# Patient Record
Sex: FEMALE | Race: WHITE | NOT HISPANIC OR LATINO | Employment: OTHER | URBAN - METROPOLITAN AREA
[De-identification: names, ages, dates, MRNs, and addresses within clinical notes are randomized per-mention and may not be internally consistent; named-entity substitution may affect disease eponyms.]

---

## 2017-08-30 ENCOUNTER — HOSPITAL ENCOUNTER (OUTPATIENT)
Dept: RADIOLOGY | Facility: HOSPITAL | Age: 62
Discharge: HOME | End: 2017-08-30
Attending: INTERNAL MEDICINE
Payer: COMMERCIAL

## 2017-08-30 DIAGNOSIS — I73.9 CLAUDICATION: ICD-10-CM

## 2017-08-30 DIAGNOSIS — R68.89 ABNORMAL ANKLE BRACHIAL INDEX (ABI): ICD-10-CM

## 2017-08-30 LAB
CREAT SERPL-MCNC: 0.6 MG/DL (ref 0.5–1.4)
SAMPLE: NORMAL

## 2017-08-30 PROCEDURE — 75635 CT ANGIO ABDOMINAL ARTERIES: CPT | Mod: 26,,, | Performed by: INTERNAL MEDICINE

## 2017-08-30 PROCEDURE — 75635 CT ANGIO ABDOMINAL ARTERIES: CPT | Mod: TC

## 2017-08-30 PROCEDURE — 25500020 PHARM REV CODE 255: Performed by: INTERNAL MEDICINE

## 2017-08-30 RX ADMIN — IOHEXOL 125 ML: 350 INJECTION, SOLUTION INTRAVENOUS at 03:08

## 2019-01-11 ENCOUNTER — OFFICE VISIT (OUTPATIENT)
Dept: SPORTS MEDICINE | Facility: CLINIC | Age: 64
End: 2019-01-11
Payer: MEDICARE

## 2019-01-11 ENCOUNTER — HOSPITAL ENCOUNTER (OUTPATIENT)
Dept: RADIOLOGY | Facility: HOSPITAL | Age: 64
Discharge: HOME | End: 2019-01-11
Attending: PHYSICIAN ASSISTANT
Payer: MEDICARE

## 2019-01-11 VITALS
SYSTOLIC BLOOD PRESSURE: 159 MMHG | HEART RATE: 85 BPM | DIASTOLIC BLOOD PRESSURE: 87 MMHG | BODY MASS INDEX: 27.99 KG/M2 | WEIGHT: 168 LBS | HEIGHT: 65 IN

## 2019-01-11 DIAGNOSIS — M79.662 PAIN OF LEFT CALF: ICD-10-CM

## 2019-01-11 DIAGNOSIS — M25.562 LEFT KNEE PAIN, UNSPECIFIED CHRONICITY: ICD-10-CM

## 2019-01-11 DIAGNOSIS — G89.29 CHRONIC PAIN OF LEFT KNEE: Primary | ICD-10-CM

## 2019-01-11 DIAGNOSIS — M25.562 CHRONIC PAIN OF LEFT KNEE: Primary | ICD-10-CM

## 2019-01-11 PROCEDURE — 99203 OFFICE O/P NEW LOW 30 MIN: CPT | Mod: S$PBB,,, | Performed by: PHYSICIAN ASSISTANT

## 2019-01-11 PROCEDURE — 99999 PR PBB SHADOW E&M-EST. PATIENT-LVL III: CPT | Mod: PBBFAC,,, | Performed by: PHYSICIAN ASSISTANT

## 2019-01-11 PROCEDURE — 73564 X-RAY EXAM KNEE 4 OR MORE: CPT | Mod: TC,50,FY,PO

## 2019-01-11 PROCEDURE — 99213 OFFICE O/P EST LOW 20 MIN: CPT | Mod: PBBFAC,25,PO | Performed by: PHYSICIAN ASSISTANT

## 2019-01-11 PROCEDURE — 99203 PR OFFICE/OUTPT VISIT, NEW, LEVL III, 30-44 MIN: ICD-10-PCS | Mod: S$PBB,,, | Performed by: PHYSICIAN ASSISTANT

## 2019-01-11 PROCEDURE — 73564 XR KNEE ORTHO BILAT WITH FLEXION: ICD-10-PCS | Mod: 26,50,, | Performed by: RADIOLOGY

## 2019-01-11 PROCEDURE — 73564 X-RAY EXAM KNEE 4 OR MORE: CPT | Mod: 26,50,, | Performed by: RADIOLOGY

## 2019-01-11 PROCEDURE — 99999 PR PBB SHADOW E&M-EST. PATIENT-LVL III: ICD-10-PCS | Mod: PBBFAC,,, | Performed by: PHYSICIAN ASSISTANT

## 2019-01-15 ENCOUNTER — CLINICAL SUPPORT (OUTPATIENT)
Dept: CARDIOLOGY | Facility: CLINIC | Age: 64
End: 2019-01-15
Attending: PHYSICIAN ASSISTANT
Payer: MEDICARE

## 2019-01-15 DIAGNOSIS — M25.562 CHRONIC PAIN OF LEFT KNEE: ICD-10-CM

## 2019-01-15 DIAGNOSIS — M79.662 PAIN OF LEFT CALF: ICD-10-CM

## 2019-01-15 DIAGNOSIS — G89.29 CHRONIC PAIN OF LEFT KNEE: ICD-10-CM

## 2019-01-15 PROCEDURE — 93971 CV US DOPPLER VENOUS LEG LEFT (CUPID ONLY): ICD-10-PCS | Mod: 26,S$PBB,LT, | Performed by: INTERNAL MEDICINE

## 2019-01-15 PROCEDURE — 93971 EXTREMITY STUDY: CPT | Mod: PBBFAC,LT | Performed by: INTERNAL MEDICINE

## 2019-01-16 ENCOUNTER — TELEPHONE (OUTPATIENT)
Dept: SPORTS MEDICINE | Facility: CLINIC | Age: 64
End: 2019-01-16

## 2019-01-16 NOTE — TELEPHONE ENCOUNTER
----- Message from Magaly Jaimes sent at 1/16/2019 10:47 AM CST -----  Contact: patient  Attn Luz    Please call pt at [phone]    Patient is returning your call regarding test results    Thank you

## 2019-02-19 PROBLEM — R60.0 BILATERAL LOWER EXTREMITY EDEMA: Status: ACTIVE | Noted: 2019-02-19

## 2019-02-19 PROBLEM — I83.93 SPIDER VEINS OF BOTH LOWER EXTREMITIES: Status: ACTIVE | Noted: 2019-02-19

## 2019-02-19 PROBLEM — I77.71: Status: ACTIVE | Noted: 2019-02-19

## 2019-02-28 ENCOUNTER — HOSPITAL ENCOUNTER (OUTPATIENT)
Dept: PREADMISSION TESTING | Facility: OTHER | Age: 64
Discharge: HOME | End: 2019-02-28
Attending: ORTHOPAEDIC SURGERY
Payer: MEDICARE

## 2019-02-28 ENCOUNTER — ANESTHESIA EVENT (OUTPATIENT)
Dept: SURGERY | Facility: OTHER | Age: 64
End: 2019-02-28
Payer: MEDICARE

## 2019-02-28 VITALS
SYSTOLIC BLOOD PRESSURE: 155 MMHG | TEMPERATURE: 98 F | HEIGHT: 65 IN | OXYGEN SATURATION: 99 % | WEIGHT: 157 LBS | BODY MASS INDEX: 26.16 KG/M2 | DIASTOLIC BLOOD PRESSURE: 77 MMHG | HEART RATE: 82 BPM

## 2019-02-28 DIAGNOSIS — M17.12 PRIMARY OSTEOARTHRITIS OF LEFT KNEE: Primary | ICD-10-CM

## 2019-02-28 LAB
ABO + RH BLD: NORMAL
ALBUMIN SERPL BCP-MCNC: 4.2 G/DL
ALP SERPL-CCNC: 107 U/L
ALT SERPL W/O P-5'-P-CCNC: 22 U/L
ANION GAP SERPL CALC-SCNC: 8 MMOL/L
ANISOCYTOSIS BLD QL SMEAR: SLIGHT
AST SERPL-CCNC: 19 U/L
BASOPHILS # BLD AUTO: 0.03 K/UL
BASOPHILS NFR BLD: 0.4 %
BILIRUB SERPL-MCNC: 0.9 MG/DL
BILIRUB UR QL STRIP: NEGATIVE
BLD GP AB SCN CELLS X3 SERPL QL: NORMAL
BUN SERPL-MCNC: 18 MG/DL
CALCIUM SERPL-MCNC: 9.5 MG/DL
CHLORIDE SERPL-SCNC: 106 MMOL/L
CHOLEST SERPL-MCNC: 210 MG/DL
CHOLEST/HDLC SERPL: 3.9 {RATIO}
CK SERPL-CCNC: 40 U/L
CLARITY UR: CLEAR
CO2 SERPL-SCNC: 28 MMOL/L
COLOR UR: YELLOW
CREAT SERPL-MCNC: 0.7 MG/DL
DIFFERENTIAL METHOD: ABNORMAL
EOSINOPHIL # BLD AUTO: 0.2 K/UL
EOSINOPHIL NFR BLD: 1.8 %
ERYTHROCYTE [DISTWIDTH] IN BLOOD BY AUTOMATED COUNT: 16.4 %
EST. GFR  (AFRICAN AMERICAN): >60 ML/MIN/1.73 M^2
EST. GFR  (NON AFRICAN AMERICAN): >60 ML/MIN/1.73 M^2
GLUCOSE SERPL-MCNC: 95 MG/DL
GLUCOSE UR QL STRIP: NEGATIVE
HCT VFR BLD AUTO: 35.9 %
HDLC SERPL-MCNC: 54 MG/DL
HDLC SERPL: 25.7 %
HGB BLD-MCNC: 11.5 G/DL
HGB UR QL STRIP: NEGATIVE
HYPOCHROMIA BLD QL SMEAR: ABNORMAL
KETONES UR QL STRIP: NEGATIVE
LDLC SERPL CALC-MCNC: 132.8 MG/DL
LEUKOCYTE ESTERASE UR QL STRIP: NEGATIVE
LYMPHOCYTES # BLD AUTO: 2.8 K/UL
LYMPHOCYTES NFR BLD: 33.2 %
MAGNESIUM SERPL-MCNC: 2.2 MG/DL
MCH RBC QN AUTO: 19.5 PG
MCHC RBC AUTO-ENTMCNC: 32 G/DL
MCV RBC AUTO: 61 FL
MONOCYTES # BLD AUTO: 0.6 K/UL
MONOCYTES NFR BLD: 7.6 %
NEUTROPHILS # BLD AUTO: 4.7 K/UL
NEUTROPHILS NFR BLD: 57 %
NITRITE UR QL STRIP: NEGATIVE
NONHDLC SERPL-MCNC: 156 MG/DL
PH UR STRIP: 7 [PH] (ref 5–8)
PLATELET # BLD AUTO: 393 K/UL
PLATELET BLD QL SMEAR: ABNORMAL
PMV BLD AUTO: 10 FL
POLYCHROMASIA BLD QL SMEAR: ABNORMAL
POTASSIUM SERPL-SCNC: 4 MMOL/L
PROT SERPL-MCNC: 7.1 G/DL
PROT UR QL STRIP: NEGATIVE
RBC # BLD AUTO: 5.9 M/UL
SODIUM SERPL-SCNC: 142 MMOL/L
SP GR UR STRIP: 1.01 (ref 1–1.03)
TRIGL SERPL-MCNC: 116 MG/DL
URN SPEC COLLECT METH UR: NORMAL
UROBILINOGEN UR STRIP-ACNC: NEGATIVE EU/DL
WBC # BLD AUTO: 8.32 K/UL

## 2019-02-28 PROCEDURE — 80061 LIPID PANEL: CPT

## 2019-02-28 PROCEDURE — 85025 COMPLETE CBC W/AUTO DIFF WBC: CPT

## 2019-02-28 PROCEDURE — 80053 COMPREHEN METABOLIC PANEL: CPT

## 2019-02-28 PROCEDURE — 83735 ASSAY OF MAGNESIUM: CPT

## 2019-02-28 PROCEDURE — 86901 BLOOD TYPING SEROLOGIC RH(D): CPT

## 2019-02-28 PROCEDURE — 82550 ASSAY OF CK (CPK): CPT

## 2019-02-28 PROCEDURE — 81003 URINALYSIS AUTO W/O SCOPE: CPT

## 2019-02-28 RX ORDER — ACETAMINOPHEN 500 MG
1000 TABLET ORAL
Status: CANCELLED | OUTPATIENT
Start: 2019-02-28 | End: 2019-02-28

## 2019-02-28 RX ORDER — SODIUM CHLORIDE, SODIUM LACTATE, POTASSIUM CHLORIDE, CALCIUM CHLORIDE 600; 310; 30; 20 MG/100ML; MG/100ML; MG/100ML; MG/100ML
INJECTION, SOLUTION INTRAVENOUS CONTINUOUS
Status: CANCELLED | OUTPATIENT
Start: 2019-02-28

## 2019-02-28 RX ORDER — LIDOCAINE HYDROCHLORIDE 10 MG/ML
0.5 INJECTION, SOLUTION EPIDURAL; INFILTRATION; INTRACAUDAL; PERINEURAL ONCE
Status: CANCELLED | OUTPATIENT
Start: 2019-02-28 | End: 2019-02-28

## 2019-02-28 NOTE — NURSING
Total Joint Replacement Questionnaire     Veronica Duque participated in Joint Class Feb 20    1. Have you ever had a Joint Replacement?   []Yes  [x] No    2. How many stairs/steps do you have to enter your home? 0  When going up, on what side is the railing?  [] Left  [] Right  [] Bilateral  [] None    3. Do you own any Durable Medical Equipment?   [] Rolling Walker  [] Standard Walker  [] Rollator  [] Cane  [] Crutches  [] Bed Side Commode  [] Hip Kit  [] Tub Transfer Bench  [] Shower Chair     4. Have you used a Home Health Company before?   [] Yes  [x] No  If Yes, Name of Company: n/a  Would you like to use this company again?   [] Yes  [] No  [x] Would you like to use your physician's preference?  [x] Yes  [] No    5. Have you arranged for someone to help you, for at least for 3 days, when you are discharged from the hospital?  [x] Yes  [] No  If Yes, Name(s) of person assisting: Brendon Jang  2/28/2019

## 2019-02-28 NOTE — ANESTHESIA PREPROCEDURE EVALUATION
02/28/2019  Veronica Duque is a 63 y.o., female.    Anesthesia Evaluation    I have reviewed the Patient Summary Reports.    I have reviewed the Nursing Notes.   I have reviewed the Medications.     Review of Systems  Anesthesia Hx:  Denies Family Hx of Anesthesia complications.  Personal Hx of Anesthesia complications, Post-Operative Nausea/Vomiting, in the past, but not with recent anesthetics / prophylaxis Slow To Awaken/Delayed Emergence and mild, somewhat sensitive to sedation / narcotics   Social:  Non-Smoker    Hematology/Oncology:  Hematology Normal   Oncology Normal     EENT/Dental:EENT/Dental Normal   Cardiovascular:   Hypertension PVD ECG has been reviewed. Pt has cristofer 50% carotid blockages that are followed with serial USG's   Pulmonary:  Pulmonary Normal    Renal/:  Renal/ Normal     Hepatic/GI:  Hepatic/GI Normal    Musculoskeletal:  Musculoskeletal Normal    Neurological:   CVA, no residual symptoms    Endocrine:  Endocrine Normal    Dermatological:  Skin Normal    Psych:   anxiety (occ xanax)          Physical Exam  General:  Well nourished    Airway/Jaw/Neck:  Airway Findings: Mouth Opening: Normal Mallampati: I  TM Distance: Normal, at least 6 cm  Jaw/Neck Findings:  Neck ROM: Normal ROM      Dental:  Dental Findings: In tact, upper braces        Mental Status:  Mental Status Findings:  Cooperative, Alert and Oriented         Anesthesia Plan  Type of Anesthesia, risks & benefits discussed:  Anesthesia Type:  spinal  Patient's Preference:   Intra-op Monitoring Plan: standard ASA monitors  Intra-op Monitoring Plan Comments:   Post Op Pain Control Plan: multimodal analgesia and per primary service following discharge from PACU  Post Op Pain Control Plan Comments:   Induction:    Beta Blocker:         Informed Consent: Patient understands risks and agrees with Anesthesia plan.   Questions answered. Anesthesia consent signed with patient.  ASA Score: 3     Day of Surgery Review of History & Physical:    H&P update referred to the surgeon.     Anesthesia Plan Notes: EKG reviewed, labs today  Dr. Mina, cardiology, told pt she was cleared for surgery        Ready For Surgery From Anesthesia Perspective.

## 2019-02-28 NOTE — DISCHARGE INSTRUCTIONS
PRE-ADMIT TESTING -  [phone]    [address]  [place] [facility]          Your surgery has been scheduled at [facility]. We are pleased to have the opportunity to serve you. For Further Information please call [phone].    On the day of surgery please report to the Information Desk on the 1st floor.    · CONTACT YOUR PHYSICIAN'S OFFICE THE DAY PRIOR TO YOUR SURGERY TO OBTAIN YOUR ARRIVAL TIME.     · The evening before surgery do not eat anything after 9 p.m. ( this includes hard candy, chewing gum and mints).  You may only have GATORADE, POWERADE AND WATER  from 9 p.m. until you leave your home.   DO NOT DRINK ANY LIQUIDS ON THE WAY TO THE HOSPITAL.      SPECIAL MEDICATION INSTRUCTIONS: TAKE medications checked off by the Anesthesiologist on your Medication List.    Angiogram Patients: Take medications as instructed by your physician, including aspirin.     Surgery Patients:    If you take ASPIRIN - Your PHYSICIAN/SURGEON will need to inform you IF/OR when you need to stop taking aspirin prior to your surgery.     Do Not take any medications containing IBUPROFEN.  Do Not Wear any make-up or dark nail polish   (especially eye make-up) to surgery. If you come to surgery with makeup on you will be required to remove the makeup or nail polish.    Do not shave your surgical area at least 5 days prior to your surgery. The surgical prep will be performed at the hospital according to Infection Control regulations.    Leave all valuables at home.   Do Not wear any jewelry or watches, including any metal in body piercings. Jewelry must be removed prior to coming to the hospital.  There is a possibility that rings that are unable to be removed may be cut off if they are on the surgical extremity.    Contact Lens must be removed before surgery. Either do not wear the contact lens or bring a case and solution for storage.  Please bring a container for eyeglasses or dentures as required.  Bring  any paperwork your physician has provided, such as consent forms,  history and physicals, doctor's orders, etc.   Bring comfortable clothes that are loose fitting to wear upon discharge. Take into consideration the type of surgery being performed.  Maintain your diet as advised per your physician the day prior to surgery.      Adequate rest the night before surgery is advised.   Park in the Parking lot behind the hospital or in the [facility] across the street from the parking lot. Parking is complimentary.  If you will be discharged the same day as your procedure, please arrange for a responsible adult to drive you home or to accompany you if traveling by taxi.   YOU WILL NOT BE PERMITTED TO DRIVE OR TO LEAVE THE HOSPITAL ALONE AFTER SURGERY.   It is strongly recommended that you arrange for someone to remain with you for the first 24 hrs following your surgery.       Thank you for your cooperation.  The Staff of [facility].        Bathing Instructions                                                                 Please shower the evening before and morning of your procedure with    ANTIBACTERIAL SOAP. ( DIAL, etc )  Concentrate on the surgical area   for at least 3 minutes and rinse completely. Dry off as usual.   Do not use any deodorant, powder, body lotions, perfume, after shave or    cologne.

## 2019-03-11 ENCOUNTER — HOSPITAL ENCOUNTER (OUTPATIENT)
Facility: OTHER | Age: 64
Discharge: HOME | End: 2019-03-12
Attending: ORTHOPAEDIC SURGERY | Admitting: ORTHOPAEDIC SURGERY
Payer: MEDICARE

## 2019-03-11 ENCOUNTER — ANESTHESIA (OUTPATIENT)
Dept: SURGERY | Facility: OTHER | Age: 64
End: 2019-03-11
Payer: MEDICARE

## 2019-03-11 DIAGNOSIS — M17.12 PRIMARY OSTEOARTHRITIS OF LEFT KNEE: Primary | ICD-10-CM

## 2019-03-11 PROCEDURE — 63600175 PHARM REV CODE 636 W HCPCS: Performed by: ORTHOPAEDIC SURGERY

## 2019-03-11 PROCEDURE — 63600175 PHARM REV CODE 636 W HCPCS: Performed by: SPECIALIST

## 2019-03-11 PROCEDURE — 37000009 HC ANESTHESIA EA ADD 15 MINS: Performed by: ORTHOPAEDIC SURGERY

## 2019-03-11 PROCEDURE — 25000003 PHARM REV CODE 250: Performed by: ORTHOPAEDIC SURGERY

## 2019-03-11 PROCEDURE — 97161 PT EVAL LOW COMPLEX 20 MIN: CPT

## 2019-03-11 PROCEDURE — 71000033 HC RECOVERY, INTIAL HOUR: Performed by: ORTHOPAEDIC SURGERY

## 2019-03-11 PROCEDURE — 25000003 PHARM REV CODE 250: Performed by: ANESTHESIOLOGY

## 2019-03-11 PROCEDURE — 71000039 HC RECOVERY, EACH ADD'L HOUR: Performed by: ORTHOPAEDIC SURGERY

## 2019-03-11 PROCEDURE — 25000003 PHARM REV CODE 250

## 2019-03-11 PROCEDURE — 94799 UNLISTED PULMONARY SVC/PX: CPT

## 2019-03-11 PROCEDURE — 97116 GAIT TRAINING THERAPY: CPT

## 2019-03-11 PROCEDURE — 36000711: Performed by: ORTHOPAEDIC SURGERY

## 2019-03-11 PROCEDURE — 63600175 PHARM REV CODE 636 W HCPCS: Performed by: NURSE ANESTHETIST, CERTIFIED REGISTERED

## 2019-03-11 PROCEDURE — 27201423 OPTIME MED/SURG SUP & DEVICES STERILE SUPPLY: Performed by: ORTHOPAEDIC SURGERY

## 2019-03-11 PROCEDURE — 37000008 HC ANESTHESIA 1ST 15 MINUTES: Performed by: ORTHOPAEDIC SURGERY

## 2019-03-11 PROCEDURE — 97110 THERAPEUTIC EXERCISES: CPT

## 2019-03-11 PROCEDURE — C1713 ANCHOR/SCREW BN/BN,TIS/BN: HCPCS | Performed by: ORTHOPAEDIC SURGERY

## 2019-03-11 PROCEDURE — 25000003 PHARM REV CODE 250: Performed by: SPECIALIST

## 2019-03-11 PROCEDURE — 63600175 PHARM REV CODE 636 W HCPCS

## 2019-03-11 PROCEDURE — 25000003 PHARM REV CODE 250: Performed by: NURSE ANESTHETIST, CERTIFIED REGISTERED

## 2019-03-11 PROCEDURE — 36000710: Performed by: ORTHOPAEDIC SURGERY

## 2019-03-11 PROCEDURE — C9290 INJ, BUPIVACAINE LIPOSOME: HCPCS | Performed by: ORTHOPAEDIC SURGERY

## 2019-03-11 PROCEDURE — C1776 JOINT DEVICE (IMPLANTABLE): HCPCS | Performed by: ORTHOPAEDIC SURGERY

## 2019-03-11 PROCEDURE — 94761 N-INVAS EAR/PLS OXIMETRY MLT: CPT

## 2019-03-11 DEVICE — IMPLANTABLE DEVICE: Type: IMPLANTABLE DEVICE | Site: KNEE | Status: FUNCTIONAL

## 2019-03-11 DEVICE — CEMENT REFOBACIN BCR 1X40: Type: IMPLANTABLE DEVICE | Site: KNEE | Status: FUNCTIONAL

## 2019-03-11 DEVICE — PATELLA NEXGEN ALL-POLY: Type: IMPLANTABLE DEVICE | Site: KNEE | Status: FUNCTIONAL

## 2019-03-11 DEVICE — TIBIAL NEXGEN OPTION STEM: Type: IMPLANTABLE DEVICE | Site: KNEE | Status: FUNCTIONAL

## 2019-03-11 RX ORDER — OXYCODONE HYDROCHLORIDE 5 MG/1
5 TABLET ORAL EVERY 6 HOURS PRN
Status: DISCONTINUED | OUTPATIENT
Start: 2019-03-11 | End: 2019-03-12 | Stop reason: HOSPADM

## 2019-03-11 RX ORDER — ROPIVACAINE HYDROCHLORIDE 5 MG/ML
INJECTION, SOLUTION EPIDURAL; INFILTRATION; PERINEURAL
Status: COMPLETED | OUTPATIENT
Start: 2019-03-11 | End: 2019-03-11

## 2019-03-11 RX ORDER — HYDROMORPHONE HYDROCHLORIDE 2 MG/ML
0.4 INJECTION, SOLUTION INTRAMUSCULAR; INTRAVENOUS; SUBCUTANEOUS EVERY 5 MIN PRN
Status: DISCONTINUED | OUTPATIENT
Start: 2019-03-11 | End: 2019-03-11 | Stop reason: HOSPADM

## 2019-03-11 RX ORDER — DIPHENHYDRAMINE HYDROCHLORIDE 50 MG/ML
25 INJECTION INTRAMUSCULAR; INTRAVENOUS EVERY 8 HOURS PRN
Status: DISCONTINUED | OUTPATIENT
Start: 2019-03-11 | End: 2019-03-12 | Stop reason: HOSPADM

## 2019-03-11 RX ORDER — SODIUM CHLORIDE 0.9 % (FLUSH) 0.9 %
3 SYRINGE (ML) INJECTION
Status: DISCONTINUED | OUTPATIENT
Start: 2019-03-11 | End: 2019-03-12 | Stop reason: HOSPADM

## 2019-03-11 RX ORDER — FAMOTIDINE 20 MG/1
20 TABLET, FILM COATED ORAL 2 TIMES DAILY
Status: DISCONTINUED | OUTPATIENT
Start: 2019-03-11 | End: 2019-03-12 | Stop reason: HOSPADM

## 2019-03-11 RX ORDER — ACETAMINOPHEN 500 MG
1000 TABLET ORAL
Status: COMPLETED | OUTPATIENT
Start: 2019-03-11 | End: 2019-03-11

## 2019-03-11 RX ORDER — HYDROCHLOROTHIAZIDE 25 MG/1
25 TABLET ORAL DAILY
Status: DISCONTINUED | OUTPATIENT
Start: 2019-03-12 | End: 2019-03-12 | Stop reason: HOSPADM

## 2019-03-11 RX ORDER — FENTANYL CITRATE 50 UG/ML
25 INJECTION, SOLUTION INTRAMUSCULAR; INTRAVENOUS EVERY 5 MIN PRN
Status: DISCONTINUED | OUTPATIENT
Start: 2019-03-11 | End: 2019-03-11 | Stop reason: HOSPADM

## 2019-03-11 RX ORDER — DOCUSATE SODIUM 100 MG/1
100 CAPSULE, LIQUID FILLED ORAL EVERY 12 HOURS
Status: DISCONTINUED | OUTPATIENT
Start: 2019-03-11 | End: 2019-03-12 | Stop reason: HOSPADM

## 2019-03-11 RX ORDER — TRANEXAMIC ACID 100 MG/ML
INJECTION, SOLUTION INTRAVENOUS
Status: DISCONTINUED | OUTPATIENT
Start: 2019-03-11 | End: 2019-03-11

## 2019-03-11 RX ORDER — OXYCODONE HYDROCHLORIDE 5 MG/1
5 TABLET ORAL EVERY 4 HOURS PRN
Status: DISCONTINUED | OUTPATIENT
Start: 2019-03-11 | End: 2019-03-12 | Stop reason: HOSPADM

## 2019-03-11 RX ORDER — CEFAZOLIN SODIUM 2 G/50ML
2 SOLUTION INTRAVENOUS
Status: COMPLETED | OUTPATIENT
Start: 2019-03-11 | End: 2019-03-12

## 2019-03-11 RX ORDER — VANCOMYCIN HCL IN 5 % DEXTROSE 1G/250ML
15 PLASTIC BAG, INJECTION (ML) INTRAVENOUS
Status: COMPLETED | OUTPATIENT
Start: 2019-03-11 | End: 2019-03-11

## 2019-03-11 RX ORDER — MIDAZOLAM HYDROCHLORIDE 1 MG/ML
INJECTION INTRAMUSCULAR; INTRAVENOUS
Status: DISCONTINUED | OUTPATIENT
Start: 2019-03-11 | End: 2019-03-11

## 2019-03-11 RX ORDER — LIDOCAINE HYDROCHLORIDE 10 MG/ML
0.5 INJECTION, SOLUTION EPIDURAL; INFILTRATION; INTRACAUDAL; PERINEURAL ONCE
Status: DISCONTINUED | OUTPATIENT
Start: 2019-03-11 | End: 2019-03-11 | Stop reason: HOSPADM

## 2019-03-11 RX ORDER — POTASSIUM CHLORIDE 750 MG/1
10 TABLET, EXTENDED RELEASE ORAL ONCE
Status: COMPLETED | OUTPATIENT
Start: 2019-03-12 | End: 2019-03-12

## 2019-03-11 RX ORDER — CEFAZOLIN SODIUM 1 G/3ML
2 INJECTION, POWDER, FOR SOLUTION INTRAMUSCULAR; INTRAVENOUS
Status: COMPLETED | OUTPATIENT
Start: 2019-03-11 | End: 2019-03-11

## 2019-03-11 RX ORDER — POLYETHYLENE GLYCOL 3350 17 G/17G
17 POWDER, FOR SOLUTION ORAL DAILY
Status: DISCONTINUED | OUTPATIENT
Start: 2019-03-12 | End: 2019-03-12 | Stop reason: HOSPADM

## 2019-03-11 RX ORDER — LOSARTAN POTASSIUM 50 MG/1
100 TABLET ORAL DAILY
Status: DISCONTINUED | OUTPATIENT
Start: 2019-03-12 | End: 2019-03-12 | Stop reason: HOSPADM

## 2019-03-11 RX ORDER — ONDANSETRON 2 MG/ML
8 INJECTION INTRAMUSCULAR; INTRAVENOUS EVERY 8 HOURS PRN
Status: DISCONTINUED | OUTPATIENT
Start: 2019-03-11 | End: 2019-03-12 | Stop reason: HOSPADM

## 2019-03-11 RX ORDER — MUPIROCIN 20 MG/G
1 OINTMENT TOPICAL 2 TIMES DAILY
Status: DISCONTINUED | OUTPATIENT
Start: 2019-03-11 | End: 2019-03-12 | Stop reason: HOSPADM

## 2019-03-11 RX ORDER — SODIUM CHLORIDE 0.9 % (FLUSH) 0.9 %
5 SYRINGE (ML) INJECTION
Status: DISCONTINUED | OUTPATIENT
Start: 2019-03-11 | End: 2019-03-12 | Stop reason: HOSPADM

## 2019-03-11 RX ORDER — OXYCODONE HYDROCHLORIDE 5 MG/1
5 TABLET ORAL
Status: DISCONTINUED | OUTPATIENT
Start: 2019-03-11 | End: 2019-03-11 | Stop reason: HOSPADM

## 2019-03-11 RX ORDER — BUPIVACAINE HCL/EPINEPHRINE 0.25-.0005
VIAL (ML) INJECTION
Status: DISCONTINUED | OUTPATIENT
Start: 2019-03-11 | End: 2019-03-11 | Stop reason: HOSPADM

## 2019-03-11 RX ORDER — PROPOFOL 10 MG/ML
VIAL (ML) INTRAVENOUS CONTINUOUS PRN
Status: DISCONTINUED | OUTPATIENT
Start: 2019-03-11 | End: 2019-03-11

## 2019-03-11 RX ORDER — ACETAMINOPHEN 500 MG
1000 TABLET ORAL EVERY 8 HOURS
Status: COMPLETED | OUTPATIENT
Start: 2019-03-11 | End: 2019-03-12

## 2019-03-11 RX ORDER — MEPERIDINE HYDROCHLORIDE 25 MG/ML
12.5 INJECTION INTRAMUSCULAR; INTRAVENOUS; SUBCUTANEOUS ONCE AS NEEDED
Status: DISCONTINUED | OUTPATIENT
Start: 2019-03-11 | End: 2019-03-11 | Stop reason: HOSPADM

## 2019-03-11 RX ORDER — OXYCODONE HYDROCHLORIDE 5 MG/1
10 TABLET ORAL EVERY 4 HOURS PRN
Status: DISCONTINUED | OUTPATIENT
Start: 2019-03-11 | End: 2019-03-12 | Stop reason: HOSPADM

## 2019-03-11 RX ORDER — DIPHENHYDRAMINE HYDROCHLORIDE 50 MG/ML
25 INJECTION INTRAMUSCULAR; INTRAVENOUS EVERY 6 HOURS PRN
Status: DISCONTINUED | OUTPATIENT
Start: 2019-03-11 | End: 2019-03-11 | Stop reason: HOSPADM

## 2019-03-11 RX ORDER — AMLODIPINE BESYLATE 5 MG/1
5 TABLET ORAL DAILY
Status: DISCONTINUED | OUTPATIENT
Start: 2019-03-12 | End: 2019-03-12 | Stop reason: HOSPADM

## 2019-03-11 RX ORDER — POTASSIUM CHLORIDE 20 MEQ/1
20 TABLET, EXTENDED RELEASE ORAL ONCE
Status: DISCONTINUED | OUTPATIENT
Start: 2019-03-12 | End: 2019-03-11

## 2019-03-11 RX ORDER — CELECOXIB 200 MG/1
200 CAPSULE ORAL 2 TIMES DAILY
Status: DISCONTINUED | OUTPATIENT
Start: 2019-03-11 | End: 2019-03-12 | Stop reason: HOSPADM

## 2019-03-11 RX ORDER — BACITRACIN 50000 [IU]/1
INJECTION, POWDER, FOR SOLUTION INTRAMUSCULAR
Status: DISCONTINUED | OUTPATIENT
Start: 2019-03-11 | End: 2019-03-11 | Stop reason: HOSPADM

## 2019-03-11 RX ORDER — ONDANSETRON 2 MG/ML
INJECTION INTRAMUSCULAR; INTRAVENOUS
Status: DISCONTINUED | OUTPATIENT
Start: 2019-03-11 | End: 2019-03-11

## 2019-03-11 RX ORDER — SODIUM CHLORIDE, SODIUM LACTATE, POTASSIUM CHLORIDE, CALCIUM CHLORIDE 600; 310; 30; 20 MG/100ML; MG/100ML; MG/100ML; MG/100ML
INJECTION, SOLUTION INTRAVENOUS CONTINUOUS
Status: DISCONTINUED | OUTPATIENT
Start: 2019-03-11 | End: 2019-03-11

## 2019-03-11 RX ORDER — KETOROLAC TROMETHAMINE 30 MG/ML
INJECTION, SOLUTION INTRAMUSCULAR; INTRAVENOUS
Status: DISCONTINUED | OUTPATIENT
Start: 2019-03-11 | End: 2019-03-11 | Stop reason: HOSPADM

## 2019-03-11 RX ORDER — ONDANSETRON 2 MG/ML
4 INJECTION INTRAMUSCULAR; INTRAVENOUS DAILY PRN
Status: DISCONTINUED | OUTPATIENT
Start: 2019-03-11 | End: 2019-03-11 | Stop reason: HOSPADM

## 2019-03-11 RX ORDER — ASPIRIN 325 MG
325 TABLET ORAL EVERY 12 HOURS
Status: DISCONTINUED | OUTPATIENT
Start: 2019-03-12 | End: 2019-03-12 | Stop reason: HOSPADM

## 2019-03-11 RX ORDER — DEXTROSE MONOHYDRATE AND SODIUM CHLORIDE 5; .9 G/100ML; G/100ML
INJECTION, SOLUTION INTRAVENOUS CONTINUOUS
Status: DISCONTINUED | OUTPATIENT
Start: 2019-03-11 | End: 2019-03-12 | Stop reason: HOSPADM

## 2019-03-11 RX ORDER — HYDROMORPHONE HYDROCHLORIDE 1 MG/ML
0.5 INJECTION, SOLUTION INTRAMUSCULAR; INTRAVENOUS; SUBCUTANEOUS EVERY 4 HOURS PRN
Status: DISCONTINUED | OUTPATIENT
Start: 2019-03-11 | End: 2019-03-12 | Stop reason: HOSPADM

## 2019-03-11 RX ORDER — LIDOCAINE HCL/PF 100 MG/5ML
SYRINGE (ML) INTRAVENOUS
Status: DISCONTINUED | OUTPATIENT
Start: 2019-03-11 | End: 2019-03-11

## 2019-03-11 RX ADMIN — SODIUM CHLORIDE, SODIUM LACTATE, POTASSIUM CHLORIDE, AND CALCIUM CHLORIDE: 600; 310; 30; 20 INJECTION, SOLUTION INTRAVENOUS at 07:03

## 2019-03-11 RX ADMIN — CEFAZOLIN SODIUM 2 G: 2 SOLUTION INTRAVENOUS at 04:03

## 2019-03-11 RX ADMIN — VANCOMYCIN HYDROCHLORIDE 1000 MG: 1 INJECTION, POWDER, FOR SOLUTION INTRAVENOUS at 08:03

## 2019-03-11 RX ADMIN — DEXTROSE MONOHYDRATE AND SODIUM CHLORIDE: 5; .9 INJECTION, SOLUTION INTRAVENOUS at 12:03

## 2019-03-11 RX ADMIN — MIDAZOLAM HYDROCHLORIDE 2 MG: 1 INJECTION, SOLUTION INTRAMUSCULAR; INTRAVENOUS at 08:03

## 2019-03-11 RX ADMIN — CEFAZOLIN 2 G: 330 INJECTION, POWDER, FOR SOLUTION INTRAMUSCULAR; INTRAVENOUS at 08:03

## 2019-03-11 RX ADMIN — FAMOTIDINE 20 MG: 20 TABLET ORAL at 08:03

## 2019-03-11 RX ADMIN — FAMOTIDINE 20 MG: 20 TABLET ORAL at 12:03

## 2019-03-11 RX ADMIN — MUPIROCIN 1 G: 20 OINTMENT TOPICAL at 08:03

## 2019-03-11 RX ADMIN — DOCUSATE SODIUM 100 MG: 100 CAPSULE, LIQUID FILLED ORAL at 12:03

## 2019-03-11 RX ADMIN — TRANEXAMIC ACID 700 MG: 100 INJECTION, SOLUTION INTRAVENOUS at 08:03

## 2019-03-11 RX ADMIN — DOCUSATE SODIUM 100 MG: 100 CAPSULE, LIQUID FILLED ORAL at 08:03

## 2019-03-11 RX ADMIN — ONDANSETRON 8 MG: 2 INJECTION INTRAMUSCULAR; INTRAVENOUS at 04:03

## 2019-03-11 RX ADMIN — ONDANSETRON 4 MG: 2 INJECTION INTRAMUSCULAR; INTRAVENOUS at 10:03

## 2019-03-11 RX ADMIN — PROPOFOL 100 MCG/KG/MIN: 10 INJECTION, EMULSION INTRAVENOUS at 08:03

## 2019-03-11 RX ADMIN — OXYCODONE HYDROCHLORIDE 10 MG: 5 TABLET ORAL at 04:03

## 2019-03-11 RX ADMIN — PROMETHAZINE HYDROCHLORIDE 6.25 MG: 25 INJECTION INTRAMUSCULAR; INTRAVENOUS at 10:03

## 2019-03-11 RX ADMIN — SODIUM CHLORIDE, SODIUM LACTATE, POTASSIUM CHLORIDE, AND CALCIUM CHLORIDE: 600; 310; 30; 20 INJECTION, SOLUTION INTRAVENOUS at 09:03

## 2019-03-11 RX ADMIN — VANCOMYCIN HYDROCHLORIDE 1000 MG: 1 INJECTION, POWDER, FOR SOLUTION INTRAVENOUS at 07:03

## 2019-03-11 RX ADMIN — LIDOCAINE HYDROCHLORIDE 75 MG: 20 INJECTION, SOLUTION INTRAVENOUS at 08:03

## 2019-03-11 RX ADMIN — ACETAMINOPHEN 1000 MG: 500 TABLET ORAL at 02:03

## 2019-03-11 RX ADMIN — OXYCODONE HYDROCHLORIDE 10 MG: 5 TABLET ORAL at 12:03

## 2019-03-11 RX ADMIN — ACETAMINOPHEN 1000 MG: 500 TABLET ORAL at 09:03

## 2019-03-11 RX ADMIN — MUPIROCIN 1 G: 20 OINTMENT TOPICAL at 12:03

## 2019-03-11 RX ADMIN — TRANEXAMIC ACID 700 MG: 100 INJECTION, SOLUTION INTRAVENOUS at 09:03

## 2019-03-11 RX ADMIN — CELECOXIB 200 MG: 200 CAPSULE ORAL at 12:03

## 2019-03-11 RX ADMIN — ROPIVACAINE HYDROCHLORIDE 3 ML: 5 INJECTION, SOLUTION EPIDURAL; INFILTRATION; PERINEURAL at 08:03

## 2019-03-11 RX ADMIN — ACETAMINOPHEN 1000 MG: 500 TABLET ORAL at 07:03

## 2019-03-11 RX ADMIN — CELECOXIB 200 MG: 200 CAPSULE ORAL at 08:03

## 2019-03-11 NOTE — ANESTHESIA POSTPROCEDURE EVALUATION
"Anesthesia Post Evaluation    Patient: Veronica Duque    Procedure(s) Performed: Procedure(s) (LRB):  ARTHROPLASTY, KNEE, SIGHT ASSISTED (Left)    Final Anesthesia Type: spinal  Patient location during evaluation: PACU  Patient participation: Yes- Able to Participate  Level of consciousness: awake and alert and oriented  Post-procedure vital signs: reviewed and stable  Pain management: adequate  Airway patency: patent  PONV status at discharge: No PONV      Cardiovascular status: blood pressure returned to baseline and hemodynamically stable  Respiratory status: unassisted, spontaneous ventilation and nasal cannula  Hydration status: euvolemic  Follow-up not needed.        Visit Vitals  BP (!) 141/67   Pulse (!) 59   Temp 36.4 °C (97.6 °F) (Oral)   Resp 18   Ht 5' 5" (1.651 m)   Wt 71.2 kg (156 lb 15.5 oz)   SpO2 100%   Breastfeeding? No   BMI 26.12 kg/m²       Pain/Chelsey Score: Pain Rating Prior to Med Admin: 0 (3/11/2019 11:20 AM)  Pain Rating Post Med Admin: 0 (3/11/2019 10:40 AM)  Chelsey Score: 10 (3/11/2019 11:20 AM)        "

## 2019-03-11 NOTE — PLAN OF CARE
Home Health referral faxed to [facility] as outpatient recovery class prevents use of [facility] - Deirdre from [facility] accepted patient. Robert from [facility] gave approval to pull rolling walker & bedside commode from our supply - [facility] to deliver to bedside      03/11/19 1324   Final Note   Assessment Type Final Discharge Note   Anticipated Discharge Disposition Home-Health   What phone number can be called within the next 1-3 days to see how you are doing after discharge? [phone]   Discharge plans and expectations educations in teach back method with documentation complete? Yes

## 2019-03-11 NOTE — NURSING
Rec'd to room 362 via bed, nand. Resp even and unlabored on room air. AAOx4. Dressing to right knee CDI with polar care in place. SCD's and IV's initiated. Family at bedside. Oriented to room and call system. Bed in lowest locked position, side rails elevated x2, cb in reach. Will cont to monitor

## 2019-03-11 NOTE — PT/OT/SLP PROGRESS
Physical Therapy Treatment    Patient Name:  Veronica Duque   MRN:  [id number]    Recommendations:     Discharge Recommendations:  other (see comments)(Home with HH)   Discharge Equipment Recommendations: commode, walker, rolling   Barriers to discharge: None    Assessment:     Veronica Duque is a 63 y.o. female admitted with a medical diagnosis of Primary osteoarthritis of left knee.  She presents with the following impairments/functional limitations:  weakness, impaired endurance, decreased lower extremity function, decreased ROM, impaired balance, gait instability, impaired functional mobilty, pain .  Progressing toward goals.  Dc in am    Rehab Prognosis: Good; patient would benefit from acute skilled PT services to address these deficits and reach maximum level of function.    Recent Surgery: Procedure(s) (LRB):  ARTHROPLASTY, KNEE, SIGHT ASSISTED (Left) Day of Surgery    Plan:     During this hospitalization, patient to be seen BID to address the identified rehab impairments via gait training, therapeutic activities, therapeutic exercises and progress toward the following goals:    · Plan of Care Expires:  04/10/19    Subjective     Chief Complaint: sleepy  Patient/Family Comments/goals: PLOF  Pain/Comfort:  · Pain Rating 1: 2/10  · Location - Side 1: Left  · Location - Orientation 1: generalized  · Location 1: knee  · Pain Addressed 1: Pre-medicate for activity, Reposition, Distraction, Nurse notified  · Pain Rating Post-Intervention 1: 2/10      Objective:     Communicated with nurse   prior to session.  Patient found up in chair  With  peripheral IV, cryotherapy, myrick catheter  upon PT entry to room.     General Precautions: Standard, fall   Orthopedic Precautions:LLE weight bearing as tolerated   Braces: N/A     Functional Mobility:  · Bed Mobility:     · Sit to Supine: stand by assistance  · Transfers:     · Sit to Stand:  stand by assistance with rolling walker  · Gait: pt amb 175' with RW  "and CGA.  cues for sequence  · Balance: fair standing      AM-PAC 6 CLICK MOBILITY  Turning over in bed (including adjusting bedclothes, sheets and blankets)?: 3  Sitting down on and standing up from a chair with arms (e.g., wheelchair, bedside commode, etc.): 3  Moving from lying on back to sitting on the side of the bed?: 3  Moving to and from a bed to a chair (including a wheelchair)?: 3  Need to walk in hospital room?: 3  Climbing 3-5 steps with a railing?: 3  Basic Mobility Total Score: 18       Therapeutic Activities and Exercises:   gait as above.   Pt performed 1 set of 10 reps of LLE  Sitting  Ankle pumps  Hip flex  Long arc quads   In bed   1. Glut sets  2. Quad sets  3. Ankle pumps  4. Hip abd/add  5. SLR  6. Knee flexion (heel slides)  7. Short arc quads  Pt required verbal cues, tactile cues and visual cues for technique in order to complete.       Patient left HOB elevated with all lines intact, call button in reach, bed alarm on, nurse notified and spouse present..    GOALS:   Multidisciplinary Problems     Physical Therapy Goals        Problem: Physical Therapy Goal    Goal Priority Disciplines Outcome Goal Variances Interventions   Physical Therapy Goal     PT, PT/OT Ongoing (interventions implemented as appropriate)     Description:  Goals to be met by: 3/25/19    Patient will increase functional independence with mobility by performin. Supine to sit with supervision.   2. Sit to supine with supervision.   3. Sit<>stand transfer with supervision using rolling walker.   4. Gait > 150 feet with SBA using rolling walker.   5. Ascend/descend 4" curb step with RW and with CGA  6.  Able to perform 1 set of 10 reps of TKR ex with assist as needed                     Time Tracking:     PT Received On: 19  PT Start Time: 160     PT Stop Time: 1649  PT Total Time (min): 40 min     Billable Minutes: Gait Training 16 and Therapeutic Exercise 24    Treatment Type: Treatment  PT/PTA: PT     PTA " Visit Number: 0     Nati Matute, PT  03/11/2019

## 2019-03-11 NOTE — PLAN OF CARE
NITA met with pt at bedside to complete discharge assessment, verified PCP and uses [facility] in [place].  Pt has a shower bench, needs RW and BSC.  NITA gave pt a list of HH agencies and pt would like to be placed with [facility] (pt's mother has this agency) and signed choice form.  Spouse will provide transportation upon discharge.     03/11/19 1250   Discharge Assessment   Assessment Type Discharge Planning Assessment   Confirmed/corrected address and phone number on facesheet? Yes   Assessment information obtained from? Patient   Communicated expected length of stay with patient/caregiver no   Prior to hospitilization cognitive status: Alert/Oriented   Prior to hospitalization functional status: Independent   Current cognitive status: Alert/Oriented   Current Functional Status: Assistive Equipment;Needs Assistance   Lives With spouse   Able to Return to Prior Arrangements yes   Is patient able to care for self after discharge? Unable to determine at this time (comments)   Readmission Within the Last 30 Days no previous admission in last 30 days   Patient currently being followed by outpatient case management? No   Patient currently receives any other outside agency services? No   Equipment Currently Used at Home shower chair   Do you have any problems affording any of your prescribed medications? No   Is the patient taking medications as prescribed? yes   Does the patient have transportation home? Yes   Transportation Anticipated family or friend will provide   Does the patient receive services at the Coumadin Clinic? No   Discharge Plan A Home Health   DME Needed Upon Discharge  bedside commode;celia bejarano   Patient/Family in Agreement with Plan yes

## 2019-03-11 NOTE — TRANSFER OF CARE
"Anesthesia Transfer of Care Note    Patient: Veronica Duuqe    Procedure(s) Performed: Procedure(s) (LRB):  ARTHROPLASTY, KNEE, SIGHT ASSISTED (Left)    Patient location: PACU    Anesthesia Type: spinal    Transport from OR: Transported from OR on 2-3 L/min O2 by NC with adequate spontaneous ventilation    Post pain: adequate analgesia    Post assessment: no apparent anesthetic complications    Post vital signs: stable    Level of consciousness: awake, alert and oriented    Nausea/Vomiting: no nausea/vomiting    Complications: none    Transfer of care protocol was followed      Last vitals:   Visit Vitals  BP (!) 186/84 (BP Location: Left arm, Patient Position: Lying)   Pulse 74   Temp 36.6 °C (97.8 °F) (Oral)   Resp 18   Ht 5' 5" (1.651 m)   Wt 71.2 kg (156 lb 15.5 oz)   SpO2 98%   Breastfeeding? No   BMI 26.12 kg/m²     "

## 2019-03-11 NOTE — CONSULTS
Consult Note  Nephrology    Consult Requested By: Sánchez Haile MD  Reason for Consult:HTN    SUBJECTIVE:     History of Present Illness:  Patient is a 63 y.o. female seen s/p TKA doing very well. Denies CP, Palps, SOB, Nausea, Vomitting.  .    Past Medical History:   Diagnosis Date    Arthritis     General anesthetics causing adverse effect in therapeutic use     slow to wake up    Hypertension     PONV (postoperative nausea and vomiting)     Stroke      Past Surgical History:   Procedure Laterality Date    ARTHROSCOPY OF KNEE      AUGMENTATION OF BREAST      COSMETIC SURGERY      HYSTERECTOMY      NASAL SEPTOPLASTY       Family History   Problem Relation Age of Onset    Heart attack Mother     Heart disease Mother     Heart attack Father     Heart disease Father      Social History     Tobacco Use    Smoking status: Never Smoker    Smokeless tobacco: Never Used   Substance Use Topics    Alcohol use: Yes     Comment: Socially    Drug use: Not on file       Medications Prior to Admission   Medication Sig Dispense Refill Last Dose    amLODIPine (NORVASC) 5 MG tablet Take 1 tablet (5 mg total) by mouth once daily. 30 tablet 11 3/11/2019 at 0545    alprazolam (XANAX) 0.25 MG tablet Take 1 tablet (0.25 mg total) by mouth 2 (two) times daily as needed. (Patient taking differently: Take 0.25 mg by mouth daily as needed. ) 30 tablet 3 3/7/2019    aspirin (ECOTRIN) 81 MG EC tablet Take 81 mg by mouth once daily.   3/6/2019    losartan-hydrochlorothiazide 100-25 mg (HYZAAR) 100-25 mg per tablet TAKE 1 TABLET BY MOUTH EVERY DAY 90 tablet 3 3/9/2019    potassium chloride (MICRO-K) 10 MEQ CpSR TAKE 1 CAPSULE(10 MEQ) BY MOUTH EVERY DAY 30 capsule 11 3/9/2019    valACYclovir (VALTREX) 500 MG tablet Take 500 mg by mouth daily as needed.   0 More than a month at Unknown time       Review of patient's allergies indicates:  No Known Allergies     Review of Systems:  Constitutional: No fever or chills, no  weight changes.  Eyes: No visual changes or photophobia  HEENT: No nasal congestion or sore throat  Respiratory: No cough or shorness of breath  Cardiovascular: No chest pain or palpitations  Gastrointestinal: Good appetite, no nausea or vomiting, no change in bowel habits  Genitourinary: No hematuria or dysuria  Skin: No rash or pruritis  Hematologic/lymphatic: No easy bruising, bleeding or lymphadenopathy  Musculoskeletal: No arthralgias or myalgias  Neurological: No seizures or tremors  Endocrine: No heat/cold intolerance.  No polyuria/polydipsia.  Psychiatric:  No depression or anxiety.     OBJECTIVE:     Vital Signs (Most Recent)  Temp: 97.2 °F (36.2 °C) (03/11/19 1537)  Pulse: 82 (03/11/19 1537)  Resp: 20 (03/11/19 1537)  BP: (!) 175/79 (03/11/19 1537)  SpO2: 98 % (03/11/19 1537)    Vital Signs Range (Last 24H):  Temp:  [97.2 °F (36.2 °C)-98.1 °F (36.7 °C)]   Pulse:  [59-82]   Resp:  [18-20]   BP: (141-186)/(65-93)   SpO2:  [98 %-100 %]     Physical Exam:    General appearance: Well developed, well nourished  Head: Normocephalic, atraumatic  Eyes:  Conjunctivae nl. Sclera anicteric. PERRL.  HEENT: Lips, mucosa, and tongue normal; teeth and gums normal and oropharynx clear.  Neck: Supple, trachea midline, thyroid not enlarged,   Lungs: Clear to auscultation bilaterally and normal respiratory effort  Heart: Regular rate and rhythm, S1, S2 normal, no murmur, click, rub or gallop  Abdomen: Soft, non-tender non-distended; bowel sounds normal; no masses,  no organomegaly  Extremities: No cyanosis or clubbing. No edema, Left Knee dressed  Pulses: 2+ and symmetric  Neurologic: Normal strength and tone. No focal numbness or weakness    Diagnostic Results:  Labs: Reviewed    ASSESSMENT/PLAN:     1. HTBN: continue home meds with hold parameters  2. Hx hypokalemia: on oral supplement at home and would continue  3. Bilat Carotid stenosis- cleared by cardiology  4. Hx Bilateral LE edema: had neg venous doppler 1/2019,  currently no significant edema.  5. 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 Hypercholesterolemia-Not on STatin currently  7. Mild Anemia; pre-op Hgb 11.5, should have w/u with PCP  8. OA Left knee s/p Left TKA/ DVT prophylaxis: per ortho,  mg q12o

## 2019-03-11 NOTE — PT/OT/SLP PROGRESS
Occupational Therapy  Not Seen    Veronica Duque   MRN: [id number]     Patient not seen for Occupational Therapy today due to ( ) departmental protocol for elective surgery patients.    Patient with Primary osteoarthritis of left knee [M17.12], s/p Procedure(s):  ARTHROPLASTY, KNEE, SIGHT ASSISTED 3/11/2019 who will be seen for Occupational Therapy evaluation POD#1.    Marianela Ware, OTR/L   3/11/2019

## 2019-03-11 NOTE — PT/OT/SLP EVAL
"Physical Therapy Evaluation and treatment    Patient Name:  Veronica Duque   MRN:  [id number]    Recommendations:     Discharge Recommendations:  other (see comments)(Home with HH)   Discharge Equipment Recommendations: commode, walker, rolling   Barriers to discharge: None    Assessment:     Veronica Duque is a 63 y.o. female admitted with a medical diagnosis of Primary osteoarthritis of left knee.  She presents with the following impairments/functional limitations:  weakness, impaired endurance, decreased lower extremity function, decreased ROM, impaired balance, gait instability, impaired functional mobilty, pain . anticipate home tomorrow after am therapy.    Rehab Prognosis: Good; patient would benefit from acute skilled PT services to address these deficits and reach maximum level of function.    Recent Surgery: Procedure(s) (LRB):  ARTHROPLASTY, KNEE, SIGHT ASSISTED (Left) Day of Surgery    Plan:     During this hospitalization, patient to be seen BID to address the identified rehab impairments via gait training, therapeutic activities, therapeutic exercises and progress toward the following goals:    · Plan of Care Expires:  04/10/19    Subjective     Chief Complaint: knee pain   Patient/Family Comments/goals: PLOF  Pain/Comfort:  · Pain Rating 1: 3/10  · Location - Side 1: Left  · Location - Orientation 1: generalized  · Location 1: knee  · Pain Addressed 1: Pre-medicate for activity, Reposition, Distraction, Cessation of Activity, Nurse notified  · Pain Rating Post-Intervention 1: 3/10    Patients cultural, spiritual, Quaker conflicts given the current situation: no    Living Environment:  Pt lives with spouse in 2 story house with 4" ELLA.  Has 1st floor bed and bath with shower stall-has SC, handicap toilet   Prior to admission, patients level of function was I with ADL and amb without AD, drives, does all iADL's, works from home .  Equipment used at home: none.  DME owned (not " currently used): none.  Upon discharge, patient will have assistance from family-spouse or daughter.    Objective:     Communicated with nursing prior to session.  Patient found sitting up in bed just finished eating with  peripheral IV, cryotherapy, myrick catheter  upon PT entry to room.cryotherapy appeared to have leaked on bed and bandage- nurse to come change     General Precautions: Standard, fall   Orthopedic Precautions:LLE weight bearing as tolerated   Braces: N/A     Exams:  · Cognitive Exam:  Patient is oriented to Person, Place, Time and Situation  · Gross Motor Coordination:  WFL  · Postural Exam:  Patient presented with the following abnormalities:    · -       Rounded shoulders  · -       mild L knee flex  · Sensation:    · -       Intact  light/touch BLE  · Skin Integrity/Edema:      · -       Skin integrity: LLE in surgical bandage   · -       Edema: L knee  · RLE ROM: WFL  · RLE Strength: WFL  · LLE ROM: Deficits: decreased in knee, ankle WFL  · LLE Strength: Deficits: fair quad contraction, assist to SLR, ankle WFL    Functional Mobility:  · Bed Mobility:     · Supine to Sit: minimum assistance  · Transfers:     · Sit to Stand:  contact guard assistance with rolling walker  · Gait: pt amb 140' with RW and CGA.  instructed in walker management and gt sequence-heel toe reciprocal gait.  initially keeping L leg stiff and no flex along with step to gait.  cues given during gait.  decreased jess  · Balance: fair standing      Therapeutic Activities and Exercises:   PT eval.  Gait as above  Pt performed 1 set of 10 reps of LLE  Sitting   Ankle pumps  Hip flex  Long arc quads     2. Quad sets  3. Ankle pumps  4. Hip abd/add  5. SLR  6. Knee flexion (heel slides)  Pt required verbal cues, tactile cues and visual cues for technique in order to complete.       AM-PAC 6 CLICK MOBILITY  Total Score:18     Patient left up in chair with all lines intact, call button in reach, nurse notified and family  "present.    GOALS:   Multidisciplinary Problems     Physical Therapy Goals        Problem: Physical Therapy Goal    Goal Priority Disciplines Outcome Goal Variances Interventions   Physical Therapy Goal     PT, PT/OT Ongoing (interventions implemented as appropriate)     Description:  Goals to be met by: 3/25/19    Patient will increase functional independence with mobility by performin. Supine to sit with supervision.   2. Sit to supine with supervision.   3. Sit<>stand transfer with supervision using rolling walker.   4. Gait > 150 feet with SBA using rolling walker.   5. Ascend/descend 4" curb step with RW and with CGA  6.  Able to perform 1 set of 10 reps of TKR ex with assist as needed                     History:     Past Medical History:   Diagnosis Date    Arthritis     General anesthetics causing adverse effect in therapeutic use     slow to wake up    Hypertension     PONV (postoperative nausea and vomiting)     Stroke        Past Surgical History:   Procedure Laterality Date    ARTHROSCOPY OF KNEE      AUGMENTATION OF BREAST      COSMETIC SURGERY      HYSTERECTOMY      NASAL SEPTOPLASTY         Time Tracking:     PT Received On: 19  PT Start Time: 1349     PT Stop Time: 1430  PT Total Time (min): 41 min     Billable Minutes: Evaluation 15, Gait Training 12 and Therapeutic Exercise 14      Nati Matute, PT  2019  "

## 2019-03-11 NOTE — PLAN OF CARE
"Problem: Physical Therapy Goal  Goal: Physical Therapy Goal  Goals to be met by: 3/25/19    Patient will increase functional independence with mobility by performin. Supine to sit with supervision.   2. Sit to supine with supervision.   3. Sit<>stand transfer with supervision using rolling walker.   4. Gait > 150 feet with SBA using rolling walker.   5. Ascend/descend 4" curb step with RW and with CGA  6.  Able to perform 1 set of 10 reps of TKR ex with assist as needed   Outcome: Ongoing (interventions implemented as appropriate)  PT eval.  Able to amb 140' with RW, anticipate home tomorrow after am therapy.  REC:  Home with HH  DME:  RW and 3 in 1      "

## 2019-03-11 NOTE — OP NOTE
[facility]  Operative Report    SUMMARY     Surgery Date: 3/11/2019     Surgeon(s) and Role:     * Sánchez Haile MD - Primary    Assistant: OBEY Harvey FA    Pre-op Diagnosis:  Primary osteoarthritis of left knee [M17.12]    Post-op Diagnosis:  Post-Op Diagnosis Codes:     * Primary osteoarthritis of left knee [M17.12]    Procedure(s) (LRB):  ARTHROPLASTY, KNEE, SIGHT ASSISTED (Left) (Rita Nex[employer])    Anesthesia: Spinal    Description of Procedure: The appropriate consent was signed. The patient understood and except all risks and complications. The patient was brought to the Operating Room after undergoing spinal anesthetic. Tourniquet was applied to the proximal operative leg. The lower extremity was then prepped and draped in a sterile manner. After exsanguination of Esmarch bandage, tourniquet was inflated to 300 mmHg. An anterior incision with a medial parapatellar arthrotomy was performed. The menisci, anterior cruciate ligament and patellar fat pad were excised. The patella was resurfaced and sized to a 32 mm patella.   Three holes were then drilled for the lugs and this was trialed. A hole was then  drilled in the distal femur, michael was placed down the femoral canal and the   distal femur cut in 6 degrees of valgus. The tibia was then cut utilizing the   Rita navigation system in 0 degree varus valgus with 5 degrees in posterior   slope. Extension block was placed and full extension was noted. The femur was   then sized to a #E and #E cutting block was placed and the anterior and   posterior cuts as well as chamfer cuts were performed. The tibia was sized to a  size 3 and a trial was performed.Trials were performed and a 10 mm spacer was felt to be appropriate.The tibia was then prepared with the drill and the punch, and trials were   removed and the knee washed out. Aquamantys was used to paint the posterior   capsule and corners. Palacos cement with gentamicin was then mixed and    pressurized sequentially in tibia, femur and patella. Components were impacted   and excess cement was removed. A trial 10 mm spacer was placed and knee   brought out to full extension. Once the cement was allowed to harden, the 10 mm  spacer was felt to be appropriate and this was locked into the tibial   baseplate. Tourniquet was deflated and hemostasis was obtained. Good patellar   tracking was noted. Exparel cocktail was injected into the fascia and   subcutaneous tissue. The fascial closure was obtained with a running #2 Quill suture. Subcutaneous closure was obtained with #1 and 2-0 Vicryl. Skin was then closed with the staples and a sterile compressive dressing was applied. The patient was then brought to the Recovery Room in a good condition.        Estimated Blood Loss: 100cc         Specimens:   Specimen (12h ago, onward)    None

## 2019-03-11 NOTE — ANESTHESIA PROCEDURE NOTES
Spinal    Diagnosis: OA L KNEE  Patient location during procedure: holding area  Start time: 3/11/2019 8:28 AM  Timeout: 3/11/2019 8:28 AM  End time: 3/11/2019 8:31 AM  Staffing  Anesthesiologist: Froylan Pérez MD  Performed: anesthesiologist   Preanesthetic Checklist  Completed: patient identified, site marked, surgical consent, pre-op evaluation, timeout performed, IV checked, risks and benefits discussed and monitors and equipment checked  Spinal Block  Patient position: sitting  Prep: ChloraPrep  Patient monitoring: heart rate, cardiac monitor, continuous pulse ox and frequent blood pressure checks  Approach: right paramedian  Location: L3-4  Injection technique: single shot  CSF Fluid: clear free-flowing CSF  Needle  Needle type: pencil-tip   Needle gauge: 25 G  Needle length: 3.5 in  Additional Documentation: incremental injection, negative aspiration for heme and no paresthesia on injection  Needle localization: anatomical landmarks  Assessment  Sensory level: T6   Dermatomal levels determined by alcohol wipe and pinch or prick  Ease of block: easy  Patient's tolerance of the procedure: comfortable throughout block and no complaints

## 2019-03-11 NOTE — ANESTHESIA POSTPROCEDURE EVALUATION
"Anesthesia Post Evaluation    Patient: Veronica Duque    Procedure(s) Performed: Procedure(s) (LRB):  ARTHROPLASTY, KNEE, SIGHT ASSISTED (Left)    Final Anesthesia Type: spinal  Patient location during evaluation: PACU  Patient participation: Yes- Able to Participate  Level of consciousness: awake and alert and oriented  Post-procedure vital signs: reviewed and stable  Pain management: adequate  Airway patency: patent  PONV status at discharge: No PONV  Anesthetic complications: no      Cardiovascular status: blood pressure returned to baseline and hemodynamically stable  Respiratory status: unassisted, spontaneous ventilation and nasal cannula          Visit Vitals  BP (!) 141/67   Pulse (!) 59   Temp 36.4 °C (97.6 °F) (Oral)   Resp 18   Ht 5' 5" (1.651 m)   Wt 71.2 kg (156 lb 15.5 oz)   SpO2 100%   Breastfeeding? No   BMI 26.12 kg/m²       Pain/Chelsey Score: Pain Rating Prior to Med Admin: 0 (3/11/2019 11:20 AM)  Pain Rating Post Med Admin: 0 (3/11/2019 10:40 AM)  Chelsey Score: 10 (3/11/2019 11:20 AM)        "

## 2019-03-12 VITALS
DIASTOLIC BLOOD PRESSURE: 70 MMHG | HEIGHT: 65 IN | OXYGEN SATURATION: 97 % | SYSTOLIC BLOOD PRESSURE: 140 MMHG | BODY MASS INDEX: 26.15 KG/M2 | HEART RATE: 79 BPM | RESPIRATION RATE: 18 BRPM | WEIGHT: 156.94 LBS | TEMPERATURE: 98 F

## 2019-03-12 LAB
ERYTHROCYTE [DISTWIDTH] IN BLOOD BY AUTOMATED COUNT: 16.3 %
HCT VFR BLD AUTO: 30 %
HGB BLD-MCNC: 9.5 G/DL
MCH RBC QN AUTO: 19.4 PG
MCHC RBC AUTO-ENTMCNC: 31.7 G/DL
MCV RBC AUTO: 61 FL
PLATELET # BLD AUTO: 294 K/UL
PMV BLD AUTO: 10 FL
RBC # BLD AUTO: 4.89 M/UL
WBC # BLD AUTO: 8.42 K/UL

## 2019-03-12 PROCEDURE — 85027 COMPLETE CBC AUTOMATED: CPT

## 2019-03-12 PROCEDURE — 97530 THERAPEUTIC ACTIVITIES: CPT

## 2019-03-12 PROCEDURE — 25000003 PHARM REV CODE 250: Performed by: INTERNAL MEDICINE

## 2019-03-12 PROCEDURE — 25000003 PHARM REV CODE 250

## 2019-03-12 PROCEDURE — 97110 THERAPEUTIC EXERCISES: CPT

## 2019-03-12 PROCEDURE — 97535 SELF CARE MNGMENT TRAINING: CPT

## 2019-03-12 PROCEDURE — 36415 COLL VENOUS BLD VENIPUNCTURE: CPT

## 2019-03-12 PROCEDURE — 97165 OT EVAL LOW COMPLEX 30 MIN: CPT

## 2019-03-12 PROCEDURE — 63600175 PHARM REV CODE 636 W HCPCS

## 2019-03-12 PROCEDURE — 97116 GAIT TRAINING THERAPY: CPT

## 2019-03-12 RX ORDER — ASPIRIN 325 MG
325 TABLET ORAL EVERY 12 HOURS
Qty: 60 TABLET | Refills: 0 | Status: ON HOLD | OUTPATIENT
Start: 2019-03-12 | End: 2022-05-16

## 2019-03-12 RX ORDER — ONDANSETRON 4 MG/1
4 TABLET, FILM COATED ORAL EVERY 8 HOURS PRN
Qty: 20 TABLET | Refills: 1 | Status: SHIPPED | OUTPATIENT
Start: 2019-03-12 | End: 2021-07-27

## 2019-03-12 RX ORDER — ASPIRIN 325 MG
325 TABLET ORAL EVERY 12 HOURS
Qty: 60 TABLET | Refills: 0 | COMMUNITY
Start: 2019-03-12 | End: 2019-03-12

## 2019-03-12 RX ORDER — OXYCODONE AND ACETAMINOPHEN 5; 325 MG/1; MG/1
TABLET ORAL
Qty: 60 TABLET | Refills: 0 | Status: SHIPPED | OUTPATIENT
Start: 2019-03-12 | End: 2021-07-27

## 2019-03-12 RX ADMIN — ONDANSETRON 8 MG: 2 INJECTION INTRAMUSCULAR; INTRAVENOUS at 08:03

## 2019-03-12 RX ADMIN — MUPIROCIN 1 G: 20 OINTMENT TOPICAL at 09:03

## 2019-03-12 RX ADMIN — ASPIRIN 325 MG ORAL TABLET 325 MG: 325 PILL ORAL at 08:03

## 2019-03-12 RX ADMIN — LOSARTAN POTASSIUM 100 MG: 50 TABLET, FILM COATED ORAL at 08:03

## 2019-03-12 RX ADMIN — CELECOXIB 200 MG: 200 CAPSULE ORAL at 08:03

## 2019-03-12 RX ADMIN — FAMOTIDINE 20 MG: 20 TABLET ORAL at 08:03

## 2019-03-12 RX ADMIN — DOCUSATE SODIUM 100 MG: 100 CAPSULE, LIQUID FILLED ORAL at 08:03

## 2019-03-12 RX ADMIN — AMLODIPINE BESYLATE 5 MG: 5 TABLET ORAL at 08:03

## 2019-03-12 RX ADMIN — POTASSIUM CHLORIDE 10 MEQ: 750 TABLET, EXTENDED RELEASE ORAL at 12:03

## 2019-03-12 RX ADMIN — OXYCODONE HYDROCHLORIDE 5 MG: 5 TABLET ORAL at 09:03

## 2019-03-12 RX ADMIN — OXYCODONE HYDROCHLORIDE 5 MG: 5 TABLET ORAL at 05:03

## 2019-03-12 RX ADMIN — CEFAZOLIN SODIUM 2 G: 2 SOLUTION INTRAVENOUS at 12:03

## 2019-03-12 RX ADMIN — HYDROCHLOROTHIAZIDE 25 MG: 25 TABLET ORAL at 08:03

## 2019-03-12 RX ADMIN — ACETAMINOPHEN 1000 MG: 500 TABLET ORAL at 05:03

## 2019-03-12 NOTE — PLAN OF CARE
"Problem: Physical Therapy Goal  Goal: Physical Therapy Goal  Goals to be met by: 3/25/19    Patient will increase functional independence with mobility by performin. Supine to sit with supervision. MET 3/12/19  2. Sit to supine with supervision.   3. Sit<>stand transfer with supervision using rolling walker.   4. Gait > 150 feet with SBA using rolling walker. MET 3/12/19  5. Ascend/descend 4" curb step with RW and with CGA MET 3/12/19  6.  Able to perform 1 set of 10 reps of TKR ex with assist as needed    Outcome: Ongoing (interventions implemented as appropriate)    Progressing well towards goals       "

## 2019-03-12 NOTE — PLAN OF CARE
Problem: Adult Inpatient Plan of Care  Goal: Plan of Care Review  Outcome: Ongoing (interventions implemented as appropriate)  VSS and afebrile, aaox4. Dressing to left knee clean, dry, and intact. Pain controlled with PO medications. Polar care applied. Neurovascular checks WNL. up with assistance with the walker. Tolerating regular diet. No c/o sob, n/v overnight. Plan of care reviewed with patient. Purposeful hourly rounding done. Call light at bedside, bed at lowest position, brakes on, non skid socks on. Will continue to monitor.

## 2019-03-12 NOTE — PLAN OF CARE
Problem: Adult Inpatient Plan of Care  Goal: Plan of Care Review  Outcome: Ongoing (interventions implemented as appropriate)  Pt in no distress on room air, IS done. Will continue to monitor.

## 2019-03-12 NOTE — PT/OT/SLP DISCHARGE
"Physical Therapy Discharge Summary    Name: Veronica Duque  MRN: [id number]   Principal Problem: Primary osteoarthritis of left knee     Patient Discharged from acute Physical Therapy on 3/12/19.  Please refer to prior PT noted date on 3/12/19 for functional status.     Assessment:     Goals partially met.    Objective:     GOALS:   Multidisciplinary Problems     Physical Therapy Goals        Problem: Physical Therapy Goal    Goal Priority Disciplines Outcome Goal Variances Interventions   Physical Therapy Goal     PT, PT/OT Ongoing (interventions implemented as appropriate)     Description:  Goals to be met by: 3/25/19    Patient will increase functional independence with mobility by performin. Supine to sit with supervision. MET 3/12/19  2. Sit to supine with supervision.   3. Sit<>stand transfer with supervision using rolling walker.   4. Gait > 150 feet with SBA using rolling walker. MET 3/12/19  5. Ascend/descend 4" curb step with RW and with CGA MET 3/12/19  6.  Able to perform 1 set of 10 reps of TKR ex with assist as needed                      Reasons for Discontinuation of Therapy Services  Transfer to alternate level of care.      Plan:     Patient Discharged to: Home with Home Health Service.  PT of record not available for documentation.      Violeta Stevens, PT  3/12/2019  "

## 2019-03-12 NOTE — NURSING
Ju RN discussed discharge instructions with patient and patient verbalized understanding. Follow up appointments discussed with patient. New Medication and side effects discussed with patient. IV catheter removed with catheter intact without any signs of swelling, redness or pain. Patient remained free from falls, injury, and skin break down this hospilization. Patient in room waiting for transport.

## 2019-03-12 NOTE — PT/OT/SLP EVAL
"Occupational Therapy   Evaluation, Treatment and Discharge     Name: Veronica Duque  MRN: [id number]  Admitting Diagnosis:  Primary osteoarthritis of left knee 1 Day Post-Op    Recommendations:     Discharge Recommendations: (HH OT/PT + 24/7 (A)/SPV as needed)  Discharge Equipment Recommendations:  (RW and BSC already delivered to pt's room )  Barriers to discharge:  None    Assessment:     Veronica Duque is a 63 y.o. female with a medical diagnosis of Primary osteoarthritis of left knee.  She presents with the following performance deficits which are affecting function: weakness, impaired endurance, impaired self care skills, impaired functional mobilty, gait instability, impaired balance, decreased lower extremity function, decreased ROM, orthopedic precautions.     OT evaluation completed. Pt required SBA for all functional mobility and transfers with use of RW. She demonstrated good understanding of OT education and completed majority of ADLs at independent to SBA level. Pt already D/C'd home at time of documentation, therefore no acute OT goals written. Recommend follow up with HH OT/PT + 24/7 (A)/SPV as needed. DME needs already delivered to her room: RW and BSC.     Rehab Prognosis: Good; patient would benefit from acute skilled OT services to address these deficits and reach maximum level of function.       Plan:     Patient to be seen   to address the above listed problems via self-care/home management, therapeutic activities, therapeutic exercises  · Plan of Care Expires: 04/11/19  · Plan of Care Reviewed with: patient    Subjective     Chief Complaint: "I feel much better now that I've eaten."  Patient/Family Comments/goals: return to PLOF    Occupational Profile:  Pt lives with spouse in 2 story house with 4" ELLA.  Has 1st floor bed and bath with shower stall-has SC, handicap toilet   Prior to admission, patients level of function was I with ADL and amb without AD, drives, does all iADL's, " works from home .  Equipment used at home: none.  DME owned (not currently used): none.  Upon discharge, patient will have assistance from family-spouse or daughter.    Pain/Comfort:  · Pain Rating 1: 0/10  · Pain Rating Post-Intervention 1: 0/10    Patients cultural, spiritual, Yazdanism conflicts given the current situation: no    Objective:     Communicated with: RN prior to session.  Patient found up in chair with peripheral IV, cryotherapy upon OT entry to room.    General Precautions: Standard, fall   Orthopedic Precautions:LLE weight bearing as tolerated   Braces: N/A     Occupational Performance:    Bed Mobility:  Pt already UIC upon arrival and concluded session UIC.     Functional Mobility/Transfers:  · Patient completed Sit <> Stand Transfer with stand by assistance  with  rolling walker   · Functional Mobility: community distance level using RW with SBA; cues for no pivoting on (L) knee    Activities of Daily Living:  · Feeding:  independence to self-feed  · Grooming: supervision standing at sink to brush her teeth and comb hair  · Upper Body Dressing: independence to don bra and overhead shirt  · Lower Body Dressing: stand by assistance to don underwear, pants and bilateral slip on shoes    Cognitive/Visual Perceptual:  Cognitive/Psychosocial Skills:     -       Oriented to: Person, Place, Time and Situation   -       Follows Commands/attention:Follows one-step commands  -       Communication: clear/fluent  -       Safety awareness/insight to disability: intact   -       Mood/Affect/Coping skills/emotional control: Appropriate to situation  Visual/Perceptual:      -Intact no c/o visual changes    Physical Exam:  Balance:    -       independent static sitting, SPV dynamic sitting, SBA-SPV static standing pending UE support, SBA dynamic standing   Postural examination/scapula alignment:    -       No postural abnormalities identified  Skin integrity: Visible skin intact and incisional site covered with  bandage- clean and dry margins  Edema:  Mild (L) LE  Sensation:    -       Intact- no c/o paresthesia, however not formally tested   Upper Extremity Range of Motion:     -       Right Upper Extremity: WFL  -       Left Upper Extremity: WFL  Upper Extremity Strength:    -       Right Upper Extremity: WFL  -       Left Upper Extremity: WFL   Strength:    -       Right Upper Extremity: WFL  -       Left Upper Extremity: WFL  Fine Motor Coordination:    -       Intact  Gross motor coordination:   WFL    AMPAC 6 Click ADL:  AMPAC Total Score: 20    Treatment & Education:  OT role and POC, LB dressing techniques, (L) LE orthopedic precautions, safety with RW during functional mobility and ADLs, DME set up within home  Education:    Patient left up in chair with all lines intact, call button in reach, RN notified and family member present    GOALS:   Multidisciplinary Problems     Occupational Therapy Goals        Problem: Occupational Therapy Goal    Goal Priority Disciplines Outcome Interventions   Occupational Therapy Goal     OT, PT/OT                     History:     Past Medical History:   Diagnosis Date    Arthritis     General anesthetics causing adverse effect in therapeutic use     slow to wake up    Hypertension     PONV (postoperative nausea and vomiting)     Stroke        Past Surgical History:   Procedure Laterality Date    ARTHROPLASTY, KNEE, SIGHT ASSISTED Left 3/11/2019    Per[facility] at [facility]    ARTHROSCOPY OF KNEE      AUGMENTATION OF BREAST      COSMETIC SURGERY      HYSTERECTOMY      NASAL SEPTOPLASTY         Time Tracking:     OT Date of Treatment: 03/12/19  OT Start Time: 0941  OT Stop Time: 1004  OT Total Time (min): 23 min    Billable Minutes:Evaluation 13  Self Care/Home Management 10    MARY Lange/L  3/12/2019

## 2019-03-12 NOTE — PROGRESS NOTES
"Progress Note  Orthopedics    Admit Date: 3/11/2019   Patient ID: Veronica Duque is a 63 y.o. female. POD#1 L TKR.  Doing well, dressing dry, NV intact. Amb 175 ft. OK for NIMA.            Sánchez Haile      Vital Sign (recent):  BP (!) 163/72 (BP Location: Left arm, Patient Position: Lying)   Pulse 71   Temp 97.6 °F (36.4 °C) (Oral)   Resp 16   Ht 5' 5" (1.651 m)   Wt 71.2 kg (156 lb 15.5 oz)   SpO2 (!) 94%   Breastfeeding? No   BMI 26.12 kg/m²       Laboratory:    CBC:   Recent Labs   Lab 03/12/19  0514   WBC 8.42   RBC 4.89   HGB 9.5*   HCT 30.0*      MCV 61*   MCH 19.4*   MCHC 31.7*       CMP: No results for input(s): GLU, CALCIUM, ALBUMIN, PROT, NA, K, CO2, CL, BUN, CREATININE, ALKPHOS, ALT, AST, BILITOT in the last 168 hours.        Intake/Output Summary (Last 24 hours) at 3/12/2019 0825  Last data filed at 3/12/2019 0553  Gross per 24 hour   Intake 1000 ml   Output 3600 ml   Net -2600 ml         Current Medications:   amLODIPine  5 mg Oral Daily    aspirin  325 mg Oral Q12H    celecoxib  200 mg Oral BID    docusate sodium  100 mg Oral Q12H    famotidine  20 mg Oral BID    hydroCHLOROthiazide  25 mg Oral Daily    losartan  100 mg Oral Daily    mupirocin  1 g Nasal BID    polyethylene glycol  17 g Oral Daily       Continuous Infusions:   dextrose 5 % and 0.9 % NaCl 75 mL/hr at 03/11/19 1215     PRN Meds:.diphenhydrAMINE, HYDROmorphone, ondansetron, oxyCODONE, oxyCODONE, oxyCODONE, promethazine (PHENERGAN) IVPB, sodium chloride 0.9%, sodium chloride 0.9%  "

## 2019-03-12 NOTE — PLAN OF CARE
Problem: Occupational Therapy Goal  Goal: Occupational Therapy Goal  Pt already D/C'd home at time of documentation, therefore no acute OT goals written. Recommend follow up with  OT/PT + 24/7 (A)/SPV. DME needs already delivered to her room: RW and BSC.     Marianela Ware, OTR/L  3/12/2019

## 2019-03-12 NOTE — PROGRESS NOTES
"IM  Progress Note    Admit Date: 3/11/2019   LOS: 0 days     SUBJECTIVE:     Follow-up For:  Primary osteoarthritis of left knee    Interval History:     Discussed with ortho team.  No CP/SOB/Calf pain.      Review of Systems:  Constitutional: No fever or chills  Respiratory: No cough or shortness of breath  Cardiovascular: No chest pain or palpitations  Gastrointestinal: No nausea or vomiting  Neurological: No confusion or weakness    OBJECTIVE:     Vital Signs Range (Last 24H):  BP (!) 163/72 (BP Location: Left arm, Patient Position: Lying)   Pulse 71   Temp 97.6 °F (36.4 °C) (Oral)   Resp 16   Ht 5' 5" (1.651 m)   Wt 71.2 kg (156 lb 15.5 oz)   SpO2 (!) 94%   Breastfeeding? No   BMI 26.12 kg/m²     Temp:  [97.2 °F (36.2 °C)-98.7 °F (37.1 °C)]   Pulse:  [59-82]   Resp:  [16-20]   BP: (141-182)/(65-93)   SpO2:  [94 %-100 %]     I & O (Last 24H):    Intake/Output Summary (Last 24 hours) at 3/12/2019 0837  Last data filed at 3/12/2019 0553  Gross per 24 hour   Intake 1000 ml   Output 3600 ml   Net -2600 ml       Physical Exam:  General appearance: Well developed, well nourished  Eyes:  Conjunctivae/corneas clear. PERRL.  Lungs: Normal respiratory effort,   clear to auscultation bilaterally   Heart: Regular rate and rhythm, S1, S2 normal, no murmur, rub or dameon.  Abdomen: Soft, non-tender non-distended; bowel sounds normal; no masses,  no organomegaly  Extremities: No cyanosis or clubbing. No edema.    Skin: Skin color, texture, turgor normal. No rashes or lesions  Neurologic: Normal strength and tone. No focal numbness or weakness   Left knee:  CDI    Laboratory Data:  Recent Labs   Lab 03/12/19  0514   WBC 8.42   RBC 4.89   HGB 9.5*   HCT 30.0*      MCV 61*   MCH 19.4*   MCHC 31.7*       BMP: No results for input(s): GLU, NA, K, CL, CO2, BUN, CREATININE, CALCIUM, MG in the last 168 hours.    Invalid input(s):  PHOS  Lab Results   Component Value Date    CALCIUM 9.5 02/28/2019       Lab Results "   Component Value Date    CALCIUM 9.5 02/28/2019       No results found for: URICACID    BNP  No results for input(s): BNP, BNPTRIAGEBLO in the last 168 hours.    Medications:  Medication list was reviewed and changes noted under Assessment/Plan.    Diagnostic Results:        ASSESSMENT/PLAN:     1. HTN: continue home meds with hold parameters  2. Hx hypokalemia: on oral supplement at home and would continue at DC.  3. Bilat Carotid stenosis- cleared by cardiology  4. Hx Bilateral LE edema: had neg venous doppler 1/2019, currently no significant edema.  5. 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 Hypercholesterolemia- Not on Statin currently  7. Mild Microcytic Anemia; pre-op Hgb 11.5, should have w/u with PCP; [facility].  Defer.   8. OA Left knee s/p Left TKA/ DVT prophylaxis: per ortho,  mg q12o  9. Mild PONV:  Antiemetics PRN.  Eat with pain pills.     Ok to DC from medicine.

## 2019-03-12 NOTE — PLAN OF CARE
After speaking with Dr Haile patient has agreed to change home health provider to [facility] - referral faxed - spoke with Norma from [place] who accepted patient - cancelled referral to [facility]      03/12/19 1029   Final Note   Assessment Type Final Discharge Note   Anticipated Discharge Disposition Home-Health   What phone number can be called within the next 1-3 days to see how you are doing after discharge? [phone]   Discharge plans and expectations educations in teach back method with documentation complete? Yes      Neuro

## 2019-03-12 NOTE — DISCHARGE INSTRUCTIONS
Knee Athroscopy Discharge Instructions    1) Pain: After surgery your knee will be sore. The knee will likely have been injected with a numbing medicine (Exparel) prior to completion of surgery for pain control. This is indicated on a green bracelet that you will continue to wear for 4 days after surgery. You will   also get a prescription for pain control before you leave the hospital. Ice and elevation will assist with pain control.    a) Apply ice as much as possible for the first 72 hours. After 72 hours, apply ice for 20minutes 3-4 times a day, after therapy,after exercising or whenever experiencing pain. Avoid direct skin contact with ice to prevent frostbit.          b) Elevate the affected leg with the pillow the length of the leg  to assist with swelling and pain.  2) Incision Care:  a) Some drainage from the incision in the first 72 hours is normal. If drainage is excessive,remove bandage,  pat dry, cover with sterile gauze and secure with tape. Notify physician about excessive drainage. Staples will be removed 14 days after surgery   3) Activity:  a) Perform exercises 2-3 x day.  b) You may shower 48 hours after surgery providing the dressing is waterproof. No tub or hot tub usage. If the dressing becomes wet, replace with a new dressing.   4) Wear thigh high antonio hose stockings for 3 weeks after surgery .You may remove stockings for 1- 2 hours during the day only. Send patient home with an extra pair antonio hose.For lifetime after your replacement surgery, you may need antibiotic coverage before dental or minor surgical procedures.  5) Possible Complications: Call Surgeon  a) Infection: Report these signs and symptoms to your surgeon.  i) Unexpected redness around incision   ii) Persistent drainage from wound after 72 hours.  iii) Temperature ,can be treated with Tylenol. Do not go to the emergency room or urgent care center, call your surgeon.   iv) Additional swelling  v) Pain not controlled with current  pain medication  b) Blood Clot: Report theses signs and symptoms to your surgeon  i) Unusual pain  ii) Red or discolored skin  iii) Swelling in the leg  iv) Unusual warm skin

## 2019-03-12 NOTE — PT/OT/SLP PROGRESS
"Physical Therapy Treatment    Patient Name:  Veronica Duque   MRN:  [id number]    Recommendations:     Discharge Recommendations:  (HHPT )   Discharge Equipment Recommendations: none(already delivered )   Barriers to discharge: None    Assessment:     Veronica Duque is a 63 y.o. female admitted with a medical diagnosis of Primary osteoarthritis of left knee.  She presents with the following impairments/functional limitations:  weakness, impaired endurance, decreased ROM, impaired functional mobilty, gait instability patient tolerated treatment session well and progressing well towards goals.     Rehab Prognosis: Good; patient would benefit from acute skilled PT services to address these deficits and reach maximum level of function.    Recent Surgery: Procedure(s) (LRB):  ARTHROPLASTY, KNEE, SIGHT ASSISTED (Left) 1 Day Post-Op    Plan:     During this hospitalization, patient to be seen BID to address the identified rehab impairments via gait training, therapeutic activities, therapeutic exercises and progress toward the following goals:    · Plan of Care Expires:  04/10/19    Subjective     Chief Complaint: patient stated " I feel good I have no pain"   Patient/Family Comments/goals: home   Pain/Comfort:  · Pain Rating 1: 0/10  · Pain Rating Post-Intervention 1: 0/10      Objective:     Communicated with nurse prior to session.  Patient found supine peripheral IV, cryotherapy  upon PT entry to room.     General Precautions: Standard, fall   Orthopedic Precautions:LLE weight bearing as tolerated   Braces: N/A     Functional Mobility:  · Supine to sit with Mod I   · Sit <> stand Rolling walker with Supervision   · Patient gait trained 300 feet with RW with SBA patient demo decrease step length, slow jess and reciprocal gait pattern.     AM-PAC 6 CLICK MOBILITY  Turning over in bed (including adjusting bedclothes, sheets and blankets)?: 4  Sitting down on and standing up from a chair with arms (e.g., " "wheelchair, bedside commode, etc.): 3  Moving from lying on back to sitting on the side of the bed?: 4  Moving to and from a bed to a chair (including a wheelchair)?: 3  Need to walk in hospital room?: 3  Climbing 3-5 steps with a railing?: 3  Basic Mobility Total Score: 20       Therapeutic Activities and Exercises:   patient performed therex X 15 reps AROM per TKA protocol LLE   Ascend/descend 4" curb step with Rolling walker with SBA   Ascend ramp with RW with CGA for safety descend 8" curb step with RW with CGA     Patient left up in chair with all lines intact, call button in reach and nurse notified..    GOALS:   Multidisciplinary Problems     Physical Therapy Goals        Problem: Physical Therapy Goal    Goal Priority Disciplines Outcome Goal Variances Interventions   Physical Therapy Goal     PT, PT/OT Ongoing (interventions implemented as appropriate)     Description:  Goals to be met by: 3/25/19    Patient will increase functional independence with mobility by performin. Supine to sit with supervision. MET 3/12/19  2. Sit to supine with supervision.   3. Sit<>stand transfer with supervision using rolling walker.   4. Gait > 150 feet with SBA using rolling walker. MET 3/12/19  5. Ascend/descend 4" curb step with RW and with CGA MET 3/12/19  6.  Able to perform 1 set of 10 reps of TKR ex with assist as needed                      Time Tracking:     PT Received On: 19  PT Start Time: 0800     PT Stop Time: 0838  PT Total Time (min): 38 min     Billable Minutes: Gait Training 15, Therapeutic Activity 10 and Therapeutic Exercise 13    Treatment Type: Treatment  PT/PTA: PTA     PTA Visit Number: 1     Ailyn Quevedo PTA  2019  "

## 2019-03-14 ENCOUNTER — TELEPHONE (OUTPATIENT)
Dept: MEDSURG UNIT | Facility: OTHER | Age: 64
End: 2019-03-14

## 2019-03-18 NOTE — DISCHARGE SUMMARY
[facility]  Short Stay  Discharge Summary  TOTAL KNEE REPLACEMENT    Admit Date: 3/11/2019    Discharge Date and Time: 3/12/2019 11:32 AM      Discharge Attending Physician: Sánchez Haile MD    Hospital Course:  Veronica Duque,is a 63 y.o. female with severe osteoarthritis L knee, unrelieved with the conservative measures. The patient was admitted on  3/11/2019 and underwent L total knee replacement with computer-assisted navigation. Postoperatively, weightbearing as tolerated with a walker and CPM machine was initiated on postop day #1. Veronica Duque did quite well and was discharged on 3/12/2019  with home health physical therapy and nursing. The patient will be on Percocet for pain and aspirin 325 mg p.o. b.i.d. with meals x4 weeks.  A CPM machine will will be sent home with the patient. Postoperative follow up will be in the office in 4 weeks.       Final Diagnoses:    Principal Problem: Primary osteoarthritis of left knee   Secondary Diagnoses:   Active Hospital Problems    Diagnosis  POA    Primary osteoarthritis of left knee [M17.12]  Yes      Resolved Hospital Problems    Diagnosis Date Resolved POA    *Primary osteoarthritis of left knee [M17.12] 03/11/2019 Yes       Discharged Condition: good    Disposition: Home-Health Care [facility]    Follow up/Patient Instructions:    Medications:  Reconciled Home Medications:      Medication List      START taking these medications    aspirin 325 MG tablet  Take 1 tablet (325 mg total) by mouth every 12 (twelve) hours. For 4 weeks post op  Replaces:  aspirin 81 MG EC tablet     ondansetron 4 MG tablet  Commonly known as:  ZOFRAN  Take 1 tablet (4 mg total) by mouth every 8 (eight) hours as needed for Nausea.     oxyCODONE-acetaminophen 5-325 mg per tablet  Commonly known as:  PERCOCET  Take 1 tablet every 4 hours as needed for pain or take 2 tablets every 6 hours as needed for pain        CHANGE how you take these medications    ALPRAZolam 0.25  "MG tablet  Commonly known as:  XANAX  Take 1 tablet (0.25 mg total) by mouth 2 (two) times daily as needed.  What changed:  when to take this        CONTINUE taking these medications    amLODIPine 5 MG tablet  Commonly known as:  NORVASC  Take 1 tablet (5 mg total) by mouth once daily.     losartan-hydrochlorothiazide 100-25 mg 100-25 mg per tablet  Commonly known as:  HYZAAR  TAKE 1 TABLET BY MOUTH EVERY DAY     potassium chloride 10 MEQ Cpsr  Commonly known as:  MICRO-K  TAKE 1 CAPSULE(10 MEQ) BY MOUTH EVERY DAY     valACYclovir 500 MG tablet  Commonly known as:  VALTREX  Take 500 mg by mouth daily as needed.        STOP taking these medications    aspirin 81 MG EC tablet  Commonly known as:  ECOTRIN  Replaced by:  aspirin 325 MG tablet          Discharge Procedure Orders   COMMODE FOR HOME USE     Order Specific Question Answer Comments   Type: Standard    Height: 5' 5" (1.651 m)    Weight: 71.2 kg (156 lb 15.5 oz)    Does patient have medical equipment at home? cane, straight    Length of need (1-99 months): 99      WALKER FOR HOME USE     Order Specific Question Answer Comments   Type of Walker: Adult (5'4"-6'6")    With wheels? Yes    Height: 5' 5" (1.651 m)    Weight: 71.2 kg (156 lb 15.5 oz)    Length of need (1-99 months): 99    Does patient have medical equipment at home? cane, straight    Please check all that apply: Patient's condition impairs ambulation.    Please check all that apply: Patient is unable to safely ambulate without equipment.    Please check all that apply: Patient needs help to get in and out of chair.    Please check all that apply: Walker will be used for gait training.      Follow-up Information     [facility]    Specialty:  DME Provider  Why:  rolling walker & bedside commode  Contact information:  [address]  [facility] [id number] [phone]             Sánchez Haile MD In 4 weeks.    Specialty:  Orthopedic Surgery  Contact information:  [address]  [facility]  [place] [address]  [phone]             [facility].    Specialties:  DME Provider, Home Health Services  Why:  Home Health   Contact information:  [address] [place] [address] [place] [id number] [phone]

## 2019-04-08 ENCOUNTER — TELEPHONE (OUTPATIENT)
Dept: PHARMACY | Facility: CLINIC | Age: 64
End: 2019-04-08

## 2019-04-24 ENCOUNTER — TELEPHONE (OUTPATIENT)
Dept: PHARMACY | Facility: CLINIC | Age: 64
End: 2019-04-24

## 2019-06-23 NOTE — TELEPHONE ENCOUNTER
----- Message from Michael Coy III, PA-C sent at 1/16/2019  9:59 AM CST -----  Please call patient and let her know that her ultrasound was negative for blood clots.  
Called Pt  Regarding ultrasound test results, LVM to call Clinic.   
Discharged

## 2020-03-30 ENCOUNTER — CLINICAL SUPPORT (OUTPATIENT)
Dept: URGENT CARE | Facility: CLINIC | Age: 65
End: 2020-03-30
Payer: MEDICARE

## 2020-03-30 DIAGNOSIS — R50.9 FEVER, UNSPECIFIED: Primary | ICD-10-CM

## 2020-03-30 PROCEDURE — U0002 COVID-19 LAB TEST NON-CDC: HCPCS

## 2020-04-01 ENCOUNTER — TELEPHONE (OUTPATIENT)
Dept: URGENT CARE | Facility: CLINIC | Age: 65
End: 2020-04-01

## 2020-04-01 LAB — SARS-COV-2 RNA RESP QL NAA+PROBE: DETECTED

## 2020-04-01 NOTE — TELEPHONE ENCOUNTER
I called patient regarding positive covid results. She says she is still fatigued in bed. No fever or cough or dyspnea. ER/return precautions discussed. Instructed increased fluids, self quarantine, and symptomatic treatment. Pt expresses understanding. All questions answered

## 2020-04-03 ENCOUNTER — TELEPHONE (OUTPATIENT)
Dept: URGENT CARE | Facility: CLINIC | Age: 65
End: 2020-04-03

## 2020-04-03 ENCOUNTER — OFFICE VISIT (OUTPATIENT)
Dept: URGENT CARE | Facility: CLINIC | Age: 65
End: 2020-04-03
Payer: MEDICARE

## 2020-04-03 VITALS
HEART RATE: 101 BPM | DIASTOLIC BLOOD PRESSURE: 83 MMHG | WEIGHT: 156 LBS | SYSTOLIC BLOOD PRESSURE: 140 MMHG | RESPIRATION RATE: 18 BRPM | HEIGHT: 65 IN | OXYGEN SATURATION: 96 % | TEMPERATURE: 101 F | BODY MASS INDEX: 25.99 KG/M2

## 2020-04-03 DIAGNOSIS — B34.9 ACUTE VIRAL SYNDROME: Primary | ICD-10-CM

## 2020-04-03 DIAGNOSIS — J18.9 PNEUMONIA DUE TO INFECTIOUS ORGANISM, UNSPECIFIED LATERALITY, UNSPECIFIED PART OF LUNG: ICD-10-CM

## 2020-04-03 PROCEDURE — 71046 X-RAY EXAM CHEST 2 VIEWS: CPT | Mod: S$GLB,,, | Performed by: INTERNAL MEDICINE

## 2020-04-03 PROCEDURE — 99214 PR OFFICE/OUTPT VISIT, EST, LEVL IV, 30-39 MIN: ICD-10-PCS | Mod: S$GLB,,, | Performed by: EMERGENCY MEDICINE

## 2020-04-03 PROCEDURE — 99214 OFFICE O/P EST MOD 30 MIN: CPT | Mod: S$GLB,,, | Performed by: EMERGENCY MEDICINE

## 2020-04-03 PROCEDURE — 71046 XR CHEST PA AND LATERAL: ICD-10-PCS | Mod: S$GLB,,, | Performed by: INTERNAL MEDICINE

## 2020-04-03 RX ORDER — ACETAMINOPHEN 500 MG
1000 TABLET ORAL
Status: COMPLETED | OUTPATIENT
Start: 2020-04-03 | End: 2020-04-03

## 2020-04-03 RX ORDER — AZITHROMYCIN 250 MG/1
TABLET, FILM COATED ORAL
Qty: 6 TABLET | Refills: 0 | Status: SHIPPED | OUTPATIENT
Start: 2020-04-03 | End: 2021-07-27 | Stop reason: ALTCHOICE

## 2020-04-03 RX ADMIN — Medication 1000 MG: at 10:04

## 2020-04-03 NOTE — PATIENT INSTRUCTIONS
You have been evaluated for persistent symptoms after a diagnosis of Coronavirus.  You appear to be doing well.  Your x-ray is normal however the fact that you have mildly increased work of breathing is concerning for initial symptoms of pneumonia.  You have been given azithromycin for 5 days.  Please return for any worsening shortness of breath.  Please continue your home quarantine

## 2020-04-03 NOTE — TELEPHONE ENCOUNTER
Patients cousin and neighbor is on the phone on behalf of the patient. She has been checking in on her. Patient has been super fatigued and has been in bed for days. Positive for Covid.

## 2020-04-03 NOTE — PROGRESS NOTES
[facility] - Visit Note                                           Chief Complaint  64 y.o. female with Fever    History of Present Illness  Veronica Duque presents to the urgent care with with complaints of persistent fatigue.  Patient was diagnosed with Coronavirus 2 days ago appear she has fever this morning of 100.5 however she has not taken any Tylenol or medicine this morning.  Patient denies feeling short of breath.  She reports minimal cough and congestion    Past Medical History:   Diagnosis Date    Arthritis     General anesthetics causing adverse effect in therapeutic use     slow to wake up    Hypertension     PONV (postoperative nausea and vomiting)     Stroke      Past Surgical History:   Procedure Laterality Date    ARTHROSCOPY OF KNEE      AUGMENTATION OF BREAST      COSMETIC SURGERY      HYSTERECTOMY      NASAL SEPTOPLASTY        Review of patient's allergies indicates:  No Known Allergies     Review of Systems and Physical Exam     Review of Systems  -- Constitution -reports fever, no weight loss, no loss of consciousness  -- Eyes - no changes in vision, no redness, no swelling, no discharge  -- Ear, Nose - no  earache, no loss of hearing, no epistaxis  -- Mouth,Throat - no sore throat, no toothache, normal voice, normal swallowing  -- Respiratory - reports mild cough and congestion, no shortness of breath, no wheezing, no increased WOB   -- Cardiovascular - denies chest pain, no palpitations, no lower extremity edema  -- Gastrointestinal - denies abdominal pain, denies nausea, vomiting, and diarrhea  -- Genitourinary - no dysuria, denies flank pain, no hematuria or frequency   -- Musculoskeletal - denies back pain, negative for myalgias and arthralgias   -- Neurological - no headache, no neurologic changes, no loss of bladder or bowel function no seizure like activity, no changes in hearing or vision  -- Skin - denies skin changes, no rash, no hives, no suspected skin  "infection    Vital Signs   height is 5' 5" (1.651 m) and weight is 70.8 kg (156 lb). Her temperature is 100.5 °F (38.1 °C) (abnormal). Her blood pressure is 140/83 (abnormal) and her pulse is 101. Her respiration is 18 and oxygen saturation is 96%.      Physical Exam  -- Nursing note and vitals reviewed -   -- Constitutional:  Awake alert and oriented, GCS 15, no acute distress.  Appears well.  -- Head: Atraumatic. Normocephalic. No obvious abnormality  -- Eyes: Pupils are equal and reactive to light. Extraocular movements intact. No nystagmus.  No periorbital swelling. Normal conjunctiva.  -- Nose: Nose grossly normal in appearance, nares grossly normal. No rhinorrhea.  -- Throat: Mucous membranes moist, pharynx normal, normal tonsils.  Airway patent.  -- Ears: External ears and TM normal bilaterally. Normal hearing.   -- Neck: Normal range of motion. Neck supple. No meningismus. No adenopathy  -- Cardiac: Normal rate, regular rhythm and normal heart sounds. No carotid bruit. No lower extremity edema.  -- Pulmonary: Normal respiratory effort, breath sounds equal bilaterally. Adequate flow.  No wheezing.  No crackles.  Mildly increased work of breathing  -- Abdominal: Soft, no tenderness, no guarding, no rebound. Normal bowel sounds.   -- Musculoskeletal: Normal range of motion, all 4 extremities 5/5 strength.  Neurovascularly intact. Atraumatic. No deformities.  -- Neurological:  Cranial nerves 2-12 grossly intact. No focal deficits.   -- Vascular: Posterior tibial, dorsalis pedis and radial pulses 2+ bilaterally    -- Lymphatics: No cervical or peripheral lymphadenopathy.   -- Skin: Warm and dry. No evidence of rash or cellulitis  -- Psychiatric: Normal mood and affect. Bedside behavior is appropriate.  Patient is cooperative.  Denies suicidal homicidal ideation.    Emergency Room Course     Treatment Course, Evaluation, and Medical Decision Makin.  Physical exam significant for temperature 100.5°, patient " otherwise appears well  2.  Chest x-ray negative for acute process  3.  Will prescribe azithromycin for what appears to be mildly increased work of breathing  4.  Discharge home      Diagnosis  Coronavirus  Presumed pneumonia  Disposition and Plan  -- Disposition: home  -- Condition: stable  -- Follow-up: Patient to follow up with Miller Yin MD in 1-2 days, and any specialists noted on discharge paperwork  -- I advised the patient that we have found no life threatening condition today and have provided recommendations his/her care  -- At this time, I believe the patient is clinically stable for discharge.   -- The patient acknowledges that ongoing follow up with a MD is required   -- Patient agrees to comply with all instruction and direction given in the urgent care  -- Patient counseled on strict return precautions as discussed

## 2020-12-08 ENCOUNTER — CLINICAL SUPPORT (OUTPATIENT)
Dept: URGENT CARE | Facility: CLINIC | Age: 65
End: 2020-12-08
Payer: MEDICARE

## 2020-12-08 DIAGNOSIS — Z20.822 ENCOUNTER FOR LABORATORY TESTING FOR COVID-19 VIRUS: Primary | ICD-10-CM

## 2020-12-08 LAB
CTP QC/QA: YES
SARS-COV-2 RDRP RESP QL NAA+PROBE: NEGATIVE

## 2020-12-08 PROCEDURE — U0002 COVID-19 LAB TEST NON-CDC: HCPCS | Mod: QW,S$GLB,, | Performed by: FAMILY MEDICINE

## 2020-12-08 PROCEDURE — U0002: ICD-10-PCS | Mod: QW,S$GLB,, | Performed by: FAMILY MEDICINE

## 2020-12-08 NOTE — PATIENT INSTRUCTIONS
CDC Testing and Quarantine Guidelines for patients with exposure to a known-positive COVID-19 person:  A close exposure is defined as anyone who has had an exposure (masked or unmasked) to a known COVID -19 positive person within 6 ft for longer than 15 minutes. If your exposure meets this definition you are required by CDC guidelines to quarantine for at least 7-10 days from time of exposure. The CDC states that a test can be performed for an asymptomatic patient (someone who does not have any symptoms) after a close exposure, and that a test should be done if you develop symptoms after a close exposure as described above.  Specifically, you can test at day 5 or later if asymptomatic, in order to get released from quarantine on day 7 or later.  If you develop symptoms sooner, you should test when your symptoms start.  If you meet the definition of a close exposure, it will not matter whether you are experiencing symptoms- a quarantine for at least 7-10 days after a close exposure is required by CDC guidelines.  Please note, if you decide to test as an asymptomatic during your quarantine and you are positive, you will be restarting your quarantine and moving from a possible 10 day quarantine (if you do not test), to a 11 day or greater quarantine.  The CDC also suggests people still monitor for symptoms for a full 14 days and remember that the shorter quarantine options do not replace initial CDC guidance.  The CDC continues to recommend quarantining for 14 days as the best way to reduce risk for spreading COVID-19 - however, this is only a recommendation.  If your exposure does not meet the above definition, you can return to your normal daily activities to include social distancing, wearing a mask and frequent handwashing.       NEGATIVE COVID TEST  You have tested negative for COVID-19 today.  If you did not have a close exposure (as defined below) you can return to your normal daily activities to include  "social distancing, wearing a mask and frequent handwashing.  A "close exposure" is defined as anyone who has had an exposure (masked or unmasked) to a known COVID -19 positive person within 6 ft for longer than 15 minutes. If your exposure meets this definition, you are required by CDC guidelines to quarantine for at least 7-10 days from time of exposure.  The CDC states that a test can be performed for an asymptomatic patient (someone who does not have any symptoms) after a close exposure, and that a test should be done if you develop symptoms after a close exposure as described above.  Specifically, you can test at day 5 or later if asymptomatic in order to get released from quarantine on day 7 or later.  If you develop symptoms sooner, you should test when your symptoms start.  If you developed symptoms since the exposure, and your test was negative today and less than 5 days from your exposure, you still have to quarantine for 7-10 days from the date of the exposure.  The 7-10 day quarantine begins from the day you were exposed, not the day of your test.  For example, if your exposure was on a Monday, and you waited until Friday of the same week to get tested and it was negative, your 7-10 day quarantine begins from that Monday, not the Friday you tested negative.  Please note, if you decide to test as an asymptomatic during your quarantine and you are positive, you will be restarting your quarantine and moving from a possible 10 day quarantine (if you do not test), to a 11 day or greater quarantine.          "

## 2021-07-27 ENCOUNTER — OFFICE VISIT (OUTPATIENT)
Dept: INTERNAL MEDICINE | Facility: CLINIC | Age: 66
End: 2021-07-27
Payer: MEDICARE

## 2021-07-27 DIAGNOSIS — B02.9 HERPES ZOSTER WITHOUT COMPLICATION: Primary | ICD-10-CM

## 2021-07-27 PROCEDURE — 99203 PR OFFICE/OUTPT VISIT, NEW, LEVL III, 30-44 MIN: ICD-10-PCS | Mod: 95,,, | Performed by: FAMILY MEDICINE

## 2021-07-27 PROCEDURE — 99203 OFFICE O/P NEW LOW 30 MIN: CPT | Mod: 95,,, | Performed by: FAMILY MEDICINE

## 2021-07-27 RX ORDER — GABAPENTIN 100 MG/1
100 CAPSULE ORAL 3 TIMES DAILY PRN
Qty: 90 CAPSULE | Refills: 0 | Status: SHIPPED | OUTPATIENT
Start: 2021-07-27 | End: 2022-02-15

## 2021-07-27 RX ORDER — VALACYCLOVIR HYDROCHLORIDE 1 G/1
1000 TABLET, FILM COATED ORAL 3 TIMES DAILY
Qty: 30 TABLET | Refills: 0 | Status: SHIPPED | OUTPATIENT
Start: 2021-07-27 | End: 2021-08-06

## 2021-09-15 DIAGNOSIS — B02.9 HERPES ZOSTER WITHOUT COMPLICATION: ICD-10-CM

## 2021-09-15 RX ORDER — GABAPENTIN 100 MG/1
100 CAPSULE ORAL 3 TIMES DAILY PRN
Qty: 90 CAPSULE | Refills: 0 | Status: CANCELLED | OUTPATIENT
Start: 2021-09-15 | End: 2022-09-15

## 2021-10-04 DIAGNOSIS — B02.9 HERPES ZOSTER WITHOUT COMPLICATION: ICD-10-CM

## 2021-10-04 RX ORDER — GABAPENTIN 100 MG/1
100 CAPSULE ORAL 3 TIMES DAILY PRN
Qty: 90 CAPSULE | Refills: 0 | Status: CANCELLED | OUTPATIENT
Start: 2021-10-04 | End: 2022-10-04

## 2021-10-06 DIAGNOSIS — B02.9 HERPES ZOSTER WITHOUT COMPLICATION: ICD-10-CM

## 2021-10-06 RX ORDER — GABAPENTIN 100 MG/1
100 CAPSULE ORAL 3 TIMES DAILY PRN
Qty: 90 CAPSULE | Refills: 0 | Status: CANCELLED | OUTPATIENT
Start: 2021-10-06 | End: 2022-10-06

## 2022-04-22 ENCOUNTER — PES CALL (OUTPATIENT)
Dept: ADMINISTRATIVE | Facility: CLINIC | Age: 67
End: 2022-04-22
Payer: MEDICARE

## 2022-04-28 ENCOUNTER — TELEPHONE (OUTPATIENT)
Dept: ADMINISTRATIVE | Facility: CLINIC | Age: 67
End: 2022-04-28
Payer: MEDICARE

## 2022-05-02 ENCOUNTER — PES CALL (OUTPATIENT)
Dept: ADMINISTRATIVE | Facility: CLINIC | Age: 67
End: 2022-05-02
Payer: MEDICARE

## 2022-05-04 ENCOUNTER — TELEPHONE (OUTPATIENT)
Dept: ADMINISTRATIVE | Facility: CLINIC | Age: 67
End: 2022-05-04
Payer: MEDICARE

## 2022-05-09 ENCOUNTER — PES CALL (OUTPATIENT)
Dept: ADMINISTRATIVE | Facility: CLINIC | Age: 67
End: 2022-05-09
Payer: MEDICARE

## 2022-05-16 PROBLEM — I73.9 PERIPHERAL VASCULAR DISEASE, UNSPECIFIED: Status: ACTIVE | Noted: 2022-05-16

## 2022-05-23 ENCOUNTER — TELEPHONE (OUTPATIENT)
Dept: ADMINISTRATIVE | Facility: CLINIC | Age: 67
End: 2022-05-23
Payer: MEDICARE

## 2022-05-23 ENCOUNTER — PATIENT MESSAGE (OUTPATIENT)
Dept: ADMINISTRATIVE | Facility: CLINIC | Age: 67
End: 2022-05-23
Payer: MEDICARE

## 2022-05-24 ENCOUNTER — TELEPHONE (OUTPATIENT)
Dept: ADMINISTRATIVE | Facility: CLINIC | Age: 67
End: 2022-05-24
Payer: MEDICARE

## 2022-05-24 ENCOUNTER — OFFICE VISIT (OUTPATIENT)
Dept: INTERNAL MEDICINE | Facility: CLINIC | Age: 67
End: 2022-05-24
Payer: MEDICARE

## 2022-05-24 DIAGNOSIS — R93.3 ABNORMAL FINDINGS ON DIAGNOSTIC IMAGING OF OTHER PARTS OF DIGESTIVE TRACT: ICD-10-CM

## 2022-05-24 DIAGNOSIS — Z00.00 ENCOUNTER FOR PREVENTIVE HEALTH EXAMINATION: Primary | ICD-10-CM

## 2022-05-24 DIAGNOSIS — Z12.11 COLON CANCER SCREENING: ICD-10-CM

## 2022-05-24 DIAGNOSIS — I48.0 PAF (PAROXYSMAL ATRIAL FIBRILLATION): ICD-10-CM

## 2022-05-24 DIAGNOSIS — Z11.59 ENCOUNTER FOR HEPATITIS C SCREENING TEST FOR LOW RISK PATIENT: ICD-10-CM

## 2022-05-24 DIAGNOSIS — I73.9 PERIPHERAL VASCULAR DISEASE, UNSPECIFIED: ICD-10-CM

## 2022-05-24 DIAGNOSIS — I77.71 BILATERAL CAROTID ARTERY DISSECTION: ICD-10-CM

## 2022-05-24 DIAGNOSIS — Z78.0 POSTMENOPAUSAL ESTROGEN DEFICIENCY: ICD-10-CM

## 2022-05-24 PROBLEM — I63.9 CEREBRAL INFARCTION, UNSPECIFIED: Status: ACTIVE | Noted: 2020-03-28

## 2022-05-24 PROBLEM — G45.9 TRANSIENT ISCHEMIC ATTACK: Status: ACTIVE | Noted: 2022-05-24

## 2022-05-24 PROBLEM — R92.8 ABNORMAL MAMMOGRAM: Status: ACTIVE | Noted: 2022-03-17

## 2022-05-24 PROBLEM — T85.43XA BREAST IMPLANT RUPTURE: Status: ACTIVE | Noted: 2022-03-17

## 2022-05-24 PROBLEM — J30.9 ALLERGIC RHINITIS, UNSPECIFIED: Status: ACTIVE | Noted: 2020-03-28

## 2022-05-24 PROCEDURE — G0439 PR MEDICARE ANNUAL WELLNESS SUBSEQUENT VISIT: ICD-10-PCS | Mod: 95,,, | Performed by: NURSE PRACTITIONER

## 2022-05-24 PROCEDURE — G0439 PPPS, SUBSEQ VISIT: HCPCS | Mod: 95,,, | Performed by: NURSE PRACTITIONER

## 2022-05-24 RX ORDER — ESTRADIOL 0.1 MG/G
CREAM VAGINAL
COMMUNITY
Start: 2022-03-17

## 2022-05-24 NOTE — PATIENT INSTRUCTIONS
Counseling and Referral of Other Preventative  (Italic type indicates deductible and co-insurance are waived)    Patient Name: Veronica Duque  Today's Date: 5/24/2022    Health Maintenance         Date Due Completion Date    Hepatitis C Screening Never done--ordered ordered    DEXA Scan ordered ordered    Colorectal Cancer Screening Endoscopy will contact you to schedule your colonoscopy   Endoscopy will contact you to schedule your colonoscopy      TETANUS VACCINE 05/24/2023 (Originally 11/18/1973) Insurance does not cover Tetanus vaccine if given through our clinic, however you may check with your local pharmacy to run vaccine and it may be covered if given there      Shingles Vaccine (1 of 2) Pt wants to discuss with her PCP Pt wants to discuss with her PCP    COVID-19 Vaccine (1) Pt declined Pt declined      Pneumococcal Vaccines (Age 65+) (1 - PCV) Pt wants to discuss with her PCP Pt wants to discuss with her PCP    Influenza Vaccine (Season Ended) 09/01/2022 ---    Mammogram 01/14/2023 1/14/2022    Lipid Panel 02/28/2024 2/28/2019          Orders Placed This Encounter   Procedures    DXA Bone Density Spine And Hip    Hepatitis C Antibody     The following information is provided to all patients.  This information is to help you find resources for any of the problems found today that may be affecting your health:                Living healthy guide: www.[facility].[place].gov      Understanding Diabetes: www.diabetes.org      Eating healthy: www.cdc.gov/healthyweight      CDC home safety checklist: www.cdc.gov/steadi/patient.html      Agency on Aging: www.goea.[place].[facility]      Alcoholics anonymous (AA): www.aa.org      Physical Activity: www.vinh.nih.gov/sm3izam      Tobacco use: www.quitwithusla.org

## 2022-05-24 NOTE — PROGRESS NOTES
The patient location is: [place]  The chief complaint leading to consultation is: Medicare HRA    Visit type: audiovisual    Face to Face time with patient: 15 minutes  20 minutes of total time spent on the encounter, which includes face to face time and non-face to face time preparing to see the patient (eg, review of tests), Obtaining and/or reviewing separately obtained history, Documenting clinical information in the electronic or other health record, Independently interpreting results (not separately reported) and communicating results to the patient/family/caregiver, or Care coordination (not separately reported).         Each patient to whom he or she provides medical services by telemedicine is:  (1) informed of the relationship between the physician and patient and the respective role of any other health care provider with respect to management of the patient; and (2) notified that he or she may decline to receive medical services by telemedicine and may withdraw from such care at any time.    Notes: Pt of Dr. Yin, here virtually for medicare HRA      Veronica Duque presented for a  Medicare AWV and comprehensive Health Risk Assessment today. The following components were reviewed and updated:    · Medical history  · Family History  · Social history  · Allergies and Current Medications  · Health Risk Assessment  · Health Maintenance  · Care Team         ** See Completed Assessments for Annual Wellness Visit within the encounter summary.**         The following assessments were completed:  · Living Situation  · CAGE  · Depression Screening  · Fall Risk Assessment (MACH 10)  · Hearing Assessment(HHI)  · Cognitive Function Screening  · Nutrition Screening  · ADL Screening  · PAQ Screening    Physical Exam    Limited PE, seen virtually, no distress during video visit    Diagnoses and health risks identified today and associated recommendations/orders:    1. Encounter for preventive health  examination  Limited PE, seen virtually, no distress during video visit    Health Maintenance updated    Records reviewed    2. Encounter for hepatitis C screening test for low risk patient  - Hepatitis C Antibody; Future    3. Postmenopausal estrogen deficiency  - DXA Bone Density Spine And Hip; Future    4. PAF (paroxysmal atrial fibrillation)  Chronic, followed by Cardiology    5. Peripheral vascular disease, unspecified  Chronic, followed by PCP    6. Bilateral carotid artery dissection  Chronic, followed by Cardiology    7. Colon cancer screening  - Case Request Endoscopy: COLONOSCOPY    8. Abnormal findings on diagnostic imaging of other parts of digestive tract   - Case Request Endoscopy: COLONOSCOPY      Provided Veronica with a 5-10 year written screening schedule and personal prevention plan. Recommendations were developed using the USPSTF age appropriate recommendations. Education, counseling, and referrals were provided as needed. After Visit Summary printed and given to patient which includes a list of additional screenings\tests needed.    Follow up in about 1 year (around 5/24/2023) for for annual HRA or sooner as needed with PCP Dr. Yin.    Eileen Lowry, DNP    I offered to discuss advanced care planning, including how to pick a person who would make decisions for you if you were unable to make them for yourself, called a health care power of , and what kind of decisions you might make such as use of life sustaining treatments such as ventilators and tube feeding when faced with a life limiting illness recorded on a living will that they will need to know. (How you want to be cared for as you near the end of your natural life)     X Patient is interested in learning more about how to make advanced directives.  I provided them paperwork and offered to discuss this with them.

## 2022-05-31 ENCOUNTER — TELEPHONE (OUTPATIENT)
Dept: VASCULAR SURGERY | Facility: CLINIC | Age: 67
End: 2022-05-31
Payer: MEDICARE

## 2022-05-31 NOTE — TELEPHONE ENCOUNTER
Informed patient that Dr. Wilson will review her studies and we will notify her with his recommendations. Verbalized understanding.

## 2022-05-31 NOTE — TELEPHONE ENCOUNTER
----- Message from Jerome Esparza sent at 5/31/2022  3:14 PM CDT -----  Contact: pt at [phone]  Type:  Sooner Appointment Request    Caller is requesting a sooner appointment.  Caller declined first available appointment listed below.  Caller will not accept being placed on the waitlist and is requesting a message be sent to doctor.    Name of Caller:  pt  When is the first available appointment?  N/A  Symptoms:  f/u  Best Call Back Number:  [phone]  Additional Information:  pt has been waiting 2 weeks to hear from the office to schedule a appt. Please call back and advise.

## 2022-06-03 ENCOUNTER — TELEPHONE (OUTPATIENT)
Dept: VASCULAR SURGERY | Facility: CLINIC | Age: 67
End: 2022-06-03
Payer: MEDICARE

## 2022-06-03 NOTE — TELEPHONE ENCOUNTER
Appointment made for 6/30/22 at [address]. Entrance 1 and go to 2nd floor. Verbalized understanding.

## 2022-06-07 ENCOUNTER — HOSPITAL ENCOUNTER (OUTPATIENT)
Dept: RADIOLOGY | Facility: HOSPITAL | Age: 67
Discharge: HOME | End: 2022-06-07
Attending: NURSE PRACTITIONER
Payer: MEDICARE

## 2022-06-07 DIAGNOSIS — Z78.0 POSTMENOPAUSAL ESTROGEN DEFICIENCY: ICD-10-CM

## 2022-06-07 PROCEDURE — 77080 DXA BONE DENSITY AXIAL: CPT | Mod: TC

## 2022-06-07 PROCEDURE — 77080 DEXA BONE DENSITY SPINE HIP: ICD-10-PCS | Mod: 26,,, | Performed by: RADIOLOGY

## 2022-06-07 PROCEDURE — 77080 DXA BONE DENSITY AXIAL: CPT | Mod: 26,,, | Performed by: RADIOLOGY

## 2022-06-30 ENCOUNTER — OFFICE VISIT (OUTPATIENT)
Dept: VASCULAR SURGERY | Facility: CLINIC | Age: 67
End: 2022-06-30
Payer: MEDICARE

## 2022-06-30 VITALS
DIASTOLIC BLOOD PRESSURE: 82 MMHG | RESPIRATION RATE: 20 BRPM | HEART RATE: 80 BPM | SYSTOLIC BLOOD PRESSURE: 155 MMHG | WEIGHT: 168.88 LBS | HEIGHT: 65 IN | BODY MASS INDEX: 28.14 KG/M2

## 2022-06-30 DIAGNOSIS — I73.9 PAD (PERIPHERAL ARTERY DISEASE): Primary | ICD-10-CM

## 2022-06-30 PROCEDURE — 99205 OFFICE O/P NEW HI 60 MIN: CPT | Mod: S$PBB,,, | Performed by: THORACIC SURGERY (CARDIOTHORACIC VASCULAR SURGERY)

## 2022-06-30 PROCEDURE — 99999 PR PBB SHADOW E&M-EST. PATIENT-LVL III: ICD-10-PCS | Mod: PBBFAC,,, | Performed by: THORACIC SURGERY (CARDIOTHORACIC VASCULAR SURGERY)

## 2022-06-30 PROCEDURE — 99205 PR OFFICE/OUTPT VISIT, NEW, LEVL V, 60-74 MIN: ICD-10-PCS | Mod: S$PBB,,, | Performed by: THORACIC SURGERY (CARDIOTHORACIC VASCULAR SURGERY)

## 2022-06-30 PROCEDURE — 99999 PR PBB SHADOW E&M-EST. PATIENT-LVL III: CPT | Mod: PBBFAC,,, | Performed by: THORACIC SURGERY (CARDIOTHORACIC VASCULAR SURGERY)

## 2022-06-30 PROCEDURE — 99213 OFFICE O/P EST LOW 20 MIN: CPT | Mod: PBBFAC,PO | Performed by: THORACIC SURGERY (CARDIOTHORACIC VASCULAR SURGERY)

## 2022-06-30 NOTE — PROGRESS NOTES
OFFICE VISIT      This patient was referred to me by Dr. Phil Pitts    HISTORY OF PRESENT ILLNESS:  The patient is a 66-year-old female who has been experiencing a burning sensation on the and Chloe lateral aspect of her left thigh after walking.  She also complains of tightness in her right calf after walking very short distances.  She denies any pain in feet while lying supine.  She does complain of cramps in the toes and legs at night.  However she has no symptoms of rest pain.  She had an aortogram with runoff performed by Dr. Pitts and this showed a 50-60% stenosis of the left superficial femoral artery.  She had a 50% stenosis of the ostium of the right common iliac artery, 80% stenosis of the right common femoral artery and 75% stenosis of the proximal right superficial femoral artery and 50-60% stenosis of the right popliteal artery with 2 vessel runoff to the foot.    Past Medical History:   Diagnosis Date    Anticoagulant long-term use     Arthritis     General anesthetics causing adverse effect in therapeutic use     slow to wake up    Hypertension     PONV (postoperative nausea and vomiting)     Stroke        Past Surgical History:   Procedure Laterality Date    AORTOGRAPHY WITH SERIALOGRAPHY  5/16/2022    Procedure: AORTOGRAM, WITH SERIALOGRAPHY. left femoral access;  Surgeon: Phil Pitts MD;  Location: [facility] CATH;  Service: Cardiology;;    ARTHROSCOPY OF KNEE      AUGMENTATION OF BREAST      COSMETIC SURGERY      EYE SURGERY  12/18/17 & 10/23/17    Cataracts    HYSTERECTOMY      JOINT REPLACEMENT  October 2019    Knee    NASAL SEPTOPLASTY         Review of patient's allergies indicates:  No Known Allergies    Current Outpatient Medications   Medication Sig Dispense Refill    amLODIPine (NORVASC) 10 MG tablet Take 10 mg by mouth once daily.      aspirin (ECOTRIN) 81 MG EC tablet Take 81 mg by mouth once daily.      estradioL (ESTRACE) 0.01 % (0.1 mg/gram) vaginal cream INSERT ONE  GRAM VAGINALLY MONDAY, WEDNESDAY AND FRIDAY      losartan-hydrochlorothiazide 100-25 mg (HYZAAR) 100-25 mg per tablet TAKE one tablet BY MOUTH EVERY DAY (Patient taking differently: Take 1 tablet by mouth once daily.) 90 tablet 3    potassium chloride (MICRO-K) 10 MEQ CpSR TAKE ONE CAPSULE BY MOUTH EVERY DAY (Patient taking differently: Take 10 mEq by mouth once daily.) 90 capsule 3    valACYclovir (VALTREX) 500 MG tablet Take 500 mg by mouth daily as needed.   0    ALPRAZolam (XANAX) 0.25 MG tablet Take 1 tablet (0.25 mg total) by mouth 2 (two) times daily as needed. 90 tablet 3     No current facility-administered medications for this visit.       Family History   Problem Relation Age of Onset    Heart attack Mother     Heart disease Mother     COPD Mother         Smoker    Hypertension Mother     Miscarriages / Stillbirths Mother     Heart attack Father     Heart disease Father        Social History     Socioeconomic History    Marital status:    Tobacco Use    Smoking status: Never Smoker    Smokeless tobacco: Never Used   Substance and Sexual Activity    Alcohol use: Yes     Alcohol/week: 1.0 standard drink     Types: 1 Standard drinks or equivalent per week     Comment: Socially    Drug use: Never    Sexual activity: Yes     Partners: Male     Birth control/protection: Post-menopausal     Social Determinants of Health     Financial Resource Strain: Low Risk     Difficulty of Paying Living Expenses: Not hard at all   Food Insecurity: No Food Insecurity    Worried About Running Out of Food in the Last Year: Never true    Ran Out of Food in the Last Year: Never true   Transportation Needs: No Transportation Needs    Lack of Transportation (Medical): No    Lack of Transportation (Non-Medical): No   Physical Activity: Sufficiently Active    Days of Exercise per Week: 5 days    Minutes of Exercise per Session: 60 min   Stress: Stress Concern Present    Feeling of Stress : To some  extent   Social Connections: Moderately Integrated    Frequency of Communication with Friends and Family: More than three times a week    Frequency of Social Gatherings with Friends and Family: Three times a week    Attends Anabaptist Services: More than 4 times per year    Active Member of Clubs or Organizations: No    Attends Club or Organization Meetings: Never    Marital Status:    Housing Stability: Low Risk     Unable to Pay for Housing in the Last Year: No    Number of Places Lived in the Last Year: 1    Unstable Housing in the Last Year: No       REVIEW OF SYSTEMS:  As per history of present illness.        PHYSICAL EXAM:  Physical Exam    Vitals:    06/30/22 1449   BP: (!) 155/82   Pulse: 80   Resp: 20     Awake, alert and oriented.  Head is normocephalic.  Atraumatic.  Pupils are equal, round and reactive.  Sclerae anicteric.  Neck is supple without jugular vein distension and trachea is midline.  Heart has a regular rate and rhythm.  Lungs are clear.  Abdomen is soft and nontender.  Extremities are warm.  She has 1+ right femoral pulse.  She has 2+ left femoral pulse.  I cannot pop palpate popliteal dorsalis pedis or posterior tibial pulses in either lower extremity.  Neurologic:  Grossly intact.    Indications    Claudication [I73.9 (ICD-10-CM)]   Lower extremity edema [R60.0 (ICD-10-CM)]   PAD (peripheral artery disease) [I73.9 (ICD-10-CM)]       Summary       · Consult vascular surgery to evaluate the 80% nodular calcific lesion in the right common femoral artery.  · Can readdress right SFA disease once the right common femoral artery has been treated     Aorta:  Mildly calcified with some ectasia but no aneurysm     Right Lower Extremity:  There was a 50% stenosis at the ostium of the right common iliac artery with the remainder of the right common and external iliac patent with mild calcific disease.  The right common femoral artery has an 80% nodular calcific lesion.  The right  superficial femoral artery has diffuse disease with up to 75% proximal SFA lesion.  The right popliteal artery has mild disease up to about 50-60% stenosis.  The infrapopliteal vessels have only minimal disease and there is 2 vessel runoff to the foot.     Left Lower Extremity:  The left iliac artery has mild calcific disease.  The left common femoral artery has only minimal disease and left superficial femoral artery has moderate diffuse disease up to 50-60% stenosis.  The left popliteal artery is not well visualized due to a knee prosthesis but the infrapopliteal vessels appear normal and there is 2 vessel runoff to the foot.     The procedure log was documented by Documenter: RT Honey and verified by Phil Pitts MD.     Date: 5/16/2022  Time: 12:48 PM       Conclusion    · There is an estimated less than 50% stenosis of bilateral proximal ICA.  · Stenotic left ECA.       Performing Clinician    Tyrel Paris     Reason for Exam  Priority: Routine  Dx: Leg swelling [M79.89 (ICD-10-CM)]; Claudication [I73.9 (ICD-10-CM)]; Other specified symptoms and signs involving the circulatory and respiratory systems [R09.89 (ICD-10-CM)]        Vitals    Height Weight BMI (Calculated) BSA (Calculated - sq m) BP Pulse             Result Image Hyperlink     Show images for CV Ultrasound Bilateral Doppler Carotid    Epiphany Scans -- Order Level:    Epiphany Scans: None found at the order level.    Findings    Right Carotid There is mild heterogeneous plaque in the right distal common carotid artery.     There is mild heterogeneous plaque in the right proximal internal carotid artery.     The highest right ICA velocity divided by the right distal CCA velocity is 0.96 .   Left Carotid There is mild heterogeneous plaque in the left distal common carotid artery.    There is mild heterogeneous plaque in the left proximal internal carotid artery.    The highest left ICA velocity divided by the left distal CCA velocity is  1.49.           IMPRESSION:  1. Peripheral arterial occlusive disease  2. Intermittent claudication of the right lower extremity affecting activities of daily living  3. Hypertension  4. History of cerebrovascular accident   5. Nocturnal cramps     RECOMMENDATIONS:  The patient has significant claudication of the right lower extremity.  I think she would benefit from endarterectomy of the right common femoral artery and possibly the proximal superficial femoral artery.  The other lesions would be left alone and could be dealt with at a later date by percutaneous intervention.  Risks and benefits were discussed including bleeding, infection, embolization to the right foot with tissue loss, limb loss, edema of the right lower extremity, etcetera.  She voiced understanding and wants to proceed.  I explained to the patient that the burning sensation of the proximal left thigh is not related to peripheral arterial occlusive disease, since she has no significant arterial occlusive disease above that level.    Rell Wilson MD

## 2022-07-20 ENCOUNTER — TELEPHONE (OUTPATIENT)
Dept: VASCULAR SURGERY | Facility: CLINIC | Age: 67
End: 2022-07-20
Payer: MEDICARE

## 2022-07-20 DIAGNOSIS — I73.9 PAD (PERIPHERAL ARTERY DISEASE): Primary | ICD-10-CM

## 2022-07-20 DIAGNOSIS — Z79.01 ADMISSION FOR LONG-TERM (CURRENT) USE OF ANTICOAGULANTS: ICD-10-CM

## 2022-07-20 DIAGNOSIS — Z51.81 ADMISSION FOR LONG-TERM (CURRENT) USE OF ANTICOAGULANTS: ICD-10-CM

## 2022-07-20 DIAGNOSIS — I73.9 PERIPHERAL VASCULAR DISEASE, UNSPECIFIED: ICD-10-CM

## 2022-07-20 RX ORDER — LIDOCAINE HYDROCHLORIDE 10 MG/ML
1 INJECTION, SOLUTION EPIDURAL; INFILTRATION; INTRACAUDAL; PERINEURAL ONCE
Status: CANCELLED | OUTPATIENT
Start: 2022-07-20 | End: 2022-07-20

## 2022-07-20 NOTE — TELEPHONE ENCOUNTER
Pt called stated wanting to wait til after September 18th to schedule surgery, Stated she will give us a call at the beginning of September to schedule.

## 2022-07-20 NOTE — TELEPHONE ENCOUNTER
----- Message from Diego Azevedo sent at 7/20/2022  3:03 PM CDT -----  Contact: pt at [phone]  Type: Needs Medical Advice  Who Called:  Pt  Best Call Back Number: [phone]    Additional Information: Pt is calling office to speak to the nurse and rtn a call she missed. Please callback

## 2022-08-12 ENCOUNTER — PATIENT MESSAGE (OUTPATIENT)
Dept: VASCULAR SURGERY | Facility: CLINIC | Age: 67
End: 2022-08-12
Payer: MEDICARE

## 2022-08-12 DIAGNOSIS — R93.3 ABNORMAL FINDINGS ON DIAGNOSTIC IMAGING OF OTHER PARTS OF DIGESTIVE TRACT: Primary | ICD-10-CM

## 2022-08-12 DIAGNOSIS — Z12.11 SPECIAL SCREENING FOR MALIGNANT NEOPLASM OF COLON: ICD-10-CM

## 2022-08-15 ENCOUNTER — TELEPHONE (OUTPATIENT)
Dept: VASCULAR SURGERY | Facility: CLINIC | Age: 67
End: 2022-08-15
Payer: MEDICARE

## 2022-08-15 DIAGNOSIS — I73.9 PAD (PERIPHERAL ARTERY DISEASE): Primary | ICD-10-CM

## 2022-08-15 DIAGNOSIS — Z79.01 ADMISSION FOR LONG-TERM (CURRENT) USE OF ANTICOAGULANTS: ICD-10-CM

## 2022-08-15 DIAGNOSIS — Z12.11 SCREENING FOR COLON CANCER: Primary | ICD-10-CM

## 2022-08-15 DIAGNOSIS — Z51.81 ADMISSION FOR LONG-TERM (CURRENT) USE OF ANTICOAGULANTS: ICD-10-CM

## 2022-08-15 DIAGNOSIS — I73.9 PERIPHERAL VASCULAR DISEASE, UNSPECIFIED: ICD-10-CM

## 2022-08-15 RX ORDER — LIDOCAINE HYDROCHLORIDE 10 MG/ML
1 INJECTION, SOLUTION EPIDURAL; INFILTRATION; INTRACAUDAL; PERINEURAL ONCE
Status: CANCELLED | OUTPATIENT
Start: 2022-08-15 | End: 2022-08-15

## 2022-09-15 ENCOUNTER — TELEPHONE (OUTPATIENT)
Dept: VASCULAR SURGERY | Facility: CLINIC | Age: 67
End: 2022-09-15
Payer: MEDICARE

## 2022-09-15 NOTE — TELEPHONE ENCOUNTER
----- Message from Lavinia Mackenzie RN sent at 9/15/2022  8:35 AM CDT -----  Contact: pt    ----- Message -----  From: Blanca Hernadez  Sent: 9/14/2022   2:24 PM CDT  To: Katie Menjivar Staff    Type: Needs Medical Advice    Who Called: pt  Best Call Back Number: [phone]    Inquiry/Question: pt has surgery scheduled for 09/21/2022 and she has some questions         Thank you~

## 2022-11-04 ENCOUNTER — TELEPHONE (OUTPATIENT)
Dept: VASCULAR SURGERY | Facility: CLINIC | Age: 67
End: 2022-11-04
Payer: MEDICARE

## 2022-11-04 NOTE — TELEPHONE ENCOUNTER
Attempted to contact patient in reference to visit with Dr. Valle on Monday 12/7/22. Voice message left for patient requesting return call.

## 2022-11-07 ENCOUNTER — HOSPITAL ENCOUNTER (OUTPATIENT)
Dept: RADIOLOGY | Facility: HOSPITAL | Age: 67
Discharge: HOME | End: 2022-11-07
Attending: SURGERY
Payer: MEDICARE

## 2022-11-07 ENCOUNTER — INITIAL CONSULT (OUTPATIENT)
Dept: VASCULAR SURGERY | Facility: CLINIC | Age: 67
End: 2022-11-07
Payer: MEDICARE

## 2022-11-07 ENCOUNTER — HOSPITAL ENCOUNTER (OUTPATIENT)
Dept: VASCULAR SURGERY | Facility: CLINIC | Age: 67
Discharge: HOME | End: 2022-11-07
Attending: SURGERY
Payer: MEDICARE

## 2022-11-07 VITALS — WEIGHT: 172.38 LBS | OXYGEN SATURATION: 98 % | HEART RATE: 89 BPM | BODY MASS INDEX: 28.72 KG/M2 | HEIGHT: 65 IN

## 2022-11-07 DIAGNOSIS — I73.9 PVD (PERIPHERAL VASCULAR DISEASE): Primary | ICD-10-CM

## 2022-11-07 DIAGNOSIS — I73.9 PVD (PERIPHERAL VASCULAR DISEASE): ICD-10-CM

## 2022-11-07 DIAGNOSIS — Z01.818 PRE-OP EVALUATION: ICD-10-CM

## 2022-11-07 DIAGNOSIS — I70.201 FEMORAL ARTERY STENOSIS, RIGHT: Primary | ICD-10-CM

## 2022-11-07 PROCEDURE — 71046 X-RAY EXAM CHEST 2 VIEWS: CPT | Mod: 26,,, | Performed by: RADIOLOGY

## 2022-11-07 PROCEDURE — 99999 PR PBB SHADOW E&M-EST. PATIENT-LVL III: CPT | Mod: PBBFAC,,, | Performed by: SURGERY

## 2022-11-07 PROCEDURE — 71046 X-RAY EXAM CHEST 2 VIEWS: CPT | Mod: TC

## 2022-11-07 PROCEDURE — 93923 UPR/LXTR ART STDY 3+ LVLS: CPT | Mod: 26,S$PBB,, | Performed by: SURGERY

## 2022-11-07 PROCEDURE — 71046 XR CHEST PA AND LATERAL: ICD-10-PCS | Mod: 26,,, | Performed by: RADIOLOGY

## 2022-11-07 PROCEDURE — 93923 UPR/LXTR ART STDY 3+ LVLS: CPT | Mod: PBBFAC | Performed by: SURGERY

## 2022-11-07 PROCEDURE — 99204 PR OFFICE/OUTPT VISIT, NEW, LEVL IV, 45-59 MIN: ICD-10-PCS | Mod: S$PBB,,, | Performed by: SURGERY

## 2022-11-07 PROCEDURE — 99204 OFFICE O/P NEW MOD 45 MIN: CPT | Mod: S$PBB,,, | Performed by: SURGERY

## 2022-11-07 PROCEDURE — 93923 PR NON-INVASIVE PHYSIOLOGIC STUDY EXTREMITY 3 LEVELS: ICD-10-PCS | Mod: 26,S$PBB,, | Performed by: SURGERY

## 2022-11-07 PROCEDURE — 99213 OFFICE O/P EST LOW 20 MIN: CPT | Mod: PBBFAC,25 | Performed by: SURGERY

## 2022-11-07 PROCEDURE — 99999 PR PBB SHADOW E&M-EST. PATIENT-LVL III: ICD-10-PCS | Mod: PBBFAC,,, | Performed by: SURGERY

## 2022-11-07 RX ORDER — ONDANSETRON 2 MG/ML
4 INJECTION INTRAMUSCULAR; INTRAVENOUS EVERY 12 HOURS PRN
Status: CANCELLED | OUTPATIENT
Start: 2022-11-07

## 2022-11-07 RX ORDER — CLOPIDOGREL BISULFATE 75 MG/1
75 TABLET ORAL DAILY
Qty: 30 TABLET | Refills: 11 | Status: SHIPPED | OUTPATIENT
Start: 2022-11-07 | End: 2023-11-03

## 2022-11-07 RX ORDER — SODIUM CHLORIDE 9 MG/ML
INJECTION, SOLUTION INTRAVENOUS CONTINUOUS
Status: CANCELLED | OUTPATIENT
Start: 2022-11-07

## 2022-11-07 NOTE — PROGRESS NOTES
Veronica Pringle Neto  11/07/2022    HPI:  Patient is a 66 y.o. female with a history of atrial fibrillation, CVA, HTN and HLD who is a previous patient of Dr. Wilson with a h/o 50% stenosis of the ostium of the right common iliac artery, 80% stenosis of the right common femoral artery and 75% stenosis of the proximal right superficial femoral artery and 50-60% stenosis of the right popliteal artery with 2 vessel runoff to the foot. She was previously scheduled for endarterectomy of the right common femoral artery and possibly the proximal superficial femoral artery with Dr. Rod in late September.  She can walk 1/2 block without stopping. She endorses a burning pain in her right thigh which causes her to take a break when walking.  She has occasional pain when laying in bed relieved by elevation.    Recent CTA in 06/2022 demonstrated moderate stenosis in the right internal iliac, right common femoral and right superficial femoral arteries.  Scattered mild to moderate stenosis is present throughout both lower extremities with three-vessel runoff to both ankles.     She also has a history of carotid stenosis followed by Dr. Mina.  She takes aspirin daily.    Yes, left hemisphere stroke 2013; no MI  Tobacco use: socially as a teenager    Past Medical History:   Diagnosis Date    Anticoagulant long-term use     Arthritis     General anesthetics causing adverse effect in therapeutic use     slow to wake up    Hypertension     PONV (postoperative nausea and vomiting)     Stroke      Past Surgical History:   Procedure Laterality Date    AORTOGRAPHY WITH SERIALOGRAPHY  5/16/2022    Procedure: AORTOGRAM, WITH SERIALOGRAPHY. left femoral access;  Surgeon: Phil Pitts MD;  Location: [facility] CATH;  Service: Cardiology;;    ARTHROSCOPY OF KNEE      AUGMENTATION OF BREAST      COSMETIC SURGERY      EYE SURGERY  12/18/17 & 10/23/17    Cataracts    HYSTERECTOMY      JOINT REPLACEMENT  October 2019    Knee    NASAL SEPTOPLASTY        Family History   Problem Relation Age of Onset    Heart attack Mother     Heart disease Mother     COPD Mother         Smoker    Hypertension Mother     Miscarriages / Stillbirths Mother     Heart attack Father     Heart disease Father      Social History     Socioeconomic History    Marital status:    Tobacco Use    Smoking status: Never    Smokeless tobacco: Never   Substance and Sexual Activity    Alcohol use: Yes     Alcohol/week: 1.0 standard drink     Types: 1 Standard drinks or equivalent per week     Comment: Socially    Drug use: Never    Sexual activity: Yes     Partners: Male     Birth control/protection: Post-menopausal     Social Determinants of Health     Financial Resource Strain: Low Risk     Difficulty of Paying Living Expenses: Not hard at all   Food Insecurity: No Food Insecurity    Worried About Running Out of Food in the Last Year: Never true    Ran Out of Food in the Last Year: Never true   Transportation Needs: No Transportation Needs    Lack of Transportation (Medical): No    Lack of Transportation (Non-Medical): No   Physical Activity: Sufficiently Active    Days of Exercise per Week: 5 days    Minutes of Exercise per Session: 60 min   Stress: Stress Concern Present    Feeling of Stress : To some extent   Social Connections: Moderately Integrated    Frequency of Communication with Friends and Family: More than three times a week    Frequency of Social Gatherings with Friends and Family: Three times a week    Attends [employer] Services: More than 4 times per year    Active Member of Clubs or Organizations: No    Attends Club or Organization Meetings: Never    Marital Status:    Housing Stability: Low Risk     Unable to Pay for Housing in the Last Year: No    Number of Places Lived in the Last Year: 1    Unstable Housing in the Last Year: No       Current Outpatient Medications:     amLODIPine (NORVASC) 10 MG tablet, Take 10 mg by mouth once daily., Disp: , Rfl:     aspirin  (ECOTRIN) 81 MG EC tablet, Take 81 mg by mouth once daily., Disp: , Rfl:     estradioL (ESTRACE) 0.01 % (0.1 mg/gram) vaginal cream, INSERT ONE GRAM VAGINALLY MONDAY, WEDNESDAY AND FRIDAY, Disp: , Rfl:     losartan-hydrochlorothiazide 100-25 mg (HYZAAR) 100-25 mg per tablet, TAKE one tablet BY MOUTH EVERY DAY (Patient taking differently: Take 1 tablet by mouth once daily.), Disp: 90 tablet, Rfl: 3    potassium chloride (MICRO-K) 10 MEQ CpSR, TAKE ONE CAPSULE BY MOUTH EVERY DAY (Patient taking differently: Take 10 mEq by mouth once daily.), Disp: 90 capsule, Rfl: 3    valACYclovir (VALTREX) 500 MG tablet, Take 500 mg by mouth daily as needed. , Disp: , Rfl: 0    ALPRAZolam (XANAX) 0.25 MG tablet, Take 1 tablet (0.25 mg total) by mouth 2 (two) times daily as needed., Disp: 90 tablet, Rfl: 3    REVIEW OF SYSTEMS:  General: negative; ENT: negative; Allergy and Immunology: negative; Hematological and Lymphatic: Negative; Endocrine: negative; Respiratory: no cough, shortness of breath, or wheezing; Cardiovascular: no chest pain or dyspnea on exertion; Gastrointestinal: no abdominal pain/back, change in bowel habits, or bloody stools; Genito-Urinary: no dysuria, trouble voiding, or hematuria; Musculoskeletal: negative  Neurological: no TIA or stroke symptoms; Psychiatric: no nervousness, anxiety or depression.    PHYSICAL EXAM:   Right Arm BP - Sittin/80 (22 1333)  Left Arm BP - Sittin/77 (22 1333)  Pulse: 89         General appearance:  Alert, well-appearing, and in no distress.  Oriented to person, place, and time   Neurological: Normal speech, no focal findings noted; CN II - XII grossly intact           Musculoskeletal: Digits/nail without cyanosis/clubbing.  Normal muscle strength/tone.                 Neck: Supple, no significant adenopathy; thyroid is not enlarged                Chest:  Clear to auscultation, no wheezes, rales or rhonchi, symmetric air entry     No use of accessory muscles              Cardiac: Normal rate and regular rhythm, S1 and S2 normal; PMI non-displaced          Abdomen: Soft, nontender, nondistended, no masses or organomegaly     No rebound tenderness noted; bowel sounds normal     No groin adenopathy      Extremities:   2+ femoral pulses bilaterally     1+ right DP, 2+ left DP; PT pulses non-palpable b/l     Mild ankle edema     No ulcerations    LAB RESULTS:  Lab Results   Component Value Date    K 3.4 (L) 05/16/2022    K 4.0 02/28/2019    K 3.9 07/24/2017    CREATININE 0.50 05/16/2022    CREATININE 0.7 02/28/2019    CREATININE 0.57 07/24/2017     Lab Results   Component Value Date    WBC 6.84 05/16/2022    WBC 8.42 03/12/2019    WBC 8.32 02/28/2019    HCT 34.7 (L) 05/16/2022    HCT 30.0 (L) 03/12/2019    HCT 35.9 (L) 02/28/2019     05/16/2022     03/12/2019     (H) 02/28/2019     No results found for: HGBA1C  IMAGING:    IMP/PLAN:        Staff     Patient is referred in from the [facility].  She is scheduled to have surgery with Dr. Daniel diez.  However he has left the institution.  Therefore she is referred to me.  I have reviewed the report of the angiogram was I can not pull up the angiogram and I reviewed his plan for surgery.  We will shoot an angiogram on the table from the contralateral groin.  We will then do a right common femoral endarterectomy and possible PTA and stent of both the iliac in the SFA.  This seems to be scattered 75% stenosis in the SFA.    I have told the patient the risks benefits of the procedure including but not limited to bleeding infection failure of the procedure to work injury to the artery vein or nerve.  She understands all these risks and we will plan the procedure thank you    S Money

## 2022-11-28 ENCOUNTER — PATIENT MESSAGE (OUTPATIENT)
Dept: SURGERY | Facility: HOSPITAL | Age: 67
End: 2022-11-28
Payer: MEDICARE

## 2022-11-29 ENCOUNTER — LAB VISIT (OUTPATIENT)
Dept: LAB | Facility: HOSPITAL | Age: 67
End: 2022-11-29
Attending: SURGERY
Payer: MEDICARE

## 2022-11-29 DIAGNOSIS — Z01.818 PRE-OP EVALUATION: ICD-10-CM

## 2022-11-29 LAB
ABO + RH BLD: NORMAL
ANION GAP SERPL CALC-SCNC: 9 MMOL/L (ref 8–16)
BASOPHILS # BLD AUTO: 0.06 K/UL (ref 0–0.2)
BASOPHILS NFR BLD: 0.7 % (ref 0–1.9)
BLD GP AB SCN CELLS X3 SERPL QL: NORMAL
BUN SERPL-MCNC: 18 MG/DL (ref 8–23)
CALCIUM SERPL-MCNC: 9.4 MG/DL (ref 8.7–10.5)
CHLORIDE SERPL-SCNC: 107 MMOL/L (ref 95–110)
CO2 SERPL-SCNC: 28 MMOL/L (ref 23–29)
CREAT SERPL-MCNC: 0.7 MG/DL (ref 0.5–1.4)
DIFFERENTIAL METHOD: ABNORMAL
EOSINOPHIL # BLD AUTO: 0.2 K/UL (ref 0–0.5)
EOSINOPHIL NFR BLD: 2.3 % (ref 0–8)
ERYTHROCYTE [DISTWIDTH] IN BLOOD BY AUTOMATED COUNT: 17.2 % (ref 11.5–14.5)
EST. GFR  (NO RACE VARIABLE): >60 ML/MIN/1.73 M^2
GLUCOSE SERPL-MCNC: 89 MG/DL (ref 70–110)
HCT VFR BLD AUTO: 36.9 % (ref 37–48.5)
HGB BLD-MCNC: 11.1 G/DL (ref 12–16)
IMM GRANULOCYTES # BLD AUTO: 0.03 K/UL (ref 0–0.04)
IMM GRANULOCYTES NFR BLD AUTO: 0.3 % (ref 0–0.5)
LYMPHOCYTES # BLD AUTO: 1.9 K/UL (ref 1–4.8)
LYMPHOCYTES NFR BLD: 22.5 % (ref 18–48)
MCH RBC QN AUTO: 19.2 PG (ref 27–31)
MCHC RBC AUTO-ENTMCNC: 30.1 G/DL (ref 32–36)
MCV RBC AUTO: 64 FL (ref 82–98)
MONOCYTES # BLD AUTO: 0.9 K/UL (ref 0.3–1)
MONOCYTES NFR BLD: 10.1 % (ref 4–15)
NEUTROPHILS # BLD AUTO: 5.5 K/UL (ref 1.8–7.7)
NEUTROPHILS NFR BLD: 64.1 % (ref 38–73)
NRBC BLD-RTO: 0 /100 WBC
PLATELET # BLD AUTO: 406 K/UL (ref 150–450)
PMV BLD AUTO: 10.3 FL (ref 9.2–12.9)
POTASSIUM SERPL-SCNC: 3.7 MMOL/L (ref 3.5–5.1)
RBC # BLD AUTO: 5.78 M/UL (ref 4–5.4)
SODIUM SERPL-SCNC: 144 MMOL/L (ref 136–145)
WBC # BLD AUTO: 8.62 K/UL (ref 3.9–12.7)

## 2022-11-29 PROCEDURE — 85025 COMPLETE CBC W/AUTO DIFF WBC: CPT | Performed by: SURGERY

## 2022-11-29 PROCEDURE — 80048 BASIC METABOLIC PNL TOTAL CA: CPT | Performed by: SURGERY

## 2022-11-29 PROCEDURE — 86901 BLOOD TYPING SEROLOGIC RH(D): CPT | Performed by: SURGERY

## 2022-11-30 ENCOUNTER — TELEPHONE (OUTPATIENT)
Dept: VASCULAR SURGERY | Facility: CLINIC | Age: 67
End: 2022-11-30
Payer: MEDICARE

## 2022-11-30 NOTE — PRE-PROCEDURE INSTRUCTIONS
Pt reports she has been diagnosed w/URI, endorses productive cough, chest congestion, SOB, chest pain (2/2 coughing), fatigue - tested negative for covid, Influenza A&B.  Informed pt her SX should be rescheduled upon resolution of URI.  Call transferred to Catia Veliz RN w/Dr. Valle.  Message sent to Catia Veliz regarding pt's current status.

## 2022-11-30 NOTE — TELEPHONE ENCOUNTER
Contacted pt in response to message. States she was diagnosed with URI yesterday and is taking augmentin. After notifying Dr. Valle nurse notified pt that surgery will need to be rescheduled. Case rescheduled to 12/27/22 @ 0700, pt confirmed. Will contact patient on 12/23/22 with arrival time.----- Message from Bryan Banks sent at 11/30/2022  1:59 PM CST -----  HORTENCIA HAYDEN calling to return phone call from Shannon [phone]

## 2022-11-30 NOTE — TELEPHONE ENCOUNTER
----- Message from Bryan Banks sent at 11/30/2022  1:59 PM CST -----  HORTENCIA HAYDEN calling to return phone call from Shannon [phone]

## 2022-12-20 ENCOUNTER — TELEPHONE (OUTPATIENT)
Dept: VASCULAR SURGERY | Facility: CLINIC | Age: 67
End: 2022-12-20
Payer: MEDICARE

## 2022-12-20 NOTE — TELEPHONE ENCOUNTER
Contacted patient in response to voice message requesting return call. Pt states she would like to know when she needs to have labs drawn. Explained that pt will have T&S repeated AM of surgery when she arrives as lab will be closed 12/24/22-12/26/22. Pt verbalized understanding. Will contact pt on 12/23/22 with time of arrival.

## 2022-12-22 ENCOUNTER — TELEPHONE (OUTPATIENT)
Dept: VASCULAR SURGERY | Facility: CLINIC | Age: 67
End: 2022-12-22
Payer: MEDICARE

## 2022-12-22 ENCOUNTER — ANESTHESIA EVENT (OUTPATIENT)
Dept: SURGERY | Facility: HOSPITAL | Age: 67
DRG: 254 | End: 2022-12-22
Payer: MEDICARE

## 2022-12-22 NOTE — ANESTHESIA PREPROCEDURE EVALUATION
12/22/2022  Veronica Duque is a 67 y.o., female.      Pre-op Assessment          Review of Systems  Anesthesia Hx:  No problems with previous Anesthesia  Personal Hx of Anesthesia complications, Post-Operative Nausea/Vomiting, with every anesthetic, treatment not known   Cardiovascular:   Hypertension Denies CAD.     Functional Capacity Can you climb two flights of stairs? ==> Yes    Pulmonary:   Denies Asthma.  Denies Sleep Apnea.    Renal/:   Denies Chronic Renal Disease.     Hepatic/GI:   Denies PUD. GERD Denies Liver Disease.    Neurological:   CVA, no residual symptoms Denies Seizures.    Endocrine:   Denies Diabetes. Denies Hypothyroidism.        Physical Exam  General: Alert    Airway:  Mallampati: I   Mouth Opening: Normal  TM Distance: Normal  Tongue: Normal  Neck ROM: Normal ROM    Dental:  Intact, Braces        Anesthesia Plan  Type of Anesthesia, risks & benefits discussed:    Anesthesia Type: Gen Natural Airway, MAC  Intra-op Monitoring Plan: Standard ASA Monitors  Post Op Pain Control Plan: multimodal analgesia and IV/PO Opioids PRN  Induction:  IV  Airway Plan: Direct  Informed Consent: Informed consent signed with the Patient and all parties understand the risks and agree with anesthesia plan.  All questions answered.   ASA Score: 3    Ready For Surgery From Anesthesia Perspective.     .

## 2022-12-22 NOTE — TELEPHONE ENCOUNTER
Attempted to contact patient with time of arrival for surgery with Dr. Valle on Tuesday 12/27/22. Voice message left for patient with arrival time and requesting return call to confirm time has been received.

## 2022-12-22 NOTE — PRE-PROCEDURE INSTRUCTIONS
PreOp Instructions given:   - Verbal medication information (what to hold and what to take)   - NPO guidelines   - Arrival place directions given; time to be given the day before procedure by the   Surgeon's Office DOS[facility]  - Bathing with antibacterial soap   - Don't wear any jewelry or bring any valuables AM of surgery   - No makeup or moisturizer to face   - No perfume/cologne, powder, lotions or aftershave   Pt. verbalized understanding.   Pt reports h/o PONV  Patient does not know arrival time.  Explained that this information comes from the surgeon's office and if they haven't heard from them by 2 or 3 pm to call the office.  Patient stated an understanding.

## 2022-12-23 ENCOUNTER — TELEPHONE (OUTPATIENT)
Dept: VASCULAR SURGERY | Facility: CLINIC | Age: 67
End: 2022-12-23
Payer: MEDICARE

## 2022-12-23 NOTE — TELEPHONE ENCOUNTER
Spoke with the pt and informed her per Dr Valle that she doesn't need to hold the plavix.Pt verbalized understanding of information received.

## 2022-12-23 NOTE — TELEPHONE ENCOUNTER
Spoke with the pt and informed her of the time of arrival for procedure on 12/27/22 is 5am at the [facility].Pt also informed not to eat or drink anything after midnight.Pt verbalized that she needed directives regarding blood thinner.Informed her that I will contact Dr Valle and she will be notified of his directives.

## 2022-12-27 ENCOUNTER — ANESTHESIA (OUTPATIENT)
Dept: SURGERY | Facility: HOSPITAL | Age: 67
DRG: 254 | End: 2022-12-27
Payer: MEDICARE

## 2022-12-27 ENCOUNTER — HOSPITAL ENCOUNTER (INPATIENT)
Facility: HOSPITAL | Age: 67
LOS: 2 days | Discharge: HOME | DRG: 254 | End: 2022-12-29
Attending: SURGERY | Admitting: SURGERY
Payer: MEDICARE

## 2022-12-27 DIAGNOSIS — R00.0 TACHYCARDIA: ICD-10-CM

## 2022-12-27 DIAGNOSIS — I70.201 FEMORAL ARTERY STENOSIS, RIGHT: Primary | ICD-10-CM

## 2022-12-27 DIAGNOSIS — I73.9 PVD (PERIPHERAL VASCULAR DISEASE): ICD-10-CM

## 2022-12-27 LAB
ABO + RH BLD: NORMAL
ANION GAP SERPL CALC-SCNC: 9 MMOL/L (ref 8–16)
BASOPHILS # BLD AUTO: 0.02 K/UL (ref 0–0.2)
BASOPHILS NFR BLD: 0.2 % (ref 0–1.9)
BLD GP AB SCN CELLS X3 SERPL QL: NORMAL
BUN SERPL-MCNC: 11 MG/DL (ref 8–23)
CALCIUM SERPL-MCNC: 7.9 MG/DL (ref 8.7–10.5)
CHLORIDE SERPL-SCNC: 108 MMOL/L (ref 95–110)
CO2 SERPL-SCNC: 24 MMOL/L (ref 23–29)
CREAT SERPL-MCNC: 0.6 MG/DL (ref 0.5–1.4)
DIFFERENTIAL METHOD: ABNORMAL
EOSINOPHIL # BLD AUTO: 0 K/UL (ref 0–0.5)
EOSINOPHIL NFR BLD: 0.1 % (ref 0–8)
ERYTHROCYTE [DISTWIDTH] IN BLOOD BY AUTOMATED COUNT: 16.9 % (ref 11.5–14.5)
EST. GFR  (NO RACE VARIABLE): >60 ML/MIN/1.73 M^2
GLUCOSE SERPL-MCNC: 128 MG/DL (ref 70–110)
HCT VFR BLD AUTO: 31.2 % (ref 37–48.5)
HGB BLD-MCNC: 9.7 G/DL (ref 12–16)
IMM GRANULOCYTES # BLD AUTO: 0.04 K/UL (ref 0–0.04)
IMM GRANULOCYTES NFR BLD AUTO: 0.4 % (ref 0–0.5)
LYMPHOCYTES # BLD AUTO: 0.8 K/UL (ref 1–4.8)
LYMPHOCYTES NFR BLD: 8.7 % (ref 18–48)
MCH RBC QN AUTO: 19.6 PG (ref 27–31)
MCHC RBC AUTO-ENTMCNC: 31.1 G/DL (ref 32–36)
MCV RBC AUTO: 63 FL (ref 82–98)
MONOCYTES # BLD AUTO: 0.1 K/UL (ref 0.3–1)
MONOCYTES NFR BLD: 1.5 % (ref 4–15)
NEUTROPHILS # BLD AUTO: 8.1 K/UL (ref 1.8–7.7)
NEUTROPHILS NFR BLD: 89.1 % (ref 38–73)
NRBC BLD-RTO: 0 /100 WBC
PLATELET # BLD AUTO: 309 K/UL (ref 150–450)
PMV BLD AUTO: 9.9 FL (ref 9.2–12.9)
POC ACTIVATED CLOTTING TIME K: 167 SEC (ref 74–137)
POTASSIUM SERPL-SCNC: 3.1 MMOL/L (ref 3.5–5.1)
RBC # BLD AUTO: 4.96 M/UL (ref 4–5.4)
SAMPLE: ABNORMAL
SODIUM SERPL-SCNC: 141 MMOL/L (ref 136–145)
WBC # BLD AUTO: 9.06 K/UL (ref 3.9–12.7)

## 2022-12-27 PROCEDURE — C1887 CATHETER, GUIDING: HCPCS | Performed by: SURGERY

## 2022-12-27 PROCEDURE — 25000003 PHARM REV CODE 250: Performed by: STUDENT IN AN ORGANIZED HEALTH CARE EDUCATION/TRAINING PROGRAM

## 2022-12-27 PROCEDURE — 37000008 HC ANESTHESIA 1ST 15 MINUTES: Performed by: SURGERY

## 2022-12-27 PROCEDURE — 36000706: Performed by: SURGERY

## 2022-12-27 PROCEDURE — 71000015 HC POSTOP RECOV 1ST HR: Performed by: SURGERY

## 2022-12-27 PROCEDURE — 25000003 PHARM REV CODE 250

## 2022-12-27 PROCEDURE — 25000003 PHARM REV CODE 250: Performed by: NURSE ANESTHETIST, CERTIFIED REGISTERED

## 2022-12-27 PROCEDURE — D9220A PRA ANESTHESIA: Mod: CRNA,,, | Performed by: NURSE ANESTHETIST, CERTIFIED REGISTERED

## 2022-12-27 PROCEDURE — 63600175 PHARM REV CODE 636 W HCPCS

## 2022-12-27 PROCEDURE — C1725 CATH, TRANSLUMIN NON-LASER: HCPCS | Performed by: SURGERY

## 2022-12-27 PROCEDURE — 86900 BLOOD TYPING SEROLOGIC ABO: CPT

## 2022-12-27 PROCEDURE — 37000009 HC ANESTHESIA EA ADD 15 MINS: Performed by: SURGERY

## 2022-12-27 PROCEDURE — C1760 CLOSURE DEV, VASC: HCPCS | Performed by: SURGERY

## 2022-12-27 PROCEDURE — 63600175 PHARM REV CODE 636 W HCPCS: Performed by: NURSE ANESTHETIST, CERTIFIED REGISTERED

## 2022-12-27 PROCEDURE — 37220 PR REVASCULARIZE ILIAC ARTERY,ANGIOPLASTY, INITIAL VESSEL: CPT | Mod: 51,RT,, | Performed by: SURGERY

## 2022-12-27 PROCEDURE — 63600175 PHARM REV CODE 636 W HCPCS: Performed by: ANESTHESIOLOGY

## 2022-12-27 PROCEDURE — C1894 INTRO/SHEATH, NON-LASER: HCPCS | Performed by: SURGERY

## 2022-12-27 PROCEDURE — D9220A PRA ANESTHESIA: ICD-10-PCS | Mod: CRNA,,, | Performed by: NURSE ANESTHETIST, CERTIFIED REGISTERED

## 2022-12-27 PROCEDURE — D9220A PRA ANESTHESIA: Mod: ANES,,, | Performed by: ANESTHESIOLOGY

## 2022-12-27 PROCEDURE — 93010 EKG 12-LEAD: ICD-10-PCS | Mod: ,,, | Performed by: INTERNAL MEDICINE

## 2022-12-27 PROCEDURE — 20600001 HC STEP DOWN PRIVATE ROOM

## 2022-12-27 PROCEDURE — 27201423 OPTIME MED/SURG SUP & DEVICES STERILE SUPPLY: Performed by: SURGERY

## 2022-12-27 PROCEDURE — 93010 ELECTROCARDIOGRAM REPORT: CPT | Mod: ,,, | Performed by: INTERNAL MEDICINE

## 2022-12-27 PROCEDURE — 35371 PR THROMBOENDARTECTMY FEMORAL COMMON: ICD-10-PCS | Mod: RT,,, | Performed by: SURGERY

## 2022-12-27 PROCEDURE — 93005 ELECTROCARDIOGRAM TRACING: CPT

## 2022-12-27 PROCEDURE — C1769 GUIDE WIRE: HCPCS | Performed by: SURGERY

## 2022-12-27 PROCEDURE — 80048 BASIC METABOLIC PNL TOTAL CA: CPT | Performed by: STUDENT IN AN ORGANIZED HEALTH CARE EDUCATION/TRAINING PROGRAM

## 2022-12-27 PROCEDURE — 27201037 HC PRESSURE MONITORING SET UP

## 2022-12-27 PROCEDURE — 25500020 PHARM REV CODE 255: Performed by: SURGERY

## 2022-12-27 PROCEDURE — D9220A PRA ANESTHESIA: ICD-10-PCS | Mod: ANES,,, | Performed by: ANESTHESIOLOGY

## 2022-12-27 PROCEDURE — 37220 PR REVASCULARIZE ILIAC ARTERY,ANGIOPLASTY, INITIAL VESSEL: ICD-10-PCS | Mod: 51,RT,, | Performed by: SURGERY

## 2022-12-27 PROCEDURE — 71000033 HC RECOVERY, INTIAL HOUR: Performed by: SURGERY

## 2022-12-27 PROCEDURE — 35371 RECHANNELING OF ARTERY: CPT | Mod: RT,,, | Performed by: SURGERY

## 2022-12-27 PROCEDURE — 94761 N-INVAS EAR/PLS OXIMETRY MLT: CPT

## 2022-12-27 PROCEDURE — 36620 ARTERIAL: ICD-10-PCS | Mod: 59,,, | Performed by: ANESTHESIOLOGY

## 2022-12-27 PROCEDURE — 71000039 HC RECOVERY, EACH ADD'L HOUR: Performed by: SURGERY

## 2022-12-27 PROCEDURE — C1768 GRAFT, VASCULAR: HCPCS | Performed by: SURGERY

## 2022-12-27 PROCEDURE — 63600175 PHARM REV CODE 636 W HCPCS: Performed by: SURGERY

## 2022-12-27 PROCEDURE — 25000003 PHARM REV CODE 250: Performed by: SURGERY

## 2022-12-27 PROCEDURE — 36000707: Performed by: SURGERY

## 2022-12-27 PROCEDURE — 86920 COMPATIBILITY TEST SPIN: CPT

## 2022-12-27 PROCEDURE — 36620 INSERTION CATHETER ARTERY: CPT | Mod: 59,,, | Performed by: ANESTHESIOLOGY

## 2022-12-27 PROCEDURE — 85025 COMPLETE CBC W/AUTO DIFF WBC: CPT | Performed by: STUDENT IN AN ORGANIZED HEALTH CARE EDUCATION/TRAINING PROGRAM

## 2022-12-27 DEVICE — GRAFT VAS GUARD 0.8X8CM BOVINE: Type: IMPLANTABLE DEVICE | Site: GROIN | Status: FUNCTIONAL

## 2022-12-27 RX ORDER — MIDAZOLAM HYDROCHLORIDE 1 MG/ML
INJECTION, SOLUTION INTRAMUSCULAR; INTRAVENOUS
Status: DISCONTINUED | OUTPATIENT
Start: 2022-12-27 | End: 2022-12-27

## 2022-12-27 RX ORDER — PHENYLEPHRINE HCL IN 0.9% NACL 1 MG/10 ML
SYRINGE (ML) INTRAVENOUS
Status: DISCONTINUED | OUTPATIENT
Start: 2022-12-27 | End: 2022-12-27

## 2022-12-27 RX ORDER — HYDROCODONE BITARTRATE AND ACETAMINOPHEN 5; 325 MG/1; MG/1
1 TABLET ORAL EVERY 4 HOURS PRN
Status: DISCONTINUED | OUTPATIENT
Start: 2022-12-27 | End: 2022-12-29 | Stop reason: HOSPADM

## 2022-12-27 RX ORDER — DROPERIDOL 2.5 MG/ML
0.62 INJECTION, SOLUTION INTRAMUSCULAR; INTRAVENOUS ONCE AS NEEDED
Status: DISCONTINUED | OUTPATIENT
Start: 2022-12-27 | End: 2022-12-27 | Stop reason: HOSPADM

## 2022-12-27 RX ORDER — MUPIROCIN 20 MG/G
OINTMENT TOPICAL 2 TIMES DAILY
Status: DISCONTINUED | OUTPATIENT
Start: 2022-12-27 | End: 2022-12-29 | Stop reason: HOSPADM

## 2022-12-27 RX ORDER — HYDROMORPHONE HYDROCHLORIDE 1 MG/ML
0.2 INJECTION, SOLUTION INTRAMUSCULAR; INTRAVENOUS; SUBCUTANEOUS EVERY 5 MIN PRN
Status: DISCONTINUED | OUTPATIENT
Start: 2022-12-27 | End: 2022-12-27 | Stop reason: HOSPADM

## 2022-12-27 RX ORDER — ROCURONIUM BROMIDE 10 MG/ML
INJECTION, SOLUTION INTRAVENOUS
Status: DISCONTINUED | OUTPATIENT
Start: 2022-12-27 | End: 2022-12-27

## 2022-12-27 RX ORDER — ONDANSETRON 2 MG/ML
4 INJECTION INTRAMUSCULAR; INTRAVENOUS EVERY 12 HOURS PRN
Status: DISCONTINUED | OUTPATIENT
Start: 2022-12-27 | End: 2022-12-27

## 2022-12-27 RX ORDER — PROPOFOL 10 MG/ML
VIAL (ML) INTRAVENOUS
Status: DISCONTINUED | OUTPATIENT
Start: 2022-12-27 | End: 2022-12-27

## 2022-12-27 RX ORDER — ONDANSETRON 2 MG/ML
4 INJECTION INTRAMUSCULAR; INTRAVENOUS ONCE AS NEEDED
Status: DISCONTINUED | OUTPATIENT
Start: 2022-12-27 | End: 2022-12-27 | Stop reason: HOSPADM

## 2022-12-27 RX ORDER — DEXAMETHASONE SODIUM PHOSPHATE 4 MG/ML
INJECTION, SOLUTION INTRA-ARTICULAR; INTRALESIONAL; INTRAMUSCULAR; INTRAVENOUS; SOFT TISSUE
Status: DISCONTINUED | OUTPATIENT
Start: 2022-12-27 | End: 2022-12-27

## 2022-12-27 RX ORDER — ACETAMINOPHEN 10 MG/ML
INJECTION, SOLUTION INTRAVENOUS
Status: DISCONTINUED | OUTPATIENT
Start: 2022-12-27 | End: 2022-12-27

## 2022-12-27 RX ORDER — LABETALOL HYDROCHLORIDE 5 MG/ML
5 INJECTION, SOLUTION INTRAVENOUS ONCE
Status: DISCONTINUED | OUTPATIENT
Start: 2022-12-27 | End: 2022-12-27 | Stop reason: HOSPADM

## 2022-12-27 RX ORDER — ONDANSETRON 2 MG/ML
INJECTION INTRAMUSCULAR; INTRAVENOUS
Status: DISCONTINUED | OUTPATIENT
Start: 2022-12-27 | End: 2022-12-27

## 2022-12-27 RX ORDER — HYDRALAZINE HYDROCHLORIDE 20 MG/ML
10 INJECTION INTRAMUSCULAR; INTRAVENOUS EVERY 8 HOURS PRN
Status: DISCONTINUED | OUTPATIENT
Start: 2022-12-27 | End: 2022-12-29 | Stop reason: HOSPADM

## 2022-12-27 RX ORDER — LIDOCAINE HYDROCHLORIDE 10 MG/ML
INJECTION, SOLUTION EPIDURAL; INFILTRATION; INTRACAUDAL; PERINEURAL
Status: DISPENSED
Start: 2022-12-27 | End: 2022-12-27

## 2022-12-27 RX ORDER — AMLODIPINE BESYLATE 10 MG/1
10 TABLET ORAL NIGHTLY
Status: DISCONTINUED | OUTPATIENT
Start: 2022-12-27 | End: 2022-12-29 | Stop reason: HOSPADM

## 2022-12-27 RX ORDER — ASPIRIN 81 MG/1
81 TABLET ORAL DAILY
Status: DISCONTINUED | OUTPATIENT
Start: 2022-12-27 | End: 2022-12-29 | Stop reason: HOSPADM

## 2022-12-27 RX ORDER — CLOPIDOGREL BISULFATE 75 MG/1
75 TABLET ORAL DAILY
Status: DISCONTINUED | OUTPATIENT
Start: 2022-12-27 | End: 2022-12-29 | Stop reason: HOSPADM

## 2022-12-27 RX ORDER — PROTAMINE SULFATE 10 MG/ML
INJECTION, SOLUTION INTRAVENOUS
Status: DISCONTINUED | OUTPATIENT
Start: 2022-12-27 | End: 2022-12-27

## 2022-12-27 RX ORDER — HEPARIN SODIUM 1000 [USP'U]/ML
INJECTION, SOLUTION INTRAVENOUS; SUBCUTANEOUS
Status: DISCONTINUED | OUTPATIENT
Start: 2022-12-27 | End: 2022-12-27

## 2022-12-27 RX ORDER — LOSARTAN POTASSIUM AND HYDROCHLOROTHIAZIDE 25; 100 MG/1; MG/1
1 TABLET ORAL DAILY
Status: DISCONTINUED | OUTPATIENT
Start: 2022-12-28 | End: 2022-12-28

## 2022-12-27 RX ORDER — FENTANYL CITRATE 50 UG/ML
INJECTION, SOLUTION INTRAMUSCULAR; INTRAVENOUS
Status: DISCONTINUED | OUTPATIENT
Start: 2022-12-27 | End: 2022-12-27

## 2022-12-27 RX ORDER — SODIUM CHLORIDE 9 MG/ML
INJECTION, SOLUTION INTRAVENOUS CONTINUOUS
Status: DISCONTINUED | OUTPATIENT
Start: 2022-12-27 | End: 2022-12-27

## 2022-12-27 RX ORDER — LIDOCAINE HYDROCHLORIDE 20 MG/ML
INJECTION, SOLUTION EPIDURAL; INFILTRATION; INTRACAUDAL; PERINEURAL
Status: DISCONTINUED | OUTPATIENT
Start: 2022-12-27 | End: 2022-12-27

## 2022-12-27 RX ORDER — IODIXANOL 320 MG/ML
INJECTION, SOLUTION INTRAVASCULAR
Status: DISCONTINUED | OUTPATIENT
Start: 2022-12-27 | End: 2022-12-27

## 2022-12-27 RX ADMIN — Medication 50 MCG: at 08:12

## 2022-12-27 RX ADMIN — PROPOFOL 150 MG: 10 INJECTION, EMULSION INTRAVENOUS at 07:12

## 2022-12-27 RX ADMIN — LIDOCAINE HYDROCHLORIDE 100 MG: 20 INJECTION, SOLUTION EPIDURAL; INFILTRATION; INTRACAUDAL; PERINEURAL at 07:12

## 2022-12-27 RX ADMIN — ROCURONIUM BROMIDE 50 MG: 10 INJECTION INTRAVENOUS at 07:12

## 2022-12-27 RX ADMIN — HYDROMORPHONE HYDROCHLORIDE 0.2 MG: 1 INJECTION, SOLUTION INTRAMUSCULAR; INTRAVENOUS; SUBCUTANEOUS at 10:12

## 2022-12-27 RX ADMIN — HYDROMORPHONE HYDROCHLORIDE 0.2 MG: 1 INJECTION, SOLUTION INTRAMUSCULAR; INTRAVENOUS; SUBCUTANEOUS at 11:12

## 2022-12-27 RX ADMIN — SUGAMMADEX 200 MG: 100 INJECTION, SOLUTION INTRAVENOUS at 10:12

## 2022-12-27 RX ADMIN — SODIUM CHLORIDE 0.4 MCG/KG/MIN: 9 INJECTION, SOLUTION INTRAVENOUS at 08:12

## 2022-12-27 RX ADMIN — MUPIROCIN: 20 OINTMENT TOPICAL at 08:12

## 2022-12-27 RX ADMIN — Medication 100 MCG: at 09:12

## 2022-12-27 RX ADMIN — ONDANSETRON 4 MG: 2 INJECTION INTRAMUSCULAR; INTRAVENOUS at 08:12

## 2022-12-27 RX ADMIN — Medication 100 MCG: at 07:12

## 2022-12-27 RX ADMIN — PROPOFOL 50 MG: 10 INJECTION, EMULSION INTRAVENOUS at 07:12

## 2022-12-27 RX ADMIN — MIDAZOLAM HYDROCHLORIDE 2 MG: 1 INJECTION, SOLUTION INTRAMUSCULAR; INTRAVENOUS at 07:12

## 2022-12-27 RX ADMIN — CEFAZOLIN 2 G: 2 INJECTION, POWDER, FOR SOLUTION INTRAMUSCULAR; INTRAVENOUS at 07:12

## 2022-12-27 RX ADMIN — PROTAMINE SULFATE 5 MG: 10 INJECTION, SOLUTION INTRAVENOUS at 09:12

## 2022-12-27 RX ADMIN — HEPARIN SODIUM 6000 UNITS: 1000 INJECTION, SOLUTION INTRAVENOUS; SUBCUTANEOUS at 08:12

## 2022-12-27 RX ADMIN — PROTAMINE SULFATE 25 MG: 10 INJECTION, SOLUTION INTRAVENOUS at 09:12

## 2022-12-27 RX ADMIN — PROPOFOL 50 MG: 10 INJECTION, EMULSION INTRAVENOUS at 10:12

## 2022-12-27 RX ADMIN — DEXAMETHASONE SODIUM PHOSPHATE 4 MG: 4 INJECTION, SOLUTION INTRAMUSCULAR; INTRAVENOUS at 08:12

## 2022-12-27 RX ADMIN — ACETAMINOPHEN 1000 MG: 10 INJECTION INTRAVENOUS at 08:12

## 2022-12-27 RX ADMIN — Medication 100 MCG: at 08:12

## 2022-12-27 RX ADMIN — ASPIRIN 81 MG: 81 TABLET, COATED ORAL at 12:12

## 2022-12-27 RX ADMIN — HYDROCODONE BITARTRATE AND ACETAMINOPHEN 1 TABLET: 5; 325 TABLET ORAL at 04:12

## 2022-12-27 RX ADMIN — SODIUM CHLORIDE: 9 INJECTION, SOLUTION INTRAVENOUS at 07:12

## 2022-12-27 RX ADMIN — AMLODIPINE BESYLATE 10 MG: 10 TABLET ORAL at 08:12

## 2022-12-27 RX ADMIN — ONDANSETRON 4 MG: 2 INJECTION INTRAMUSCULAR; INTRAVENOUS at 06:12

## 2022-12-27 RX ADMIN — GLYCOPYRROLATE 0.2 MG: 0.2 INJECTION, SOLUTION INTRAMUSCULAR; INTRAVENOUS at 09:12

## 2022-12-27 RX ADMIN — FENTANYL CITRATE 100 MCG: 50 INJECTION, SOLUTION INTRAMUSCULAR; INTRAVENOUS at 07:12

## 2022-12-27 RX ADMIN — CLOPIDOGREL BISULFATE 75 MG: 75 TABLET ORAL at 12:12

## 2022-12-27 RX ADMIN — HYDROCODONE BITARTRATE AND ACETAMINOPHEN 1 TABLET: 5; 325 TABLET ORAL at 08:12

## 2022-12-27 RX ADMIN — Medication 100 MCG: at 10:12

## 2022-12-27 NOTE — NURSING
Patient arrived via stretcher accompanied  by escort x2.  Patient AAO x 4 and denies pain during nursing assessment.  Skin D/I, noted bilateral groin area with guaze with transparent dressing. Patient educated on lying flat until 1430. Pulses palpable popliteal, dorsal and radial. PIV to L hand 20g and L FA 18g , both flushes and patent.  LBM: 12/26/22.  Larose care completed and to be removed 12/28/22 at 0600.  Oriented to room re: call light, bedside table and items W/N reach.  Family at bedside.

## 2022-12-27 NOTE — PLAN OF CARE
Problem: Adult Inpatient Plan of Care  Goal: Plan of Care Review  12/27/2022 1750 by Ijeoma Gilmore RN  Outcome: Ongoing, Progressing  12/27/2022 1750 by Ijeoma Gilmore RN  Outcome: Ongoing, Progressing  Goal: Patient-Specific Goal (Individualized)  12/27/2022 1750 by Ijeoma Gilmore RN  Outcome: Ongoing, Progressing  12/27/2022 1750 by Ijeoma Gilmore RN  Outcome: Ongoing, Progressing  Goal: Absence of Hospital-Acquired Illness or Injury  12/27/2022 1750 by Ijeoma Gilmore RN  Outcome: Ongoing, Progressing  12/27/2022 1750 by Ijeoma Gilmore RN  Outcome: Ongoing, Progressing  Goal: Optimal Comfort and Wellbeing  12/27/2022 1750 by Ijeoma Gilmore RN  Outcome: Ongoing, Progressing  12/27/2022 1750 by Ijeoma Gilmore RN  Outcome: Ongoing, Progressing  Goal: Readiness for Transition of Care  12/27/2022 1750 by Ijeoma Gilmore RN  Outcome: Ongoing, Progressing  12/27/2022 1750 by Ijeoma Gilmore RN  Outcome: Ongoing, Progressing     Problem: Infection  Goal: Absence of Infection Signs and Symptoms  12/27/2022 1750 by Ijeoma Gilmore RN  Outcome: Ongoing, Progressing  12/27/2022 1750 by Ijeoma Gilmore RN  Outcome: Ongoing, Progressing     Problem: Fall Injury Risk  Goal: Absence of Fall and Fall-Related Injury  12/27/2022 1750 by Ijeoma Gilmore RN  Outcome: Ongoing, Progressing  12/27/2022 1750 by Ijeoma Gilmore RN  Outcome: Ongoing, Progressing     Problem: Surgical Site Infection  Goal: Absence of Infection Signs and Symptoms  12/27/2022 1750 by Ijeoma Gilmore RN  Outcome: Ongoing, Progressing  12/27/2022 1750 by Ijeoma Gilmore RN  Outcome: Ongoing, Progressing

## 2022-12-27 NOTE — TRANSFER OF CARE
Anesthesia Transfer of Care Note    Patient: Veronica Duque    Procedure(s) Performed: Procedure(s) (LRB):  Angiogram Extremity Unilateral (Right)  ENDARTERECTOMY, FEMORAL (N/A)  AORTOGRAM (N/A)    Patient location: PACU    Anesthesia Type: general    Transport from OR: Transported from OR on 6-10 L/min O2 by face mask with adequate spontaneous ventilation    Post pain: adequate analgesia    Post assessment: no apparent anesthetic complications    Post vital signs: stable    Level of consciousness: sedated    Nausea/Vomiting: no nausea/vomiting    Complications: none    Transfer of care protocol was followed      Last vitals:

## 2022-12-27 NOTE — BRIEF OP NOTE
[facility] - Surgery ([facility])  Brief Operative Note    SUMMARY     Surgery Date: 12/27/2022     Surgeon(s) and Role:     * Fidencio Valle MD - Primary     * Familia Thomas MD - Fellow    Assisting Surgeon: None    Pre-op Diagnosis:  Femoral artery stenosis, right [I70.201]    Post-op Diagnosis:  Post-Op Diagnosis Codes:     * Femoral artery stenosis, right [I70.201]    Procedure(s) (LRB):  Aortogram  Angiogram Extremity Unilateral (Right)  Right Femoral Endarterectomy with patch angioplasty  PTA 6 x 40 mm Ultraverse of Right EIA and right SFA  5Fr Mynx closure of left CFA  Anesthesia: Local MAC    Operative Findings: extensive plaque in the right CFA. PT is dopplerable.  SFA and EIA treated with PTA. Good result < 30% residual stenosis.     Estimated Blood Loss: * No values recorded between 12/27/2022  7:48 AM and 12/27/2022 10:19 AM *    Estimated Blood Loss has not been documented. EBL = 100cc.         Specimens:   Specimen (24h ago, onward)      None            [id number]    Dictation #1  MRN:[id number]  CSN:[id number]     Familia Thomas MD PGY7  Vascular Surgery Fellow  [phone]

## 2022-12-27 NOTE — ANESTHESIA RELEASE NOTE
Anesthesia Release from PACU Note    Patient: Veronica Duque    Procedure(s) Performed: Procedure(s) (LRB):  Angiogram Extremity Unilateral (Right)  ENDARTERECTOMY, FEMORAL (N/A)  AORTOGRAM (N/A)    Anesthesia type: General    Post pain: Adequate analgesia    Post assessment: no apparent anesthetic complications    Last Vitals:   Vitals:    12/27/22 1300   BP: (!) 150/69   Pulse: 92   Resp: 19   Temp: 37 °C (98.6 °F)   SpO2: 98%       Post vital signs: stable    Level of consciousness: awake    Complications: none    Airway Patency: patent    Respiratory: spontaneous    Cardiovascular: stable    Hydration: euvolemic

## 2022-12-27 NOTE — ANESTHESIA PROCEDURE NOTES
Intubation    Date/Time: 12/27/2022 7:27 AM  Performed by: Wang Case CRNA  Authorized by: Michael Jimenez MD     Intubation:     Induction:  Intravenous    Intubated:  Postinduction    Mask Ventilation:  Easy with oral airway    Attempts:  1    Attempted By:  CRNA    Method of Intubation:  Direct    Blade:  Bowling 2    Laryngeal View Grade: Grade I - full view of cords      Difficult Airway Encountered?: No      Complications:  None    Airway Device:  Oral endotracheal tube    Airway Device Size:  7.0    Style/Cuff Inflation:  Cuffed (inflated to minimal occlusive pressure)    Tube secured:  21    Secured at:  The lips    Placement Verified By:  Capnometry    Complicating Factors:  None    Findings Post-Intubation:  BS equal bilateral and atraumatic/condition of teeth unchanged

## 2022-12-27 NOTE — NURSING TRANSFER
Nursing Transfer Note      12/27/2022     Reason patient is being transferred: postop    Transfer To: 1061    Transfer via stretcher    Transfer with n/a    Transported by transport team    Medicines sent: n/a    Any special needs or follow-up needed: HoB flat until 1430    Chart send with patient: Yes    Notified: spouse, daughter    Patient reassessed at: 12/27/2022 @ 9904

## 2022-12-27 NOTE — ANESTHESIA PROCEDURE NOTES
Arterial    Diagnosis: pvd    Patient location during procedure: done in OR    Staffing  Authorizing Provider: Michael Jimenez MD  Performing Provider: Michael Jimenez MD    Anesthesiologist was present at the time of the procedure.  Arterial  Skin Prep: chlorhexidine gluconate and isopropyl alcohol  Local Infiltration: none  Orientation: left  Location: radial    Catheter Size: 20 G  Catheter placement by Anatomical landmarks. Heme positive aspiration all ports. Insertion Attempts: 1  Assessment  Dressing: secured with tape and tegaderm  Patient: Tolerated well

## 2022-12-27 NOTE — H&P
Interval History 12/27/22  No significant change to history, physical, or ROS since note below was written.     Risks of surgery explained. Proceed as planned.      HPI:  Patient is a 66 y.o. female with a history of atrial fibrillation, CVA, HTN and HLD who is a previous patient of Dr. Wilson with a h/o 50% stenosis of the ostium of the right common iliac artery, 80% stenosis of the right common femoral artery and 75% stenosis of the proximal right superficial femoral artery and 50-60% stenosis of the right popliteal artery with 2 vessel runoff to the foot. She was previously scheduled for endarterectomy of the right common femoral artery and possibly the proximal superficial femoral artery with Dr. Rod in late September.  She can walk 1/2 block without stopping. She endorses a burning pain in her right thigh which causes her to take a break when walking.  She has occasional pain when laying in bed relieved by elevation.     Recent CTA in 06/2022 demonstrated moderate stenosis in the right internal iliac, right common femoral and right superficial femoral arteries.  Scattered mild to moderate stenosis is present throughout both lower extremities with three-vessel runoff to both ankles.      She also has a history of carotid stenosis followed by Dr. Mina.  She takes aspirin daily.     Yes, left hemisphere stroke 2013; no MI  Tobacco use: socially as a teenager          Past Medical History:   Diagnosis Date    Anticoagulant long-term use      Arthritis      General anesthetics causing adverse effect in therapeutic use       slow to wake up    Hypertension      PONV (postoperative nausea and vomiting)      Stroke              Past Surgical History:   Procedure Laterality Date    AORTOGRAPHY WITH SERIALOGRAPHY   5/16/2022     Procedure: AORTOGRAM, WITH SERIALOGRAPHY. left femoral access;  Surgeon: Phil Pitts MD;  Location: [facility];  Service: Cardiology;;    ARTHROSCOPY OF KNEE        AUGMENTATION OF BREAST         COSMETIC SURGERY        EYE SURGERY   12/18/17 & 10/23/17     Cataracts    HYSTERECTOMY        JOINT REPLACEMENT   October 2019     Knee    NASAL SEPTOPLASTY                Family History   Problem Relation Age of Onset    Heart attack Mother      Heart disease Mother      COPD Mother           Smoker    Hypertension Mother      Miscarriages / Stillbirths Mother      Heart attack Father      Heart disease Father        Social History               Socioeconomic History    Marital status:    Tobacco Use    Smoking status: Never    Smokeless tobacco: Never   Substance and Sexual Activity    Alcohol use: Yes       Alcohol/week: 1.0 standard drink       Types: 1 Standard drinks or equivalent per week       Comment: Socially    Drug use: Never    Sexual activity: Yes       Partners: Male       Birth control/protection: Post-menopausal      Social Determinants of Health          Financial Resource Strain: Low Risk     Difficulty of Paying Living Expenses: Not hard at all   Food Insecurity: No Food Insecurity    Worried About Running Out of Food in the Last Year: Never true    Ran Out of Food in the Last Year: Never true   Transportation Needs: No Transportation Needs    Lack of Transportation (Medical): No    Lack of Transportation (Non-Medical): No   Physical Activity: Sufficiently Active    Days of Exercise per Week: 5 days    Minutes of Exercise per Session: 60 min   Stress: Stress Concern Present    Feeling of Stress : To some extent   Social Connections: Moderately Integrated    Frequency of Communication with Friends and Family: More than three times a week    Frequency of Social Gatherings with Friends and Family: Three times a week    Attends Tenriism Services: More than 4 times per year    Active Member of Clubs or Organizations: No    Attends Club or Organization Meetings: Never    Marital Status:    Housing Stability: Low Risk     Unable to Pay for Housing in the Last Year: No    Number of  Places Lived in the Last Year: 1    Unstable Housing in the Last Year: No            Current Outpatient Medications:     amLODIPine (NORVASC) 10 MG tablet, Take 10 mg by mouth once daily., Disp: , Rfl:     aspirin (ECOTRIN) 81 MG EC tablet, Take 81 mg by mouth once daily., Disp: , Rfl:     estradioL (ESTRACE) 0.01 % (0.1 mg/gram) vaginal cream, INSERT ONE GRAM VAGINALLY MONDAY, WEDNESDAY AND FRIDAY, Disp: , Rfl:     losartan-hydrochlorothiazide 100-25 mg (HYZAAR) 100-25 mg per tablet, TAKE one tablet BY MOUTH EVERY DAY (Patient taking differently: Take 1 tablet by mouth once daily.), Disp: 90 tablet, Rfl: 3    potassium chloride (MICRO-K) 10 MEQ CpSR, TAKE ONE CAPSULE BY MOUTH EVERY DAY (Patient taking differently: Take 10 mEq by mouth once daily.), Disp: 90 capsule, Rfl: 3    valACYclovir (VALTREX) 500 MG tablet, Take 500 mg by mouth daily as needed. , Disp: , Rfl: 0    ALPRAZolam (XANAX) 0.25 MG tablet, Take 1 tablet (0.25 mg total) by mouth 2 (two) times daily as needed., Disp: 90 tablet, Rfl: 3     REVIEW OF SYSTEMS:  General: negative; ENT: negative; Allergy and Immunology: negative; Hematological and Lymphatic: Negative; Endocrine: negative; Respiratory: no cough, shortness of breath, or wheezing; Cardiovascular: no chest pain or dyspnea on exertion; Gastrointestinal: no abdominal pain/back, change in bowel habits, or bloody stools; Genito-Urinary: no dysuria, trouble voiding, or hematuria; Musculoskeletal: negative  Neurological: no TIA or stroke symptoms; Psychiatric: no nervousness, anxiety or depression.     PHYSICAL EXAM:   Right Arm BP - Sittin/80 (22 1333)  Left Arm BP - Sittin/77 (22 1333)  Pulse: 89       General appearance:             Alert, well-appearing, and in no distress.  Oriented to person, place, and time                    Neurological:           Normal speech, no focal findings noted; CN II - XII grossly intact           Musculoskeletal:             Digits/nail  without cyanosis/clubbing.  Normal muscle strength/tone.                                  Neck:            Supple, no significant adenopathy; thyroid is not enlarged                                 Chest:           Clear to auscultation, no wheezes, rales or rhonchi, symmetric air entry                                                        No use of accessory muscles                              Cardiac:           Normal rate and regular rhythm, S1 and S2 normal; PMI non-displaced                           Abdomen:          Soft, nontender, nondistended, no masses or organomegaly                                                        No rebound tenderness noted; bowel sounds normal                                                        No groin adenopathy                       Extremities:           2+ femoral pulses bilaterally                                                        1+ right DP, 2+ left DP; PT pulses non-palpable b/l                                                        Mild ankle edema                                                        No ulcerations     LAB RESULTS:        Lab Results   Component Value Date     K 3.4 (L) 05/16/2022     K 4.0 02/28/2019     K 3.9 07/24/2017     CREATININE 0.50 05/16/2022     CREATININE 0.7 02/28/2019     CREATININE 0.57 07/24/2017            Lab Results   Component Value Date     WBC 6.84 05/16/2022     WBC 8.42 03/12/2019     WBC 8.32 02/28/2019     HCT 34.7 (L) 05/16/2022     HCT 30.0 (L) 03/12/2019     HCT 35.9 (L) 02/28/2019      05/16/2022      03/12/2019      (H) 02/28/2019      No results found for: HGBA1C  IMAGING:    IMP/PLAN:      Staff      Patient is referred in from the [facility].  She is scheduled to have surgery with Dr. Daniel diez.  However he has left the institution.  Therefore she is referred to me.  I have reviewed the report of the angiogram was I can not pull up the angiogram and I reviewed his plan for  surgery.  We will shoot an angiogram on the table from the contralateral groin.  We will then do a right common femoral endarterectomy and possible PTA and stent of both the iliac in the SFA.  This seems to be scattered 75% stenosis in the SFA.    I have told the patient the risks benefits of the procedure including but not limited to bleeding infection failure of the procedure to work injury to the artery vein or nerve.  She understands all these risks and we will plan the procedure thank you     S Money

## 2022-12-28 PROBLEM — I70.201 FEMORAL ARTERY STENOSIS, RIGHT: Status: ACTIVE | Noted: 2022-12-28

## 2022-12-28 PROCEDURE — 20600001 HC STEP DOWN PRIVATE ROOM

## 2022-12-28 PROCEDURE — 25000003 PHARM REV CODE 250: Performed by: STUDENT IN AN ORGANIZED HEALTH CARE EDUCATION/TRAINING PROGRAM

## 2022-12-28 PROCEDURE — 63600175 PHARM REV CODE 636 W HCPCS: Performed by: STUDENT IN AN ORGANIZED HEALTH CARE EDUCATION/TRAINING PROGRAM

## 2022-12-28 PROCEDURE — 25000003 PHARM REV CODE 250

## 2022-12-28 RX ORDER — POTASSIUM CHLORIDE 20 MEQ/1
20 TABLET, EXTENDED RELEASE ORAL
Status: COMPLETED | OUTPATIENT
Start: 2022-12-28 | End: 2022-12-28

## 2022-12-28 RX ORDER — HEPARIN SODIUM 5000 [USP'U]/ML
5000 INJECTION, SOLUTION INTRAVENOUS; SUBCUTANEOUS EVERY 8 HOURS
Status: DISCONTINUED | OUTPATIENT
Start: 2022-12-28 | End: 2022-12-29 | Stop reason: HOSPADM

## 2022-12-28 RX ADMIN — POTASSIUM CHLORIDE 20 MEQ: 1500 TABLET, EXTENDED RELEASE ORAL at 02:12

## 2022-12-28 RX ADMIN — CLOPIDOGREL BISULFATE 75 MG: 75 TABLET ORAL at 09:12

## 2022-12-28 RX ADMIN — HYDROCODONE BITARTRATE AND ACETAMINOPHEN 1 TABLET: 5; 325 TABLET ORAL at 03:12

## 2022-12-28 RX ADMIN — POTASSIUM CHLORIDE 20 MEQ: 1500 TABLET, EXTENDED RELEASE ORAL at 11:12

## 2022-12-28 RX ADMIN — ASPIRIN 81 MG: 81 TABLET, COATED ORAL at 09:12

## 2022-12-28 RX ADMIN — HYDROCODONE BITARTRATE AND ACETAMINOPHEN 1 TABLET: 5; 325 TABLET ORAL at 09:12

## 2022-12-28 RX ADMIN — MUPIROCIN: 20 OINTMENT TOPICAL at 09:12

## 2022-12-28 RX ADMIN — AMLODIPINE BESYLATE 10 MG: 10 TABLET ORAL at 06:12

## 2022-12-28 RX ADMIN — HEPARIN SODIUM 5000 UNITS: 5000 INJECTION INTRAVENOUS; SUBCUTANEOUS at 09:12

## 2022-12-28 RX ADMIN — HYDRALAZINE HYDROCHLORIDE 10 MG: 20 INJECTION, SOLUTION INTRAMUSCULAR; INTRAVENOUS at 04:12

## 2022-12-28 RX ADMIN — POTASSIUM CHLORIDE 20 MEQ: 1500 TABLET, EXTENDED RELEASE ORAL at 03:12

## 2022-12-28 NOTE — ANESTHESIA POSTPROCEDURE EVALUATION
Anesthesia Post Evaluation    Patient: Veronica Duque    Procedure(s) Performed: Procedure(s) (LRB):  Angiogram Extremity Unilateral (Right)  ENDARTERECTOMY, FEMORAL (N/A)  AORTOGRAM (N/A)    Final Anesthesia Type: general      Patient location during evaluation: PACU  Patient participation: Yes- Able to Participate  Level of consciousness: awake  Post-procedure vital signs: reviewed and stable  Pain management: adequate  Airway patency: patent    PONV status at discharge: No PONV  Anesthetic complications: no      Cardiovascular status: blood pressure returned to baseline  Respiratory status: unassisted  Hydration status: euvolemic  Follow-up not needed.          Vitals Value Taken Time   /72 12/28/22 0845   Temp 36.7 °C (98 °F) 12/28/22 0845   Pulse 89 12/28/22 0845   Resp 16 12/28/22 0845   SpO2 96 % 12/28/22 0845         Event Time   Out of Recovery 13:24:00         Pain/Chelsey Score: Pain Rating Prior to Med Admin: 8 (12/27/2022  8:56 PM)  Pain Rating Post Med Admin: 0 (12/27/2022 10:22 PM)  Chelsey Score: 9 (12/27/2022 12:45 PM)

## 2022-12-28 NOTE — OP NOTE
Surgery Date: 12/27/2022      Surgeon(s) and Role:     * Fidencio Valle MD - Primary     * Familia Thomas MD - Fellow     Assisting Surgeon: None     Pre-op Diagnosis:  Femoral artery stenosis, right [I70.201]     Post-op Diagnosis:  Post-Op Diagnosis Codes:     * Femoral artery stenosis, right [I70.201]     Procedure(s) (LRB):  Aortogram  Angiogram Extremity Unilateral (Right)  Right Femoral Endarterectomy with patch angioplasty  PTA 6 x 40 mm Ultraverse of Right EIA and right SFA  5Fr Mynx closure of left CFA  Anesthesia: Local MAC     Operative Findings: extensive plaque in the right CFA. PT is dopplerable.  SFA and EIA treated with PTA. Good result < 30% residual stenosis.      Estimated Blood Loss: * No values recorded between 12/27/2022  7:48 AM and 12/27/2022 10:19 AM *     Estimated Blood Loss has not been documented. EBL = 100cc.         Specimens:   Specimen (24h ago, onward)        None           Indications:  The patient is a 67-year-old male with lifestyle limiting rest pain. The patient also had severe stenosis of her right common femoral artery.  The patient has been informed of the risks and benefits of the procedure which included angiography, angioplasty and possible stenting and all necessary procedures.  The patient elected to proceed with the procedure.     Description of procedure:  The patient was brought to the operating room and placed on the operating table in the supine position.  Time-outs were performed using both pre induction and pre incision safety checklist to confirm correct site, procedure, patient, and all other information critical to the procedure.  Prophylactic antibiotics of 2 g of Ancef were given prior to incision.  The right and left groins were shaved prepped and draped in the usual sterile fashion.  The Right common Femoral artery was assessed with Intraoperative Duplex US. A 7 cm longitudinal incision was made over the right common femoral artery.  Correct positioning  was confirmed with anatomical landmarks and by palpation of the dense calcification within the femoral artery.  Dissection through the soft tissue and fascia was made with electrocautery Bovie.  Lymphatics and small crossing vessels were suture ligated with 2-0 silk suture.  The femoral artery fascial sheath was incised with Metzenbaum scissors.  Extension of the dissection plane was made both superiorly and inferiorly along the artery.  The common femoral artery was controlled with silastic vessel loops.  The superficial femoral artery was controlled with silastic vessel loops.  The profundus femoris artery was controlled with silastic vessel loops.  The circumflex both medial and lateral arteries were controlled with silastic vessel loops.  Proximal control of the which of the right common femoral artery was achieved with angled DeBakey clamp and angled right angled clamp.  The superficial femoral artery was further controlled with a small angled DeBakey clamp.  The profundus femoris artery was controlled with a profunda clamp.  A small arteriotomy was made with an 11 blade knife on the common femoral artery this was extended both superiorly and inferiorly with Paul scissors.  A Gallatin elevator was used to remove the dense calcifications and plaque from the common femoral artery this was extended down to the superficial femoral artery.  A profundaplasty and a endartectomy of the right profunda was performed with careful attention to the endpoint.  A hemostat was used to clamp the superior portion of the plaque and remove it in a feathered fashion from the right proximal common femoral artery.  This was done in a similar fashion distally along the superficial femoral artery.  The profunda femoris artery was flushed and cleaned with fine Oxana's forceps.  Using a peanut the endarterectomized artery was cleaned and a careful fashion with Oxana's forceps and general forceps.  The artery was then flushed with  heparinized saline..  The profundus femoris was also flushed with heparinized saline.  This was closed with a pericardium bovine patch using 6 0 Prolene suture in a running fashion.  We started at the apex of the arteriotomy in the left profunda femoris artery and ran both sutures down each side to meet in the middle.  The bovine patch was trimmed with Metzenbaum scissors and started in the inferior portion of the arteriotomy and ra n on both sides of the arteriotomy.  Before final closure of the arteriotomy the artery was flushed both forward bed bled from the common femoral artery and backward bled from the SFA and profunda femoral artery.  Before finally closing the arteriotomy the artery was flushed with heparinized saline.   Next we turned our attention to the right common iliac artery and external iliac artery disease.  The left common femoral artery was accessed using Seldinger technique under direct visualization of the pericaridal patch. Under direct visualization guidance a Seldinger needle was placed into the right common femoral artery.  A small micro puncture starter wire was used through the needle into the right common femoral artery  .  A 4 3 micro puncture sheath was then exchanged for the needle over the small micro puncture starter wire.  A sheath shot was taken under fluoroscopy to confirm correct placement of the 4 Indonesian sheath.  Next an 035 stiff angled Glidewire was advanced into the abdominal aorta.  A a 5 Indonesian sheath was now exchanged for the 4 Indonesian sheath over the 035 stiff angled Glidewire.  An Omni Flush catheter was now placed over the 035 stiff angled Glidewire and positioned at the level of L2.  An aortogram was taken and this revealed a flush stenosis of the right common iliac artery.  The wire and sheath were positioned just above the aortic bifurcation and the 035 stiff angled Glidewire was advanced into the right common iliac artery.  A 5 Indonesian catheter was placed into the  right common femoral artery.  And this was exchanged for 5Fr ifeanyi sheath 35cm in length.  A long-segment of his stenosis was noted in the right external iliac artery. A 6 x 40 mm lballoon was placed in the right external iliac artery under fluoroscopy insufflating to nominal pressure.  This was upsized to a 8 mm via insufflation.  Next we angioplastied the right SFA with a 6 x 40 mm balloon. Angiography was confirmed with contrast, and no residual stenosis, dissection, or thrombus was noted.  All balloons, catheters, and wires and sheath were then removed.  A 5Fr Greek Mynx closure device was used for the left common femoral artery.  All clamps and silastic loops were removed a good pulse was felt in the profunda femoris.  Strong Doppler signals were noted in the foot both the PT.  Hemostasis was achieved with Gelfoam thrombin and Surgicel.  The groin was closed in multiple layers starting with 2-0 Vicryl suture for the deep layers.  Next 3-0 Vicryl suture was used for the subcutaneous tissue and deep dermal layer.  The skin was closed with 3-0 monocryl suture in a running continuous fashion.  A debriefing checklist was performed to share information critical to the postoperative care of the patient.  The patient tolerated the procedure well was extubated and transferred to the postoperative care unit in good health.        Familia Thomas MD PGY7  Vascular Surgery Fellow  [phone]

## 2022-12-28 NOTE — PROGRESS NOTES
[facility]  Vascular Surgery  Progress Note    Patient Name: Veronica Duque  MRN: [id number]  Admission Date: 12/27/2022  Primary Care Provider: Miller Yin MD    Subjective:     Interval History: No acute events overnight. Patient resting comfortably in bed this morning. Tolerating diet with no N/V. Pain is well controlled.    Post-Op Info:  Procedure(s) (LRB):  Angiogram Extremity Unilateral (Right)  ENDARTERECTOMY, FEMORAL (N/A)  AORTOGRAM (N/A)   1 Day Post-Op       Medications:  Continuous Infusions:  Scheduled Meds:   amLODIPine  10 mg Oral QHS    aspirin  81 mg Oral Daily    clopidogreL  75 mg Oral Daily    losartan-hydrochlorothiazide 100-25 mg  1 tablet Oral Daily    mupirocin   Nasal BID     PRN Meds:hydrALAZINE, HYDROcodone-acetaminophen     Objective:     Vital Signs (Most Recent):  Temp: 98 °F (36.7 °C) (12/28/22 0845)  Pulse: 89 (12/28/22 0845)  Resp: 16 (12/28/22 0845)  BP: (!) 158/72 (12/28/22 0845)  SpO2: 96 % (12/28/22 0845)   Vital Signs (24h Range):  Temp:  [97.5 °F (36.4 °C)-98.6 °F (37 °C)] 98 °F (36.7 °C)  Pulse:  [] 89  Resp:  [15-22] 16  SpO2:  [95 %-100 %] 96 %  BP: (128-187)/(60-87) 158/72         Physical Exam  Constitutional:       Appearance: Normal appearance.   HENT:      Head: Normocephalic and atraumatic.      Nose: Nose normal.      Mouth/Throat:      Mouth: Mucous membranes are moist.   Eyes:      Pupils: Pupils are equal, round, and reactive to light.   Cardiovascular:      Rate and Rhythm: Normal rate and regular rhythm.      Pulses: Normal pulses.      Comments: Right DP biphasic  Pulmonary:      Effort: Pulmonary effort is normal.   Abdominal:      General: Abdomen is flat.   Musculoskeletal:         General: Normal range of motion.      Cervical back: Normal range of motion and neck supple.   Skin:     General: Skin is warm and dry.      Capillary Refill: Capillary refill takes less than 2 seconds.   Neurological:      General: No focal deficit  present.      Mental Status: She is alert and oriented to person, place, and time.   Psychiatric:         Mood and Affect: Mood normal.       Significant Labs:  CBC:   Recent Labs   Lab 12/27/22  1338   WBC 9.06   RBC 4.96   HGB 9.7*   HCT 31.2*      MCV 63*   MCH 19.6*   MCHC 31.1*     CMP:   Recent Labs   Lab 12/27/22  1338   *   CALCIUM 7.9*      K 3.1*   CO2 24      BUN 11   CREATININE 0.6       Significant Diagnostics:  I have reviewed all pertinent imaging results/findings within the past 24 hours.    Assessment/Plan:     * Femoral artery stenosis, right  Patient is a 66 y.o. female with a history of atrial fibrillation, CVA, HTN and HLD who is a previous patient of Dr. Wilson with a h/o 50% stenosis of the ostium of the right common iliac artery, 80% stenosis of the right common femoral artery and 75% stenosis of the proximal right superficial femoral artery and 50-60% stenosis of the right popliteal artery with 2 vessel runoff to the foot. Recent CTA in 06/2022 demonstrated moderate stenosis in the right internal iliac, right common femoral and right superficial femoral arteries.  Scattered mild to moderate stenosis is present throughout both lower extremities with three-vessel runoff to both ankles. She is now day 1 s/p right Femoral Endarterectomy with patch angioplasty.    -Neurovascular checks q2 hours  -PT/ OT  -Encourage ambulation, incentive spirometer  -PRN pain medications   -Dispo: likely DC tomorrow   -Cardiac diet        Chester Marroquin MD  Vascular Surgery  [facility]

## 2022-12-28 NOTE — ASSESSMENT & PLAN NOTE
Patient is a 66 y.o. female with a history of atrial fibrillation, CVA, HTN and HLD who is a previous patient of Dr. Wilson with a h/o 50% stenosis of the ostium of the right common iliac artery, 80% stenosis of the right common femoral artery and 75% stenosis of the proximal right superficial femoral artery and 50-60% stenosis of the right popliteal artery with 2 vessel runoff to the foot. Recent CTA in 06/2022 demonstrated moderate stenosis in the right internal iliac, right common femoral and right superficial femoral arteries.  Scattered mild to moderate stenosis is present throughout both lower extremities with three-vessel runoff to both ankles. She is now day 1 s/p right Femoral Endarterectomy with patch angioplasty.    -Neurovascular checks q2 hours  -PT/ OT  -Encourage ambulation, incentive spirometer  -PRN pain medications   -Dispo: likely DC tomorrow   -Cardiac diet

## 2022-12-28 NOTE — SUBJECTIVE & OBJECTIVE
Medications:  Continuous Infusions:  Scheduled Meds:   amLODIPine  10 mg Oral QHS    aspirin  81 mg Oral Daily    clopidogreL  75 mg Oral Daily    losartan-hydrochlorothiazide 100-25 mg  1 tablet Oral Daily    mupirocin   Nasal BID     PRN Meds:hydrALAZINE, HYDROcodone-acetaminophen     Objective:     Vital Signs (Most Recent):  Temp: 98 °F (36.7 °C) (12/28/22 0845)  Pulse: 89 (12/28/22 0845)  Resp: 16 (12/28/22 0845)  BP: (!) 158/72 (12/28/22 0845)  SpO2: 96 % (12/28/22 0845)   Vital Signs (24h Range):  Temp:  [97.5 °F (36.4 °C)-98.6 °F (37 °C)] 98 °F (36.7 °C)  Pulse:  [] 89  Resp:  [15-22] 16  SpO2:  [95 %-100 %] 96 %  BP: (128-187)/(60-87) 158/72         Physical Exam  Constitutional:       Appearance: Normal appearance.   HENT:      Head: Normocephalic and atraumatic.      Nose: Nose normal.      Mouth/Throat:      Mouth: Mucous membranes are moist.   Eyes:      Pupils: Pupils are equal, round, and reactive to light.   Cardiovascular:      Rate and Rhythm: Normal rate and regular rhythm.      Pulses: Normal pulses.      Comments: Right DP biphasic  Pulmonary:      Effort: Pulmonary effort is normal.   Abdominal:      General: Abdomen is flat.   Musculoskeletal:         General: Normal range of motion.      Cervical back: Normal range of motion and neck supple.   Skin:     General: Skin is warm and dry.      Capillary Refill: Capillary refill takes less than 2 seconds.   Neurological:      General: No focal deficit present.      Mental Status: She is alert and oriented to person, place, and time.   Psychiatric:         Mood and Affect: Mood normal.       Significant Labs:  CBC:   Recent Labs   Lab 12/27/22  1338   WBC 9.06   RBC 4.96   HGB 9.7*   HCT 31.2*      MCV 63*   MCH 19.6*   MCHC 31.1*     CMP:   Recent Labs   Lab 12/27/22  1338   *   CALCIUM 7.9*      K 3.1*   CO2 24      BUN 11   CREATININE 0.6       Significant Diagnostics:  I have reviewed all pertinent imaging  results/findings within the past 24 hours.

## 2022-12-28 NOTE — PLAN OF CARE
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]  PVD (peripheral vascular disease) [I73.9]    Admission Date: 12/27/2022  Expected Discharge Date:     Discharge Barriers Identified: None    Payor: MEDICARE / Plan: MEDICARE PART A & B / Product Type: Government /     Extended Emergency Contact Information  Primary Emergency Contact: Adriana Holguin   [facility]  Home Phone: [phone]  Relation: Daughter    Discharge Plan A: Home with family  Discharge Plan B: Home with family, Home Health      [facility] - [facility] - [address]  [address]  [place] [address]  Phone: [phone] Fax: [phone]    [facility]  [address]  [facility] [address]  Phone: [phone] Fax: [phone]      Initial Assessment (most recent)       Adult Discharge Assessment - 12/28/22 1617          Discharge Assessment    Assessment Type Discharge Planning Assessment     Confirmed/corrected address, phone number and insurance Yes     Confirmed Demographics Correct on Facesheet     Source of Information patient     People in Home spouse     Do you expect to return to your current living situation? Yes     Do you have help at home or someone to help you manage your care at home? --   Patient reports she is fully independent with ADLs and has family available to help if needed.    Current cognitive status: Alert/Oriented     Home Accessibility stairs within home     Stairs, Within Home, Primary 2 story home. Patient's bedroom is on the 1st floor.     Home Layout Able to live on 1st floor     Equipment Currently Used at Home none     Patient currently being followed by outpatient case management? No     Do you currently have service(s) that help you manage your care at home? No   Patient had [facility]  in the past, no particular  preference for an agency if HH is needed.    Do you take prescription medications? Yes     Do you have prescription coverage? Yes     Do you have any problems affording any of your prescribed medications? No     Is the patient taking medications as prescribed? yes     Who is going to help you get home at discharge? Spouse     How do you get to doctors appointments? car, drives self;family or friend will provide     Are you on dialysis? No     Do you take coumadin? No     Discharge Plan A Home with family     Discharge Plan B Home with family;Home Health     DME Needed Upon Discharge  none     Discharge Plan discussed with: Patient     Discharge Barriers Identified None                 Gi Dobbs LCSW  [facility]  Ext. [phone]

## 2022-12-28 NOTE — PLAN OF CARE
Received pt for care at 0700, pt in bed resting, family at bedside. MAR, labs, and chart reviewed. Pt A&Ox4, VSS and afebrile. Pt hypertensive in PM, PRN BP meds given with little effect, MD aware. Pt able to maintain O2 sats >92% on room air. Pt right groin dressing dry and intact. Pt ambulating to bathroom with assist x1. Pt potassium replaced PO during shift. Pt c/o soreness in right leg, PRN pain medication given with good effect. Pt remained free of falls, acute injury, or skin breakdown. Pt reminded to turn and reposition q2 hours. Pt bed in lowest position, wheels locked, and call bell within reach.      Problem: Adult Inpatient Plan of Care  Goal: Plan of Care Review  Outcome: Ongoing, Progressing  Goal: Patient-Specific Goal (Individualized)  Outcome: Ongoing, Progressing  Goal: Absence of Hospital-Acquired Illness or Injury  Outcome: Ongoing, Progressing  Goal: Optimal Comfort and Wellbeing  Outcome: Ongoing, Progressing  Goal: Readiness for Transition of Care  Outcome: Ongoing, Progressing     Problem: Infection  Goal: Absence of Infection Signs and Symptoms  Outcome: Ongoing, Progressing     Problem: Fall Injury Risk  Goal: Absence of Fall and Fall-Related Injury  Outcome: Ongoing, Progressing     Problem: Surgical Site Infection  Goal: Absence of Infection Signs and Symptoms  Outcome: Ongoing, Progressing     Problem: Skin Injury Risk Increased  Goal: Skin Health and Integrity  Outcome: Ongoing, Progressing

## 2022-12-29 VITALS
HEIGHT: 65 IN | BODY MASS INDEX: 27.82 KG/M2 | WEIGHT: 167 LBS | OXYGEN SATURATION: 94 % | DIASTOLIC BLOOD PRESSURE: 74 MMHG | RESPIRATION RATE: 16 BRPM | TEMPERATURE: 99 F | HEART RATE: 96 BPM | SYSTOLIC BLOOD PRESSURE: 162 MMHG

## 2022-12-29 PROCEDURE — 63600175 PHARM REV CODE 636 W HCPCS: Performed by: STUDENT IN AN ORGANIZED HEALTH CARE EDUCATION/TRAINING PROGRAM

## 2022-12-29 PROCEDURE — 25000003 PHARM REV CODE 250: Performed by: STUDENT IN AN ORGANIZED HEALTH CARE EDUCATION/TRAINING PROGRAM

## 2022-12-29 RX ORDER — HYDROCODONE BITARTRATE AND ACETAMINOPHEN 5; 325 MG/1; MG/1
1 TABLET ORAL EVERY 6 HOURS PRN
Qty: 15 TABLET | Refills: 0 | Status: SHIPPED | OUTPATIENT
Start: 2022-12-29

## 2022-12-29 RX ORDER — ATORVASTATIN CALCIUM 40 MG/1
40 TABLET, FILM COATED ORAL DAILY
Qty: 90 TABLET | Refills: 3 | Status: SHIPPED | OUTPATIENT
Start: 2022-12-29 | End: 2023-05-02

## 2022-12-29 RX ADMIN — ASPIRIN 81 MG: 81 TABLET, COATED ORAL at 09:12

## 2022-12-29 RX ADMIN — CLOPIDOGREL BISULFATE 75 MG: 75 TABLET ORAL at 09:12

## 2022-12-29 RX ADMIN — HEPARIN SODIUM 5000 UNITS: 5000 INJECTION INTRAVENOUS; SUBCUTANEOUS at 05:12

## 2022-12-29 RX ADMIN — MUPIROCIN: 20 OINTMENT TOPICAL at 09:12

## 2022-12-29 NOTE — DISCHARGE SUMMARY
[facility]  Vascular Surgery  Discharge Summary      Patient Name: Veronica Duque  MRN: [id number]  Admission Date: 12/27/2022  Hospital Length of Stay: 2 days  Discharge Date and Time:  12/29/2022 9:06 AM  Attending Physician: Fidencio Valle MD   Discharging Provider: Nai Oviedo NP  Primary Care Provider: Miller Yin MD    HPI:   No notes on file    Procedure(s) (LRB):  Angiogram Extremity Unilateral (Right)  ENDARTERECTOMY, FEMORAL (N/A)  AORTOGRAM (N/A)     Hospital Course: Patient is a 66 y.o. female with a history of atrial fibrillation, CVA, HTN and HLD who is a previous patient of Dr. Wilson with a h/o 50% stenosis of the ostium of the right common iliac artery, 80% stenosis of the right common femoral artery and 75% stenosis of the proximal right superficial femoral artery and 50-60% stenosis of the right popliteal artery with 2 vessel runoff to the foot. She is now day 2 s/p right Femoral Endarterectomy with patch angioplasty.  The patient did not have any complications intraoperatively and was transferred to the floor after her surgery.  She did well postoperatively with normal neurovascular checks, tolerated a diet, pain controlled, and was able to ambulate without difficulty.  On the morning of 12/29 she was deemed appropriate for discharge.       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    Specialty: Vascular Surgery  Contact information:  [address]  [facility] [address] [phone]                         Patient Instructions:      Diet Cardiac     Notify your health care provider if you experience any of the following:  temperature >100.4     Notify your health care provider if you experience any of the following:  persistent nausea and vomiting or diarrhea     Notify your health care provider if you experience any of the following:  severe uncontrolled pain     Notify your health care provider if you experience any of the following:  redness, tenderness, or signs of infection (pain, swelling, redness, odor or green/yellow discharge around incision site)     Notify your health care provider if you experience any of the following:  difficulty breathing or increased cough     Notify your health care provider if you experience any of the following:  severe persistent headache     Notify your health care provider if you experience any of the following:  worsening rash     Notify your health care provider if you experience any of the following:  persistent dizziness, light-headedness, or visual disturbances     Notify your health care provider if you experience any of the following:  increased confusion or weakness     Notify your health care provider if you experience any of the following:     Change dressing (specify)   Order Comments: Keep gauze and tape on groin incision, change once daily.   Keep incision clean and dry.  Okay to shower, let water run over incision and dry.  Do not submerge in pools or tubs.     Activity as tolerated     Medications:  Reconciled Home Medications:      Medication List      START taking these medications    atorvastatin 40 MG tablet  Commonly known as: LIPITOR  Take 1 tablet (40 mg total) by mouth once daily.      HYDROcodone-acetaminophen 5-325 mg per tablet  Commonly known as: NORCO  Take 1 tablet by mouth every 6 (six) hours as needed for Pain.        CHANGE how you take these medications    losartan-hydrochlorothiazide 100-25 mg 100-25 mg per tablet  Commonly known as: HYZAAR  TAKE one tablet BY MOUTH EVERY DAY  What changed: when to take this     potassium chloride 10 MEQ Cpsr  Commonly known as: MICRO-K  TAKE ONE CAPSULE BY MOUTH EVERY DAY  What changed: when to take this        CONTINUE taking these medications    ALPRAZolam 0.25 MG tablet  Commonly known as: XANAX  Take 1 tablet (0.25 mg total) by mouth 2 (two) times daily as needed.     amLODIPine 10 MG tablet  Commonly known as: NORVASC  Take 10 mg by mouth every evening.     aspirin 81 MG EC tablet  Commonly known as: ECOTRIN  Take 81 mg by mouth once daily.     clopidogreL 75 mg tablet  Commonly known as: PLAVIX  Take 1 tablet (75 mg total) by mouth once daily.     estradioL 0.01 % (0.1 mg/gram) vaginal cream  Commonly known as: ESTRACE  INSERT ONE GRAM VAGINALLY MONDAY, WEDNESDAY AND FRIDAY     valACYclovir 500 MG tablet  Commonly known as: VALTREX  Take 500 mg by mouth daily as needed.            Nai Oviedo NP  Vascular Surgery  [facility]

## 2022-12-29 NOTE — HOSPITAL COURSE
Patient is a 66 y.o. female with a history of atrial fibrillation, CVA, HTN and HLD who is a previous patient of Dr. Wilson with a h/o 50% stenosis of the ostium of the right common iliac artery, 80% stenosis of the right common femoral artery and 75% stenosis of the proximal right superficial femoral artery and 50-60% stenosis of the right popliteal artery with 2 vessel runoff to the foot. She is now day 2 s/p right Femoral Endarterectomy with patch angioplasty.  The patient did not have any complications intraoperatively and was transferred to the floor after her surgery.  She did well postoperatively with normal neurovascular checks, tolerated a diet, pain controlled, and was able to ambulate without difficulty.  On the morning of 12/29 she was deemed appropriate for discharge.

## 2022-12-29 NOTE — SUBJECTIVE & OBJECTIVE
Medications:  Continuous Infusions:  Scheduled Meds:   amLODIPine  10 mg Oral QHS    aspirin  81 mg Oral Daily    clopidogreL  75 mg Oral Daily    heparin (porcine)  5,000 Units Subcutaneous Q8H    mupirocin   Nasal BID     PRN Meds:hydrALAZINE, HYDROcodone-acetaminophen     Objective:     Vital Signs (Most Recent):  Temp: 99.2 °F (37.3 °C) (12/29/22 0801)  Pulse: 96 (12/29/22 0801)  Resp: 16 (12/29/22 0801)  BP: (!) 162/74 (12/29/22 0801)  SpO2: (!) 94 % (12/29/22 0801)   Vital Signs (24h Range):  Temp:  [96.5 °F (35.8 °C)-99.2 °F (37.3 °C)] 99.2 °F (37.3 °C)  Pulse:  [] 96  Resp:  [16-20] 16  SpO2:  [91 %-99 %] 94 %  BP: (144-186)/(65-78) 162/74         Physical Exam  Constitutional:       Appearance: Normal appearance.   HENT:      Head: Normocephalic and atraumatic.      Nose: Nose normal.      Mouth/Throat:      Mouth: Mucous membranes are moist.   Eyes:      Pupils: Pupils are equal, round, and reactive to light.   Cardiovascular:      Rate and Rhythm: Normal rate and regular rhythm.      Pulses: Normal pulses.      Comments: Right DP&PT biphasic  Right DP 1+ palpable  Pulmonary:      Effort: Pulmonary effort is normal.   Abdominal:      General: Abdomen is flat.   Musculoskeletal:         General: Normal range of motion.      Cervical back: Normal range of motion and neck supple.   Skin:     General: Skin is warm and dry.      Capillary Refill: Capillary refill takes less than 2 seconds.   Neurological:      General: No focal deficit present.      Mental Status: She is alert and oriented to person, place, and time.   Psychiatric:         Mood and Affect: Mood normal.       Significant Labs:  CBC:   Recent Labs   Lab 12/27/22  1338   WBC 9.06   RBC 4.96   HGB 9.7*   HCT 31.2*      MCV 63*   MCH 19.6*   MCHC 31.1*     CMP:   Recent Labs   Lab 12/27/22  1338   *   CALCIUM 7.9*      K 3.1*   CO2 24      BUN 11   CREATININE 0.6       Significant Diagnostics:  I have reviewed all  pertinent imaging results/findings within the past 24 hours.

## 2022-12-29 NOTE — PROGRESS NOTES
Received orders to discharge pt home. Discharge summary provided and discussed with pt, all questions answered. Pt sent home with dressing supplies. Pt had new prescriptions brought to bedside from pharmacy. Pt left with all personal belongings. IV x2 removed, catheter intact, pressure and dressing applied, pt tolerated well. Pt brought to front entrance via wheel chair where to was met with family car.

## 2022-12-29 NOTE — PROGRESS NOTES
12/28/22 1631   Vital Signs   BP (!) 186/78  (PRN BP meds given)   BP Location Left arm   BP Method Automatic       PRN Hydralazine given, little effect noted. Repeat /72.

## 2022-12-29 NOTE — ASSESSMENT & PLAN NOTE
Patient is a 66 y.o. female with a history of atrial fibrillation, CVA, HTN and HLD who is a previous patient of Dr. Wilson with a h/o 50% stenosis of the ostium of the right common iliac artery, 80% stenosis of the right common femoral artery and 75% stenosis of the proximal right superficial femoral artery and 50-60% stenosis of the right popliteal artery with 2 vessel runoff to the foot. Recent CTA in 06/2022 demonstrated moderate stenosis in the right internal iliac, right common femoral and right superficial femoral arteries.  Scattered mild to moderate stenosis is present throughout both lower extremities with three-vessel runoff to both ankles. She is now day 1 s/p right Femoral Endarterectomy with patch angioplasty.      -Encourage ambulation, incentive spirometer  -PRN pain medications   -Cardiac diet    Dispo:  DC, follow up with Dr. Valle in 4 weeks with LUKE/PVR

## 2022-12-29 NOTE — DISCHARGE INSTRUCTIONS
VASCULAR SURGERY DISCHARGE INSTRUCTIONS    Woundcare:  - Take your dressing off 2 days after your surgery and take a shower. Shower daily and pat your incision site dry  - Leave sutures in place  - It is normal to notice some bruising at your incision site in the first 1-3 days after surgery    Activity:  - Activity is good for you. Try to walk frequently and increase walking distance every day  - No heavy lifting for 2 weeks  - No baths, swimming in pools, lakes, [facility] etc. for 2 weeks     Diet:  -Resume your pre-operative home diet    Follow up:  -Follow up for ultrasound and vascular surgery clinic appointment in ~4 weeks    Call Vascular Surgery Office at [phone] if you experience:  -Increased redness, warmth, tenderness, or draining pus from your incision  -Increased pain/swelling at your incision  -Worsening fevers, chills, nausea/vomiting  -Pain, weakness, coldness, or numbness in your legs  -Uncontrolled pain  -Your call will be returned within 24 hours and further instructions will be provided    Go to ER/Urgent Care if you experience:  -Worsening shortness of breath or chest pain  -Sudden severe pain and swelling at your incision site

## 2022-12-29 NOTE — PLAN OF CARE
Pt rested well overnight. BP trending down within parameters. R groin drsg clean dry and intact, site soft, no bruising, oozing or hematoma noted. Pt's pain managed well with current regimen. Granddaughters at bedside, no acute distress noted. Safety and comfort maintained, will cont to follow Plan of Care until handoff.

## 2022-12-29 NOTE — PLAN OF CARE
[facility]  Discharge Final Note    Primary Care Provider: Miller Yin MD    Expected Discharge Date: 12/29/2022    Final Discharge Note (most recent)       Final Note - 12/29/22 1021          Final Note    Assessment Type Final Discharge Note     Anticipated Discharge Disposition Home or Self Care     Hospital Resources/Appts/Education Provided Provided patient/caregiver with written discharge plan information        Post-Acute Status    Discharge Delays None known at this time                     Important Message from Medicare  Important Message from Medicare regarding Discharge Appeal Rights: Given to patient/caregiver, Explained to patient/caregiver, Signed/date by patient/caregiver     Date IMM was signed: 12/29/22  Time IMM was signed: 0948    Contact Info       Fidencio Valle MD   Specialty: Vascular Surgery    [address] [facility] [address]   Phone: [phone]       Next Steps: Follow up in 4 week(s)          Pt d/c home with family. No d/c needs reported to medical team at this time.     Amee Vail LCSW  Case Management/[facility]  [phone]

## 2022-12-31 LAB
BLD PROD TYP BPU: NORMAL
BLD PROD TYP BPU: NORMAL
BLOOD UNIT EXPIRATION DATE: NORMAL
BLOOD UNIT EXPIRATION DATE: NORMAL
BLOOD UNIT TYPE CODE: 6200
BLOOD UNIT TYPE CODE: 6200
BLOOD UNIT TYPE: NORMAL
BLOOD UNIT TYPE: NORMAL
CODING SYSTEM: NORMAL
CODING SYSTEM: NORMAL
DISPENSE STATUS: NORMAL
DISPENSE STATUS: NORMAL
NUM UNITS TRANS PACKED RBC: NORMAL
NUM UNITS TRANS PACKED RBC: NORMAL

## 2023-01-03 ENCOUNTER — NURSE TRIAGE (OUTPATIENT)
Dept: ADMINISTRATIVE | Facility: CLINIC | Age: 68
End: 2023-01-03
Payer: MEDICARE

## 2023-01-03 NOTE — TELEPHONE ENCOUNTER
Pt called for Post Discharge tracker call #1 and she said that she woke up with night sweats the last 2 nights and was concerned her incision looks good and denies any fever and I told her I would route a message to provider/ surgeon and they can call her back. Pt told to reach out if any other questions or concerns she said that she is not having any trouble at this time             Reason for Disposition   Nursing judgment    Protocols used: Information Only Call - No Triage-[place]

## 2023-01-03 NOTE — TELEPHONE ENCOUNTER
Post discharge tracker day 1 call -     VM left stating attempt to contact Pt for post discharge tracker text and if she is having a medical emergency to call 911 now or go to the nearest ER. Another attempt will be conducted to reach her on this line shortly.    VM left with above message except this was the 2nd and final attempt to reach her but if she wished to be triaged to call ooc at 8  or if having a medical emergency to call 911 or go to the nearest ER.      No contact protocol followed. Encounter routed to PCP and sx.   Reason for Disposition   Message left on identified voicemail    Protocols used: No Contact or Duplicate Contact Call-A-[place]

## 2023-01-09 ENCOUNTER — TELEPHONE (OUTPATIENT)
Dept: VASCULAR SURGERY | Facility: CLINIC | Age: 68
End: 2023-01-09
Payer: MEDICARE

## 2023-01-09 ENCOUNTER — PATIENT MESSAGE (OUTPATIENT)
Dept: VASCULAR SURGERY | Facility: CLINIC | Age: 68
End: 2023-01-09
Payer: MEDICARE

## 2023-01-09 DIAGNOSIS — I73.9 PVD (PERIPHERAL VASCULAR DISEASE): Primary | ICD-10-CM

## 2023-01-09 NOTE — TELEPHONE ENCOUNTER
Attempted to contact patient in reference for same-day appt scheduled with Dr. Valle. Voice message left for patient requesting return call.

## 2023-01-23 ENCOUNTER — HOSPITAL ENCOUNTER (OUTPATIENT)
Dept: VASCULAR SURGERY | Facility: CLINIC | Age: 68
Discharge: HOME | End: 2023-01-23
Attending: SURGERY
Payer: MEDICARE

## 2023-01-23 ENCOUNTER — OFFICE VISIT (OUTPATIENT)
Dept: VASCULAR SURGERY | Facility: CLINIC | Age: 68
End: 2023-01-23
Payer: MEDICARE

## 2023-01-23 DIAGNOSIS — I73.9 PVD (PERIPHERAL VASCULAR DISEASE): Primary | ICD-10-CM

## 2023-01-23 DIAGNOSIS — I73.9 PVD (PERIPHERAL VASCULAR DISEASE): ICD-10-CM

## 2023-01-23 DIAGNOSIS — I73.9 PAD (PERIPHERAL ARTERY DISEASE): Primary | ICD-10-CM

## 2023-01-23 PROCEDURE — 99024 PR POST-OP FOLLOW-UP VISIT: ICD-10-PCS | Mod: S$GLB,,, | Performed by: SURGERY

## 2023-01-23 PROCEDURE — 1157F ADVNC CARE PLAN IN RCRD: CPT | Mod: CPTII,S$GLB,, | Performed by: SURGERY

## 2023-01-23 PROCEDURE — 93923 PR NON-INVASIVE PHYSIOLOGIC STUDY EXTREMITY 3 LEVELS: ICD-10-PCS | Mod: S$GLB,,, | Performed by: SURGERY

## 2023-01-23 PROCEDURE — 99024 POSTOP FOLLOW-UP VISIT: CPT | Mod: S$GLB,,, | Performed by: SURGERY

## 2023-01-23 PROCEDURE — 1101F PR PT FALLS ASSESS DOC 0-1 FALLS W/OUT INJ PAST YR: ICD-10-PCS | Mod: CPTII,S$GLB,, | Performed by: SURGERY

## 2023-01-23 PROCEDURE — 1157F PR ADVANCE CARE PLAN OR EQUIV PRESENT IN MEDICAL RECORD: ICD-10-PCS | Mod: CPTII,S$GLB,, | Performed by: SURGERY

## 2023-01-23 PROCEDURE — 3288F FALL RISK ASSESSMENT DOCD: CPT | Mod: CPTII,S$GLB,, | Performed by: SURGERY

## 2023-01-23 PROCEDURE — 1159F PR MEDICATION LIST DOCUMENTED IN MEDICAL RECORD: ICD-10-PCS | Mod: CPTII,S$GLB,, | Performed by: SURGERY

## 2023-01-23 PROCEDURE — 93923 UPR/LXTR ART STDY 3+ LVLS: CPT | Mod: S$GLB,,, | Performed by: SURGERY

## 2023-01-23 PROCEDURE — 99999 PR PBB SHADOW E&M-EST. PATIENT-LVL II: ICD-10-PCS | Mod: PBBFAC,,, | Performed by: SURGERY

## 2023-01-23 PROCEDURE — 1101F PT FALLS ASSESS-DOCD LE1/YR: CPT | Mod: CPTII,S$GLB,, | Performed by: SURGERY

## 2023-01-23 PROCEDURE — 99999 PR PBB SHADOW E&M-EST. PATIENT-LVL II: CPT | Mod: PBBFAC,,, | Performed by: SURGERY

## 2023-01-23 PROCEDURE — 1159F MED LIST DOCD IN RCRD: CPT | Mod: CPTII,S$GLB,, | Performed by: SURGERY

## 2023-01-23 PROCEDURE — 3288F PR FALLS RISK ASSESSMENT DOCUMENTED: ICD-10-PCS | Mod: CPTII,S$GLB,, | Performed by: SURGERY

## 2023-01-23 NOTE — PROGRESS NOTES
Veronica Mckeonjarad  01/23/2023    HPI:  Patient is a 67 y.o. female with a history of atrial fibrillation, CVA, HTN and HLD who is a previous patient of Dr. Wilson with a h/o 50% stenosis of the ostium of the right common iliac artery, 80% stenosis of the right common femoral artery and 75% stenosis of the proximal right superficial femoral artery and 50-60% stenosis of the right popliteal artery with 2 vessel runoff to the foot. She was previously scheduled for endarterectomy of the right common femoral artery and possibly the proximal superficial femoral artery with Dr. Rod in late September.  She can walk 1/2 block without stopping. She endorses a burning pain in her right thigh which causes her to take a break when walking.  She has occasional pain when laying in bed relieved by elevation.    Recent CTA in 06/2022 demonstrated moderate stenosis in the right internal iliac, right common femoral and right superficial femoral arteries.  Scattered mild to moderate stenosis is present throughout both lower extremities with three-vessel runoff to both ankles.     She also has a history of carotid stenosis followed by Dr. Mina.  She takes aspirin daily.    Interval History 1/23/23:  S/p right femoral endarterectomy 12/27/22. Now complaining of R calf pain with walking one block. Thigh pain now improved.     Yes, left hemisphere stroke 2013; no MI  Tobacco use: socially as a teenager    Past Medical History:   Diagnosis Date    Anticoagulant long-term use     Arthritis     General anesthetics causing adverse effect in therapeutic use     slow to wake up    Hypertension     PONV (postoperative nausea and vomiting)     Stroke      Past Surgical History:   Procedure Laterality Date    ANGIOGRAPHY OF LOWER EXTREMITY Right 12/27/2022    Procedure: Angiogram Extremity Unilateral;  Surgeon: Fidencio Valle MD;  Location: [facility] OR [address] [place] [address];  Service: Vascular;  Laterality: Right;  RLE diagnostic angio & R SFA femoral  endarterectomy w/ PTA & stenting  UHM575.86  gycm2 149.22  fluoro time 5.4  contrast vol    AORTOGRAPHY N/A 12/27/2022    Procedure: AORTOGRAM;  Surgeon: Fidencio Valle MD;  [facility];  Service: Vascular;  Laterality: N/A;    AORTOGRAPHY WITH SERIALOGRAPHY  5/16/2022    Procedure: AORTOGRAM, WITH SERIALOGRAPHY. left femoral access;  Surgeon: Phil Pitts MD;  Location: [facility];  Service: Cardiology;;    ARTHROSCOPY OF KNEE      AUGMENTATION OF BREAST      COSMETIC SURGERY      ENDARTERECTOMY OF FEMORAL ARTERY N/A 12/27/2022    Procedure: ENDARTERECTOMY, FEMORAL;  Surgeon: Fidencio Valle MD;  [facility];  Service: Vascular;  Laterality: N/A;  RLE diagnostic angio & R SFA femoral endarterectomy w/ PTA & stenting    EYE SURGERY  12/18/17 & 10/23/17    Cataracts    HYSTERECTOMY      JOINT REPLACEMENT  October 2019    Knee    NASAL SEPTOPLASTY       Family History   Problem Relation Age of Onset    Heart attack Mother     Heart disease Mother     COPD Mother         Smoker    Hypertension Mother     Miscarriages / Stillbirths Mother     Heart attack Father     Heart disease Father      Social History     Socioeconomic History    Marital status:    Tobacco Use    Smoking status: Never    Smokeless tobacco: Never   Substance and Sexual Activity    Alcohol use: Yes     Alcohol/week: 1.0 standard drink     Types: 1 Standard drinks or equivalent per week     Comment: Socially    Drug use: Never    Sexual activity: Yes     Partners: Male     Birth control/protection: Post-menopausal     Social Determinants of Health     Financial Resource Strain: Low Risk     Difficulty of Paying Living Expenses: Not hard at all   Food Insecurity: No Food Insecurity    Worried About Running Out of Food in the Last Year: Never true    Ran Out of Food in the Last Year: Never true   Transportation Needs: No Transportation Needs    Lack of Transportation (Medical): No    Lack of Transportation  (Non-Medical): No   Physical Activity: Sufficiently Active    Days of Exercise per Week: 5 days    Minutes of Exercise per Session: 60 min   Stress: Stress Concern Present    Feeling of Stress : To some extent   Social Connections: Moderately Integrated    Frequency of Communication with Friends and Family: More than three times a week    Frequency of Social Gatherings with Friends and Family: Three times a week    Attends [employer] Services: More than 4 times per year    Active Member of Clubs or Organizations: No    Attends Club or Organization Meetings: Never    Marital Status:    Housing Stability: Low Risk     Unable to Pay for Housing in the Last Year: No    Number of Places Lived in the Last Year: 1    Unstable Housing in the Last Year: No       Current Outpatient Medications:     ALPRAZolam (XANAX) 0.25 MG tablet, Take 1 tablet (0.25 mg total) by mouth 2 (two) times daily as needed., Disp: 90 tablet, Rfl: 3    amLODIPine (NORVASC) 10 MG tablet, Take 10 mg by mouth every evening., Disp: , Rfl:     aspirin (ECOTRIN) 81 MG EC tablet, Take 81 mg by mouth once daily., Disp: , Rfl:     atorvastatin (LIPITOR) 40 MG tablet, Take 1 tablet (40 mg total) by mouth once daily., Disp: 90 tablet, Rfl: 3    clopidogreL (PLAVIX) 75 mg tablet, Take 1 tablet (75 mg total) by mouth once daily., Disp: 30 tablet, Rfl: 11    estradioL (ESTRACE) 0.01 % (0.1 mg/gram) vaginal cream, INSERT ONE GRAM VAGINALLY MONDAY, WEDNESDAY AND FRIDAY, Disp: , Rfl:     HYDROcodone-acetaminophen (NORCO) 5-325 mg per tablet, Take 1 tablet by mouth every 6 (six) hours as needed for Pain., Disp: 15 tablet, Rfl: 0    losartan-hydrochlorothiazide 100-25 mg (HYZAAR) 100-25 mg per tablet, TAKE one tablet BY MOUTH EVERY DAY (Patient taking differently: Take 1 tablet by mouth once daily.), Disp: 90 tablet, Rfl: 3    potassium chloride (MICRO-K) 10 MEQ CpSR, TAKE ONE CAPSULE BY MOUTH EVERY DAY (Patient taking differently: Take 10 mEq by mouth once  daily.), Disp: 90 capsule, Rfl: 3    valACYclovir (VALTREX) 500 MG tablet, Take 500 mg by mouth daily as needed. , Disp: , Rfl: 0    REVIEW OF SYSTEMS:  General: negative; ENT: negative; Allergy and Immunology: negative; Hematological and Lymphatic: Negative; Endocrine: negative; Respiratory: no cough, shortness of breath, or wheezing; Cardiovascular: no chest pain or dyspnea on exertion; Gastrointestinal: no abdominal pain/back, change in bowel habits, or bloody stools; Genito-Urinary: no dysuria, trouble voiding, or hematuria; Musculoskeletal: negative  Neurological: no TIA or stroke symptoms; Psychiatric: no nervousness, anxiety or depression.    PHYSICAL EXAM:                General appearance:  Alert, well-appearing, and in no distress.  Oriented to person, place, and time   Neurological: Normal speech, no focal findings noted; CN II - XII grossly intact           Musculoskeletal: Digits/nail without cyanosis/clubbing.  Normal muscle strength/tone.                 Neck: Supple, no significant adenopathy; thyroid is not enlarged                Chest:  Clear to auscultation, no wheezes, rales or rhonchi, symmetric air entry     No use of accessory muscles             Cardiac: Normal rate and regular rhythm, S1 and S2 normal; PMI non-displaced          Abdomen: Soft, nontender, nondistended, no masses or organomegaly     No rebound tenderness noted; bowel sounds normal     No groin adenopathy      Extremities:   2+ femoral pulses bilaterally     1+ right DP, 2+ left DP; PT pulses non-palpable b/l     Mild ankle edema     No ulcerations    LAB RESULTS:  Lab Results   Component Value Date    K 3.1 (L) 12/27/2022    K 3.7 11/29/2022    K 3.4 (L) 05/16/2022    CREATININE 0.6 12/27/2022    CREATININE 0.7 11/29/2022    CREATININE 0.50 05/16/2022     Lab Results   Component Value Date    WBC 9.06 12/27/2022    WBC 8.62 11/29/2022    WBC 6.84 05/16/2022    HCT 31.2 (L) 12/27/2022    HCT 36.9 (L) 11/29/2022    HCT 34.7 (L)  2022     2022     2022     2022     No results found for: HGBA1C  IMAGIN22 LUKE R 0.70, L 0.87  23 LUKE R 0.57, L 0.83    IMP/PLAN:  S/p right femoral endarterectomy 22. Now with right calf pain.     -Recommended walking x4/week for 30 minutes.  -Continue asa, statin, plavix   -RTC in 4 months    FRANK Guzman MD  PGY-1

## 2023-05-01 ENCOUNTER — TELEPHONE (OUTPATIENT)
Dept: VASCULAR SURGERY | Facility: CLINIC | Age: 68
End: 2023-05-01
Payer: MEDICARE

## 2023-05-01 NOTE — TELEPHONE ENCOUNTER
Attempted to contact pt to reschedule appts with vascular lab and with Dr. Valle on 5/15/23 due to change in Dr. Valle's schedule. Voice message left for pt requesting return call.

## 2023-05-03 ENCOUNTER — TELEPHONE (OUTPATIENT)
Dept: VASCULAR SURGERY | Facility: CLINIC | Age: 68
End: 2023-05-03
Payer: MEDICARE

## 2023-05-03 ENCOUNTER — PATIENT MESSAGE (OUTPATIENT)
Dept: VASCULAR SURGERY | Facility: CLINIC | Age: 68
End: 2023-05-03
Payer: MEDICARE

## 2023-05-03 NOTE — TELEPHONE ENCOUNTER
Attempted to contact pt to notify her that forms needing completion must be sent as forms, not as pictures and to discuss need for form to be completed as surgery was completed approx. 5 months ago. Voice message left for pt with above message and requesting return call.

## 2023-05-03 NOTE — TELEPHONE ENCOUNTER
Contacted pt in response to message. Notified pt that appts with Dr. Valle and with vascular lab on 5/15/23 will need to be rescheduled due to change in Dr. Valle's scheduled. Appointments rescheduled, pt verified. Appointment letter placed in mail.

## 2023-05-03 NOTE — TELEPHONE ENCOUNTER
Contacted pt in response to message. States this is a policy she has that pays for medical care and she has one year to fill this out to receive compensation. Instructed pt to fax form to clinic and nurse will contact her when completed. Pt verbalized understanding.----- Message from Sam Vogt sent at 5/3/2023  4:15 PM CDT -----  Regarding: retuning missed call  Contact: pt @ [phone]  Patient missed called and is returning call from Catia in the office, please call to advise further. Thank you do.

## 2023-05-09 ENCOUNTER — TELEPHONE (OUTPATIENT)
Dept: PHARMACY | Facility: CLINIC | Age: 68
End: 2023-05-09
Payer: MEDICARE

## 2023-05-09 NOTE — TELEPHONE ENCOUNTER
GORGE opened. Staff message sent to [facility] regarding switch to Praluent per formulary requirement.     Florence, this is Era Osborn, clinical pharmacist with [facility] that is part of your care team.  We have begun working on your prescription that your doctor has sent us. Our next steps include:     Working with your insurance company to obtain approval for your medication  Working with you to ensure your medication is affordable     We will be calling you along the way with updates on your medication but if you have any concerns or receive information that you would like to discuss please reach us at [phone].    Welcome call outcome: Left voicemail

## 2023-05-10 ENCOUNTER — SPECIALTY PHARMACY (OUTPATIENT)
Dept: PHARMACY | Facility: CLINIC | Age: 68
End: 2023-05-10
Payer: MEDICARE

## 2023-05-10 NOTE — TELEPHONE ENCOUNTER
Provider discontinued Repatha due to plan preferring Praluent. Praluent has not been ordered. Closing referral until new order is received.

## 2023-05-23 ENCOUNTER — SPECIALTY PHARMACY (OUTPATIENT)
Dept: PHARMACY | Facility: CLINIC | Age: 68
End: 2023-05-23
Payer: MEDICARE

## 2023-05-23 NOTE — TELEPHONE ENCOUNTER
PA approved with high copay for Praluent. Incoming call from pt returning call. Pt reports copay from her insurance is unaffordable and she is above income requirements for PAP. Pt would not like to proceed at this time. Routing to provider and closing out of [facility].

## 2023-05-25 ENCOUNTER — PATIENT MESSAGE (OUTPATIENT)
Dept: VASCULAR SURGERY | Facility: CLINIC | Age: 68
End: 2023-05-25
Payer: MEDICARE

## 2023-05-26 ENCOUNTER — TELEPHONE (OUTPATIENT)
Dept: VASCULAR SURGERY | Facility: CLINIC | Age: 68
End: 2023-05-26
Payer: MEDICARE

## 2023-05-26 NOTE — TELEPHONE ENCOUNTER
Contacted pt in response to message. Appts rescheduled. Appt letter refused. ----- Message from Alberto Ibarra sent at 5/26/2023 10:16 AM CDT -----  Regarding: appt  Contact: @[phone]  Pt calling to resched her appts on 5/29 stating she wont be in town due to memorial day... also asking where can the forms she needs to send in be mailed.... pls call and [phone]

## 2023-05-26 NOTE — TELEPHONE ENCOUNTER
Attempted to contact pt in response to message. Voice message left for pt explaining that when nurse spoke with pt on 5/3/23 nurse explained to pt that forms cannot be printed as a photograph and completed this way and that the forms would either need to be scanned as a document, faxed, mailed, or physically brought to clinic to be completed. Return call requested for questions.

## 2023-07-13 ENCOUNTER — HOSPITAL ENCOUNTER (OUTPATIENT)
Dept: VASCULAR SURGERY | Facility: CLINIC | Age: 68
Discharge: HOME | End: 2023-07-13
Attending: SURGERY
Payer: MEDICARE

## 2023-07-13 ENCOUNTER — OFFICE VISIT (OUTPATIENT)
Dept: VASCULAR SURGERY | Facility: CLINIC | Age: 68
End: 2023-07-13
Attending: SURGERY
Payer: MEDICARE

## 2023-07-13 VITALS
BODY MASS INDEX: 27.56 KG/M2 | HEIGHT: 65 IN | SYSTOLIC BLOOD PRESSURE: 140 MMHG | TEMPERATURE: 98 F | WEIGHT: 165.38 LBS | DIASTOLIC BLOOD PRESSURE: 67 MMHG | HEART RATE: 78 BPM

## 2023-07-13 DIAGNOSIS — I73.9 PAD (PERIPHERAL ARTERY DISEASE): Primary | ICD-10-CM

## 2023-07-13 DIAGNOSIS — I73.9 PAD (PERIPHERAL ARTERY DISEASE): ICD-10-CM

## 2023-07-13 PROCEDURE — 93926 LOWER EXTREMITY STUDY: CPT | Mod: PBBFAC | Performed by: SURGERY

## 2023-07-13 PROCEDURE — 99213 OFFICE O/P EST LOW 20 MIN: CPT | Mod: PBBFAC | Performed by: SURGERY

## 2023-07-13 PROCEDURE — 93923 PR NON-INVASIVE PHYSIOLOGIC STUDY EXTREMITY 3 LEVELS: ICD-10-PCS | Mod: 26,S$PBB,, | Performed by: SURGERY

## 2023-07-13 PROCEDURE — 93926 LOWER EXTREMITY STUDY: CPT | Mod: 26,S$PBB,, | Performed by: SURGERY

## 2023-07-13 PROCEDURE — 99999 PR PBB SHADOW E&M-EST. PATIENT-LVL III: ICD-10-PCS | Mod: PBBFAC,,, | Performed by: SURGERY

## 2023-07-13 PROCEDURE — 99213 PR OFFICE/OUTPT VISIT, EST, LEVL III, 20-29 MIN: ICD-10-PCS | Mod: S$PBB,,, | Performed by: SURGERY

## 2023-07-13 PROCEDURE — 99213 OFFICE O/P EST LOW 20 MIN: CPT | Mod: S$PBB,,, | Performed by: SURGERY

## 2023-07-13 PROCEDURE — 93926 PR DUPLEX LO EXTREM ART UNILAT/LTD: ICD-10-PCS | Mod: 26,S$PBB,, | Performed by: SURGERY

## 2023-07-13 PROCEDURE — 99999 PR PBB SHADOW E&M-EST. PATIENT-LVL III: CPT | Mod: PBBFAC,,, | Performed by: SURGERY

## 2023-07-13 PROCEDURE — 93923 UPR/LXTR ART STDY 3+ LVLS: CPT | Mod: 26,S$PBB,, | Performed by: SURGERY

## 2023-07-13 PROCEDURE — 93923 UPR/LXTR ART STDY 3+ LVLS: CPT | Mod: PBBFAC | Performed by: SURGERY

## 2023-07-13 NOTE — PROGRESS NOTES
VASCULAR SURGERY NOTE    Patient ID: Veronica Duque is a 67 y.o. female.    I. HISTORY     HPI: Veronica Duque is a 67 y.o. female who is here today for follow up appointment for Femoral endarterectomy with patch angioplasty in December. Today the patient states that she is able to walk on a treadmill for twenty minutes continuously and for about a quarter mile outside before having to stop. Pre-procedure the patient states that she would not have been able to walk down a jolly way in the hospital. She is pleased with her results and states that she is able todo everything she would like day to day. Today the patient denies feelings of pain at rest, skin changes, and non healing wounds.     ALLERGIES:   Review of patient's allergies indicates:   Allergen Reactions    Statins-hmg-coa reductase inhibitors      Myalgias          SOCIAL HISTORY:   Social History     Tobacco Use    Smoking status: Never    Smokeless tobacco: Never   Substance Use Topics    Alcohol use: Yes     Alcohol/week: 1.0 standard drink     Types: 1 Standard drinks or equivalent per week     Comment: Socially    Drug use: Never        FAMILY HISTORY:   Family History   Problem Relation Age of Onset    Heart attack Mother     Heart disease Mother     COPD Mother         Smoker    Hypertension Mother     Miscarriages / Stillbirths Mother     Heart attack Father     Heart disease Father         MEDICATIONS:   Current Outpatient Medications   Medication Instructions    ALPRAZolam (XANAX) 0.25 mg, Oral, 2 times daily PRN    amLODIPine (NORVASC) 5 MG tablet TAKE ONE TABLET BY MOUTH EVERY DAY    amLODIPine (NORVASC) 10 mg, Oral, Nightly    aspirin (ECOTRIN) 81 mg, Oral, Daily    clopidogreL (PLAVIX) 75 mg, Oral, Daily    estradioL (ESTRACE) 0.01 % (0.1 mg/gram) vaginal cream INSERT ONE GRAM VAGINALLY MONDAY, WEDNESDAY AND FRIDAY    ezetimibe (ZETIA) 10 mg, Oral, Daily    HYDROcodone-acetaminophen (NORCO) 5-325 mg per tablet 1 tablet, Oral,  Every 6 hours PRN    losartan-hydrochlorothiazide 100-25 mg (HYZAAR) 100-25 mg per tablet TAKE ONE TABLET BY MOUTH ONCE DAILY    potassium chloride (MICRO-K) 10 MEQ CpSR TAKE ONE CAPSULE BY MOUTH EVERY DAY    valACYclovir (VALTREX) 500 mg, Oral, Daily PRN        Past Medical History:   Diagnosis Date    Anticoagulant long-term use     Arthritis     General anesthetics causing adverse effect in therapeutic use     slow to wake up    Hypertension     PONV (postoperative nausea and vomiting)     Stroke         Past Surgical History:   Procedure Laterality Date    ANGIOGRAPHY OF LOWER EXTREMITY Right 12/27/2022    Procedure: Angiogram Extremity Unilateral;  Surgeon: Fidencio Valle MD;  Location: [facility] OR [address] [place] [address];  Service: Vascular;  Laterality: Right;  RLE diagnostic angio & R SFA femoral endarterectomy w/ PTA & stenting  MQM188.86  gycm2 149.22  fluoro time 5.4  contrast vol    AORTOGRAPHY N/A 12/27/2022    Procedure: AORTOGRAM;  Surgeon: Fidencio Valle MD;  Location: [address];  Service: Vascular;  Laterality: N/A;    AORTOGRAPHY WITH SERIALOGRAPHY  5/16/2022    Procedure: AORTOGRAM, WITH SERIALOGRAPHY. left femoral access;  Surgeon: Phil Pitts MD;  Location: [facility] C[facility];  Service: Cardiology;;    ARTHROSCOPY OF KNEE      AUGMENTATION OF BREAST      COSMETIC SURGERY      ENDARTERECTOMY OF FEMORAL ARTERY N/A 12/27/2022    Procedure: ENDARTERECTOMY, FEMORAL;  Surgeon: Fidencio Valle MD;  Location: [address];  Service: Vascular;  Laterality: N/A;  RLE diagnostic angio & R SFA femoral endarterectomy w/ PTA & stenting    EYE SURGERY  12/18/17 & 10/23/17    Cataracts    HYSTERECTOMY      JOINT REPLACEMENT  October 2019    Knee    NASAL SEPTOPLASTY         Social History     Occupational History    Not on file   Tobacco Use    Smoking status: Never    Smokeless tobacco: Never   Substance and Sexual Activity    Alcohol use: Yes     Alcohol/week: 1.0 standard drink     Types: 1 Standard drinks or equivalent  per week     Comment: Socially    Drug use: Never    Sexual activity: Yes     Partners: Male     Birth control/protection: Post-menopausal         ROS      II. PHYSICAL EXAM     Physical Exam  VASC:  RLE: Edema to the lower extremity, non palpable posterior tibial and dorsalis pedis pulse.  LLE: Edema to the lower extremity, 2+ posterior tibial, 2+ dorsalis pedis pulse.     III. ASSESSMENT & PLAN (MEDICAL DECISION MAKING)       Imaging Results: (I have personally reviewed all images and provided interpretation below)      Assessment/Diagnosis and Plan:    1. PAD (peripheral artery disease)        67 y.o. female with a past medical history atrial fibrillation, CVA, HTN and PAD. Patient received a Femoral endarterectomy with patch angioplasty in December. Today the patients symptoms are much improved. She exercises regularly and has been advised about the benefits of continuing to do so. Follow up in clinic is 6 months to follow up.        Exercise program  F/u 6 months with LUKE's

## 2023-07-29 ENCOUNTER — PATIENT MESSAGE (OUTPATIENT)
Dept: VASCULAR SURGERY | Facility: CLINIC | Age: 68
End: 2023-07-29
Payer: MEDICARE

## 2023-10-27 ENCOUNTER — PATIENT MESSAGE (OUTPATIENT)
Dept: VASCULAR SURGERY | Facility: CLINIC | Age: 68
End: 2023-10-27
Payer: MEDICARE

## 2023-12-19 ENCOUNTER — HOSPITAL ENCOUNTER (OUTPATIENT)
Dept: RADIOLOGY | Facility: HOSPITAL | Age: 68
Discharge: HOME | End: 2023-12-19
Attending: SURGERY
Payer: MEDICARE

## 2023-12-19 ENCOUNTER — OFFICE VISIT (OUTPATIENT)
Dept: ORTHOPEDICS | Facility: CLINIC | Age: 68
End: 2023-12-19
Payer: MEDICARE

## 2023-12-19 VITALS — BODY MASS INDEX: 27.47 KG/M2 | WEIGHT: 164.88 LBS | HEIGHT: 65 IN

## 2023-12-19 DIAGNOSIS — M79.672 LEFT FOOT PAIN: Primary | ICD-10-CM

## 2023-12-19 DIAGNOSIS — M79.672 LEFT FOOT PAIN: ICD-10-CM

## 2023-12-19 PROCEDURE — 99213 PR OFFICE/OUTPT VISIT, EST, LEVL III, 20-29 MIN: ICD-10-PCS | Mod: S$PBB,,, | Performed by: SURGERY

## 2023-12-19 PROCEDURE — 99999 PR PBB SHADOW E&M-EST. PATIENT-LVL III: ICD-10-PCS | Mod: PBBFAC,,, | Performed by: SURGERY

## 2023-12-19 PROCEDURE — 73630 XR FOOT COMPLETE 3 VIEW LEFT: ICD-10-PCS | Mod: 26,LT,, | Performed by: RADIOLOGY

## 2023-12-19 PROCEDURE — 73630 X-RAY EXAM OF FOOT: CPT | Mod: 26,LT,, | Performed by: RADIOLOGY

## 2023-12-19 PROCEDURE — 99999 PR PBB SHADOW E&M-EST. PATIENT-LVL III: CPT | Mod: PBBFAC,,, | Performed by: SURGERY

## 2023-12-19 PROCEDURE — 99213 OFFICE O/P EST LOW 20 MIN: CPT | Mod: PBBFAC,PN | Performed by: SURGERY

## 2023-12-19 PROCEDURE — 99213 OFFICE O/P EST LOW 20 MIN: CPT | Mod: S$PBB,,, | Performed by: SURGERY

## 2023-12-19 PROCEDURE — 73630 X-RAY EXAM OF FOOT: CPT | Mod: TC,PN,LT

## 2023-12-19 RX ORDER — DICLOFENAC SODIUM 10 MG/G
2 GEL TOPICAL DAILY
Qty: 50 G | Refills: 1 | Status: SHIPPED | OUTPATIENT
Start: 2023-12-19

## 2023-12-19 NOTE — PROGRESS NOTES
"SUBJECTIVE:    Ms. Duque is here today for a follow up visit.  She presents with heel pain.  It is on the left side.  It is worse in the daytime, however also gets worse with activity.  She has not attempted any physical therapy, done any home exercises, had any injections, or had any surgery.  She has tried some inserts which she brings with her today.        OBJECTIVE:      Vitals:    12/19/23 1445   Weight: 74.8 kg (164 lb 14.5 oz)   Height: 5' 5" (1.651 m)   PainSc:   7       Lower Extremity Exam  Tender to palpation about the insertion of the plantar fascia, pain relieved with dorsiflexion, no flatfoot deformity.  Neurovascularly intact.     DIAGNOSTIC STUDIES:  Three-view x-ray of the left foot ordered obtained and independently interpreted by me.  Minimal heel spur, no obvious osseous abnormality.    ASSESSMENT:   1. Plantar fasciitis left foot      PLAN:  Veronica was seen today for heel pain.    Diagnoses and all orders for this visit:    Left foot pain  -     X-Ray Foot Complete Left; Future    Other orders  -     diclofenac sodium (VOLTAREN) 1 % Gel; Apply 2 g topically once daily.        Heel cups recommend, instructions see with home exercises and explanation of plantar fasciitis given.  Recommend massage.  Voltaren ordered.  Follow up in 4 months.    Tommie Song MD  [facility]  Orthopedic Surgery      This note was done with voice recognition software. Please excuse any errors missed in proof reading.     "

## 2024-01-11 ENCOUNTER — OFFICE VISIT (OUTPATIENT)
Dept: PODIATRY | Facility: CLINIC | Age: 69
End: 2024-01-11
Payer: MEDICARE

## 2024-01-11 VITALS
SYSTOLIC BLOOD PRESSURE: 173 MMHG | HEIGHT: 65 IN | DIASTOLIC BLOOD PRESSURE: 86 MMHG | BODY MASS INDEX: 27.44 KG/M2 | HEART RATE: 80 BPM

## 2024-01-11 DIAGNOSIS — M72.2 PLANTAR FASCIITIS: Primary | ICD-10-CM

## 2024-01-11 PROCEDURE — 99203 OFFICE O/P NEW LOW 30 MIN: CPT | Mod: S$PBB,,, | Performed by: STUDENT IN AN ORGANIZED HEALTH CARE EDUCATION/TRAINING PROGRAM

## 2024-01-11 PROCEDURE — 99214 OFFICE O/P EST MOD 30 MIN: CPT | Mod: PBBFAC,PN | Performed by: STUDENT IN AN ORGANIZED HEALTH CARE EDUCATION/TRAINING PROGRAM

## 2024-01-11 PROCEDURE — 99999 PR PBB SHADOW E&M-EST. PATIENT-LVL IV: CPT | Mod: PBBFAC,,, | Performed by: STUDENT IN AN ORGANIZED HEALTH CARE EDUCATION/TRAINING PROGRAM

## 2024-01-11 RX ORDER — METHYLPREDNISOLONE 4 MG/1
TABLET ORAL
Qty: 1 EACH | Refills: 0 | Status: SHIPPED | OUTPATIENT
Start: 2024-01-11 | End: 2024-02-01

## 2024-01-11 NOTE — PROGRESS NOTES
Subjective:     Patient ID: Veronica Duque is a 68 y.o. female.    Chief Complaint: Foot Problem (Lt foot, xray done by Dr Lemus,pt saw before) and Heel Pain    Veronica is a 68 y.o. female who presents to the clinic complaining of heel pain in the left foot, especially with the first step in the morning. The pain is described as Sharp. The onset of the pain was unknown and has worsened over the past several weeks. Veronica rates the pain as 6/10. She denies a history of trauma. Prior treatments include arch supports which she thinks are not helping. Sees Dr. Valle for PAD    Review of Systems   Constitutional: Negative for chills, decreased appetite, diaphoresis and fever.   HENT:  Negative for congestion and hearing loss.    Cardiovascular:  Negative for chest pain, leg swelling and syncope.   Respiratory:  Negative for cough and shortness of breath.    Skin:  Negative for color change, dry skin, flushing, itching, nail changes, poor wound healing and rash.   Musculoskeletal:  Negative for arthritis, back pain, joint pain and joint swelling.   Gastrointestinal:  Negative for nausea and vomiting.   Neurological:  Negative for focal weakness, numbness, paresthesias and weakness.   Psychiatric/Behavioral:  Negative for altered mental status. The patient is not nervous/anxious.         Objective:     Physical Exam  Constitutional:       General: She is not in acute distress.     Appearance: She is well-developed. She is not diaphoretic.   Cardiovascular:      Pulses:           Dorsalis pedis pulses are 2+ on the right side and 2+ on the left side.        Posterior tibial pulses are 1+ on the right side and 1+ on the left side.      Comments: Skin temperature is within normal limits. Toes are cool to touch and feet are warm proximally. Hair growth is within normal limits. Skin is normotrophic and without hyperpigmentation. No edema noted. No spider veins or varicosities noted, bilaterally.   Musculoskeletal:          General: Tenderness present.      Comments: Adequate joint range of motion without pain, limitation, nor crepitation to bilateral feet and ankle joints. Muscle strength is 5/5 in all groups bilaterally.    Tenderness to left plantar heel at insertion of plantar fascia to medial tuberosity of the calcaneus       Lymphadenopathy:      Comments: Negative lymphangitic streaking    Skin:     General: Skin is warm and dry.      Findings: No lesion.      Comments: Skin is warm and dry, no acute signs of infection noted. No open wounds, macerations or hyperkeratotic lesions, bilaterally.     Toenails are well trimmed and of normal morphology, bilaterally.    Neurological:      Mental Status: She is alert and oriented to person, place, and time.      Sensory: No sensory deficit.      Motor: No abnormal muscle tone.      Comments: Light touch within normal limits.    Psychiatric:         Behavior: Behavior normal.         Thought Content: Thought content normal.         Judgment: Judgment normal.           Assessment:      Encounter Diagnosis   Name Primary?    Plantar fasciitis Yes     Plan:     Veronica was seen today for foot problem and heel pain.    Diagnoses and all orders for this visit:    Plantar fasciitis  -     Ambulatory referral/consult to Physical/Occupational Therapy; Future    Other orders  -     methylPREDNISolone (MEDROL DOSEPACK) 4 mg tablet; use as directed      I counseled the patient on her conditions, their implications and medical management.  Xray independently reviewed with patient noting mild pes planus deformity with scattered degenerative changes and heel spur  Stretching and strengthening exercises dispensed. Rx PT  Recommend supportive tennis shoes at all times while ambulating, continue arch support   RICE, OTC NSAIDs PRN pain  Return to clinic in 6-8 weeks or PRN as discussed

## 2024-08-12 ENCOUNTER — OFFICE VISIT (OUTPATIENT)
Dept: PRIMARY CARE CLINIC | Facility: CLINIC | Age: 69
End: 2024-08-12
Payer: MEDICARE

## 2024-08-12 VITALS
HEART RATE: 68 BPM | DIASTOLIC BLOOD PRESSURE: 66 MMHG | WEIGHT: 168.19 LBS | OXYGEN SATURATION: 97 % | RESPIRATION RATE: 16 BRPM | BODY MASS INDEX: 28.02 KG/M2 | SYSTOLIC BLOOD PRESSURE: 132 MMHG | HEIGHT: 65 IN

## 2024-08-12 DIAGNOSIS — R73.9 ELEVATED BLOOD SUGAR: ICD-10-CM

## 2024-08-12 DIAGNOSIS — Z76.89 ENCOUNTER TO ESTABLISH CARE WITH NEW DOCTOR: Primary | ICD-10-CM

## 2024-08-12 DIAGNOSIS — I10 PRIMARY HYPERTENSION: ICD-10-CM

## 2024-08-12 DIAGNOSIS — Z12.11 SCREENING FOR COLON CANCER: ICD-10-CM

## 2024-08-12 DIAGNOSIS — I73.9 PAD (PERIPHERAL ARTERY DISEASE): ICD-10-CM

## 2024-08-12 DIAGNOSIS — I70.0 AORTIC ATHEROSCLEROSIS: ICD-10-CM

## 2024-08-12 DIAGNOSIS — I65.23 BILATERAL CAROTID ARTERY STENOSIS: ICD-10-CM

## 2024-08-12 DIAGNOSIS — D69.2 SENILE PURPURA: ICD-10-CM

## 2024-08-12 DIAGNOSIS — D64.9 ANEMIA, UNSPECIFIED TYPE: ICD-10-CM

## 2024-08-12 PROBLEM — I77.71: Status: RESOLVED | Noted: 2019-02-19 | Resolved: 2024-08-12

## 2024-08-12 PROCEDURE — 99213 OFFICE O/P EST LOW 20 MIN: CPT | Mod: PBBFAC,PN | Performed by: STUDENT IN AN ORGANIZED HEALTH CARE EDUCATION/TRAINING PROGRAM

## 2024-08-12 PROCEDURE — 99999 PR PBB SHADOW E&M-EST. PATIENT-LVL III: CPT | Mod: PBBFAC,,, | Performed by: STUDENT IN AN ORGANIZED HEALTH CARE EDUCATION/TRAINING PROGRAM

## 2024-08-12 PROCEDURE — 99204 OFFICE O/P NEW MOD 45 MIN: CPT | Mod: S$PBB,,, | Performed by: STUDENT IN AN ORGANIZED HEALTH CARE EDUCATION/TRAINING PROGRAM

## 2024-08-12 NOTE — ASSESSMENT & PLAN NOTE
Per patient, her family has thalassemia trait. She has never been tested but she has chronic anemia. Will check iron panel just to make sure she is not iron deficient as well.

## 2024-08-12 NOTE — PROGRESS NOTES
INTERNAL MEDICINE INITIAL VISIT NOTE      CHIEF COMPLAINT     Chief Complaint   Patient presents with    Establish Care       HPI     Veronica Duque is a 68 y.o.  female who presents with afib (long ago, not on anticoagulation, resolved on its own), HTN, CVA (around 2010), BL carotid artery dissection, PAD (She underwent right Femoral Endarterectomy with patch angioplasty) , Left knee OA s/p knee replacement is here to establish care with me.  -itchy where she had shingles last year. Started recently.   -no falls in the last year  -no other issues today. No urinary issues.   -had labs ordered by her cardiology. She has chronic anemia, will just check an iron panel to be sure.     Advance Care Planning     Date: 08/12/2024    Power of   I initiated the process of voluntary advance care planning today and explained the importance of this process to the patient.  I introduced the concept of advance directives to the patient, as well. Then the patient received detailed information about the importance of designating a Health Care Power of  (HCPOA). She was also instructed to communicate with this person about their wishes for future healthcare, should she become sick and lose decision-making capacity. The patient has previously appointed a HCPOA. After our discussion, the patient has decided to complete a HCPOA and has appointed her significant other, health care agent: Brendon Duque & health care agent number: [phone]. I encouraged her to communicate with this person about their wishes for future healthcare, should she become sick and lose decision-making capacity.      A total of 5 min was spent on advance care planning, goals of care discussion, emotional support, formulating and communicating prognosis and exploring burden/benefit of various approaches of treatment. This discussion occurred on a fully voluntary basis with the verbal consent of the patient and/or family.  -has  HCPOA and living will in chart        Past Medical History:  Past Medical History:   Diagnosis Date    Anticoagulant long-term use     Arthritis     General anesthetics causing adverse effect in therapeutic use     slow to wake up    Hypertension     PONV (postoperative nausea and vomiting)     Stroke        Past Surgical History:  Past Surgical History:   Procedure Laterality Date    ANGIOGRAPHY OF LOWER EXTREMITY Right 12/27/2022    Procedure: Angiogram Extremity Unilateral;  Surgeon: Fidencio Valle MD;  Location: [address];  Service: Vascular;  Laterality: Right;  RLE diagnostic angio & R SFA femoral endarterectomy w/ PTA & stenting  PGI369.86  gycm2 149.22  fluoro time 5.4  contrast vol    AORTOGRAPHY N/A 12/27/2022    Procedure: AORTOGRAM;  Surgeon: Fidencio Valle MD;  Location: [address];  Service: Vascular;  Laterality: N/A;    AORTOGRAPHY WITH SERIALOGRAPHY  5/16/2022    Procedure: AORTOGRAM, WITH SERIALOGRAPHY. left femoral access;  Surgeon: Phil Pitts MD;  Location: [facility] C[facility];  Service: Cardiology;;    ARTHROSCOPY OF KNEE      AUGMENTATION OF BREAST      COSMETIC SURGERY      ENDARTERECTOMY OF FEMORAL ARTERY N/A 12/27/2022    Procedure: ENDARTERECTOMY, FEMORAL;  Surgeon: Fidencio Valle MD;  Location: [address];  Service: Vascular;  Laterality: N/A;  RLE diagnostic angio & R SFA femoral endarterectomy w/ PTA & stenting    EYE SURGERY  12/18/17 & 10/23/17    Cataracts    HYSTERECTOMY      JOINT REPLACEMENT  October 2019    Knee    NASAL SEPTOPLASTY         Allergies:  Review of patient's allergies indicates:   Allergen Reactions    Statins-hmg-coa reductase inhibitors      Myalgias         Home Medications:  Prior to Admission medications    Medication Sig Start Date End Date Taking? Authorizing Provider   ALPRAZolam (XANAX) 0.5 MG tablet Take 0.5 mg by mouth 2 (two) times daily. 9/6/23  Yes Provider, Historical   amLODIPine (NORVASC) 5 MG tablet Take 1 tablet (5 mg total) by mouth  once daily. 6/18/24  Yes Rashel Mina III, MD   aspirin (ECOTRIN) 81 MG EC tablet Take 81 mg by mouth once daily.   Yes Provider, Historical   diclofenac sodium (VOLTAREN) 1 % Gel Apply 2 g topically once daily. 12/19/23  Yes Tommie Song MD   estradioL (ESTRACE) 0.01 % (0.1 mg/gram) vaginal cream INSERT ONE GRAM VAGINALLY MONDAY, WEDNESDAY AND FRIDAY 3/17/22  Yes Provider, Historical   ezetimibe (ZETIA) 10 mg tablet Take 1 tablet (10 mg total) by mouth once daily. 6/18/24  Yes Rashel Mina III, MD   HYDROcodone-acetaminophen (NORCO) 5-325 mg per tablet Take 1 tablet by mouth every 6 (six) hours as needed for Pain. 12/29/22  Yes Brayan Queen MD   losartan-hydrochlorothiazide 100-25 mg (HYZAAR) 100-25 mg per tablet Take 1 tablet by mouth once daily. 6/18/24  Yes Rashel Mina III, MD   potassium chloride (MICRO-K) 10 MEQ CpSR Take 1 capsule (10 mEq total) by mouth once daily. 6/18/24  Yes Rashel Mina III, MD   valACYclovir (VALTREX) 500 MG tablet Take 500 mg by mouth daily as needed.  1/21/19  Yes Provider, Historical   amLODIPine (NORVASC) 10 MG tablet Take 10 mg by mouth every evening.  Patient not taking: Reported on 6/18/2024    Provider, Historical       Family History:  Family History   Problem Relation Name Age of Onset    Heart attack Mother Lary Garcia     Heart disease Mother Lary Garcia     COPD Mother Lary Garcia         Smoker    Hypertension Mother Lary Garcia     Miscarriages / Stillbirths Mother Lary Garcia     Heart attack Father      Heart disease Father         Social History:  Social History     Tobacco Use    Smoking status: Never    Smokeless tobacco: Never   Substance Use Topics    Alcohol use: Yes     Alcohol/week: 1.0 standard drink of alcohol     Types: 1 Standard drinks or equivalent per week     Comment: Socially    Drug use: Never       Review of Systems:  Review of Systems   Constitutional:  Negative for chills and fever.   HENT:  Negative for  "rhinorrhea and sore throat.    Respiratory:  Negative for cough, chest tightness and shortness of breath.    Cardiovascular:  Negative for chest pain.   Gastrointestinal:  Negative for abdominal pain, blood in stool, constipation and diarrhea.   Genitourinary:  Negative for dysuria and hematuria.   Neurological:  Negative for dizziness, light-headedness and headaches.   Psychiatric/Behavioral:  Negative for confusion.        Health Maintainence:   Td - did not have tetanus in the last 10 yrs   Flu - does not get flu shot  Prevnar - refuses vaccines   Pneumovax -refuses vaccines   Zoster -refuses vaccines   MMG - scheduled for aug 20th  C-SCOPE -ordered it today  DEXA 6/2022, normal bone density  Hep C screening 6/2022  Low Dose CT scan in pts if 55-79 y/o with 30 pack yr smoking hx and currently smoke or have quit within past 15 yrs. Does not meet criteria.     PHYSICAL EXAM     /66 (BP Location: Right arm, Patient Position: Sitting, BP Method: Small (Manual))   Pulse 68   Resp 16   Ht 5' 5" (1.651 m)   Wt 76.3 kg (168 lb 3.4 oz)   SpO2 97%   BMI 27.99 kg/m²     GEN - A+OX4, NAD   HEENT - PERRL  Neck - No cervical LAD.  CV - RRR, no m/r, BL carotid bruits noted  Chest - CTAB, no wheezing or rhonchi  Abd - S/NT/ND/+BS.   Ext - 2+ L DP pulse, faint R DP pulse and 2+ radial pulses. Mild BL ankle edema.   MSK - Normal gait.   Skin - No rash. Bruising noted on RUE    LABS     Previous labs reviewed from 12/2022. Renal function wnl. Chronic anemia. Will schedule her labs.     ASSESSMENT/PLAN     1. Encounter to establish care with new doctor  Assessment & Plan:  Reviewed PMHx, labs, medications, vaccinations, screenings and social hx.       2. PAD (peripheral artery disease)  Overview:  She had 50% stenosis of the ostium of the right common iliac artery, 80% stenosis of the right common femoral artery and 75% stenosis of the proximal right superficial femoral artery and 50-60% stenosis of the right popliteal " artery with 2 vessel runoff to the foot. She underwent right Femoral Endarterectomy with patch angioplasty.     Assessment & Plan:  On zetia and asa. Follows with cardiology, continue.       3. Aortic atherosclerosis  Assessment & Plan:  Noted on CXR from 4/2020. Calcification present along the wall of the aorta       4. Elevated blood sugar  -     Hemoglobin A1C; Future; Expected date: 08/12/2024    5. Anemia, unspecified type  Assessment & Plan:  Per patient, her family has thalassemia trait. She has never been tested but she has chronic anemia. Will check iron panel just to make sure she is not iron deficient as well.     Orders:  -     IRON AND TIBC; Future; Expected date: 08/12/2024  -     FERRITIN; Future; Expected date: 08/12/2024    6. Senile purpura  Assessment & Plan:  Noted on her RUE. Per patient, she does bruise easily. No signs of major bleeding. She does take aspirin. Will monitor.      7. Screening for colon cancer  -     Ambulatory referral/consult to Endo Procedure ; Future; Expected date: 08/13/2024    8. Primary hypertension  Assessment & Plan:  BP is not at goal. Would like it below 120-130 if possible. She is on hctz 25 and hypokalemic. Will check renin and aldosterone levels. Also on losartan 100 mg and amlodipine 5 mg. Continue for now, will monitor     Orders:  -     ALDOSTERONE; Future; Expected date: 08/12/2024  -     Plasma Renin Actvity; Future; Expected date: 08/12/2024    9. Bilateral carotid artery stenosis  Assessment & Plan:  Carotid bruits present on exam BL. Will order carotid US. Last one was performed 2/2022.     Orders:  -     CV Ultrasound Bilateral Doppler Carotid; Future         RTC in 6 months, sooner if needed and depending on labs.    Juju Wallace MD  Department of Internal Medicine  9:07 AM     I spent a total of 45 minutes on the day of the visit.  This includes face to face time and non-face to face time preparing to see the patient (eg, review of tests),  obtaining and/or reviewing separately obtained history, documenting clinical information in the electronic or other health record, independently interpreting results and communicating results to the patient/family/caregiver, or care coordinator.

## 2024-08-12 NOTE — ASSESSMENT & PLAN NOTE
Noted on her RUE. Per patient, she does bruise easily. No signs of major bleeding. She does take aspirin. Will monitor.

## 2024-08-12 NOTE — ASSESSMENT & PLAN NOTE
BP is not at goal. Would like it below 120-130 if possible. She is on hctz 25 and hypokalemic. Will check renin and aldosterone levels. Also on losartan 100 mg and amlodipine 5 mg. Continue for now, will monitor

## 2024-08-13 ENCOUNTER — LAB VISIT (OUTPATIENT)
Dept: LAB | Facility: HOSPITAL | Age: 69
End: 2024-08-13
Attending: INTERNAL MEDICINE
Payer: MEDICARE

## 2024-08-13 DIAGNOSIS — D64.9 ANEMIA, UNSPECIFIED TYPE: ICD-10-CM

## 2024-08-13 DIAGNOSIS — I10 PRIMARY HYPERTENSION: ICD-10-CM

## 2024-08-13 DIAGNOSIS — E78.2 MIXED HYPERLIPIDEMIA: ICD-10-CM

## 2024-08-13 DIAGNOSIS — R73.9 ELEVATED BLOOD SUGAR: ICD-10-CM

## 2024-08-13 DIAGNOSIS — I73.9 PAD (PERIPHERAL ARTERY DISEASE): ICD-10-CM

## 2024-08-13 LAB
ALBUMIN SERPL BCP-MCNC: 3.9 G/DL (ref 3.5–5.2)
ALP SERPL-CCNC: 101 U/L (ref 55–135)
ALT SERPL W/O P-5'-P-CCNC: 16 U/L (ref 10–44)
ANION GAP SERPL CALC-SCNC: 11 MMOL/L (ref 8–16)
AST SERPL-CCNC: 17 U/L (ref 10–40)
BASOPHILS # BLD AUTO: 0.05 K/UL (ref 0–0.2)
BASOPHILS NFR BLD: 0.7 % (ref 0–1.9)
BILIRUB SERPL-MCNC: 0.8 MG/DL (ref 0.1–1)
BUN SERPL-MCNC: 14 MG/DL (ref 8–23)
CALCIUM SERPL-MCNC: 9.2 MG/DL (ref 8.7–10.5)
CHLORIDE SERPL-SCNC: 109 MMOL/L (ref 95–110)
CHOLEST SERPL-MCNC: 164 MG/DL (ref 120–199)
CHOLEST/HDLC SERPL: 3.9 {RATIO} (ref 2–5)
CO2 SERPL-SCNC: 23 MMOL/L (ref 23–29)
CREAT SERPL-MCNC: 0.8 MG/DL (ref 0.5–1.4)
DIFFERENTIAL METHOD BLD: ABNORMAL
EOSINOPHIL # BLD AUTO: 0.1 K/UL (ref 0–0.5)
EOSINOPHIL NFR BLD: 1.7 % (ref 0–8)
ERYTHROCYTE [DISTWIDTH] IN BLOOD BY AUTOMATED COUNT: 18.1 % (ref 11.5–14.5)
EST. GFR  (NO RACE VARIABLE): >60 ML/MIN/1.73 M^2
ESTIMATED AVG GLUCOSE: 111 MG/DL (ref 68–131)
FERRITIN SERPL-MCNC: 277 NG/ML (ref 20–300)
GLUCOSE SERPL-MCNC: 105 MG/DL (ref 70–110)
HBA1C MFR BLD: 5.5 % (ref 4–5.6)
HCT VFR BLD AUTO: 33.5 % (ref 37–48.5)
HDLC SERPL-MCNC: 42 MG/DL (ref 40–75)
HDLC SERPL: 25.6 % (ref 20–50)
HGB BLD-MCNC: 10.5 G/DL (ref 12–16)
IMM GRANULOCYTES # BLD AUTO: 0.02 K/UL (ref 0–0.04)
IMM GRANULOCYTES NFR BLD AUTO: 0.3 % (ref 0–0.5)
IRON SERPL-MCNC: 120 UG/DL (ref 30–160)
LDLC SERPL CALC-MCNC: 103.6 MG/DL (ref 63–159)
LYMPHOCYTES # BLD AUTO: 1.8 K/UL (ref 1–4.8)
LYMPHOCYTES NFR BLD: 26.3 % (ref 18–48)
MCH RBC QN AUTO: 20 PG (ref 27–31)
MCHC RBC AUTO-ENTMCNC: 31.3 G/DL (ref 32–36)
MCV RBC AUTO: 64 FL (ref 82–98)
MONOCYTES # BLD AUTO: 0.6 K/UL (ref 0.3–1)
MONOCYTES NFR BLD: 9.3 % (ref 4–15)
NEUTROPHILS # BLD AUTO: 4.2 K/UL (ref 1.8–7.7)
NEUTROPHILS NFR BLD: 61.7 % (ref 38–73)
NONHDLC SERPL-MCNC: 122 MG/DL
NRBC BLD-RTO: 0 /100 WBC
PLATELET # BLD AUTO: 356 K/UL (ref 150–450)
PMV BLD AUTO: 10.8 FL (ref 9.2–12.9)
POTASSIUM SERPL-SCNC: 3.9 MMOL/L (ref 3.5–5.1)
PROT SERPL-MCNC: 6.4 G/DL (ref 6–8.4)
RBC # BLD AUTO: 5.25 M/UL (ref 4–5.4)
SATURATED IRON: 36 % (ref 20–50)
SODIUM SERPL-SCNC: 143 MMOL/L (ref 136–145)
TOTAL IRON BINDING CAPACITY: 334 UG/DL (ref 250–450)
TRANSFERRIN SERPL-MCNC: 226 MG/DL (ref 200–375)
TRIGL SERPL-MCNC: 92 MG/DL (ref 30–150)
TSH SERPL DL<=0.005 MIU/L-ACNC: 0.67 UIU/ML (ref 0.4–4)
WBC # BLD AUTO: 6.87 K/UL (ref 3.9–12.7)

## 2024-08-13 PROCEDURE — 83540 ASSAY OF IRON: CPT | Performed by: STUDENT IN AN ORGANIZED HEALTH CARE EDUCATION/TRAINING PROGRAM

## 2024-08-13 PROCEDURE — 82088 ASSAY OF ALDOSTERONE: CPT | Performed by: STUDENT IN AN ORGANIZED HEALTH CARE EDUCATION/TRAINING PROGRAM

## 2024-08-13 PROCEDURE — 82728 ASSAY OF FERRITIN: CPT | Performed by: STUDENT IN AN ORGANIZED HEALTH CARE EDUCATION/TRAINING PROGRAM

## 2024-08-13 PROCEDURE — 83036 HEMOGLOBIN GLYCOSYLATED A1C: CPT | Performed by: STUDENT IN AN ORGANIZED HEALTH CARE EDUCATION/TRAINING PROGRAM

## 2024-08-13 PROCEDURE — 80053 COMPREHEN METABOLIC PANEL: CPT | Performed by: INTERNAL MEDICINE

## 2024-08-13 PROCEDURE — 84443 ASSAY THYROID STIM HORMONE: CPT | Performed by: INTERNAL MEDICINE

## 2024-08-13 PROCEDURE — 85025 COMPLETE CBC W/AUTO DIFF WBC: CPT | Performed by: INTERNAL MEDICINE

## 2024-08-13 PROCEDURE — 80061 LIPID PANEL: CPT | Performed by: INTERNAL MEDICINE

## 2024-08-16 LAB
ALDOST SERPL-MCNC: 27.4 NG/DL
RENIN PLAS-CCNC: 4.1 NG/ML/H

## 2024-08-28 ENCOUNTER — TELEPHONE (OUTPATIENT)
Dept: PRIMARY CARE CLINIC | Facility: CLINIC | Age: 69
End: 2024-08-28
Payer: MEDICARE

## 2024-08-28 NOTE — TELEPHONE ENCOUNTER
PT called. Informed PT that her labs look good. Informed PT her Iron levels are normal. Also informed PT her A1c is also good.

## 2024-08-28 NOTE — TELEPHONE ENCOUNTER
----- Message from Juju Wallace MD sent at 8/28/2024  2:53 PM CDT -----  Pls let patient know that her labs look good. Iron levels are normal. A1c is also good

## 2025-02-03 ENCOUNTER — OFFICE VISIT (OUTPATIENT)
Dept: SPORTS MEDICINE | Facility: CLINIC | Age: 70
End: 2025-02-03
Payer: COMMERCIAL

## 2025-02-03 ENCOUNTER — HOSPITAL ENCOUNTER (OUTPATIENT)
Dept: RADIOLOGY | Facility: HOSPITAL | Age: 70
Discharge: HOME | End: 2025-02-03
Attending: ORTHOPAEDIC SURGERY
Payer: MEDICARE

## 2025-02-03 VITALS
WEIGHT: 168.63 LBS | SYSTOLIC BLOOD PRESSURE: 129 MMHG | BODY MASS INDEX: 28.1 KG/M2 | HEART RATE: 97 BPM | DIASTOLIC BLOOD PRESSURE: 73 MMHG | HEIGHT: 65 IN

## 2025-02-03 DIAGNOSIS — M25.511 RIGHT SHOULDER PAIN, UNSPECIFIED CHRONICITY: ICD-10-CM

## 2025-02-03 DIAGNOSIS — M25.511 RIGHT SHOULDER PAIN, UNSPECIFIED CHRONICITY: Primary | ICD-10-CM

## 2025-02-03 PROCEDURE — 3008F BODY MASS INDEX DOCD: CPT | Mod: CPTII,S$GLB,, | Performed by: ORTHOPAEDIC SURGERY

## 2025-02-03 PROCEDURE — 73030 X-RAY EXAM OF SHOULDER: CPT | Mod: 26,RT,, | Performed by: RADIOLOGY

## 2025-02-03 PROCEDURE — 3074F SYST BP LT 130 MM HG: CPT | Mod: CPTII,S$GLB,, | Performed by: ORTHOPAEDIC SURGERY

## 2025-02-03 PROCEDURE — 99204 OFFICE O/P NEW MOD 45 MIN: CPT | Mod: S$GLB,,, | Performed by: ORTHOPAEDIC SURGERY

## 2025-02-03 PROCEDURE — 3288F FALL RISK ASSESSMENT DOCD: CPT | Mod: CPTII,S$GLB,, | Performed by: ORTHOPAEDIC SURGERY

## 2025-02-03 PROCEDURE — 1157F ADVNC CARE PLAN IN RCRD: CPT | Mod: CPTII,S$GLB,, | Performed by: ORTHOPAEDIC SURGERY

## 2025-02-03 PROCEDURE — 1101F PT FALLS ASSESS-DOCD LE1/YR: CPT | Mod: CPTII,S$GLB,, | Performed by: ORTHOPAEDIC SURGERY

## 2025-02-03 PROCEDURE — 1159F MED LIST DOCD IN RCRD: CPT | Mod: CPTII,S$GLB,, | Performed by: ORTHOPAEDIC SURGERY

## 2025-02-03 PROCEDURE — 99999 PR PBB SHADOW E&M-EST. PATIENT-LVL III: CPT | Mod: PBBFAC,HCNC,, | Performed by: ORTHOPAEDIC SURGERY

## 2025-02-03 PROCEDURE — 73030 X-RAY EXAM OF SHOULDER: CPT | Mod: TC,RT

## 2025-02-03 PROCEDURE — 3078F DIAST BP <80 MM HG: CPT | Mod: CPTII,S$GLB,, | Performed by: ORTHOPAEDIC SURGERY

## 2025-02-03 PROCEDURE — 1125F AMNT PAIN NOTED PAIN PRSNT: CPT | Mod: CPTII,S$GLB,, | Performed by: ORTHOPAEDIC SURGERY
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
## 2025-02-04 ENCOUNTER — TELEPHONE (OUTPATIENT)
Dept: SPORTS MEDICINE | Facility: CLINIC | Age: 70
End: 2025-02-04
Payer: MEDICARE

## 2025-02-04 NOTE — TELEPHONE ENCOUNTER
----- Message from Jesus sent at 2/4/2025  2:28 PM CST -----  Regarding: PT IS WANTING TO SPEAK WITH STAFF REGARDING GETTING CORTISONE SHOT MATTHEW INSTEAD OF PRP  Contact: PT  Confirmed contact info below:  Contact Name: Veronica Neto  Phone Number: [phone]

## 2025-02-05 ENCOUNTER — OFFICE VISIT (OUTPATIENT)
Dept: SPORTS MEDICINE | Facility: CLINIC | Age: 70
End: 2025-02-05
Payer: MEDICARE

## 2025-02-05 VITALS
SYSTOLIC BLOOD PRESSURE: 121 MMHG | BODY MASS INDEX: 28.1 KG/M2 | DIASTOLIC BLOOD PRESSURE: 69 MMHG | HEART RATE: 99 BPM | HEIGHT: 65 IN | WEIGHT: 168.63 LBS

## 2025-02-05 DIAGNOSIS — M25.511 ACUTE PAIN OF RIGHT SHOULDER: Primary | ICD-10-CM

## 2025-02-05 PROCEDURE — 1159F MED LIST DOCD IN RCRD: CPT | Mod: HCNC,CPTII,S$GLB, | Performed by: ORTHOPAEDIC SURGERY

## 2025-02-05 PROCEDURE — 3074F SYST BP LT 130 MM HG: CPT | Mod: HCNC,CPTII,S$GLB, | Performed by: ORTHOPAEDIC SURGERY

## 2025-02-05 PROCEDURE — 99214 OFFICE O/P EST MOD 30 MIN: CPT | Mod: 25,HCNC,S$GLB, | Performed by: ORTHOPAEDIC SURGERY

## 2025-02-05 PROCEDURE — 1157F ADVNC CARE PLAN IN RCRD: CPT | Mod: HCNC,CPTII,S$GLB, | Performed by: ORTHOPAEDIC SURGERY

## 2025-02-05 PROCEDURE — 1125F AMNT PAIN NOTED PAIN PRSNT: CPT | Mod: HCNC,CPTII,S$GLB, | Performed by: ORTHOPAEDIC SURGERY

## 2025-02-05 PROCEDURE — 1101F PT FALLS ASSESS-DOCD LE1/YR: CPT | Mod: HCNC,CPTII,S$GLB, | Performed by: ORTHOPAEDIC SURGERY

## 2025-02-05 PROCEDURE — 3008F BODY MASS INDEX DOCD: CPT | Mod: HCNC,CPTII,S$GLB, | Performed by: ORTHOPAEDIC SURGERY

## 2025-02-05 PROCEDURE — 3078F DIAST BP <80 MM HG: CPT | Mod: HCNC,CPTII,S$GLB, | Performed by: ORTHOPAEDIC SURGERY

## 2025-02-05 PROCEDURE — 99999 PR PBB SHADOW E&M-EST. PATIENT-LVL III: CPT | Mod: PBBFAC,HCNC,, | Performed by: ORTHOPAEDIC SURGERY

## 2025-02-05 PROCEDURE — 3288F FALL RISK ASSESSMENT DOCD: CPT | Mod: HCNC,CPTII,S$GLB, | Performed by: ORTHOPAEDIC SURGERY
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
## 2025-02-10 ENCOUNTER — TELEPHONE (OUTPATIENT)
Dept: ORTHOPEDICS | Facility: CLINIC | Age: 70
End: 2025-02-10
Payer: MEDICARE

## 2025-02-10 NOTE — TELEPHONE ENCOUNTER
Called pt and LVM to call us back to get scheduled for her Left knee pain.      ----- Message from Danica Edouard sent at 2/5/2025  3:00 PM CST -----  I don't think that this patient had a preference! Thank you    Silke Gastelum   Clinical/OR assistant to Dr. Gianna Maria  ----- Message -----  From: Chloe Davenport  Sent: 2/5/2025   2:57 PM CST  To: Silke Gastelum; Brayan Erickson; Chloe Edouard - if pt is looking to have surgery sooner rather than later we are referring patients to Dr. Henderson as his schedule and surgery dates are more available than GC or WAM. If pt wants wither GC or WAM they will be waiting a couple months for surgery - just let me know what pt is hoping for  - as it will direct who's schedule they get put on.  ----- Message -----  From: Silke Gastelum  Sent: 2/5/2025   2:05 PM CST  To: Saad Quarles A Staff; Domenic OWENS Staff    Good afternoon,     Dr. Maria saw the attached patient for a separate body part today but mentioned pain in her left knee that had previous total joint replacement done by Dr. Haile on 3/11/19. Dr. Maria recommended that she see someone in our ortho department for consult.     Would you be able to assist in getting the patient scheduled with one of the ortho providers that sees patients who have had previous joint replacement with another provider. I wasn't sure who exactly would see total joint replacement done by another provider.     Thank you    Silke Gastelum   Clinical/OR assistant to Dr. Gianna Maria

## 2025-02-17 ENCOUNTER — TELEPHONE (OUTPATIENT)
Dept: PRIMARY CARE CLINIC | Facility: CLINIC | Age: 70
End: 2025-02-17
Payer: MEDICARE

## 2025-02-17 NOTE — TELEPHONE ENCOUNTER
----- Message from Cristy sent at 2/17/2025  8:47 AM CST -----  Contact: [phone] .[email or web address] Patient is calling for Medical Advice regarding:pt is calling she drop a form on 2/13/25 to be filled out by the doctor and pt would like to know if it sent to the insurance company.It needs to be retuned by 2/28/25 or pt will have no insurance.  Please call pt back and advise.How long has patient had these symptoms:Pharmacy name and phone#:Patient wants a call back or [facility]:callbackComments:Please advise patient replies from provider may take up to 48 hours.

## 2025-02-21 DIAGNOSIS — Z00.00 ENCOUNTER FOR MEDICARE ANNUAL WELLNESS EXAM: ICD-10-CM

## 2025-03-12 ENCOUNTER — TELEPHONE (OUTPATIENT)
Dept: CARDIOLOGY | Facility: CLINIC | Age: 70
End: 2025-03-12
Payer: MEDICARE

## 2025-03-12 ENCOUNTER — OFFICE VISIT (OUTPATIENT)
Dept: PRIMARY CARE CLINIC | Facility: CLINIC | Age: 70
End: 2025-03-12
Payer: MEDICARE

## 2025-03-12 VITALS
OXYGEN SATURATION: 96 % | DIASTOLIC BLOOD PRESSURE: 74 MMHG | HEIGHT: 65 IN | HEART RATE: 86 BPM | RESPIRATION RATE: 16 BRPM | WEIGHT: 165.56 LBS | SYSTOLIC BLOOD PRESSURE: 148 MMHG | BODY MASS INDEX: 27.58 KG/M2

## 2025-03-12 DIAGNOSIS — I73.9 PAD (PERIPHERAL ARTERY DISEASE): ICD-10-CM

## 2025-03-12 DIAGNOSIS — D69.2 SENILE PURPURA: Primary | ICD-10-CM

## 2025-03-12 DIAGNOSIS — I10 PRIMARY HYPERTENSION: ICD-10-CM

## 2025-03-12 DIAGNOSIS — I70.0 AORTIC ATHEROSCLEROSIS: ICD-10-CM

## 2025-03-12 DIAGNOSIS — I48.0 PAROXYSMAL ATRIAL FIBRILLATION: ICD-10-CM

## 2025-03-12 DIAGNOSIS — Z12.11 SCREENING FOR COLON CANCER: ICD-10-CM

## 2025-03-12 PROBLEM — Z76.89 ENCOUNTER TO ESTABLISH CARE WITH NEW DOCTOR: Status: RESOLVED | Noted: 2024-08-12 | Resolved: 2025-03-12

## 2025-03-12 PROCEDURE — 1123F ACP DISCUSS/DSCN MKR DOCD: CPT | Mod: HCNC,CPTII,S$GLB, | Performed by: STUDENT IN AN ORGANIZED HEALTH CARE EDUCATION/TRAINING PROGRAM

## 2025-03-12 PROCEDURE — 1126F AMNT PAIN NOTED NONE PRSNT: CPT | Mod: HCNC,CPTII,S$GLB, | Performed by: STUDENT IN AN ORGANIZED HEALTH CARE EDUCATION/TRAINING PROGRAM

## 2025-03-12 PROCEDURE — 1159F MED LIST DOCD IN RCRD: CPT | Mod: HCNC,CPTII,S$GLB, | Performed by: STUDENT IN AN ORGANIZED HEALTH CARE EDUCATION/TRAINING PROGRAM

## 2025-03-12 PROCEDURE — 99999 PR PBB SHADOW E&M-EST. PATIENT-LVL IV: CPT | Mod: PBBFAC,HCNC,, | Performed by: STUDENT IN AN ORGANIZED HEALTH CARE EDUCATION/TRAINING PROGRAM

## 2025-03-12 PROCEDURE — 1160F RVW MEDS BY RX/DR IN RCRD: CPT | Mod: HCNC,CPTII,S$GLB, | Performed by: STUDENT IN AN ORGANIZED HEALTH CARE EDUCATION/TRAINING PROGRAM

## 2025-03-12 PROCEDURE — 3078F DIAST BP <80 MM HG: CPT | Mod: HCNC,CPTII,S$GLB, | Performed by: STUDENT IN AN ORGANIZED HEALTH CARE EDUCATION/TRAINING PROGRAM

## 2025-03-12 PROCEDURE — 1101F PT FALLS ASSESS-DOCD LE1/YR: CPT | Mod: HCNC,CPTII,S$GLB, | Performed by: STUDENT IN AN ORGANIZED HEALTH CARE EDUCATION/TRAINING PROGRAM

## 2025-03-12 PROCEDURE — 3288F FALL RISK ASSESSMENT DOCD: CPT | Mod: HCNC,CPTII,S$GLB, | Performed by: STUDENT IN AN ORGANIZED HEALTH CARE EDUCATION/TRAINING PROGRAM

## 2025-03-12 PROCEDURE — 3077F SYST BP >= 140 MM HG: CPT | Mod: HCNC,CPTII,S$GLB, | Performed by: STUDENT IN AN ORGANIZED HEALTH CARE EDUCATION/TRAINING PROGRAM

## 2025-03-12 PROCEDURE — 99215 OFFICE O/P EST HI 40 MIN: CPT | Mod: HCNC,S$GLB,, | Performed by: STUDENT IN AN ORGANIZED HEALTH CARE EDUCATION/TRAINING PROGRAM

## 2025-03-12 PROCEDURE — 3008F BODY MASS INDEX DOCD: CPT | Mod: HCNC,CPTII,S$GLB, | Performed by: STUDENT IN AN ORGANIZED HEALTH CARE EDUCATION/TRAINING PROGRAM

## 2025-03-12 NOTE — ASSESSMENT & PLAN NOTE
Per patient, she does bruise easily. No signs of major bleeding. She does take aspirin. Will monitor.

## 2025-03-12 NOTE — PROGRESS NOTES
Clinic Note  3/12/2025      Subjective:       Patient ID:  Veronica is a 69 y.o. female being seen for an established visit.    Chief Complaint: Follow-up    HPI  Veronica Duque is a 68 y.o.  female who presents with afib (long ago, not on anticoagulation, resolved on its own), HTN, CVA (around 2010), BL carotid artery dissection, PAD (She underwent right Femoral Endarterectomy with patch angioplasty) , Left knee OA s/p knee replacement is here for a f/u visit. Did acp 2025  -having spasms in her toes and the arch in both her feet but more in the right. When that happens, her toes spread out. This started 4 months ago, its worse now. Happens 4-5 times a week. Only happens when in bed. Gets better when she stands up. Also gets spasms sometimes in her calf when walking in both her calf but mostly in the right. This is the same leg she had the femoral endarterectomy and patch angioplasty. Will order an arterial US of both her legs. Her current cardiologist is not on the special needs [employer] plan so he is not covered. She needs a new cardiologist.   -no falls.   -ordered labs today.   -pt states no one called her to schedule colonoscopy. I placed the order again.   -I ordered Carotid US last visit since she had bruits. Has hx of BL carotid artery dissection. Asked her to schedule it.   -refuses vaccinations.     Advance Care Planning     Date: 03/12/2025    Power of   I initiated the process of voluntary advance care planning today and explained the importance of this process to the patient.  I introduced the concept of advance directives to the patient, as well. Then the patient received detailed information about the importance of designating a Health Care Power of  (HCPOA). She was also instructed to communicate with this person about their wishes for future healthcare, should she become sick and lose decision-making capacity. The patient has previously appointed a HCPOA. After our  discussion, the patient has decided to complete a HCPOA and has appointed her significant other, health care agent: Brendon Duque & health care agent number: [phone]. I encouraged her to communicate with this person about their wishes for future healthcare, should she become sick and lose decision-making capacity.      A total of 5 min was spent on advance care planning, goals of care discussion, emotional support, formulating and communicating prognosis and exploring burden/benefit of various approaches of treatment. This discussion occurred on a fully voluntary basis with the verbal consent of the patient and/or family.      Review of Systems   Constitutional:  Negative for chills and fever.   HENT:  Negative for congestion.    Respiratory:  Negative for cough and shortness of breath.    Cardiovascular:  Positive for claudication. Negative for chest pain and palpitations.   Gastrointestinal:  Negative for abdominal pain, blood in stool, constipation and diarrhea.   Genitourinary:  Negative for dysuria and hematuria.   Neurological:  Negative for dizziness and headaches.       Medication List with Changes/Refills   Current Medications    ALPRAZOLAM (XANAX) 0.5 MG TABLET    Take 0.5 mg by mouth 2 (two) times daily.    AMLODIPINE (NORVASC) 5 MG TABLET    Take 1 tablet (5 mg total) by mouth once daily.    ASPIRIN (ECOTRIN) 81 MG EC TABLET    Take 81 mg by mouth once daily.    DICLOFENAC SODIUM (VOLTAREN) 1 % GEL    Apply 2 g topically once daily.    ESTRADIOL (ESTRACE) 0.01 % (0.1 MG/GRAM) VAGINAL CREAM    INSERT ONE GRAM VAGINALLY MONDAY, WEDNESDAY AND FRIDAY    EZETIMIBE (ZETIA) 10 MG TABLET    Take 1 tablet (10 mg total) by mouth once daily.    LOSARTAN-HYDROCHLOROTHIAZIDE 100-25 MG (HYZAAR) 100-25 MG PER TABLET    Take 1 tablet by mouth once daily.    POTASSIUM CHLORIDE (MICRO-K) 10 MEQ CPSR    Take 1 capsule (10 mEq total) by mouth once daily.    VALACYCLOVIR (VALTREX) 500 MG TABLET    Take 500 mg by mouth  "daily as needed.            Objective:      BP (!) 148/74   Pulse 86   Resp 16   Ht 5' 5" (1.651 m)   Wt 75.1 kg (165 lb 9.1 oz)   SpO2 96%   BMI 27.55 kg/m²   Estimated body mass index is 27.55 kg/m² as calculated from the following:    Height as of this encounter: 5' 5" (1.651 m).    Weight as of this encounter: 75.1 kg (165 lb 9.1 oz).  Physical Exam  Constitutional:       Appearance: Normal appearance.   Eyes:      Conjunctiva/sclera: Conjunctivae normal.   Neck:      Vascular: Carotid bruit present.   Cardiovascular:      Rate and Rhythm: Normal rate and regular rhythm.      Heart sounds: Normal heart sounds.   Pulmonary:      Effort: Pulmonary effort is normal. No respiratory distress.      Breath sounds: Normal breath sounds.   Musculoskeletal:      Right lower leg: No edema.      Left lower leg: No edema.   Skin:     General: Skin is warm.   Neurological:      Mental Status: She is alert and oriented to person, place, and time.   Psychiatric:         Mood and Affect: Mood normal.         Behavior: Behavior normal.           Assessment and Plan:     1. Senile purpura  Assessment & Plan:  Per patient, she does bruise easily. No signs of major bleeding. She does take aspirin. Will monitor.      2. Paroxysmal atrial fibrillation  Assessment & Plan:  long ago, not on anticoagulation, resolved on its own. On asa, continue to f/u with cardiology.       3. Aortic atherosclerosis  Assessment & Plan:  Noted on CXR from 4/2020. Calcification present along the wall of the aorta       4. Primary hypertension  Assessment & Plan:  BP is not at goal here but in feb at another doctors office, it was good. Pt states when she checks it at home, its in the 130s. Will enroll her in digital medicine today. Told her that we can help her set It up. Will not add any medications today.     Orders:  -     Basic Metabolic Panel; Future; Expected date: 03/12/2025  -     CBC Without Differential; Future; Expected date: " 03/12/2025  -     Hypertension Digital Medicine ([facility]) Enrollment Order    5. PAD (peripheral artery disease)  Overview:  She had 50% stenosis of the ostium of the right common iliac artery, 80% stenosis of the right common femoral artery and 75% stenosis of the proximal right superficial femoral artery and 50-60% stenosis of the right popliteal artery with 2 vessel runoff to the foot. She underwent right Femoral Endarterectomy with patch angioplasty.     Assessment & Plan:  having spasms in her toes and the arch in both her feet but more in the right. When that happens, her toes spread out. This started 4 months ago, its worse now. Happens 4-5 times a week. Only happens when in bed. Gets better when she stands up. Also gets spasms sometimes in her calf when walking in both her calf but mostly in the right. This is the same leg she had the femoral endarterectomy and patch angioplasty. Will order an arterial US of both her legs. Her current cardiologist is not on the special needs [employer] plan so he is not covered. She needs a new cardiologist.     Orders:  -     CV Ultrasound doppler arterial legs bilat; Future  -     Ambulatory referral/consult to Cardiology; Future; Expected date: 03/13/2025    6. Screening for colon cancer  -     Ambulatory referral/consult to Endo Procedure ; Future; Expected date: 03/13/2025         Follow Up:   Follow up in about 3 months (around 6/12/2025).    Other Orders Placed This Visit:  Orders Placed This Encounter   Procedures    Basic Metabolic Panel    CBC Without Differential    Ambulatory referral/consult to Cardiology    Ambulatory referral/consult to Endo Procedure     Hypertension Digital Medicine ([facility]) Enrollment Order    CV Ultrasound doppler arterial legs bilat         Juju Wallace I spent a total of 44 minutes on the day of the visit.  This includes face to face time and non-face to face time preparing to see the patient (eg, review of tests),  obtaining and/or reviewing separately obtained history, documenting clinical information in the electronic or other health record, independently interpreting results and communicating results to the patient/family/caregiver, or care coordinator.

## 2025-03-12 NOTE — PROGRESS NOTES
"Clinic Note  3/12/2025      Subjective:       Patient ID:  Veronica is a 69 y.o. female being seen for an established visit.    Chief Complaint: No chief complaint on file.      ROS    Medication List with Changes/Refills   Current Medications    ALPRAZOLAM (XANAX) 0.5 MG TABLET    Take 0.5 mg by mouth 2 (two) times daily.    AMLODIPINE (NORVASC) 5 MG TABLET    Take 1 tablet (5 mg total) by mouth once daily.    ASPIRIN (ECOTRIN) 81 MG EC TABLET    Take 81 mg by mouth once daily.    DICLOFENAC SODIUM (VOLTAREN) 1 % GEL    Apply 2 g topically once daily.    ESTRADIOL (ESTRACE) 0.01 % (0.1 MG/GRAM) VAGINAL CREAM    INSERT ONE GRAM VAGINALLY MONDAY, WEDNESDAY AND FRIDAY    EZETIMIBE (ZETIA) 10 MG TABLET    Take 1 tablet (10 mg total) by mouth once daily.    LOSARTAN-HYDROCHLOROTHIAZIDE 100-25 MG (HYZAAR) 100-25 MG PER TABLET    Take 1 tablet by mouth once daily.    POTASSIUM CHLORIDE (MICRO-K) 10 MEQ CPSR    Take 1 capsule (10 mEq total) by mouth once daily.    VALACYCLOVIR (VALTREX) 500 MG TABLET    Take 500 mg by mouth daily as needed.        Problem List[1]        Objective:      There were no vitals taken for this visit.  Estimated body mass index is 28.07 kg/m² as calculated from the following:    Height as of 2/5/25: 5' 5" (1.651 m).    Weight as of 2/5/25: 76.5 kg (168 lb 10.4 oz).  Physical Exam      Assessment and Plan:         Problem List Items Addressed This Visit    None      Follow Up:   No follow-ups on file.    Other Orders Placed This Visit:  No orders of the defined types were placed in this encounter.    __________________________  Judi Ocasio, MS4  [facility]  [facility]           [1]   Patient Active Problem List  Diagnosis    Bilateral lower extremity edema    Spider veins of both lower extremities    Primary osteoarthritis of left knee    PVD (peripheral vascular disease)    Abnormal mammogram    Allergic rhinitis, unspecified    Breast implant rupture    " Cerebral infarction, unspecified    Transient ischemic attack    Femoral artery stenosis, right    PAD (peripheral artery disease)    Aortic atherosclerosis    Encounter to establish care with new doctor    Senile purpura    Primary hypertension    Anemia    Bilateral carotid artery stenosis

## 2025-03-12 NOTE — ASSESSMENT & PLAN NOTE
BP is not at goal here but in feb at another doctors office, it was good. Pt states when she checks it at home, its in the 130s. Will enroll her in digital medicine today. Told her that we can help her set It up. Will not add any medications today.

## 2025-03-12 NOTE — ASSESSMENT & PLAN NOTE
having spasms in her toes and the arch in both her feet but more in the right. When that happens, her toes spread out. This started 4 months ago, its worse now. Happens 4-5 times a week. Only happens when in bed. Gets better when she stands up. Also gets spasms sometimes in her calf when walking in both her calf but mostly in the right. This is the same leg she had the femoral endarterectomy and patch angioplasty. Will order an arterial US of both her legs. Her current cardiologist is not on the special needs [employer] plan so he is not covered. She needs a new cardiologist.

## 2025-03-14 ENCOUNTER — LAB VISIT (OUTPATIENT)
Dept: LAB | Facility: HOSPITAL | Age: 70
End: 2025-03-14
Attending: STUDENT IN AN ORGANIZED HEALTH CARE EDUCATION/TRAINING PROGRAM
Payer: MEDICARE

## 2025-03-14 DIAGNOSIS — I10 PRIMARY HYPERTENSION: ICD-10-CM

## 2025-03-14 LAB
ANION GAP SERPL CALC-SCNC: 9 MMOL/L (ref 8–16)
BUN SERPL-MCNC: 13 MG/DL (ref 8–23)
CALCIUM SERPL-MCNC: 9.1 MG/DL (ref 8.7–10.5)
CHLORIDE SERPL-SCNC: 107 MMOL/L (ref 95–110)
CO2 SERPL-SCNC: 28 MMOL/L (ref 23–29)
CREAT SERPL-MCNC: 0.6 MG/DL (ref 0.5–1.4)
ERYTHROCYTE [DISTWIDTH] IN BLOOD BY AUTOMATED COUNT: 18.6 % (ref 11.5–14.5)
EST. GFR  (NO RACE VARIABLE): >60 ML/MIN/1.73 M^2
GLUCOSE SERPL-MCNC: 78 MG/DL (ref 70–110)
HCT VFR BLD AUTO: 39.5 % (ref 37–48.5)
HGB BLD-MCNC: 11.8 G/DL (ref 12–16)
MCH RBC QN AUTO: 19.1 PG (ref 27–31)
MCHC RBC AUTO-ENTMCNC: 29.9 G/DL (ref 32–36)
MCV RBC AUTO: 64 FL (ref 82–98)
PLATELET # BLD AUTO: 363 K/UL (ref 150–450)
PMV BLD AUTO: 10.4 FL (ref 9.2–12.9)
POTASSIUM SERPL-SCNC: 3.8 MMOL/L (ref 3.5–5.1)
RBC # BLD AUTO: 6.19 M/UL (ref 4–5.4)
SODIUM SERPL-SCNC: 144 MMOL/L (ref 136–145)
WBC # BLD AUTO: 7.54 K/UL (ref 3.9–12.7)

## 2025-03-14 PROCEDURE — 80048 BASIC METABOLIC PNL TOTAL CA: CPT | Mod: HCNC | Performed by: STUDENT IN AN ORGANIZED HEALTH CARE EDUCATION/TRAINING PROGRAM

## 2025-03-14 PROCEDURE — 36415 COLL VENOUS BLD VENIPUNCTURE: CPT | Mod: HCNC,PO | Performed by: STUDENT IN AN ORGANIZED HEALTH CARE EDUCATION/TRAINING PROGRAM

## 2025-03-14 PROCEDURE — 85027 COMPLETE CBC AUTOMATED: CPT | Mod: HCNC | Performed by: STUDENT IN AN ORGANIZED HEALTH CARE EDUCATION/TRAINING PROGRAM

## 2025-03-17 ENCOUNTER — TELEPHONE (OUTPATIENT)
Dept: PRIMARY CARE CLINIC | Facility: CLINIC | Age: 70
End: 2025-03-17
Payer: MEDICARE

## 2025-03-17 ENCOUNTER — RESULTS FOLLOW-UP (OUTPATIENT)
Dept: PRIMARY CARE CLINIC | Facility: CLINIC | Age: 70
End: 2025-03-17

## 2025-03-17 NOTE — TELEPHONE ENCOUNTER
Call to patient to let patient know that her labs are stable. .     Patient with questions about CV US, scheduled for patient at [place]. Patient had questions about Endo procedure, instructed nurse would call and do a pre-screening and explain meds and date of colonoscopy.

## 2025-03-17 NOTE — TELEPHONE ENCOUNTER
----- Message from Juju Wallace MD sent at 3/17/2025  9:13 AM CDT -----  Pls let patient know that her labs are stable.   ----- Message -----  From: [facility]  Sent: 3/14/2025   4:35 PM CDT  To: Juju Wallace MD

## 2025-03-28 ENCOUNTER — LAB VISIT (OUTPATIENT)
Dept: LAB | Facility: HOSPITAL | Age: 70
End: 2025-03-28
Attending: INTERNAL MEDICINE
Payer: MEDICARE

## 2025-03-28 ENCOUNTER — OFFICE VISIT (OUTPATIENT)
Dept: CARDIOLOGY | Facility: CLINIC | Age: 70
End: 2025-03-28
Payer: MEDICARE

## 2025-03-28 VITALS
HEART RATE: 83 BPM | DIASTOLIC BLOOD PRESSURE: 73 MMHG | WEIGHT: 163.56 LBS | BODY MASS INDEX: 27.22 KG/M2 | SYSTOLIC BLOOD PRESSURE: 132 MMHG

## 2025-03-28 DIAGNOSIS — I70.201 FEMORAL ARTERY STENOSIS, RIGHT: ICD-10-CM

## 2025-03-28 DIAGNOSIS — G45.9 TRANSIENT ISCHEMIC ATTACK: ICD-10-CM

## 2025-03-28 DIAGNOSIS — E78.2 MIXED HYPERLIPIDEMIA: Primary | ICD-10-CM

## 2025-03-28 DIAGNOSIS — E78.2 MIXED HYPERLIPIDEMIA: ICD-10-CM

## 2025-03-28 DIAGNOSIS — I10 PRIMARY HYPERTENSION: ICD-10-CM

## 2025-03-28 DIAGNOSIS — Z78.9 STATIN INTOLERANCE: ICD-10-CM

## 2025-03-28 DIAGNOSIS — I73.9 PAD (PERIPHERAL ARTERY DISEASE): ICD-10-CM

## 2025-03-28 DIAGNOSIS — I65.23 BILATERAL CAROTID ARTERY STENOSIS: ICD-10-CM

## 2025-03-28 DIAGNOSIS — I63.9 CEREBRAL INFARCTION, UNSPECIFIED MECHANISM: ICD-10-CM

## 2025-03-28 DIAGNOSIS — I73.9 PVD (PERIPHERAL VASCULAR DISEASE): ICD-10-CM

## 2025-03-28 DIAGNOSIS — I48.0 PAROXYSMAL ATRIAL FIBRILLATION: ICD-10-CM

## 2025-03-28 LAB
ALBUMIN SERPL BCP-MCNC: 3.8 G/DL (ref 3.5–5.2)
ALP SERPL-CCNC: 90 UNIT/L (ref 40–150)
ALT SERPL W/O P-5'-P-CCNC: 14 UNIT/L (ref 10–44)
ANION GAP (OHS): 9 MMOL/L (ref 8–16)
AST SERPL-CCNC: 17 UNIT/L (ref 11–45)
BILIRUB SERPL-MCNC: 0.8 MG/DL (ref 0.1–1)
BUN SERPL-MCNC: 16 MG/DL (ref 8–23)
CALCIUM SERPL-MCNC: 9 MG/DL (ref 8.7–10.5)
CHLORIDE SERPL-SCNC: 108 MMOL/L (ref 95–110)
CHOLEST SERPL-MCNC: 197 MG/DL (ref 120–199)
CHOLEST/HDLC SERPL: 4.5 {RATIO} (ref 2–5)
CO2 SERPL-SCNC: 25 MMOL/L (ref 23–29)
CREAT SERPL-MCNC: 0.7 MG/DL (ref 0.5–1.4)
GFR SERPLBLD CREATININE-BSD FMLA CKD-EPI: >60 ML/MIN/1.73/M2
GLUCOSE SERPL-MCNC: 93 MG/DL (ref 70–110)
HDLC SERPL-MCNC: 44 MG/DL (ref 40–75)
HDLC SERPL: 22.3 % (ref 20–50)
LDLC SERPL CALC-MCNC: 130.8 MG/DL (ref 63–159)
NONHDLC SERPL-MCNC: 153 MG/DL
POTASSIUM SERPL-SCNC: 4.3 MMOL/L (ref 3.5–5.1)
PROT SERPL-MCNC: 6.4 GM/DL (ref 6–8.4)
SODIUM SERPL-SCNC: 142 MMOL/L (ref 136–145)
TRIGL SERPL-MCNC: 111 MG/DL (ref 30–150)

## 2025-03-28 PROCEDURE — 83718 ASSAY OF LIPOPROTEIN: CPT | Mod: HCNC

## 2025-03-28 PROCEDURE — 82172 ASSAY OF APOLIPOPROTEIN: CPT

## 2025-03-28 PROCEDURE — 83695 ASSAY OF LIPOPROTEIN(A): CPT

## 2025-03-28 PROCEDURE — 82040 ASSAY OF SERUM ALBUMIN: CPT | Mod: HCNC

## 2025-03-28 PROCEDURE — 99999 PR PBB SHADOW E&M-EST. PATIENT-LVL III: CPT | Mod: PBBFAC,,, | Performed by: INTERNAL MEDICINE

## 2025-03-28 PROCEDURE — 36415 COLL VENOUS BLD VENIPUNCTURE: CPT

## 2025-03-28 NOTE — PATIENT INSTRUCTIONS
"I recommend the book, "The Obesity Code" for weight loss; it recommends intermittent fasting and avoidance of sugar, artificial sweeteners and refined carbohydrates.    Also, here is information on a Mediterranean type diet including fish, the pesco-mediterranean diet from the American College of Cardiology:    1.  Humans are evolutionarily adapted to obtain calories and nutrients from both plant and animal food sources. Many people overconsume animal products, often-processed meats high in saturated fats and chemical additives. In contrast, while strict veganism has gained popularity for many reasons and has value in certain groups, it can cause nutritional deficiencies (vitamin B12, high-quality proteins, iron, zinc, omega-3 fatty acid, vitamin D, and calcium), and predispose to osteopenia, loss of muscle mass, and anemia. This is not true of a lacto-ovo vegetarian diet, which allows no animal-based food except for eggs and dairy. A 6-year study of [id number] [place] [employer] reported a decreased incidence of all-cause mortality when comparing vegetarians with nonvegetarians. However, when the vegetarians were stratified into vegans, lacto-ovo vegetarians, pesco-vegetarians, and semi-vegetarians, the pesco-vegetarians had lowest risks for all-cause mortality, cardiovascular disease (CVD) mortality, and mortality from other causes.     2.  The authors propose a plant-rich diet rich in nuts with fish and seafood as the principle source of animal food. Known as the Pesco-Mediterranean diet, it is supplemented with extra-virgin olive oil (EVOO), which is the principle fat source, along with moderate amounts of dairy (particularly yogurt and cheese) and eggs, as well as modest amounts of alcohol consumption (ideally red wine with the evening meal), but few red and processed meats.     3.  Both epidemiological studies and randomized clinical trials indicate that the traditional Mediterranean diet is associated with " lower risks for all-cause and CVD mortality, coronary heart disease, metabolic syndrome, diabetes, cognitive decline, neurodegenerative diseases (including Alzheimers), depression, overall cancer mortality, and breast and colorectal cancers.     4.  The traditional Mediterranean diet has been endorsed in the most recent Dietary Guidelines for Americans and the American College of Cardiology/American Heart Association guidelines. The 2020 U[employer]. News & World Report ranked it #1 for overall health based upon it being nutritious, safe, relatively easy to follow, protective against CVD and diabetes, and effective for weight loss.     5.  Fish and seafood are important sources of vitamins protein and omega-3 fatty acids, of which the higher blood and adipose tissue are associated with reduced fatal and nonfatal myocardial infarction. When not fried, fish consumption has been associated with reduced risk of heart failure, and the incidence of the metabolic syndrome, coronary heart disease, ischemic stroke, and sudden cardiac death, particularly when seafood replaces less healthy foods.     6.  Unrestricted use of olive oil in the kitchen, on salads (with vinegar), cooking vegetables, and at the table is the foundation of the traditional Mediterranean diet, although olive oil quality is crucial, which makes it expensive. EVOO retains hydrophilic components of olives including highly bioactive polyphenols, which are believed to underlie many of EVOOs cardiometabolic benefits, such as reduced low-density lipoprotein cholesterol (LDL-C) and increased high-density lipoprotein cholesterol (HDL-C), improved vascular reactivity, enhanced HDL particle functionality, and a lower diabetes risk.     7.  Tree nuts, an integral component of the traditional Mediterranean diet, are nutrient dense rich in unsaturated fats, fiber, protein, polyphenols, phytosterols, and tocopherols. Nut consumption is associated with decreased incidence  and mortality rates from both CVD and coronary artery disease (CAD), as well as atrial fibrillation and diabetes. Randomized controlled trials have shown that diets enriched with nuts produce cardiometabolic benefits including improvements in insulin sensitivity, LDL-C, inflammation, and vascular reactivity. A 1-daily serving of mixed nuts resulted in a 28% reduction in CVD risk. Generous intake of nuts does not promote weight gain because of increased satiety and incomplete digestion.     8.  Legumes are an excellent source of vegetable protein, folate, and magnesium and fiber, and like other seeds including peanuts, are rich in polyphenols. Consumption of legumes has been linked to a reduced risk of incident and fatal CVD and CAD, as well as improvements in blood glucose, cholesterol, blood pressure, and body weight. Legumes, like fish, are a satiating and healthy substitute for red meat and processed meats.     9.  Dairy products and eggs are important sources of protein, nonsodium minerals, probiotics, and vitamin D. Although there is no clear consensus among nutrition experts on the role of dairy products in CVD risk, they are allowed in this Pesco-Mediterranean diet. Fermented low-fat versions, such as yogurt and soft cheeses, are preferred; butter and hard cheese are high in saturated fats and salt.     10.  Eggs are composed of beneficial nutrients including all essential amino acids, in addition to minerals (selenium, phosphorus, iodine, zinc), vitamins (A, D, B2, B12, niacin), and carotenoids (lutein, zeaxanthin). Although each yolk contains about 184 mg of dietary cholesterol, large prospective cohorts suggest that egg consumption is unrelated to serum cholesterol and does not increase CVD risk. Eggs are allowed in the Pesco-Mediterranean diet; egg whites are unlimited and preferably no more than 5 yolks/week.     11.  Whole grains, such as barley, whole oats, rye, corn, buckwheat, brown rice, and quinoa,  are an integral part of the traditional Mediterranean diet. Pasta is an example of a starchy food that has a low glycemic index despite being a refined carbohydrate. In the context of a low glycemic index dietary pattern such as the Mediterranean diet, pasta does not adversely affect adiposity and may even help reduce body weight and there is no evidence that pasta promotes cardiometabolic risk factors. White rice is associated with type 2 diabetes mellitus in Asians but not in Western cohorts, possibly because it is cooked and served plain in [place] and in Western cultures cooked in mixed dishes with vegetables and vegetable oil including EVOO.     12.  The staple beverage of the Pesco-Mediterranean diet is water--which can be flavored but not sweetened. Unsweetened tea and coffee are rich in antioxidants and are associated with improved CVD outcomes. If alcohol is consumed at all, dry red wine is recommended, with the ideal amount being a single glass (6 oz) for women and 1 or 2 glasses/day for men (6-12 oz) consumed with meals.     13.  Time-restricted eating, a type of intermittent fasting, is the practice of limiting the daily intake of calories to a window of time usually between 6-12 hours each day. Intermittent fasting when done on a regular basis has been shown to decrease intra-abdominal adipose tissue and reduce free-radical production. This elicits powerful cellular responses that improve glucose metabolism and reduce systemic inflammation, and may also reduce risks of diabetes, CVD, cancer, and neurodegenerative diseases. After a 12-hour overnight fast, insulin levels are typically low, and glycogen stores have been depleted. In this fasted state, the body starts mobilizing fatty acids from adipose cells to burn as metabolic fuel instead of glucose. This improves insulin sensitivity. Time-restricted eating is not more effective for weight loss than standard calorie-restriction, but appears to enhance CV  health even in nonobese people. Fasting may also lower blood pressure and resting heart rate and improve autonomic balance with augmented heart rate variability.     14.  The evidence regarding time-restricted eating is mostly based on animal models and observational human studies. The most popular form of time-restricted eating involves eating two rather than three meals and compressing the calorie-consumption window. No head-to-head studies have been performed to assess the optimal time window.

## 2025-03-28 NOTE — PROGRESS NOTES
Subjective:   03/28/2025     Patient ID:  Veronica Duque is a 69 y.[facility]. [facility]      Comes in for assumption of cardiac care.  She has a history of a stroke, many years ago.  She had atrial fibrillation some point, but has not atrial fibrillation for years, will resolve that diagnosis.      She does have hypertension treated with amlodipine and losartan HCT, she takes potassium.  Blood pressure appears to be well controlled today.      Hyperlipidemia is treated only with ezetimibe, she has been statin intolerant.  Her last LDL, almost a year ago was 104, will recheck.      She does have known peripheral arterial disease, followed by vascular surgery.  Has left leg claudication now.  Will refer back.      She has never smoked cigarettes.                Prior cardiology note:   67 y.o. female, PMHx of atrial fibrillation, CVA, HTN and HLD, presents for follow up of her PAD.  She had 50% stenosis of the ostium of the right common iliac artery, 80% stenosis of the right common femoral artery and 75% stenosis of the proximal right superficial femoral artery and 50-60% stenosis of the right popliteal artery with 2 vessel runoff to the foot. She underwent right Femoral Endarterectomy with patch angioplasty.     5/16/22  Peripheral angiogram  Aorta:  Mildly calcified with some ectasia but no aneurysm     Right Lower Extremity:  There was a 50% stenosis at the ostium of the right common iliac artery with the remainder of the right common and external iliac patent with mild calcific disease.  The right common femoral artery has an 80% nodular calcific lesion.  The right superficial femoral artery has diffuse disease with up to 75% proximal SFA lesion.  The right popliteal artery has mild disease up to about 50-60% stenosis.  The infrapopliteal vessels have only minimal disease and there is 2 vessel runoff to the foot.     Left Lower Extremity:  The left iliac  artery has mild calcific disease.  The left common femoral artery has only minimal disease and left superficial femoral artery has moderate diffuse disease up to 50-60% stenosis.  The left popliteal artery is not well visualized due to a knee prosthesis but the infrapopliteal vessels appear normal and there is 2 vessel runoff to the foot.     12/22 - Dr. Valle  Aortogram  Angiogram Extremity Unilateral (Right)  Right Femoral Endarterectomy with patch angioplasty  PTA 6 x 40 mm Ultraverse of Right EIA and right SFA     She has been feeling well and her claudication symptoms have resolved.  She has stopped her plavix secondary to bruising and reports myalgias and statin intolerance.     Assessment:    1. PAD (peripheral artery disease)   2. Essential hypertension   3.Paroxysmal atrial fibrillation   4.Generalized edema        Plan  1. Atrial fibrillation - on ASA, monitor  2. HTN - continue current regimen   4. PAD - doing well postopertively, will follow up with Dr. Valle.  Statin intolerant recommend repatha therapy now on zetia.  Repeat CMP, CBC, TSH and lipid panel.   5. Leg swelling - obtain venous doppler US     Follow up in 6 months.         Past Medical History:   Diagnosis Date    Anticoagulant long-term use     Arthritis     General anesthetics causing adverse effect in therapeutic use     slow to wake up    Hypertension     PONV (postoperative nausea and vomiting)     Stroke        Review of patient's allergies indicates:   Allergen Reactions    Statins-hmg-coa reductase inhibitors      Myalgias         Current Medications[1]     Objective:   Review of Systems   Cardiovascular:  Positive for claudication. Negative for chest pain, cyanosis, dyspnea on exertion, irregular heartbeat, leg swelling, near-syncope, orthopnea, palpitations, paroxysmal nocturnal dyspnea and syncope.         Vitals:    03/28/25 0830   BP: 132/73   Pulse: 83     Wt Readings from Last 3 Encounters:   03/28/25 74.2 kg (163 lb 9.3 oz)    03/12/25 75.1 kg (165 lb 9.1 oz)   02/05/25 76.5 kg (168 lb 10.4 oz)     Temp Readings from Last 3 Encounters:   07/13/23 97.9 °F (36.6 °C) (Oral)   12/29/22 99.2 °F (37.3 °C) (Oral)   05/16/22 97.8 °F (36.6 °C) (Oral)     BP Readings from Last 3 Encounters:   03/28/25 132/73   03/12/25 (!) 148/74   02/05/25 121/69     Pulse Readings from Last 3 Encounters:   03/28/25 83   03/12/25 86   02/05/25 99             Physical Exam  Vitals reviewed.   Constitutional:       General: She is not in acute distress.     Appearance: She is well-developed.   HENT:      Head: Normocephalic and atraumatic.      Nose: Nose normal.   Eyes:      Conjunctiva/sclera: Conjunctivae normal.      Pupils: Pupils are equal, round, and reactive to light.   Neck:      Vascular: Carotid bruit (loud left greater than right) present. No JVD.   Cardiovascular:      Rate and Rhythm: Normal rate and regular rhythm.      Pulses: Intact distal pulses.      Heart sounds: Normal heart sounds. No murmur heard.     No friction rub. No gallop.   Pulmonary:      Effort: Pulmonary effort is normal. No respiratory distress.      Breath sounds: Normal breath sounds. No wheezing or rales.   Chest:      Chest wall: No tenderness.   Abdominal:      General: Bowel sounds are normal. There is no distension.      Palpations: Abdomen is soft.      Tenderness: There is no abdominal tenderness.   Musculoskeletal:         General: No tenderness or deformity. Normal range of motion.      Cervical back: Normal range of motion and neck supple.      Right lower leg: Edema (trace) present.      Left lower leg: Edema (Trace) present.   Skin:     General: Skin is warm and dry.      Findings: No erythema or rash.   Neurological:      Mental Status: She is alert and oriented to person, place, and time.      Cranial Nerves: No cranial nerve deficit.      Motor: No abnormal muscle tone.      Coordination: Coordination normal.   Psychiatric:         Behavior: Behavior normal.          Thought Content: Thought content normal.         Judgment: Judgment normal.           Lab Results   Component Value Date    CHOL 164 08/13/2024    CHOL 210 (H) 02/28/2019    CHOL 172 07/24/2017     Lab Results   Component Value Date    HDL 42 08/13/2024    HDL 54 02/28/2019    HDL 40 (L) 07/24/2017     Lab Results   Component Value Date    LDLCALC 103.6 08/13/2024    LDLCALC 132.8 02/28/2019    LDLCALC 108 07/24/2017     Lab Results   Component Value Date    ALT 16 08/13/2024    AST 17 08/13/2024    AST 31 05/16/2022    AST 19 02/28/2019     Lab Results   Component Value Date    CREATININE 0.6 03/14/2025    BUN 13 03/14/2025     03/14/2025    K 3.8 03/14/2025    CO2 28 03/14/2025    CO2 23 08/13/2024    CO2 24 12/27/2022     Lab Results   Component Value Date    HGB 11.8 (L) 03/14/2025    HCT 39.5 03/14/2025    HCT 33.5 (L) 08/13/2024    HCT 31.2 (L) 12/27/2022                   EKG shows sinus rhythm with right bundle-branch block.      Assessment and Plan:     Mixed hyperlipidemia  Comments:  Check lab, statin intolerant, should be on PCSK9 inhibitor, LDL goal less than 55  Orders:  -     Lipoprotein A (LPA); Future; Expected date: 04/04/2025  -     Lipid Panel; Future; Expected date: 04/04/2025  -     Apolipoprotein B; Future; Expected date: 03/29/2025    Primary hypertension  -     Comprehensive Metabolic Panel; Future; Expected date: 04/04/2025    Transient ischemic attack  Comments:  Remote history of stroke/TIA    Bilateral carotid artery stenosis  Comments:  Refer to Dr. Valle, he seen her before for peripheral arterial disease and claudication  Orders:  -     Ambulatory referral/consult to Vascular Surgery; Future; Expected date: 04/04/2025  -     VAS US Carotid Bilateral; Future    Femoral artery stenosis, right  -     Ambulatory referral/consult to Vascular Surgery; Future; Expected date: 04/04/2025    PAD (peripheral artery disease)  Comments:  Left leg claudication, refer back to vascular  surgery, they have seen her before  Orders:  -     Ambulatory referral/consult to Cardiology    Paroxysmal atrial fibrillation    PVD (peripheral vascular disease)    Cerebral infarction, unspecified mechanism    Statin intolerance         Follow up in about 6 months (around 9/28/2025).          Future Appointments   Date Time Provider Department Center   3/28/2025  9:15 AM SPECIMEN LAB, [facility] [place]   4/7/2025  2:30 PM CV [facility] [place]   6/12/2025 10:20 AM Juju Wallace MD [id number] 65+ [place]                    [1]   Current Outpatient Medications:     ALPRAZolam (XANAX) 0.5 MG tablet, Take 0.5 mg by mouth 2 (two) times daily., Disp: , Rfl:     amLODIPine (NORVASC) 5 MG tablet, Take 1 tablet (5 mg total) by mouth once daily., Disp: 90 tablet, Rfl: 3    aspirin (ECOTRIN) 81 MG EC tablet, Take 81 mg by mouth once daily., Disp: , Rfl:     diclofenac sodium (VOLTAREN) 1 % Gel, Apply 2 g topically once daily., Disp: 50 g, Rfl: 1    estradioL (ESTRACE) 0.01 % (0.1 mg/gram) vaginal cream, INSERT ONE GRAM VAGINALLY MONDAY, WEDNESDAY AND FRIDAY, Disp: , Rfl:     ezetimibe (ZETIA) 10 mg tablet, Take 1 tablet (10 mg total) by mouth once daily., Disp: 90 tablet, Rfl: 3    losartan-hydrochlorothiazide 100-25 mg (HYZAAR) 100-25 mg per tablet, Take 1 tablet by mouth once daily., Disp: 90 tablet, Rfl: 3    potassium chloride (MICRO-K) 10 MEQ CpSR, Take 1 capsule (10 mEq total) by mouth once daily., Disp: 90 capsule, Rfl: 3    valACYclovir (VALTREX) 500 MG tablet, Take 500 mg by mouth daily as needed. , Disp: , Rfl: 0

## 2025-03-29 LAB — APO B SERPL-MCNC: 102 MG/DL

## 2025-03-31 ENCOUNTER — TELEPHONE (OUTPATIENT)
Dept: VASCULAR SURGERY | Facility: CLINIC | Age: 70
End: 2025-03-31
Payer: MEDICARE

## 2025-03-31 DIAGNOSIS — I73.9 PAD (PERIPHERAL ARTERY DISEASE): Primary | ICD-10-CM

## 2025-03-31 NOTE — TELEPHONE ENCOUNTER
Attempted to contact patient in response to message. Voice message left for patient requesting return call.   ----- Message from Josseline sent at 3/31/2025 10:16 AM CDT -----  Regarding: appt access  Contact: [phone]  Patient calling to schedule appt via referral. Pls call [phone]

## 2025-03-31 NOTE — TELEPHONE ENCOUNTER
Attempted to contact pt to schedule appt with Dr. Valle. Voice message left for pt requesting return call to schedule. ----- Message from Med Assistant Pinzon sent at 3/31/2025  8:18 AM CDT -----  Referral placed by Dr. Godwin Dalal; Pt requesting Dr. Arana

## 2025-03-31 NOTE — TELEPHONE ENCOUNTER
Contacted pt in response to message. Appointment scheduled, pt verified.   ----- Message from [employer] sent at 3/31/2025  2:42 PM CDT -----  Regarding: Returning Call  Contact: [phone]  Type:  Patient Returning CallWho Called: Veronica Duque Who Left Message for Patient: Catia Does the patient know what this is regarding?: Yes Would the patient rather a call back or a response via [facility]? Call Best Call Back Number: [phone]

## 2025-04-07 ENCOUNTER — HOSPITAL ENCOUNTER (OUTPATIENT)
Dept: CARDIOLOGY | Facility: HOSPITAL | Age: 70
Discharge: HOME | End: 2025-04-07
Attending: STUDENT IN AN ORGANIZED HEALTH CARE EDUCATION/TRAINING PROGRAM
Payer: MEDICARE

## 2025-04-07 ENCOUNTER — PATIENT MESSAGE (OUTPATIENT)
Dept: CARDIOLOGY | Facility: CLINIC | Age: 70
End: 2025-04-07

## 2025-04-07 DIAGNOSIS — I73.9 PAD (PERIPHERAL ARTERY DISEASE): ICD-10-CM

## 2025-04-07 LAB
LEFT ANT TIBIAL SYS PSV: 54 CM/S
LEFT CFA PSV: 145 CM/S
LEFT EXTERNAL ILIAC PSV: 105 CM/S
LEFT POPLITEAL PSV: 122 CM/S
LEFT POST TIBIAL SYS PSV: 73 CM/S
LEFT PROFUNDA SYS PSV: 149 CM/S
LEFT SUPER FEMORAL DIST SYS PSV: 204 CM/S
LEFT SUPER FEMORAL MID SYS PSV: 448 CM/S
LEFT SUPER FEMORAL OSTIAL SYS PSV: 162 CM/S
LEFT SUPER FEMORAL PROX SYS PSV: 179 CM/S
LEFT TIB/PER TRUNK SYS PSV: 102 CM/S
OHS CV LEFT LOWER EXTREMITY ABI (NO CALC): 1
OHS CV RIGHT ABI LOWER EXTREMITY (NO CALC): 0.7
RIGHT ANT TIBIAL SYS PSV: 22 CM/S
RIGHT CFA PSV: 158 CM/S
RIGHT EXTERNAL ILLIAC PSV: 137 CM/S
RIGHT POPLITEAL PSV: 51 CM/S
RIGHT POST TIBIAL SYS PSV: 24 CM/S
RIGHT PROFUNDA SYS PSV: 145 CM/S
RIGHT SUPER FEMORAL DIST SYS PSV: 28 CM/S
RIGHT SUPER FEMORAL MID SYS PSV: 273 CM/S
RIGHT SUPER FEMORAL OSTIAL SYS PSV: 96 CM/S
RIGHT SUPER FEMORAL PROX SYS PSV: 28 CM/S
RIGHT TIB/PER TRUNK SYS PSV: 205 CM/S

## 2025-04-07 PROCEDURE — 93925 LOWER EXTREMITY STUDY: CPT | Mod: 26,,, | Performed by: INTERNAL MEDICINE

## 2025-04-07 PROCEDURE — 93925 LOWER EXTREMITY STUDY: CPT

## 2025-04-10 ENCOUNTER — PATIENT OUTREACH (OUTPATIENT)
Dept: PRIMARY CARE CLINIC | Facility: CLINIC | Age: 70
End: 2025-04-10
Payer: MEDICARE

## 2025-04-12 ENCOUNTER — RESULTS FOLLOW-UP (OUTPATIENT)
Dept: CARDIOLOGY | Facility: CLINIC | Age: 70
End: 2025-04-12

## 2025-04-14 ENCOUNTER — TELEPHONE (OUTPATIENT)
Dept: CARDIOLOGY | Facility: CLINIC | Age: 70
End: 2025-04-14
Payer: MEDICARE

## 2025-04-14 DIAGNOSIS — I65.23 BILATERAL CAROTID ARTERY STENOSIS: ICD-10-CM

## 2025-04-14 DIAGNOSIS — G45.9 TRANSIENT ISCHEMIC ATTACK: ICD-10-CM

## 2025-04-14 DIAGNOSIS — E78.2 MIXED HYPERLIPIDEMIA: ICD-10-CM

## 2025-04-14 DIAGNOSIS — I70.201 FEMORAL ARTERY STENOSIS, RIGHT: ICD-10-CM

## 2025-04-14 DIAGNOSIS — Z78.9 STATIN INTOLERANCE: ICD-10-CM

## 2025-04-14 DIAGNOSIS — I73.9 PAD (PERIPHERAL ARTERY DISEASE): Primary | ICD-10-CM

## 2025-04-14 RX ORDER — EVOLOCUMAB 140 MG/ML
140 INJECTION, SOLUTION SUBCUTANEOUS
Qty: 2 ML | Refills: 11 | Status: ACTIVE | OUTPATIENT
Start: 2025-04-14 | End: 2026-04-14

## 2025-04-14 NOTE — TELEPHONE ENCOUNTER
----- Message from Godwin Dalal MD sent at 4/12/2025 12:04 PM CDT -----  Call her and ask her if is okay for me to send a prescription for PCSK9 inhibitors to the [facility].  Make sure she received my message.  ----- Message -----  From: Lab, Background User  Sent: 3/28/2025   1:23 PM CDT  To: Godwin Dalal Jr., MD

## 2025-04-24 ENCOUNTER — NURSE TRIAGE (OUTPATIENT)
Dept: ADMINISTRATIVE | Facility: CLINIC | Age: 70
End: 2025-04-24
Payer: MEDICARE

## 2025-04-24 NOTE — TELEPHONE ENCOUNTER
Pt called with c/o elevated BP. Pt reports current BP is 144/97. Care advice recommends see within 2 weeks in office. Pt verbalized understanding. Appt offered and accepted.  Reason for Disposition   Systolic BP >= 130 OR Diastolic >= 80, and is taking BP medications    Additional Information   Negative: Sounds like a life-threatening emergency to the triager   Negative: Systolic BP >= 160 OR Diastolic >= 100, and any cardiac (e.g., breathing difficulty, chest pain) or neurologic symptoms (e.g., new-onset blurred or double vision)   Negative: Pregnant 20 or more weeks (or postpartum < 6 weeks) with new hand or face swelling   Negative: Pregnant 20 or more weeks (or postpartum < 6 weeks) and Systolic BP >= 160 OR Diastolic >= 110   Negative: Patient sounds very sick or weak to the triager   Negative: Systolic BP >= 200 OR Diastolic >= 120 and having NO cardiac or neurologic symptoms   Negative: Pregnant 20 or more weeks (or postpartum < 6 weeks) with Systolic BP >= 140 OR Diastolic >= 90   Negative: Systolic BP >= 180 OR Diastolic >= 110, and missed most recent dose of blood pressure medication   Negative: Systolic BP >= 180 OR Diastolic >= 110   Negative: Patient wants to be seen   Negative: Ran out of BP medications   Negative: Taking BP medications and feels is having side effects (e.g., impotence, cough, dizziness)   Negative: Systolic BP >= 160 OR Diastolic >= 100   Negative: Systolic BP >= 130 OR Diastolic >= 80, and pregnant    Protocols used: Blood Pressure - High-A-OH

## 2025-04-24 NOTE — TELEPHONE ENCOUNTER
Call back to patient for follow-up with BP reading. BP today 144/97 pm, 160/78 this am. Patient taking BP before medications in morning. Instructed to take medications first then take BP 1-2 hours afterwards.     Patient denies any HA, dizziness, visual changes, CP, or SOB.     Is on Digital Medicine but readings not coming across. Given [facility] information and location and DM support number for follow-up [phone]    Patient advised to take BP in am and call this nurse to report. She agreed.

## 2025-04-28 ENCOUNTER — HOSPITAL ENCOUNTER (OUTPATIENT)
Dept: VASCULAR SURGERY | Facility: CLINIC | Age: 70
Discharge: HOME | End: 2025-04-28
Attending: INTERNAL MEDICINE
Payer: MEDICARE

## 2025-04-28 ENCOUNTER — OFFICE VISIT (OUTPATIENT)
Dept: PRIMARY CARE CLINIC | Facility: CLINIC | Age: 70
End: 2025-04-28
Payer: MEDICARE

## 2025-04-28 ENCOUNTER — OFFICE VISIT (OUTPATIENT)
Dept: VASCULAR SURGERY | Facility: CLINIC | Age: 70
End: 2025-04-28
Attending: SURGERY
Payer: MEDICARE

## 2025-04-28 ENCOUNTER — HOSPITAL ENCOUNTER (OUTPATIENT)
Dept: VASCULAR SURGERY | Facility: CLINIC | Age: 70
Discharge: HOME | End: 2025-04-28
Attending: SURGERY
Payer: MEDICARE

## 2025-04-28 VITALS
WEIGHT: 165.56 LBS | HEART RATE: 75 BPM | DIASTOLIC BLOOD PRESSURE: 72 MMHG | RESPIRATION RATE: 20 BRPM | SYSTOLIC BLOOD PRESSURE: 128 MMHG | TEMPERATURE: 98 F | BODY MASS INDEX: 27.58 KG/M2 | HEIGHT: 65 IN

## 2025-04-28 VITALS
DIASTOLIC BLOOD PRESSURE: 78 MMHG | WEIGHT: 165.69 LBS | SYSTOLIC BLOOD PRESSURE: 170 MMHG | HEART RATE: 77 BPM | OXYGEN SATURATION: 98 % | BODY MASS INDEX: 27.61 KG/M2 | HEIGHT: 65 IN

## 2025-04-28 DIAGNOSIS — I10 PRIMARY HYPERTENSION: Primary | ICD-10-CM

## 2025-04-28 DIAGNOSIS — I65.23 BILATERAL CAROTID ARTERY STENOSIS: ICD-10-CM

## 2025-04-28 DIAGNOSIS — I73.9 PAD (PERIPHERAL ARTERY DISEASE): ICD-10-CM

## 2025-04-28 DIAGNOSIS — R09.81 SINUS CONGESTION: ICD-10-CM

## 2025-04-28 DIAGNOSIS — I73.9 PERIPHERAL VASCULAR DISEASE, UNSPECIFIED: Primary | ICD-10-CM

## 2025-04-28 DIAGNOSIS — Z76.89 ENCOUNTER FOR NAIL CARE: ICD-10-CM

## 2025-04-28 DIAGNOSIS — I70.201 FEMORAL ARTERY STENOSIS, RIGHT: ICD-10-CM

## 2025-04-28 PROCEDURE — 3008F BODY MASS INDEX DOCD: CPT | Mod: CPTII,HCNC,S$GLB,

## 2025-04-28 PROCEDURE — 3078F DIAST BP <80 MM HG: CPT | Mod: CPTII,HCNC,S$GLB, | Performed by: SURGERY

## 2025-04-28 PROCEDURE — 99999 PR PBB SHADOW E&M-EST. PATIENT-LVL IV: CPT | Mod: PBBFAC,HCNC,,

## 2025-04-28 PROCEDURE — 99215 OFFICE O/P EST HI 40 MIN: CPT | Mod: HCNC,S$GLB,,

## 2025-04-28 PROCEDURE — 99999 PR PBB SHADOW E&M-EST. PATIENT-LVL IV: CPT | Mod: PBBFAC,HCNC,, | Performed by: SURGERY

## 2025-04-28 PROCEDURE — 3078F DIAST BP <80 MM HG: CPT | Mod: CPTII,HCNC,S$GLB,

## 2025-04-28 PROCEDURE — 1125F AMNT PAIN NOTED PAIN PRSNT: CPT | Mod: CPTII,HCNC,S$GLB,

## 2025-04-28 PROCEDURE — 3288F FALL RISK ASSESSMENT DOCD: CPT | Mod: CPTII,HCNC,S$GLB,

## 2025-04-28 PROCEDURE — 3008F BODY MASS INDEX DOCD: CPT | Mod: CPTII,HCNC,S$GLB, | Performed by: SURGERY

## 2025-04-28 PROCEDURE — 3288F FALL RISK ASSESSMENT DOCD: CPT | Mod: CPTII,HCNC,S$GLB, | Performed by: SURGERY

## 2025-04-28 PROCEDURE — 1101F PT FALLS ASSESS-DOCD LE1/YR: CPT | Mod: CPTII,HCNC,S$GLB,

## 2025-04-28 PROCEDURE — 1157F ADVNC CARE PLAN IN RCRD: CPT | Mod: CPTII,HCNC,S$GLB,

## 2025-04-28 PROCEDURE — 1157F ADVNC CARE PLAN IN RCRD: CPT | Mod: CPTII,HCNC,S$GLB, | Performed by: SURGERY

## 2025-04-28 PROCEDURE — 3074F SYST BP LT 130 MM HG: CPT | Mod: CPTII,HCNC,S$GLB, | Performed by: SURGERY

## 2025-04-28 PROCEDURE — 1101F PT FALLS ASSESS-DOCD LE1/YR: CPT | Mod: CPTII,HCNC,S$GLB, | Performed by: SURGERY

## 2025-04-28 PROCEDURE — 99213 OFFICE O/P EST LOW 20 MIN: CPT | Mod: HCNC,S$GLB,, | Performed by: SURGERY

## 2025-04-28 PROCEDURE — 3077F SYST BP >= 140 MM HG: CPT | Mod: CPTII,HCNC,S$GLB,

## 2025-04-28 PROCEDURE — 1125F AMNT PAIN NOTED PAIN PRSNT: CPT | Mod: CPTII,HCNC,S$GLB, | Performed by: SURGERY

## 2025-04-28 PROCEDURE — 1159F MED LIST DOCD IN RCRD: CPT | Mod: CPTII,HCNC,S$GLB, | Performed by: SURGERY

## 2025-04-28 RX ORDER — LEVOCETIRIZINE DIHYDROCHLORIDE 5 MG/1
5 TABLET, FILM COATED ORAL NIGHTLY
Qty: 30 TABLET | Refills: 11 | Status: SHIPPED | OUTPATIENT
Start: 2025-04-28 | End: 2026-04-28

## 2025-04-28 RX ORDER — LANOLIN ALCOHOL/MO/W.PET/CERES
400 CREAM (GRAM) TOPICAL DAILY
COMMUNITY

## 2025-04-28 RX ORDER — SPIRONOLACTONE 25 MG/1
25 TABLET ORAL DAILY
Qty: 30 TABLET | Refills: 11 | Status: SHIPPED | OUTPATIENT
Start: 2025-04-28 | End: 2026-04-28

## 2025-04-28 RX ORDER — FLUTICASONE PROPIONATE 50 MCG
1 SPRAY, SUSPENSION (ML) NASAL DAILY
Qty: 9.9 ML | Refills: 0 | Status: SHIPPED | OUTPATIENT
Start: 2025-04-28

## 2025-04-28 NOTE — ASSESSMENT & PLAN NOTE
BP Readings from Last 5 Encounters:   04/28/25 128/72   04/28/25 (!) 170/78   03/28/25 132/73   03/12/25 (!) 148/74   02/05/25 121/69     - BP not well/consistently controlled on current therapies  - Start aldactone 25mg daily  - Stop potassium supplementation  - Metabolic panel to be repeated in 2 weeks  - TTE ordered: last performed in 2022 with normal findings  - Continue current BP regimen  - Daily home BP checks/logs  - Cardiac diet encouraged  - Aerobic exercise as tolerated, goal 150min/week.

## 2025-04-28 NOTE — PROGRESS NOTES
Veronica Duque is a 69 y.o. female with a history of atrial fibrillation, CVA, HTN, HLD  presenting for lower extremity pain L> R. Pain occurs after walking for about 1 block, severe. She also relates while sleeping she has bilateral leg pain L>R. Upon physical examination her Femoral, DP/PT were noted to be palpable and DP/PT was present on doppler. However after some brief exercise her DP/PT was noted to be absent on doppler b/l. Cap refil was noted to be decreased significantly on right foot post brief exercise. Arterial studies reviewed in depth with patient demonstrating PVD. Discussed if patient was amenable to surgery even if it meant increasing her walking to 2 blocks vs 1 block, patient eagerly said yes.      Plan:  - Recommended Tylenol PRN nightly  - CTA of abdomen and lower extremities  - Planning for Angiogram pending studies

## 2025-04-28 NOTE — PROGRESS NOTES
[facility]    Subjective     Patient Name: Veronica Duque  YOB: 1955  Patient Age: 69 y.o.  Patient Sex: female  Patient Phone: [phone]  PCP: Juju Wallace MD  Last PCP Appointment: 3/12/2025    History of Present Illness:  Pt presents today to the clinic for follow up on hypertension management.     Last seen in clinic on 3/12/25 by Dr Wallace.    HYPERTENSION:  She reports concern about current elevations in systolic BP that range from 140's to 170's. Current therapy includes losartan-hydrochlorothiazide 100-25mg daily and amlodipine 5mg daily. No appreciated chest pain or SOB. Minor bilateral lower extremity edema present, but goes away with leg elevation. Last TTE performed in 2022 . History of aldosterone level 27.4 with renin of 4.1.     ALLERGY:  Seasonal allergy flare likely cause of recent sinus congestion and and rhinitis. Pt previously taking antihistamine therapy. No currently taking regular flonase or intranasal antihistamine. No reports of fevers or productive cough.     HLD:  - Currently taking: Hx of statin allergy. Managed with Ezetimibe 10mg daily  - Last lipid panel was on 3/28/25; LDL at 130    ROM:  Negative with exception of items mentioned above    Medications: Does not have pill packs      Health Maintenance Summary            Current Care Gaps       TETANUS VACCINE (Every 10 Years) Never done     No completion history exists for this topic.              High Dose Statin (Yearly) Never done     No completion history exists for this topic.              Colorectal Cancer Screening (View Topic Details) Never done      03/12/2025  Order placed for Ambulatory referral/consult to Endo Procedure  by Juju Wallace MD              Shingles Vaccine (1 of 2) Never done     No completion history exists for this topic.              Pneumococcal Vaccines (Age 50+) (1 of 1 - PCV) Never done     No completion history exists for this topic.               RSV Vaccine (Age 60+ and Pregnant patients) (1 - Risk 60-74 years 1-dose series) Never done     No completion history exists for this topic.              COVID-19 Vaccine (1 - 2024-25 season) Never done     No completion history exists for this topic.              DEXA Scan (Every 3 Years) Due soon on 6/7/2025 06/07/2022  DXA Bone Density Spine And Hip                      Upcoming       Mammogram (Yearly) Next due on 8/23/2025 08/23/2024  Mammo Digital Diagnostic Left with Vinod    08/20/2024  Mammo Digital Screening Bilat w/ Vinod    08/18/2023  MAMMO DIGITAL SCREENING BILAT WITH VINOD    07/05/2022  MAMMO DIGITAL DIAGNOSTIC RIGHT WITH VINOD    01/14/2022  Outside Claim: [place] MAMMO, CAD, DIAGNOSTIC, BILAT     Only the first 5 history entries have been loaded, but more history exists.            Aspirin/Antiplatelet Therapy (Yearly) Next due on 4/28/2026 04/28/2025  Registry Metric: Last Current Aspirin/Antiplatelet Reviewed Date    05/16/2022  Registry Metric: Last Active Aspirin/antiplatelet Medication              Lipid Panel (Every 5 Years) Next due on 3/28/2030      04/14/2025  Order placed for Lipid Panel by Godwin Dalal Jr., MD    03/28/2025  Lipid Panel    08/13/2024  Cholesterol Total component of Lipid Panel    02/28/2019  Cholesterol Total component of Lipid panel    07/24/2017  Cholesterol Total component of Lipid panel      Only the first 5 history entries have been loaded, but more history exists.                      Completed or No Longer Recommended       Hepatitis C Screening  Completed      06/07/2022  Hepatitis C Ab component of Hepatitis C Antibody              Influenza Vaccine (Series Information) Addressed      03/12/2025  Declined                            Prior to Admission medications    Medication Sig Start Date End Date Taking? Authorizing Provider   ALPRAZolam (XANAX) 0.5 MG tablet Take 0.5 mg by mouth 2 (two) times daily. 9/6/23  Yes Provider, Historical    amLODIPine (NORVASC) 5 MG tablet Take 1 tablet (5 mg total) by mouth once daily. 6/18/24  Yes Rashel Mina III, MD   aspirin (ECOTRIN) 81 MG EC tablet Take 81 mg by mouth once daily.   Yes Provider, Historical   diclofenac sodium (VOLTAREN) 1 % Gel Apply 2 g topically once daily. 12/19/23  Yes Tommie Song MD   estradioL (ESTRACE) 0.01 % (0.1 mg/gram) vaginal cream INSERT ONE GRAM VAGINALLY MONDAY, WEDNESDAY AND FRIDAY 3/17/22  Yes Provider, Historical   evolocumab (REPATHA SURECLICK) 140 mg/mL PnIj Inject 1 mL (140 mg total) into the skin every 14 (fourteen) days.  Patient not taking: Reported on 4/28/2025 4/14/25 4/14/26 Yes Godwin Dalal Jr., MD   ezetimibe (ZETIA) 10 mg tablet Take 1 tablet (10 mg total) by mouth once daily. 6/18/24  Yes Rashel Mina III, MD   losartan-hydrochlorothiazide 100-25 mg (HYZAAR) 100-25 mg per tablet Take 1 tablet by mouth once daily. 6/18/24  Yes Rashel Mina III, MD   valACYclovir (VALTREX) 500 MG tablet Take 500 mg by mouth daily as needed.  1/21/19  Yes Provider, Historical   potassium chloride (MICRO-K) 10 MEQ CpSR Take 1 capsule (10 mEq total) by mouth once daily. 6/18/24 4/28/25 Yes Rashel Mina III, MD   fluticasone propionate (FLONASE) 50 mcg/actuation nasal spray 1 spray (50 mcg total) by Each Nostril route once daily. 4/28/25   Jim Willson MD   levocetirizine (XYZAL) 5 MG tablet Take 1 tablet (5 mg total) by mouth every evening.  Patient not taking: Reported on 4/28/2025 4/28/25 4/28/26  Jim Willson MD   magnesium oxide (MAG-OX) 400 mg (241.3 mg magnesium) tablet Take 400 mg by mouth once daily. OTC, **pt unsure of dosage**    Provider, Historical   spironolactone (ALDACTONE) 25 MG tablet Take 1 tablet (25 mg total) by mouth once daily.  Patient not taking: Reported on 4/28/2025 4/28/25 4/28/26  Jim Willson MD       OBJECTIVE:     Vitals:    04/28/25 0821   BP: (!) 170/78   BP Location: Right arm   Patient Position: Sitting   Pulse: 77   SpO2:  "98%   Weight: 75.1 kg (165 lb 10.8 oz)   Height: 5' 5" (1.651 m)       Body mass index is 27.57 kg/m².     PHYSICAL EXAM  GEN - A+OX4, NAD, BP elevated   HEENT - PERRL, EOMI,  Neck - No thyromegaly or cervical LAD.   CV - RRR, no m/r   Chest - CTAB, no wheezing or rhonchi  Abd - S/NT/ND/+BS.   Ext - 2+BDP and radial pulses. Mild BLE edema.  MSK - normal ROM, no tenderness  Neuro - 5/5 BUE and BLE strength.  LN - No axillary or inguinal LAD appreciated.  Skin - No rash, pain associated with possible ingrown toe nail (s)    LABS  Previous labs reviewed.    ASSESSMENT & PLAN:   Ms. Veronica Duque is a 69 y.o. female who was seen today in clinic today for follow up on BP. Pt was hypertensive on presentation despite taking appropriate medications. She reports checking BP at home, noting similar elevations. Assessed control on current therapies; recommend adding alternative therapy with spironolactone. Discussed potential increase to serum potassium, which would require discontinuing supplemental potassium. Evaluated control of seasonal allergies; patient to start new antihistamine with Xyzal. Intranasal therapy with flonase ordered.Will scheduled routine follow up labs with clinic appointment previously scheduled with Dr Wallace on 6/12/25    1. Primary hypertension  Overview:  - HTN managed with losartan-HCTZ 100-25mg qd and amlodipine 5mg qd  - TTE from 02/15/22  · Concentric remodeling and normal systolic function.  · The estimated ejection fraction is 70%.  · Normal left ventricular diastolic function.  · Normal right ventricular size with normal right ventricular systolic function.    Assessment & Plan:  BP Readings from Last 5 Encounters:   04/28/25 128/72   04/28/25 (!) 170/78   03/28/25 132/73   03/12/25 (!) 148/74   02/05/25 121/69     - BP not well/consistently controlled on current therapies  - Start aldactone 25mg daily  - Stop potassium supplementation  - Metabolic panel to be repeated in 2 weeks  - " TTE ordered: last performed in 2022 with normal findings  - Continue current BP regimen  - Daily home BP checks/logs  - Cardiac diet encouraged  - Aerobic exercise as tolerated, goal 150min/week.    Orders:  -     spironolactone (ALDACTONE) 25 MG tablet; Take 1 tablet (25 mg total) by mouth once daily. (Patient not taking: Reported on 4/28/2025)  Dispense: 30 tablet; Refill: 11  -     Comprehensive Metabolic Panel; Future; Expected date: 04/28/2025  -     Echo Saline Bubble? No; Future    2. Sinus congestion  Overview:  - Seasonal allergies previously managed with regular antihistamine use    Assessment & Plan:  - Encouraged pt to rotate antihistamine therapy to prevent tolerance .  - Start Xyzal 5mg daily  - Start flonase intranasal steroid one spray per nostril daily    Orders:  -     levocetirizine (XYZAL) 5 MG tablet; Take 1 tablet (5 mg total) by mouth every evening. (Patient not taking: Reported on 4/28/2025)  Dispense: 30 tablet; Refill: 11  -     fluticasone propionate (FLONASE) 50 mcg/actuation nasal spray; 1 spray (50 mcg total) by Each Nostril route once daily.  Dispense: 9.9 mL; Refill: 0    3. Encounter for nail care  -     Ambulatory referral/consult to Podiatry; Future; Expected date: 05/05/2025     Follow up in about 6 weeks (around 6/12/2025).     All questions answered. Pt to call/message the Primary Clinic for any additional concerns    I spent a total of 40 minutes on the day of the visit.      This includes face to face time and non-face to face time preparing to see the patient (eg, review of tests), obtaining and/or reviewing separately obtained history, documenting clinical information in the electronic or other health record, independently interpreting results and communicating results to the patient/family/caregiver, or care coordinator.       Jim Willson MD  [facility] - [facility]

## 2025-04-28 NOTE — ASSESSMENT & PLAN NOTE
- Encouraged pt to rotate antihistamine therapy to prevent tolerance .  - Start Xyzal 5mg daily  - Start flonase intranasal steroid one spray per nostril daily

## 2025-04-28 NOTE — PATIENT INSTRUCTIONS
For sinus congestion:  - Start xyzal 5mg daily  - Start flonase once daily per nostril      For elevated BP  - Start spironolactone (aldactone) 25mg daily  - Stop the potassium   - Repeat metabolic panel in weeks

## 2025-05-02 ENCOUNTER — HOSPITAL ENCOUNTER (OUTPATIENT)
Dept: RADIOLOGY | Facility: HOSPITAL | Age: 70
Discharge: HOME | End: 2025-05-02
Attending: SURGERY
Payer: MEDICARE

## 2025-05-02 DIAGNOSIS — I73.9 PERIPHERAL VASCULAR DISEASE, UNSPECIFIED: ICD-10-CM

## 2025-05-02 PROCEDURE — 75635 CT ANGIO ABDOMINAL ARTERIES: CPT | Mod: 26,HCNC,, | Performed by: RADIOLOGY

## 2025-05-02 PROCEDURE — 25500020 PHARM REV CODE 255: Mod: HCNC | Performed by: SURGERY

## 2025-05-02 PROCEDURE — 75635 CT ANGIO ABDOMINAL ARTERIES: CPT | Mod: TC,HCNC

## 2025-05-02 RX ADMIN — IOHEXOL 150 ML: 350 INJECTION, SOLUTION INTRAVENOUS at 09:05

## 2025-05-05 ENCOUNTER — PATIENT MESSAGE (OUTPATIENT)
Dept: VASCULAR SURGERY | Facility: CLINIC | Age: 70
End: 2025-05-05
Payer: MEDICARE

## 2025-05-05 ENCOUNTER — TELEPHONE (OUTPATIENT)
Dept: VASCULAR SURGERY | Facility: CLINIC | Age: 70
End: 2025-05-05
Payer: MEDICARE

## 2025-05-05 NOTE — TELEPHONE ENCOUNTER
----- Message from Henrietta sent at 5/5/2025 11:25 AM CDT -----  Type:  Appointment Reschedule  RequestName of Caller:Ms Duque When is the first available appointment?n/aWould the patient rather a call back or a response via [facility]? Call Best Call Back Number: [phone] Information: She needs to reschedule her appt for 05/19 please call

## 2025-05-06 ENCOUNTER — RESULTS FOLLOW-UP (OUTPATIENT)
Dept: PRIMARY CARE CLINIC | Facility: CLINIC | Age: 70
End: 2025-05-06
Payer: MEDICARE

## 2025-05-06 ENCOUNTER — LAB VISIT (OUTPATIENT)
Dept: LAB | Facility: HOSPITAL | Age: 70
End: 2025-05-06
Payer: MEDICARE

## 2025-05-06 DIAGNOSIS — I10 PRIMARY HYPERTENSION: ICD-10-CM

## 2025-05-06 LAB
ALBUMIN SERPL BCP-MCNC: 3.8 G/DL (ref 3.5–5.2)
ALP SERPL-CCNC: 94 UNIT/L (ref 40–150)
ALT SERPL W/O P-5'-P-CCNC: 19 UNIT/L (ref 10–44)
ANION GAP (OHS): 10 MMOL/L (ref 8–16)
AST SERPL-CCNC: 19 UNIT/L (ref 11–45)
BILIRUB SERPL-MCNC: 0.8 MG/DL (ref 0.1–1)
BUN SERPL-MCNC: 24 MG/DL (ref 8–23)
CALCIUM SERPL-MCNC: 9.2 MG/DL (ref 8.7–10.5)
CHLORIDE SERPL-SCNC: 105 MMOL/L (ref 95–110)
CO2 SERPL-SCNC: 26 MMOL/L (ref 23–29)
CREAT SERPL-MCNC: 0.8 MG/DL (ref 0.5–1.4)
GFR SERPLBLD CREATININE-BSD FMLA CKD-EPI: >60 ML/MIN/1.73/M2
GLUCOSE SERPL-MCNC: 107 MG/DL (ref 70–110)
POTASSIUM SERPL-SCNC: 3.5 MMOL/L (ref 3.5–5.1)
PROT SERPL-MCNC: 6.5 GM/DL (ref 6–8.4)
SODIUM SERPL-SCNC: 141 MMOL/L (ref 136–145)

## 2025-05-06 PROCEDURE — 36415 COLL VENOUS BLD VENIPUNCTURE: CPT

## 2025-05-06 PROCEDURE — 80053 COMPREHEN METABOLIC PANEL: CPT | Mod: HCNC

## 2025-05-07 ENCOUNTER — PATIENT MESSAGE (OUTPATIENT)
Dept: PODIATRY | Facility: CLINIC | Age: 70
End: 2025-05-07
Payer: MEDICARE

## 2025-05-14 ENCOUNTER — OFFICE VISIT (OUTPATIENT)
Dept: PODIATRY | Facility: CLINIC | Age: 70
End: 2025-05-14
Payer: MEDICARE

## 2025-05-14 VITALS
BODY MASS INDEX: 27.49 KG/M2 | HEIGHT: 65 IN | DIASTOLIC BLOOD PRESSURE: 76 MMHG | HEART RATE: 85 BPM | SYSTOLIC BLOOD PRESSURE: 132 MMHG | WEIGHT: 165 LBS

## 2025-05-14 DIAGNOSIS — I73.9 PERIPHERAL VASCULAR DISEASE: Primary | ICD-10-CM

## 2025-05-14 DIAGNOSIS — L60.0 INGROWN NAIL: ICD-10-CM

## 2025-05-14 DIAGNOSIS — Z76.89 ENCOUNTER FOR NAIL CARE: ICD-10-CM

## 2025-05-14 PROCEDURE — 3288F FALL RISK ASSESSMENT DOCD: CPT | Mod: CPTII,HCNC,S$GLB, | Performed by: PODIATRIST

## 2025-05-14 PROCEDURE — 3008F BODY MASS INDEX DOCD: CPT | Mod: CPTII,HCNC,S$GLB, | Performed by: PODIATRIST

## 2025-05-14 PROCEDURE — 1101F PT FALLS ASSESS-DOCD LE1/YR: CPT | Mod: CPTII,HCNC,S$GLB, | Performed by: PODIATRIST

## 2025-05-14 PROCEDURE — 3078F DIAST BP <80 MM HG: CPT | Mod: CPTII,HCNC,S$GLB, | Performed by: PODIATRIST

## 2025-05-14 PROCEDURE — 1160F RVW MEDS BY RX/DR IN RCRD: CPT | Mod: CPTII,HCNC,S$GLB, | Performed by: PODIATRIST

## 2025-05-14 PROCEDURE — 1159F MED LIST DOCD IN RCRD: CPT | Mod: CPTII,HCNC,S$GLB, | Performed by: PODIATRIST

## 2025-05-14 PROCEDURE — 99214 OFFICE O/P EST MOD 30 MIN: CPT | Mod: HCNC,S$GLB,, | Performed by: PODIATRIST

## 2025-05-14 PROCEDURE — 1157F ADVNC CARE PLAN IN RCRD: CPT | Mod: CPTII,HCNC,S$GLB, | Performed by: PODIATRIST

## 2025-05-14 PROCEDURE — 99999 PR PBB SHADOW E&M-EST. PATIENT-LVL IV: CPT | Mod: PBBFAC,HCNC,, | Performed by: PODIATRIST

## 2025-05-14 PROCEDURE — 1125F AMNT PAIN NOTED PAIN PRSNT: CPT | Mod: CPTII,HCNC,S$GLB, | Performed by: PODIATRIST

## 2025-05-14 PROCEDURE — 3075F SYST BP GE 130 - 139MM HG: CPT | Mod: CPTII,HCNC,S$GLB, | Performed by: PODIATRIST

## 2025-05-14 NOTE — PROGRESS NOTES
Subjective:      Patient ID: Veronica Duque is a 69 y.o. female.    Chief Complaint:   Ingrown Toenail (Left foot great toe) and Toe Pain (Left great toe pain when something touches it hurts really bad)    Veronica is a 69 y.o. female who presents to the clinic complaining of painful ingrown toenail on the left foot.     Pt new to me.   Last saw Dr. Haney  1/24     Pt is currently being treated by Dr. Valle vascular... workup reveals pvd.   F/u with vascular 6/12/25     Pt got a pedicure. Frequent ingrown nails.   Pcp/vascular suggested avoid pedicures and see podiatry   She has never had any infection    Patient relates she gets muscle cramps at night      Poppy:   Impression   =========     Right Leg: Segmental Pressures suggest severe peripheral arterial occlusive disease. However, PVR waveforms suggest moderate   peripheral arterial occlusive disease.   Left Leg: Segmental Pressures and PVR waveforms suggest minimal peripheral arterial occlusive disease.   DATE OF SERVICE: 04/28/2025      Narrative & Impression  EXAMINATION:  CTA RUNOFF ABD PEL BILAT LOWER EXT  CLINICAL HISTORY:  Claudication or leg ischemia;  Peripheral vascular disease, unspecified  Impression:  Bilateral areas of stenosis.  There is good trifurcation runoff bilaterally below the knee.    Electronically signed by:Oscar Driscoll MD  Date:                                            05/02/2025       Past Medical History:   Diagnosis Date    Anticoagulant long-term use     Arthritis     General anesthetics causing adverse effect in therapeutic use     slow to wake up    Hypertension     PONV (postoperative nausea and vomiting)     Stroke      Past Surgical History:   Procedure Laterality Date    ANGIOGRAPHY OF LOWER EXTREMITY Right 12/27/2022    Procedure: Angiogram Extremity Unilateral;  Surgeon: Fidencio Valle MD;  Location: [facility] OR [address] [place] [address];  Service: Vascular;  Laterality: Right;  RLE diagnostic angio & R SFA femoral endarterectomy w/  PTA & stenting  YXR270.86  gycm2 149.22  fluoro time 5.4  contrast vol    AORTOGRAPHY N/A 12/27/2022    Procedure: AORTOGRAM;  Surgeon: Fidencio Valle MD;  Location: [facility] OR [facility] FLR;  Service: Vascular;  Laterality: N/A;    AORTOGRAPHY WITH SERIALOGRAPHY  5/16/2022    Procedure: AORTOGRAM, WITH SERIALOGRAPHY. left femoral access;  Surgeon: Phil Pitts MD;  Location: [facility] CATH;  Service: Cardiology;;    ARTHROSCOPY OF KNEE      AUGMENTATION OF BREAST      COSMETIC SURGERY      ENDARTERECTOMY OF FEMORAL ARTERY N/A 12/27/2022    Procedure: ENDARTERECTOMY, FEMORAL;  Surgeon: Fidencio Valle MD;  Location: [facility] OR [facility] FLR;  Service: Vascular;  Laterality: N/A;  RLE diagnostic angio & R SFA femoral endarterectomy w/ PTA & stenting    EYE SURGERY  12/18/17 & 10/23/17    Cataracts    HYSTERECTOMY      JOINT REPLACEMENT  October 2019    Knee    NASAL SEPTOPLASTY       Medications Ordered Prior to Encounter[1]  Review of patient's allergies indicates:   Allergen Reactions    Statins-hmg-coa reductase inhibitors      Myalgias         Review of Systems   Constitutional: Negative for chills, decreased appetite, fever, malaise/fatigue, night sweats, weight gain and weight loss.   Cardiovascular:  Negative for chest pain, claudication, dyspnea on exertion, leg swelling, palpitations and syncope.        PVD   Respiratory:  Negative for cough and shortness of breath.    Endocrine: Negative for cold intolerance and heat intolerance.   Hematologic/Lymphatic: Negative for bleeding problem. Does not bruise/bleed easily.   Skin:  Positive for nail changes. Negative for color change, dry skin, flushing, itching, poor wound healing, rash, skin cancer, suspicious lesions and unusual hair distribution.   Musculoskeletal:  Negative for arthritis, back pain, falls, gout, joint pain, joint swelling, muscle cramps, muscle weakness, myalgias, neck pain and stiffness.   Gastrointestinal:  Negative for diarrhea, nausea and vomiting.  "  Neurological:  Negative for dizziness, focal weakness, light-headedness, numbness, paresthesias, tremors, vertigo and weakness.   Psychiatric/Behavioral:  Negative for altered mental status and depression. The patient does not have insomnia.    Allergic/Immunologic: Negative.            Objective:       Vitals:    05/14/25 1314   BP: 132/76   Pulse: 85   Weight: 74.9 kg (165 lb 0.2 oz)   Height: 5' 5" (1.651 m)   PainSc:   5   PainLoc: Toe   74.9 kg (165 lb 0.2 oz)     Physical Exam  Vitals reviewed.   Constitutional:       General: She is not in acute distress.     Appearance: She is well-developed. She is not ill-appearing, toxic-appearing or diaphoretic.      Comments: Proper supportive shoegear      Cardiovascular:      Pulses:           Dorsalis pedis pulses are 1+ on the right side and 1+ on the left side.        Posterior tibial pulses are 0 on the right side and 0 on the left side.      Comments: Hair growth to digits no ischemia  Musculoskeletal:         General: No tenderness.      Right lower leg: No edema.      Left lower leg: No edema.      Right ankle: Normal.      Right Achilles Tendon: Normal.      Left ankle: Normal.      Left Achilles Tendon: Normal.      Right foot: Decreased range of motion. Deformity, bunion and prominent metatarsal heads present. No tenderness or bony tenderness.      Left foot: Decreased range of motion. Deformity, bunion and prominent metatarsal heads present. No tenderness or bony tenderness.      Comments: No pain on palpation   Feet:      Right foot:      Protective Sensation: 10 sites tested.  10 sites sensed.      Skin integrity: No ulcer, blister, skin breakdown, erythema, warmth, callus or dry skin.      Left foot:      Protective Sensation: 10 sites tested.  10 sites sensed.      Skin integrity: No ulcer, blister, skin breakdown, erythema, warmth, callus or dry skin.      Comments: Incurvated nails    No signs of infection  Skin:     General: Skin is warm.      " Capillary Refill: Capillary refill takes 2 to 3 seconds.      Coloration: Skin is not pale.      Findings: No erythema or rash.   Neurological:      Mental Status: She is alert and oriented to person, place, and time.      Sensory: No sensory deficit.      Gait: Gait is intact.   Psychiatric:         Attention and Perception: Attention normal.         Mood and Affect: Mood normal.         Speech: Speech normal.         Behavior: Behavior normal.         Thought Content: Thought content normal.         Cognition and Memory: Cognition normal.         Judgment: Judgment normal.               Assessment:       Encounter Diagnoses   Name Primary?    Encounter for nail care     Peripheral vascular disease Yes    Ingrown nail          Plan:       Veronica was seen today for ingrown toenail and toe pain.    Diagnoses and all orders for this visit:    Peripheral vascular disease    Encounter for nail care  -     Ambulatory referral/consult to Podiatry    Ingrown nail      I counseled the patient on her conditions, their implications and medical management.    Chart review    Ideally patient would benefit from ingrown toenail permanent procedures however given severe PVD likely not beneficial risks may outweigh benefit  Patient will be following up vascular soon possible procedure    Pedicure precautions    Follow-up 2 months sooner if needed          Follow up in about 2 months (around 7/14/2025).          [1]   Current Outpatient Medications on File Prior to Visit   Medication Sig Dispense Refill    ALPRAZolam (XANAX) 0.5 MG tablet Take 0.5 mg by mouth 2 (two) times daily.      amLODIPine (NORVASC) 5 MG tablet Take 1 tablet (5 mg total) by mouth once daily. 90 tablet 3    aspirin (ECOTRIN) 81 MG EC tablet Take 81 mg by mouth once daily.      diclofenac sodium (VOLTAREN) 1 % Gel Apply 2 g topically once daily. 50 g 1    estradioL (ESTRACE) 0.01 % (0.1 mg/gram) vaginal cream INSERT ONE GRAM VAGINALLY MONDAY, WEDNESDAY AND  FRIDAY      evolocumab (REPATHA SURECLICK) 140 mg/mL PnIj Inject 1 mL (140 mg total) into the skin every 14 (fourteen) days. 2 mL 11    ezetimibe (ZETIA) 10 mg tablet Take 1 tablet (10 mg total) by mouth once daily. 90 tablet 3    fluticasone propionate (FLONASE) 50 mcg/actuation nasal spray 1 spray (50 mcg total) by Each Nostril route once daily. 9.9 mL 0    levocetirizine (XYZAL) 5 MG tablet Take 1 tablet (5 mg total) by mouth every evening. 30 tablet 11    losartan-hydrochlorothiazide 100-25 mg (HYZAAR) 100-25 mg per tablet Take 1 tablet by mouth once daily. 90 tablet 3    magnesium oxide (MAG-OX) 400 mg (241.3 mg magnesium) tablet Take 400 mg by mouth once daily. OTC, **pt unsure of dosage**      spironolactone (ALDACTONE) 25 MG tablet Take 1 tablet (25 mg total) by mouth once daily. 30 tablet 11    valACYclovir (VALTREX) 500 MG tablet Take 500 mg by mouth daily as needed.   0     No current facility-administered medications on file prior to visit.

## 2025-05-21 ENCOUNTER — OFFICE VISIT (OUTPATIENT)
Dept: VASCULAR SURGERY | Facility: CLINIC | Age: 70
End: 2025-05-21
Payer: MEDICARE

## 2025-05-21 VITALS
SYSTOLIC BLOOD PRESSURE: 147 MMHG | HEART RATE: 87 BPM | TEMPERATURE: 98 F | WEIGHT: 165.38 LBS | HEIGHT: 65 IN | DIASTOLIC BLOOD PRESSURE: 70 MMHG | BODY MASS INDEX: 27.56 KG/M2

## 2025-05-21 DIAGNOSIS — I73.9 PERIPHERAL VASCULAR DISEASE, UNSPECIFIED: Primary | ICD-10-CM

## 2025-05-21 PROCEDURE — 3077F SYST BP >= 140 MM HG: CPT | Mod: CPTII,HCNC,S$GLB, | Performed by: SURGERY

## 2025-05-21 PROCEDURE — 3288F FALL RISK ASSESSMENT DOCD: CPT | Mod: CPTII,HCNC,S$GLB, | Performed by: SURGERY

## 2025-05-21 PROCEDURE — 1157F ADVNC CARE PLAN IN RCRD: CPT | Mod: CPTII,HCNC,S$GLB, | Performed by: SURGERY

## 2025-05-21 PROCEDURE — 3078F DIAST BP <80 MM HG: CPT | Mod: CPTII,HCNC,S$GLB, | Performed by: SURGERY

## 2025-05-21 PROCEDURE — 1126F AMNT PAIN NOTED NONE PRSNT: CPT | Mod: CPTII,HCNC,S$GLB, | Performed by: SURGERY

## 2025-05-21 PROCEDURE — 99999 PR PBB SHADOW E&M-EST. PATIENT-LVL III: CPT | Mod: PBBFAC,HCNC,, | Performed by: SURGERY

## 2025-05-21 PROCEDURE — 99214 OFFICE O/P EST MOD 30 MIN: CPT | Mod: HCNC,S$GLB,, | Performed by: SURGERY

## 2025-05-21 PROCEDURE — 1101F PT FALLS ASSESS-DOCD LE1/YR: CPT | Mod: CPTII,HCNC,S$GLB, | Performed by: SURGERY

## 2025-05-21 PROCEDURE — 1159F MED LIST DOCD IN RCRD: CPT | Mod: CPTII,HCNC,S$GLB, | Performed by: SURGERY

## 2025-05-21 PROCEDURE — 3008F BODY MASS INDEX DOCD: CPT | Mod: CPTII,HCNC,S$GLB, | Performed by: SURGERY

## 2025-05-21 RX ORDER — CILOSTAZOL 100 MG/1
100 TABLET ORAL 2 TIMES DAILY
Qty: 60 TABLET | Refills: 11 | Status: SHIPPED | OUTPATIENT
Start: 2025-05-21 | End: 2026-05-21

## 2025-05-21 NOTE — PROGRESS NOTES
VASCULAR SURGERY NOTE    Patient ID: Veronica Duque is a 69 y.o. female.    I. HISTORY     Chief Complaint: PAD with Claudication    HPI: Veronica Duque is a 69 y.o. female who is here today for follow up appointment.  She has history of atrial fibrillation, CVA, HTN, HLD  presenting for lower extremity pain R> L. Pain occurs after walking for about 1 block, severe. Upon physical examination her Femoral, DP/PT were noted to be palpable and DP/PT was present on doppler. However after some brief exercise her DP/PT was noted to be absent on doppler b/l. Cap refil was noted to be decreased significantly on right foot post brief exercise. She has a recent CTA A/P with runoff which shows right side narrowing of the proximal right SFA. There are multiple areas of narrowing of the right SFA. There is occlusion of the distal right SFA. She is currently taking aspirin, repatha and zetia.         Past Medical History:   Diagnosis Date    Anticoagulant long-term use     Arthritis     General anesthetics causing adverse effect in therapeutic use     slow to wake up    Hypertension     PONV (postoperative nausea and vomiting)     Stroke         Past Surgical History:   Procedure Laterality Date    ANGIOGRAPHY OF LOWER EXTREMITY Right 12/27/2022    Procedure: Angiogram Extremity Unilateral;  Surgeon: Fidencio Valle MD;  Location: [facility] OR [address] [place] [address];  Service: Vascular;  Laterality: Right;  RLE diagnostic angio & R SFA femoral endarterectomy w/ PTA & stenting  ZBR131.86  gycm2 149.22  fluoro time 5.4  contrast vol    AORTOGRAPHY N/A 12/27/2022    Procedure: AORTOGRAM;  Surgeon: Fidencio Valle MD;  Location: [facility] OR [address] [place] [address];  Service: Vascular;  Laterality: N/A;    AORTOGRAPHY WITH SERIALOGRAPHY  5/16/2022    Procedure: AORTOGRAM, WITH SERIALOGRAPHY. left femoral access;  Surgeon: Phil Pitts MD;  Location: [facility] CATH;  Service: Cardiology;;    ARTHROSCOPY OF KNEE      AUGMENTATION OF BREAST      COSMETIC SURGERY       ENDARTERECTOMY OF FEMORAL ARTERY N/A 12/27/2022    Procedure: ENDARTERECTOMY, FEMORAL;  Surgeon: Fidencio Valle MD;  Location: [facility] OR [address] [place] [address];  Service: Vascular;  Laterality: N/A;  RLE diagnostic angio & R SFA femoral endarterectomy w/ PTA & stenting    EYE SURGERY  12/18/17 & 10/23/17    Cataracts    HYSTERECTOMY      JOINT REPLACEMENT  October 2019    Knee    NASAL SEPTOPLASTY         Social History     Occupational History    Not on file   Tobacco Use    Smoking status: Never    Smokeless tobacco: Never   Substance and Sexual Activity    Alcohol use: Yes     Alcohol/week: 1.0 standard drink of alcohol     Types: 1 Unspecified drink type per week     Comment: Socially    Drug use: Never    Sexual activity: Yes     Partners: Male     Birth control/protection: Post-menopausal       Review of Systems   Constitutional: Negative for chills and decreased appetite.   Eyes:  Negative for blurred vision.   Cardiovascular:  Positive for claudication. Negative for chest pain.   Endocrine: Negative for cold intolerance and heat intolerance.   Skin:  Negative for color change.        Spider Veins biltarally         II. PHYSICAL EXAM     Physical Exam  Constitutional:       Appearance: Normal appearance.   Cardiovascular:      Rate and Rhythm: Normal rate and regular rhythm.   Pulmonary:      Effort: Pulmonary effort is normal.      Breath sounds: Normal breath sounds.   Abdominal:      General: Bowel sounds are normal.   Skin:     General: Skin is dry.      Capillary Refill: Capillary refill takes less than 2 seconds.   Neurological:      General: No focal deficit present.      Mental Status: She is alert. Mental status is at baseline.       RLE:DP/PT were noted to be palpable and DP/PT was present on doppler. However after some brief exercise her DP/PT was noted to be absent on doppler b/l.       III. ASSESSMENT & PLAN (MEDICAL DECISION MAKING)     PAD with Claudication     Imaging Results: (I have personally reviewed  the images/studies and provided my interpretation below)    CTA A/P with Runoff:  right side narrowing of the proximal right SFA. There are multiple areas of narrowing of the right SFA. There is occlusion of the distal right SFA.         Assessment/Diagnosis and Plan:  69 y.o. female with atrial fibrillation, CVA, HTN, HLD  presenting for lower extremity pain R> L claudication symptoms. Patient will benefit with medical management.     -Walking program at least 4x/week for 30min per session.  -Cilostazol 100mg BID thereafter   -ASA 81mg daily recommended for all patients with PAD  -Continue Repatha and Zetia   -Control of atherosclerotic risk factors: Goal LDL <100, Goal HbA1C <7, Goal BP <140/90      Fidencio Valle  Vascular Surgeon  [facility]

## 2025-05-27 ENCOUNTER — CLINICAL SUPPORT (OUTPATIENT)
Dept: ENDOSCOPY | Facility: HOSPITAL | Age: 70
End: 2025-05-27
Attending: STUDENT IN AN ORGANIZED HEALTH CARE EDUCATION/TRAINING PROGRAM
Payer: MEDICARE

## 2025-05-27 DIAGNOSIS — Z12.11 SCREENING FOR COLON CANCER: ICD-10-CM

## 2025-06-18 ENCOUNTER — HOSPITAL ENCOUNTER (INPATIENT)
Facility: HOSPITAL | Age: 70
LOS: 1 days | Discharge: HOME | DRG: 282 | End: 2025-06-20
Attending: EMERGENCY MEDICINE | Admitting: INTERNAL MEDICINE
Payer: MEDICARE

## 2025-06-18 ENCOUNTER — TELEPHONE (OUTPATIENT)
Dept: ADMINISTRATIVE | Facility: CLINIC | Age: 70
End: 2025-06-18
Payer: MEDICARE

## 2025-06-18 DIAGNOSIS — I21.4 NSTEMI (NON-ST ELEVATION MYOCARDIAL INFARCTION): ICD-10-CM

## 2025-06-18 DIAGNOSIS — R07.9 CHEST PAIN: ICD-10-CM

## 2025-06-18 DIAGNOSIS — I21.4 NSTEMI (NON-ST ELEVATED MYOCARDIAL INFARCTION): ICD-10-CM

## 2025-06-18 DIAGNOSIS — Z01.818 PRE-OP EVALUATION: ICD-10-CM

## 2025-06-18 PROBLEM — Z86.73 HISTORY OF CVA (CEREBROVASCULAR ACCIDENT): Status: ACTIVE | Noted: 2025-06-18

## 2025-06-18 LAB
ABSOLUTE EOSINOPHIL (OHS): 0.09 K/UL
ABSOLUTE MONOCYTE (OHS): 0.63 K/UL (ref 0.3–1)
ABSOLUTE NEUTROPHIL COUNT (OHS): 6.16 K/UL (ref 1.8–7.7)
ALBUMIN SERPL BCP-MCNC: 4 G/DL (ref 3.5–5.2)
ALP SERPL-CCNC: 81 UNIT/L (ref 40–150)
ALT SERPL W/O P-5'-P-CCNC: 20 UNIT/L (ref 10–44)
ANION GAP (OHS): 8 MMOL/L (ref 8–16)
AST SERPL-CCNC: 23 UNIT/L (ref 11–45)
BASOPHILS # BLD AUTO: 0.04 K/UL
BASOPHILS NFR BLD AUTO: 0.5 %
BILIRUB SERPL-MCNC: 0.6 MG/DL (ref 0.1–1)
BNP SERPL-MCNC: 90 PG/ML (ref 0–99)
BUN SERPL-MCNC: 15 MG/DL (ref 8–23)
BUN SERPL-MCNC: 16 MG/DL (ref 6–30)
CALCIUM SERPL-MCNC: 9 MG/DL (ref 8.7–10.5)
CATH EF ESTIMATED: 60 %
CHLORIDE SERPL-SCNC: 104 MMOL/L (ref 95–110)
CHLORIDE SERPL-SCNC: 109 MMOL/L (ref 95–110)
CO2 SERPL-SCNC: 25 MMOL/L (ref 23–29)
CREAT SERPL-MCNC: 0.6 MG/DL (ref 0.5–1.4)
CREAT SERPL-MCNC: 0.7 MG/DL (ref 0.5–1.4)
ERYTHROCYTE [DISTWIDTH] IN BLOOD BY AUTOMATED COUNT: 16.7 % (ref 11.5–14.5)
FERRITIN SERPL-MCNC: 346 NG/ML (ref 20–300)
GFR SERPLBLD CREATININE-BSD FMLA CKD-EPI: >60 ML/MIN/1.73/M2
GLUCOSE SERPL-MCNC: 110 MG/DL (ref 70–110)
GLUCOSE SERPL-MCNC: 113 MG/DL (ref 70–110)
HCT VFR BLD AUTO: 32.1 % (ref 37–48.5)
HCT VFR BLD CALC: 31 %PCV (ref 36–54)
HGB BLD-MCNC: 9.8 GM/DL (ref 12–16)
IMM GRANULOCYTES # BLD AUTO: 0.05 K/UL (ref 0–0.04)
IMM GRANULOCYTES NFR BLD AUTO: 0.6 % (ref 0–0.5)
IRON SATN MFR SERPL: 27 % (ref 20–50)
IRON SERPL-MCNC: 95 UG/DL (ref 30–160)
LYMPHOCYTES # BLD AUTO: 1.62 K/UL (ref 1–4.8)
MAGNESIUM SERPL-MCNC: 1.8 MG/DL (ref 1.6–2.6)
MCH RBC QN AUTO: 19.3 PG (ref 27–31)
MCHC RBC AUTO-ENTMCNC: 30.5 G/DL (ref 32–36)
MCV RBC AUTO: 63 FL (ref 82–98)
NUCLEATED RBC (/100WBC) (OHS): 0 /100 WBC
OHS QRS DURATION: 134 MS
OHS QRS DURATION: 142 MS
OHS QRS DURATION: 146 MS
OHS QTC CALCULATION: 506 MS
OHS QTC CALCULATION: 509 MS
OHS QTC CALCULATION: 533 MS
PHOSPHATE SERPL-MCNC: 3.1 MG/DL (ref 2.7–4.5)
PLATELET # BLD AUTO: 342 K/UL (ref 150–450)
PMV BLD AUTO: 9.8 FL (ref 9.2–12.9)
POC IONIZED CALCIUM: 1.18 MMOL/L (ref 1.06–1.42)
POC TCO2 (MEASURED): 23 MMOL/L (ref 23–29)
POCT GLUCOSE: 135 MG/DL (ref 70–110)
POTASSIUM BLD-SCNC: 3.4 MMOL/L (ref 3.5–5.1)
POTASSIUM SERPL-SCNC: 3.5 MMOL/L (ref 3.5–5.1)
PROT SERPL-MCNC: 6.2 GM/DL (ref 6–8.4)
RBC # BLD AUTO: 5.07 M/UL (ref 4–5.4)
RELATIVE EOSINOPHIL (OHS): 1 %
RELATIVE LYMPHOCYTE (OHS): 18.9 % (ref 18–48)
RELATIVE MONOCYTE (OHS): 7.3 % (ref 4–15)
RELATIVE NEUTROPHIL (OHS): 71.7 % (ref 38–73)
SAMPLE: ABNORMAL
SODIUM BLD-SCNC: 142 MMOL/L (ref 136–145)
SODIUM SERPL-SCNC: 142 MMOL/L (ref 136–145)
TIBC SERPL-MCNC: 348 UG/DL (ref 250–450)
TRANSFERRIN SERPL-MCNC: 235 MG/DL (ref 200–375)
TROPONIN I SERPL HS-MCNC: 701 NG/L
TROPONIN I SERPL HS-MCNC: 90 NG/L
WBC # BLD AUTO: 8.59 K/UL (ref 3.9–12.7)

## 2025-06-18 PROCEDURE — 25000003 PHARM REV CODE 250: Mod: HCNC

## 2025-06-18 PROCEDURE — 93005 ELECTROCARDIOGRAM TRACING: CPT | Mod: HCNC

## 2025-06-18 PROCEDURE — 93458 L HRT ARTERY/VENTRICLE ANGIO: CPT | Mod: 26,HCNC,, | Performed by: INTERNAL MEDICINE

## 2025-06-18 PROCEDURE — 93010 ELECTROCARDIOGRAM REPORT: CPT | Mod: HCNC,,, | Performed by: INTERNAL MEDICINE

## 2025-06-18 PROCEDURE — 25000003 PHARM REV CODE 250: Mod: HCNC | Performed by: STUDENT IN AN ORGANIZED HEALTH CARE EDUCATION/TRAINING PROGRAM

## 2025-06-18 PROCEDURE — G0378 HOSPITAL OBSERVATION PER HR: HCPCS | Mod: HCNC

## 2025-06-18 PROCEDURE — 85025 COMPLETE CBC W/AUTO DIFF WBC: CPT | Mod: HCNC

## 2025-06-18 PROCEDURE — 84484 ASSAY OF TROPONIN QUANT: CPT | Mod: HCNC

## 2025-06-18 PROCEDURE — 4A023N7 MEASUREMENT OF CARDIAC SAMPLING AND PRESSURE, LEFT HEART, PERCUTANEOUS APPROACH: ICD-10-PCS | Performed by: INTERNAL MEDICINE

## 2025-06-18 PROCEDURE — 99152 MOD SED SAME PHYS/QHP 5/>YRS: CPT | Mod: HCNC | Performed by: INTERNAL MEDICINE

## 2025-06-18 PROCEDURE — 25500020 PHARM REV CODE 255: Mod: HCNC | Performed by: INTERNAL MEDICINE

## 2025-06-18 PROCEDURE — B2151ZZ FLUOROSCOPY OF LEFT HEART USING LOW OSMOLAR CONTRAST: ICD-10-PCS | Performed by: INTERNAL MEDICINE

## 2025-06-18 PROCEDURE — 80053 COMPREHEN METABOLIC PANEL: CPT | Mod: HCNC

## 2025-06-18 PROCEDURE — 63600175 PHARM REV CODE 636 W HCPCS: Mod: HCNC | Performed by: INTERNAL MEDICINE

## 2025-06-18 PROCEDURE — 93458 L HRT ARTERY/VENTRICLE ANGIO: CPT | Mod: HCNC | Performed by: INTERNAL MEDICINE

## 2025-06-18 PROCEDURE — 99285 EMERGENCY DEPT VISIT HI MDM: CPT | Mod: 25

## 2025-06-18 PROCEDURE — 96372 THER/PROPH/DIAG INJ SC/IM: CPT

## 2025-06-18 PROCEDURE — C1887 CATHETER, GUIDING: HCPCS | Mod: HCNC | Performed by: INTERNAL MEDICINE

## 2025-06-18 PROCEDURE — 84466 ASSAY OF TRANSFERRIN: CPT | Mod: HCNC

## 2025-06-18 PROCEDURE — B2111ZZ FLUOROSCOPY OF MULTIPLE CORONARY ARTERIES USING LOW OSMOLAR CONTRAST: ICD-10-PCS | Performed by: INTERNAL MEDICINE

## 2025-06-18 PROCEDURE — 83735 ASSAY OF MAGNESIUM: CPT | Mod: HCNC

## 2025-06-18 PROCEDURE — C1894 INTRO/SHEATH, NON-LASER: HCPCS | Mod: HCNC | Performed by: INTERNAL MEDICINE

## 2025-06-18 PROCEDURE — 84100 ASSAY OF PHOSPHORUS: CPT | Mod: HCNC

## 2025-06-18 PROCEDURE — 82728 ASSAY OF FERRITIN: CPT | Mod: HCNC

## 2025-06-18 PROCEDURE — 25000003 PHARM REV CODE 250: Mod: HCNC | Performed by: INTERNAL MEDICINE

## 2025-06-18 PROCEDURE — 99153 MOD SED SAME PHYS/QHP EA: CPT | Mod: HCNC | Performed by: INTERNAL MEDICINE

## 2025-06-18 PROCEDURE — 93010 ELECTROCARDIOGRAM REPORT: CPT | Mod: HCNC,76,, | Performed by: INTERNAL MEDICINE

## 2025-06-18 PROCEDURE — 63600175 PHARM REV CODE 636 W HCPCS: Mod: HCNC

## 2025-06-18 PROCEDURE — 99152 MOD SED SAME PHYS/QHP 5/>YRS: CPT | Mod: HCNC,,, | Performed by: INTERNAL MEDICINE

## 2025-06-18 PROCEDURE — 99215 OFFICE O/P EST HI 40 MIN: CPT | Mod: HCNC,25,GC, | Performed by: INTERNAL MEDICINE

## 2025-06-18 PROCEDURE — 83880 ASSAY OF NATRIURETIC PEPTIDE: CPT | Mod: HCNC

## 2025-06-18 PROCEDURE — 94761 N-INVAS EAR/PLS OXIMETRY MLT: CPT | Mod: HCNC

## 2025-06-18 PROCEDURE — C1769 GUIDE WIRE: HCPCS | Mod: HCNC | Performed by: INTERNAL MEDICINE

## 2025-06-18 RX ORDER — CLOPIDOGREL BISULFATE 75 MG/1
75 TABLET ORAL DAILY
Status: DISCONTINUED | OUTPATIENT
Start: 2025-06-19 | End: 2025-06-20 | Stop reason: HOSPADM

## 2025-06-18 RX ORDER — NALOXONE HCL 0.4 MG/ML
0.02 VIAL (ML) INJECTION
Status: DISCONTINUED | OUTPATIENT
Start: 2025-06-18 | End: 2025-06-20 | Stop reason: HOSPADM

## 2025-06-18 RX ORDER — NITROGLYCERIN 0.4 MG/1
0.4 TABLET SUBLINGUAL EVERY 5 MIN PRN
Status: DISCONTINUED | OUTPATIENT
Start: 2025-06-18 | End: 2025-06-20 | Stop reason: HOSPADM

## 2025-06-18 RX ORDER — SODIUM CHLORIDE 9 MG/ML
INJECTION, SOLUTION INTRAVENOUS CONTINUOUS
Status: ACTIVE | OUTPATIENT
Start: 2025-06-18 | End: 2025-06-18

## 2025-06-18 RX ORDER — ASPIRIN 81 MG/1
81 TABLET ORAL DAILY
Status: DISCONTINUED | OUTPATIENT
Start: 2025-06-19 | End: 2025-06-20 | Stop reason: HOSPADM

## 2025-06-18 RX ORDER — ENOXAPARIN SODIUM 100 MG/ML
40 INJECTION SUBCUTANEOUS EVERY 24 HOURS
Status: DISCONTINUED | OUTPATIENT
Start: 2025-06-18 | End: 2025-06-20 | Stop reason: HOSPADM

## 2025-06-18 RX ORDER — POTASSIUM CHLORIDE 20 MEQ/1
40 TABLET, EXTENDED RELEASE ORAL ONCE
Status: COMPLETED | OUTPATIENT
Start: 2025-06-18 | End: 2025-06-18

## 2025-06-18 RX ORDER — HEPARIN SODIUM 1000 [USP'U]/ML
INJECTION, SOLUTION INTRAVENOUS; SUBCUTANEOUS
Status: DISCONTINUED | OUTPATIENT
Start: 2025-06-18 | End: 2025-06-18 | Stop reason: HOSPADM

## 2025-06-18 RX ORDER — MIDAZOLAM HYDROCHLORIDE 1 MG/ML
INJECTION INTRAMUSCULAR; INTRAVENOUS
Status: DISCONTINUED | OUTPATIENT
Start: 2025-06-18 | End: 2025-06-18 | Stop reason: HOSPADM

## 2025-06-18 RX ORDER — ACETAMINOPHEN 325 MG/1
650 TABLET ORAL EVERY 4 HOURS PRN
Status: DISCONTINUED | OUTPATIENT
Start: 2025-06-18 | End: 2025-06-20 | Stop reason: HOSPADM

## 2025-06-18 RX ORDER — SODIUM CHLORIDE 0.9 % (FLUSH) 0.9 %
10 SYRINGE (ML) INJECTION EVERY 12 HOURS PRN
Status: DISCONTINUED | OUTPATIENT
Start: 2025-06-18 | End: 2025-06-20 | Stop reason: HOSPADM

## 2025-06-18 RX ORDER — HEPARIN SOD,PORCINE/0.9 % NACL 1000/500ML
INTRAVENOUS SOLUTION INTRAVENOUS
Status: DISCONTINUED | OUTPATIENT
Start: 2025-06-18 | End: 2025-06-18 | Stop reason: HOSPADM

## 2025-06-18 RX ORDER — IBUPROFEN 200 MG
16 TABLET ORAL
Status: DISCONTINUED | OUTPATIENT
Start: 2025-06-18 | End: 2025-06-20 | Stop reason: HOSPADM

## 2025-06-18 RX ORDER — CLOPIDOGREL BISULFATE 300 MG/1
600 TABLET, FILM COATED ORAL ONCE
Status: COMPLETED | OUTPATIENT
Start: 2025-06-18 | End: 2025-06-18

## 2025-06-18 RX ORDER — ACETAMINOPHEN 325 MG/1
650 TABLET ORAL EVERY 8 HOURS PRN
Status: DISCONTINUED | OUTPATIENT
Start: 2025-06-18 | End: 2025-06-20 | Stop reason: HOSPADM

## 2025-06-18 RX ORDER — CILOSTAZOL 100 MG/1
100 TABLET ORAL 2 TIMES DAILY
Status: DISCONTINUED | OUTPATIENT
Start: 2025-06-18 | End: 2025-06-20 | Stop reason: HOSPADM

## 2025-06-18 RX ORDER — HYDRALAZINE HYDROCHLORIDE 25 MG/1
25 TABLET, FILM COATED ORAL EVERY 8 HOURS PRN
Status: DISCONTINUED | OUTPATIENT
Start: 2025-06-18 | End: 2025-06-18

## 2025-06-18 RX ORDER — AMLODIPINE BESYLATE 5 MG/1
5 TABLET ORAL DAILY
Status: DISCONTINUED | OUTPATIENT
Start: 2025-06-18 | End: 2025-06-20 | Stop reason: HOSPADM

## 2025-06-18 RX ORDER — SODIUM CHLORIDE 0.9 % (FLUSH) 0.9 %
10 SYRINGE (ML) INJECTION
Status: DISCONTINUED | OUTPATIENT
Start: 2025-06-18 | End: 2025-06-20 | Stop reason: HOSPADM

## 2025-06-18 RX ORDER — LIDOCAINE HYDROCHLORIDE 20 MG/ML
INJECTION, SOLUTION EPIDURAL; INFILTRATION; INTRACAUDAL; PERINEURAL
Status: DISCONTINUED | OUTPATIENT
Start: 2025-06-18 | End: 2025-06-18 | Stop reason: HOSPADM

## 2025-06-18 RX ORDER — CETIRIZINE HYDROCHLORIDE 5 MG/1
5 TABLET ORAL NIGHTLY
Status: DISCONTINUED | OUTPATIENT
Start: 2025-06-18 | End: 2025-06-20 | Stop reason: HOSPADM

## 2025-06-18 RX ORDER — DIPHENHYDRAMINE HCL 50 MG
50 CAPSULE ORAL ONCE
Status: DISCONTINUED | OUTPATIENT
Start: 2025-06-18 | End: 2025-06-18 | Stop reason: HOSPADM

## 2025-06-18 RX ORDER — IPRATROPIUM BROMIDE AND ALBUTEROL SULFATE 2.5; .5 MG/3ML; MG/3ML
3 SOLUTION RESPIRATORY (INHALATION) EVERY 6 HOURS PRN
Status: DISCONTINUED | OUTPATIENT
Start: 2025-06-18 | End: 2025-06-20 | Stop reason: HOSPADM

## 2025-06-18 RX ORDER — FENTANYL CITRATE 50 UG/ML
INJECTION, SOLUTION INTRAMUSCULAR; INTRAVENOUS
Status: DISCONTINUED | OUTPATIENT
Start: 2025-06-18 | End: 2025-06-18 | Stop reason: HOSPADM

## 2025-06-18 RX ORDER — ALUMINUM HYDROXIDE, MAGNESIUM HYDROXIDE, AND SIMETHICONE 1200; 120; 1200 MG/30ML; MG/30ML; MG/30ML
30 SUSPENSION ORAL 4 TIMES DAILY PRN
Status: DISCONTINUED | OUTPATIENT
Start: 2025-06-18 | End: 2025-06-20 | Stop reason: HOSPADM

## 2025-06-18 RX ORDER — LOSARTAN POTASSIUM AND HYDROCHLOROTHIAZIDE 25; 100 MG/1; MG/1
1 TABLET ORAL DAILY
Status: DISCONTINUED | OUTPATIENT
Start: 2025-06-18 | End: 2025-06-20 | Stop reason: HOSPADM

## 2025-06-18 RX ORDER — SENNOSIDES 8.6 MG/1
8.6 TABLET ORAL 2 TIMES DAILY
Status: DISCONTINUED | OUTPATIENT
Start: 2025-06-19 | End: 2025-06-20 | Stop reason: HOSPADM

## 2025-06-18 RX ORDER — TALC
6 POWDER (GRAM) TOPICAL NIGHTLY PRN
Status: DISCONTINUED | OUTPATIENT
Start: 2025-06-18 | End: 2025-06-20 | Stop reason: HOSPADM

## 2025-06-18 RX ORDER — EZETIMIBE 10 MG/1
10 TABLET ORAL DAILY
Status: DISCONTINUED | OUTPATIENT
Start: 2025-06-18 | End: 2025-06-20 | Stop reason: HOSPADM

## 2025-06-18 RX ORDER — ONDANSETRON 8 MG/1
8 TABLET, ORALLY DISINTEGRATING ORAL EVERY 8 HOURS PRN
Status: DISCONTINUED | OUTPATIENT
Start: 2025-06-18 | End: 2025-06-20 | Stop reason: HOSPADM

## 2025-06-18 RX ORDER — DIPHENHYDRAMINE HYDROCHLORIDE 50 MG/ML
INJECTION, SOLUTION INTRAMUSCULAR; INTRAVENOUS
Status: DISCONTINUED | OUTPATIENT
Start: 2025-06-18 | End: 2025-06-18 | Stop reason: HOSPADM

## 2025-06-18 RX ORDER — IBUPROFEN 200 MG
24 TABLET ORAL
Status: DISCONTINUED | OUTPATIENT
Start: 2025-06-18 | End: 2025-06-20 | Stop reason: HOSPADM

## 2025-06-18 RX ORDER — GLUCAGON 1 MG
1 KIT INJECTION
Status: DISCONTINUED | OUTPATIENT
Start: 2025-06-18 | End: 2025-06-20 | Stop reason: HOSPADM

## 2025-06-18 RX ADMIN — CILOSTAZOL 100 MG: 100 TABLET ORAL at 04:06

## 2025-06-18 RX ADMIN — CLOPIDOGREL BISULFATE 600 MG: 300 TABLET, FILM COATED ORAL at 01:06

## 2025-06-18 RX ADMIN — ONDANSETRON 8 MG: 8 TABLET, ORALLY DISINTEGRATING ORAL at 10:06

## 2025-06-18 RX ADMIN — SPIRONOLACTONE 25 MG: 25 TABLET ORAL at 04:06

## 2025-06-18 RX ADMIN — LOSARTAN POTASSIUM AND HYDROCHLOROTHIAZIDE 1 TABLET: 100; 25 TABLET, FILM COATED ORAL at 04:06

## 2025-06-18 RX ADMIN — POTASSIUM CHLORIDE 40 MEQ: 1500 TABLET, EXTENDED RELEASE ORAL at 11:06

## 2025-06-18 RX ADMIN — CILOSTAZOL 100 MG: 100 TABLET ORAL at 08:06

## 2025-06-18 RX ADMIN — SODIUM CHLORIDE: 9 INJECTION, SOLUTION INTRAVENOUS at 02:06

## 2025-06-18 RX ADMIN — AMLODIPINE BESYLATE 5 MG: 5 TABLET ORAL at 04:06

## 2025-06-18 RX ADMIN — ENOXAPARIN SODIUM 40 MG: 40 INJECTION SUBCUTANEOUS at 08:06

## 2025-06-18 RX ADMIN — EZETIMIBE 10 MG: 10 TABLET ORAL at 04:06

## 2025-06-18 NOTE — ASSESSMENT & PLAN NOTE
- s/p SFA stenting   - follows with Dr. Valle outpatient  - continue home asa, statin, zetia and cilostaol

## 2025-06-18 NOTE — HPI
Veronica Duque is a 69yoF  with a hx of HTN, HLD, PAD s/p R SFA stenting (non-obstructive L CFA/SFA) here with chest pain. She has no known coronary disease. She said it occered when she woke to go to the bathroom this morning and was a heavy pressure in the substernal region, radiating to the neck/shoulder and associated with nausea. It was coming and going until EMS got there where it resolved with NTG. She has been hypertensive. She has never had this pain before. Her hsTn went from 90 to 700. EKG shows SR with RBBB, non-specific t wave inversions likely related to the bundle and similar to prior. Interventional cardiology is consulted for ischemic evaluation. She is a non-smoker.

## 2025-06-18 NOTE — ED NOTES
I-STAT Chem-8+ Results:   Value Reference Range   Sodium 142 136-145 mmol/L   Potassium  3.4 3.5-5.1 mmol/L   Chloride 104  mmol/L   Ionized Calcium 1.18 1.06-1.42 mmol/L   CO2 (measured) 23 23-29 mmol/L   Glucose 113  mg/dL   BUN 16 6-30 mg/dL   Creatinine 0.7 0.5-1.4 mg/dL   Hematocrit 31 36-54%

## 2025-06-18 NOTE — NURSING
Vasc band removed without any problems. No hematoma , no bleeding noted. + 2 palpable pulse.  Gauze/tegaderm dressing placed.

## 2025-06-18 NOTE — ED NOTES
Requested patients losartan-hydrochlorothiazide 100-25 mg per tablet 1 tablet, ezetimibe tablet 10 mg ,and cilostazoL tablet 100 mg from pharmacy for patients 1230 dose.

## 2025-06-18 NOTE — PLAN OF CARE
[facility] - Emergency Dept  Initial Discharge Assessment       Primary Care Provider: Juju Wallace MD    Admission Diagnosis: Chest pain [R07.9]    Admission Date: 6/18/2025  Expected Discharge Date:     Pt stated she is independent with her ambulation and ADL's and does not require assistance or equipment.    Post acute services discussed and declined at this time    Pt to d/c home with no needs when ready    Transition of Care Barriers: (P) None    Payor: [employer] MEDICARE / Plan: [employer] MEDICARE HMO / Product Type: Capitation /     Extended Emergency Contact Information  Primary Emergency Contact: Adriana Holguin   [facility]  Home Phone: [phone]  Relation: Daughter    Discharge Plan A: Home with family  Discharge Plan B: Home      [facility] [address] [address]  [address]  [place] [address]  Phone: [phone] Fax: [phone]    [facility]  [address] [place] [address]  Phone: [phone] Fax: [phone]      Initial Assessment (most recent)       Adult Discharge Assessment - 06/18/25 1133          Discharge Assessment    Assessment Type Discharge Planning Assessment     Confirmed/corrected address, phone number and insurance Yes     Confirmed Demographics Correct on Facesheet     Source of Information patient     Communicated SHITAL with patient/caregiver Yes     People in Home spouse     Name(s) of People in Home [facility]     Facility Arrived From: home     Do you expect to return to your current living situation? Yes     Do you have help at home or someone to help you manage your care at home? No     Prior to hospitilization cognitive status: Alert/Oriented;No Deficits     Current cognitive status: Alert/Oriented;No Deficits     Walking or Climbing Stairs Difficulty no     Dressing/Bathing Difficulty no     Home Accessibility stairs to enter home;stairs within home     Number of  Stairs, Within Home, Primary ten     Number of Stairs, Main Entrance one     Home Layout Able to live on 1st floor     Equipment Currently Used at Home none     Readmission within 30 days? No     Patient currently being followed by outpatient case management? No     Do you currently have service(s) that help you manage your care at home? No     Do you take prescription medications? Yes     Do you have any problems affording any of your prescribed medications? No     Is the patient taking medications as prescribed? yes     Who is going to help you get home at discharge? family/friends     How do you get to doctors appointments? car, drives self     Are you on dialysis? No     Do you take coumadin? No     Discharge Plan A Home with family     Discharge Plan B Home     DME Needed Upon Discharge  none (P)      Discharge Plan discussed with: Patient (P)      Transition of Care Barriers None (P)         Physical Activity    On average, how many days per week do you engage in moderate to strenuous exercise (like a brisk walk)? 0 days (P)      On average, how many minutes do you engage in exercise at this level? 0 min (P)         Financial Resource Strain    How hard is it for you to pay for the very basics like food, housing, medical care, and heating? Not very hard (P)         Housing Stability    In the last 12 months, was there a time when you were not able to pay the mortgage or rent on time? No (P)      At any time in the past 12 months, were you homeless or living in a shelter (including now)? No (P)         Transportation Needs    In the past 12 months, has lack of transportation kept you from medical appointments or from getting medications? No (P)      In the past 12 months, has lack of transportation kept you from meetings, work, or from getting things needed for daily living? No (P)         Food Insecurity    Within the past 12 months, you worried that your food would run out before you got the money to buy  more. Never true (P)      Within the past 12 months, the food you bought just didn't last and you didn't have money to get more. Never true (P)         Stress    Do you feel stress - tense, restless, nervous, or anxious, or unable to sleep at night because your mind is troubled all the time - these days? Only a little (P)         Social Isolation    How often do you feel lonely or isolated from those around you?  Never (P)         Alcohol Use    Q1: How often do you have a drink containing alcohol? Never (P)      Q2: How many drinks containing alcohol do you have on a typical day when you are drinking? Patient does not drink (P)      Q3: How often do you have six or more drinks on one occasion? Never (P)         Utilities    In the past 12 months has the electric, gas, oil, or water company threatened to shut off services in your home? No (P)         Health Literacy    How often do you need to have someone help you when you read instructions, pamphlets, or other written material from your doctor or pharmacy? Never (P)                       Discharge Plan A and Plan B have been determined by review of patient's clinical status, future medical and therapeutic needs, and coverage/benefits for post-acute care in coordination with multidisciplinary team members.    Amber Quick CD, MSW, LMSW, RSW   Case Management  [facility]  Email: [email or web address]

## 2025-06-18 NOTE — NURSING TRANSFER
Nursing Transfer Note      6/18/2025       Nurse giving handoff:Magno Shanks RN   Nurse receiving handoff:Domingo Altamirano RN    Reason patient is being transferred: inpatient room    Transfer From: SSCU room 1 to room 57573    Transfer via wheelchair    Transfer with cardiac monitoring    Transported by Senia Fajardo RN    Transfer Vital Signs:  Blood Pressure:130/62  Heart Rate: 85  O2:98% on room air  Temperature: 98.3  Respirations:15    Telemetry: Box Number [id number]  Order for Tele Monitor? Yes    Additional Lines: N/A    Medicines sent: lovenox 40mg, cilostazol 100mg, hyzaar 100-25mg    Any special needs or follow-up needed: assessment    Patient belongings transferred with patient: Yes    Chart send with patient: Yes    Notified: spouse    Patient reassessed at: 1800 on 6/18/2025  Upon arrival to floor: cardiac monitor applied, patient oriented to room, call bell in reach, and bed in lowest position

## 2025-06-18 NOTE — ASSESSMENT & PLAN NOTE
[facility] +/- PCI, patient is a LEONEL candidate  - Anti-Thrombotic therapy: ASA (325 loaded by EMS), 600 plavix loaded in ED  - Access: RRA, L CFA back up  - Creatinine: 0.6  - Pre-Op Med: Bendaryl 50mg pO   - All patient's questions were answered.  -The risks, benefits and alternatives of the procedure were explained to the patient.   -The risks of coronary angiography include but are not limited to: bleeding, infection, heart rhythm abnormalities, allergic reactions, kidney injury and potential need for dialysis, stroke and death.   - Should stenting be indicated, the patient has agreed to dual anti-platelet therapy with a drug-eluting stent   - Additionally, pt is aware that non-compliance is likely to result in stent clotting with heart attack, heart failure, and/or death  -The risks of moderate sedation include hypotension, respiratory depression, arrhythmias, bronchospasm, and death.   - Informed consent was obtained and the  patient is agreeable to proceed with the procedure.

## 2025-06-18 NOTE — ED TRIAGE NOTES
Patient arrived via EMS from home for the chief complaint of chest pain. The patient states she woke up this morning with severe chest pain radiating between her shoulders and onto her neck. The patient also states she feels like she is having some indigestion. The patient was given three nitro prior to arriving to the hospital all symptoms of chest pain resolved prior too arriving to the ER.

## 2025-06-18 NOTE — Clinical Note
Diagnosis: Chest pain [160465]   Reason for IP Medical Treatment  (Clinical interventions that can only be accomplished in the IP setting? ) :: Chest pain

## 2025-06-18 NOTE — Clinical Note
The catheter was reinserted into the ostium   left main. An angiography was performed of the left coronary arteries. Multiple views were taken.

## 2025-06-18 NOTE — ED PROVIDER NOTES
Encounter Date: 6/18/2025       History     Chief Complaint   Patient presents with    Chest Pain     Beginning at 5am this morning. Reporting indigestion, pain waking her from sleep. Pain between her shoulder blades and into left neck.      Ms. Duque is a 68yo woman with medical history significant for hypertension, hyperlipidemia, significant PAD, remote stroke, who presents to the emergency department via EMS for chest pain.  Patient reports being awoken from her sleep at approximately 5:00 a.m. this morning with indigestion and tight substernal chest pain.  She also had pain radiating to her bilateral shoulders as well as up the left side of her neck.  She denies diaphoresis and emesis.  She denies any history of heart attack or known heart disease.  She has never had this chest pain before. Previous echocardiogram shows normal ejection fraction, normal diastolic function, no wall motion abnormalities.  EN route with EMS she received 325mg aspirin and 1 sublingual nitroglycerin with complete relief of her symptoms.  On my initial evaluation in the emergency department, she is completely asymptomatic and back to baseline.        Review of patient's allergies indicates:   Allergen Reactions    Statins-hmg-coa reductase inhibitors      Myalgias       Past Medical History:   Diagnosis Date    Anticoagulant long-term use     Arthritis     General anesthetics causing adverse effect in therapeutic use     slow to wake up    Hypertension     PONV (postoperative nausea and vomiting)     Stroke      Past Surgical History:   Procedure Laterality Date    ANGIOGRAPHY OF LOWER EXTREMITY Right 12/27/2022    Procedure: Angiogram Extremity Unilateral;  Surgeon: Fidencio Valle MD;  Location: [facility] OR [address] [place] [address];  Service: Vascular;  Laterality: Right;  RLE diagnostic angio & R SFA femoral endarterectomy w/ PTA & stenting  VYY575.86  gycm2 149.22  fluoro time 5.4  contrast vol    AORTOGRAPHY N/A 12/27/2022    Procedure: AORTOGRAM;   Surgeon: Fidencio Valle MD;  [facility];  Service: Vascular;  Laterality: N/A;    AORTOGRAPHY WITH SERIALOGRAPHY  5/16/2022    Procedure: AORTOGRAM, WITH SERIALOGRAPHY. left femoral access;  Surgeon: Phil Pitts MD;  Location: [facility];  Service: Cardiology;;    ARTHROSCOPY OF KNEE      AUGMENTATION OF BREAST      COSMETIC SURGERY      ENDARTERECTOMY OF FEMORAL ARTERY N/A 12/27/2022    Procedure: ENDARTERECTOMY, FEMORAL;  Surgeon: Fidencio Valle MD;  [facility];  Service: Vascular;  Laterality: N/A;  RLE diagnostic angio & R SFA femoral endarterectomy w/ PTA & stenting    EYE SURGERY  12/18/17 & 10/23/17    Cataracts    HYSTERECTOMY      JOINT REPLACEMENT  October 2019    Knee    NASAL SEPTOPLASTY       Family History   Problem Relation Name Age of Onset    Heart attack Mother Lary Garcia     Heart disease Mother Lary Garcia     COPD Mother Lary Garcia         Smoker    Hypertension Mother Lary Garcia     Miscarriages / Stillbirths Mother Lary Garcia     Heart attack Father      Heart disease Father       Social History[1]  Review of Systems    Physical Exam     Initial Vitals [06/18/25 0800]   BP Pulse Resp Temp SpO2   (!) 150/74 82 18 98.1 °F (36.7 °C) 96 %      MAP       --         Physical Exam    Constitutional: She appears well-developed and well-nourished. She is not diaphoretic. No distress.   HENT:   Head: Normocephalic and atraumatic.   Cardiovascular:  Normal rate and regular rhythm.           No murmur heard.  Pulmonary/Chest: Breath sounds normal.   Abdominal: Abdomen is soft. There is no abdominal tenderness.   Musculoskeletal:         General: No edema.     Neurological: She is alert and oriented to person, place, and time.   Skin: Skin is warm and dry.         ED Course   Procedures  Labs Reviewed   HEPATITIS C ANTIBODY   HEP C VIRUS HOLD SPECIMEN   HIV 1 / 2 ANTIBODY   CBC W/ AUTO DIFFERENTIAL    Narrative:     The following orders were created  for panel order CBC auto differential.  Procedure                               Abnormality         Status                     ---------                               -----------         ------                     CBC with Differential[[id number]]                                                        Please view results for these tests on the individual orders.   COMPREHENSIVE METABOLIC PANEL   TROPONIN I HIGH SENSITIVITY   B-TYPE NATRIURETIC PEPTIDE   CBC WITH DIFFERENTIAL   ISTAT CHEM8     EKG Readings: (Independently Interpreted)   Initial Reading: No STEMI. Rhythm: Normal Sinus Rhythm.       Imaging Results              X-Ray Chest AP Portable (In process)                   X-Rays:   Independently Interpreted Readings:   Chest X-Ray: Normal heart size. No acute processes     Medications - No data to display  Medical Decision Making  Ms. Duque is a 68yo woman with medical history significant for hypertension, hyperlipidemia, significant PAD, remote stroke, who presents to the emergency department via EMS for indigestion and chest pain.  Differential diagnosis includes STEMI, NSTEMI, unstable angina, pneumothorax, GERD.  EKG demonstrating normal sinus rhythm with nonspecific repolarization.  Chest x-ray with no acute processes.  Given the patient's symptoms completely resolved after 1x nitroglycerin EN route to ED, favor unstable angina. First troponin 90. Heart score 7.  Repeat troponin 700.  Call to discuss with the cardiology, they are sending a fellow to evaluate patient.  Anticipate admission for ischemic workup versus catheterization.    Amount and/or Complexity of Data Reviewed  Labs: ordered. Decision-making details documented in ED Course.  Radiology: ordered. Decision-making details documented in ED Course.  ECG/medicine tests:  Decision-making details documented in ED Course.    Risk  Prescription drug management.               ED Course as of 06/18/25 0947   Wed Jun 18, 2025   0820 EKG 12-lead  NSR,  RBBB, no STEMI per my independent interpretation.   [DC]   0820 X-Ray Chest AP Portable  CXR shows no acute disease per my independent interpretation.   [DC]   0910 POC Creatinine: 0.7 [DC]      ED Course User Index  [DC] Hilario Simons MD                           Clinical Impression:  Final diagnoses:  [R07.9] Chest pain                       [1]   Social History  Tobacco Use    Smoking status: Never    Smokeless tobacco: Never   Substance Use Topics    Alcohol use: Yes     Alcohol/week: 1.0 standard drink of alcohol     Types: 1 Unspecified drink type per week     Comment: Socially    Drug use: Never        José Miguel Elias MD  Resident  06/18/25 9711

## 2025-06-18 NOTE — ASSESSMENT & PLAN NOTE
Substernal chest pain that radiates to back, neck and left arm - resolved with NTG    - EKG NSR  - initial troponin 90, will trend Q4H  - Relieved with nitroglycerin, 325 mg ASA administered  - CXR without acute process, BNP wnl  - cardiology consulted, appreciate recommendations   - Ddx: anxiety, costochondritis, esophageal reflux, pericarditis, biliary disease   - risk factors: age >65, HTN, HLD,  - HEART score 7  - CBC, CMP (goal Mag>2, K>4) daily; lipid panel ordered  - cardiac telemetry

## 2025-06-18 NOTE — SUBJECTIVE & OBJECTIVE
Past Medical History:   Diagnosis Date    Anticoagulant long-term use     Arthritis     General anesthetics causing adverse effect in therapeutic use     slow to wake up    Hypertension     PONV (postoperative nausea and vomiting)     Stroke        Past Surgical History:   Procedure Laterality Date    ANGIOGRAPHY OF LOWER EXTREMITY Right 12/27/2022    Procedure: Angiogram Extremity Unilateral;  Surgeon: Fidencio Valle MD;  Location: [address];  Service: Vascular;  Laterality: Right;  RLE diagnostic angio & R SFA femoral endarterectomy w/ PTA & stenting  GIE835.86  gycm2 149.22  fluoro time 5.4  contrast vol    AORTOGRAPHY N/A 12/27/2022    Procedure: AORTOGRAM;  Surgeon: Fidencio Valle MD;  Location: [address];  Service: Vascular;  Laterality: N/A;    AORTOGRAPHY WITH SERIALOGRAPHY  5/16/2022    Procedure: AORTOGRAM, WITH SERIALOGRAPHY. left femoral access;  Surgeon: Phil Pitts MD;  Location: [facility];  Service: Cardiology;;    ARTHROSCOPY OF KNEE      AUGMENTATION OF BREAST      COSMETIC SURGERY      ENDARTERECTOMY OF FEMORAL ARTERY N/A 12/27/2022    Procedure: ENDARTERECTOMY, FEMORAL;  Surgeon: Fiedncio Valle MD;  Location: [address];  Service: Vascular;  Laterality: N/A;  RLE diagnostic angio & R SFA femoral endarterectomy w/ PTA & stenting    EYE SURGERY  12/18/17 & 10/23/17    Cataracts    HYSTERECTOMY      JOINT REPLACEMENT  October 2019    Knee    NASAL SEPTOPLASTY         Review of patient's allergies indicates:   Allergen Reactions    Statins-hmg-coa reductase inhibitors      Myalgias         (Not in a hospital admission)    Family History       Problem Relation (Age of Onset)    COPD Mother    Heart attack Mother, Father    Heart disease Mother, Father    Hypertension Mother    Miscarriages / Stillbirths Mother          Tobacco Use    Smoking status: Never    Smokeless tobacco: Never   Substance and Sexual Activity    Alcohol use: Yes     Alcohol/week: 1.0 standard drink of  alcohol     Types: 1 Unspecified drink type per week     Comment: Socially    Drug use: Never    Sexual activity: Yes     Partners: Male     Birth control/protection: Post-menopausal     Review of Systems   Constitutional: Negative for diaphoresis and fever.   Cardiovascular:  Negative for chest pain, dyspnea on exertion, leg swelling, near-syncope, orthopnea, palpitations, paroxysmal nocturnal dyspnea and syncope.   Respiratory:  Negative for cough and shortness of breath.    Gastrointestinal:  Negative for abdominal pain, diarrhea, nausea and vomiting.   Neurological:  Negative for light-headedness.   Psychiatric/Behavioral:  Negative for altered mental status and substance abuse.      Objective:     Vital Signs (Most Recent):  Temp: 98.1 °F (36.7 °C) (06/18/25 0800)  Pulse: 79 (06/18/25 1200)  Resp: 18 (06/18/25 1200)  BP: (!) 168/73 (06/18/25 1200)  SpO2: 99 % (06/18/25 1200) Vital Signs (24h Range):  Temp:  [98.1 °F (36.7 °C)] 98.1 °F (36.7 °C)  Pulse:  [73-82] 79  Resp:  [15-20] 18  SpO2:  [96 %-100 %] 99 %  BP: (144-186)/(66-81) 168/73     Weight: 75 kg (165 lb 4.8 oz)  Body mass index is 27.51 kg/m².    SpO2: 99 %       No intake or output data in the 24 hours ending 06/18/25 1301    Lines/Drains/Airways       Peripheral Intravenous Line  Duration                  Peripheral IV - Single Lumen 06/18/25 0847 20 G Right Antecubital <1 day         Peripheral IV - Single Lumen 06/18/25 20 G Right;Posterior Hand <1 day                     Physical Exam  Constitutional:       Appearance: Normal appearance.   Cardiovascular:      Rate and Rhythm: Normal rate and regular rhythm.      Pulses: Normal pulses.      Heart sounds: Normal heart sounds.      Comments: 2+ radials, 2+ Dps to palpation  Pulmonary:      Effort: Pulmonary effort is normal.      Breath sounds: Normal breath sounds. No wheezing or rales.   Abdominal:      General: Abdomen is flat. There is no distension.      Palpations: Abdomen is soft.       Tenderness: There is no abdominal tenderness.   Musculoskeletal:      Right lower leg: No edema.      Left lower leg: No edema.   Skin:     General: Skin is warm and dry.   Neurological:      Mental Status: She is alert and oriented to person, place, and time. Mental status is at baseline.   Psychiatric:         Mood and Affect: Mood normal.         Behavior: Behavior normal.            Significant Labs: All pertinent lab results from the last 24 hours have been reviewed.    Significant Imaging: Reviewed

## 2025-06-18 NOTE — HPI
Veronica Duque is a 69 y.o. female with hypertension, hyperlipidemia, PVD and PAD s/p R SFA stenting being admitted to hospital medicine for evaluation of chest pain. Patient reports sudden onset of constant, waxing and waning intensity substernal chest heaviness that radiated to between her shoulder blades after getting up to go to the bathroom around 5:15 AM this morning. She endorses associated nausea with dry heaves but no true vomiting and belching. She took a 81 mg ASA and tums for the pain without relief - she also had a brief episode of left arm pain at that time. She took her blood pressure at that time and it was 180/83 which is high for her. Because her pain did not improve she called EMS, she was given an additional 81 mg asa x3 and SL NTG which resolved her pain. She did have left sided neck pain for a few minutes that later resolved. She denies familiarity of symptoms, dizziness/lightheadedness, shortness of breath, diaphoresis, cough, congestion, fever or chills. Does not smoke or drink.       In ED: hypertensive to , remaining vitals stable. CBC with hgb 9.8 which is baseline. CMP unremarkable. Initial troponin 90, no baseline for comparison. BNP wnl. CXR without acute process. EKG NSR. Given 40 mEq of potassium in the ED. Admitted to medicine for further management.

## 2025-06-18 NOTE — CONSULTS
[facility] - Emergency Dept  Interventional Cardiology  Consult Note    Patient Name: Veronica Duque  MRN: [id number]  Admission Date: 6/18/2025  Hospital Length of Stay: 0 days  Code Status: Full Code   Attending Provider: Jake Quinn MD  Consulting Provider: Connor M Gillies, DO  Primary Care Physician: Juju Wallace MD  Principal Problem:Chest pain    Patient information was obtained from patient, past medical records, and ER records.     Inpatient consult to Interventional Cardiology  Consult performed by: Gillies, Connor M, DO  Consult ordered by: Jake Quinn MD        Subjective:     Chief Complaint:  Chest pain     HPI:  Veronica Duque is a 69yoF  with a hx of HTN, HLD, PAD s/p R SFA stenting (non-obstructive L CFA/SFA) here with chest pain. She has no known coronary disease. She said it occered when she woke to go to the bathroom this morning and was a heavy pressure in the substernal region, radiating to the neck/shoulder and associated with nausea. It was coming and going until EMS got there where it resolved with NTG. She has been hypertensive. She has never had this pain before. Her hsTn went from 90 to 700. EKG shows SR with RBBB, non-specific t wave inversions likely related to the bundle and similar to prior. Interventional cardiology is consulted for ischemic evaluation. She is a non-smoker.    Past Medical History:   Diagnosis Date    Anticoagulant long-term use     Arthritis     General anesthetics causing adverse effect in therapeutic use     slow to wake up    Hypertension     PONV (postoperative nausea and vomiting)     Stroke        Past Surgical History:   Procedure Laterality Date    ANGIOGRAPHY OF LOWER EXTREMITY Right 12/27/2022    Procedure: Angiogram Extremity Unilateral;  Surgeon: Fidencio Valle MD;  Location: [facility] OR [address] [place] [address];  Service: Vascular;  Laterality: Right;  RLE diagnostic angio & R SFA femoral endarterectomy w/ PTA & stenting  BMN078.86  gycm2  149.22  fluoro time 5.4  contrast vol    AORTOGRAPHY N/A 12/27/2022    Procedure: AORTOGRAM;  Surgeon: Fidencio Valle MD;  [facility];  Service: Vascular;  Laterality: N/A;    AORTOGRAPHY WITH SERIALOGRAPHY  5/16/2022    Procedure: AORTOGRAM, WITH SERIALOGRAPHY. left femoral access;  Surgeon: Phil Pitts MD;  Location: [facility] CATH;  Service: Cardiology;;    ARTHROSCOPY OF KNEE      AUGMENTATION OF BREAST      COSMETIC SURGERY      ENDARTERECTOMY OF FEMORAL ARTERY N/A 12/27/2022    Procedure: ENDARTERECTOMY, FEMORAL;  Surgeon: Fidencio Valle MD;  [facility];  Service: Vascular;  Laterality: N/A;  RLE diagnostic angio & R SFA femoral endarterectomy w/ PTA & stenting    EYE SURGERY  12/18/17 & 10/23/17    Cataracts    HYSTERECTOMY      JOINT REPLACEMENT  October 2019    Knee    NASAL SEPTOPLASTY         Review of patient's allergies indicates:   Allergen Reactions    Statins-hmg-coa reductase inhibitors      Myalgias         (Not in a hospital admission)    Family History       Problem Relation (Age of Onset)    COPD Mother    Heart attack Mother, Father    Heart disease Mother, Father    Hypertension Mother    Miscarriages / Stillbirths Mother          Tobacco Use    Smoking status: Never    Smokeless tobacco: Never   Substance and Sexual Activity    Alcohol use: Yes     Alcohol/week: 1.0 standard drink of alcohol     Types: 1 Unspecified drink type per week     Comment: Socially    Drug use: Never    Sexual activity: Yes     Partners: Male     Birth control/protection: Post-menopausal     Review of Systems   Constitutional: Negative for diaphoresis and fever.   Cardiovascular:  Negative for chest pain, dyspnea on exertion, leg swelling, near-syncope, orthopnea, palpitations, paroxysmal nocturnal dyspnea and syncope.   Respiratory:  Negative for cough and shortness of breath.    Gastrointestinal:  Negative for abdominal pain, diarrhea, nausea and vomiting.   Neurological:  Negative  for light-headedness.   Psychiatric/Behavioral:  Negative for altered mental status and substance abuse.      Objective:     Vital Signs (Most Recent):  Temp: 98.1 °F (36.7 °C) (06/18/25 0800)  Pulse: 79 (06/18/25 1200)  Resp: 18 (06/18/25 1200)  BP: (!) 168/73 (06/18/25 1200)  SpO2: 99 % (06/18/25 1200) Vital Signs (24h Range):  Temp:  [98.1 °F (36.7 °C)] 98.1 °F (36.7 °C)  Pulse:  [73-82] 79  Resp:  [15-20] 18  SpO2:  [96 %-100 %] 99 %  BP: (144-186)/(66-81) 168/73     Weight: 75 kg (165 lb 4.8 oz)  Body mass index is 27.51 kg/m².    SpO2: 99 %       No intake or output data in the 24 hours ending 06/18/25 1301    Lines/Drains/Airways       Peripheral Intravenous Line  Duration                  Peripheral IV - Single Lumen 06/18/25 0847 20 G Right Antecubital <1 day         Peripheral IV - Single Lumen 06/18/25 20 G Right;Posterior Hand <1 day                     Physical Exam  Constitutional:       Appearance: Normal appearance.   Cardiovascular:      Rate and Rhythm: Normal rate and regular rhythm.      Pulses: Normal pulses.      Heart sounds: Normal heart sounds.      Comments: 2+ radials, 2+ Dps to palpation  Pulmonary:      Effort: Pulmonary effort is normal.      Breath sounds: Normal breath sounds. No wheezing or rales.   Abdominal:      General: Abdomen is flat. There is no distension.      Palpations: Abdomen is soft.      Tenderness: There is no abdominal tenderness.   Musculoskeletal:      Right lower leg: No edema.      Left lower leg: No edema.   Skin:     General: Skin is warm and dry.   Neurological:      Mental Status: She is alert and oriented to person, place, and time. Mental status is at baseline.   Psychiatric:         Mood and Affect: Mood normal.         Behavior: Behavior normal.            Significant Labs: All pertinent lab results from the last 24 hours have been reviewed.    Significant Imaging: Reviewed  Assessment and Plan:     Cardiac/Vascular  NSTEMI (non-ST elevated myocardial  infarction)  [facility] +/- PCI, patient is a LEONEL candidate  - Anti-Thrombotic therapy: ASA (325 loaded by EMS), 600 plavix loaded in ED  - Access: RRA, L CFA back up  - Creatinine: 0.6  - Pre-Op Med: Bendaryl 50mg pO   - All patient's questions were answered.  -The risks, benefits and alternatives of the procedure were explained to the patient.   -The risks of coronary angiography include but are not limited to: bleeding, infection, heart rhythm abnormalities, allergic reactions, kidney injury and potential need for dialysis, stroke and death.   - Should stenting be indicated, the patient has agreed to dual anti-platelet therapy with a drug-eluting stent   - Additionally, pt is aware that non-compliance is likely to result in stent clotting with heart attack, heart failure, and/or death  -The risks of moderate sedation include hypotension, respiratory depression, arrhythmias, bronchospasm, and death.   - Informed consent was obtained and the  patient is agreeable to proceed with the procedure.          VTE Risk Mitigation (From admission, onward)           Ordered     enoxaparin injection 40 mg  Daily         06/18/25 1029     IP VTE HIGH RISK PATIENT  Once         06/18/25 1029     Place sequential compression device  Until discontinued         06/18/25 1029                    Thank you for your consult. I will follow-up with patient. Please contact us if you have any additional questions.    Connor M Gillies, DO  Interventional Cardiology   [facility] - Emergency Dept      IC STAFF  I have seen the patient, reviewed the Fellow's history and physical, assessment and plan. I have personally interviewed and examined the patient and agree with the findings.     KADEN Toscano MD

## 2025-06-18 NOTE — PLAN OF CARE
Patient received back to SSCU room 1 at 1450 post left heart cath.  Report received from WOODY Montoya. Patient is AAOx3. VSS. Bedrest instructions reviewed with patient. All questions answered. Right radial vasc band in place. no bleeding noted. No hematoma. + radial  pulses palpable. Family notified and to bedside. Call light placed within reach.

## 2025-06-18 NOTE — ASSESSMENT & PLAN NOTE
Most recent hemoglobin and hematocrit are listed below.  Recent Labs     06/18/25  0845 06/18/25  0853   HGB 9.8*  --    HCT 32.1* 31*     Plan  - Monitor serial CBC: Daily  - Transfuse PRBC if patient becomes hemodynamically unstable, symptomatic or H/H drops below 7/21.  - Patient has not received any PRBC transfusions to date  - Patient's anemia is currently stable

## 2025-06-18 NOTE — TELEPHONE ENCOUNTER
Ms. Duque is currently on ezetimibe and Repatha. The Repatha was initiated in 2025. The most recent LDL = 130.8 mg/dL (3/28/25). No changes.    ----- Message from WOODY Cotto sent at 2025  1:11 PM CDT -----  Regarding: Order for HORTENCIA DUQUE    Patient Name: HORTENCIA DUQUE([id number])  Sex: Female  : 1955      PCP: KIRSTEN NEWELL    Center: [facility] LOCATION ([facility])     Types of orders made on 2025: Activity, Cardiac Cath, Case Request, Code                                       Status, Consult, Diet, ECG, IV, Imaging,                                       Lab, Medications, Nursing, Point of Care                                       Testing, Point of Care Testing-Docked                                       Device, Respiratory Care, Transfer, VTE                                       Risk Orders    Order Date:2025  Ordering User:GILLIES, CONNOR M [[id number]]  Attending Provider:Hilario Simons MD [[id number]]  Authorizing Provider: Gillies, Connor M, DO [[id number]]  Department:[facility] EMERGENCY DEPARTMENT[[id number]]    Order Specific Information  Order: Notify [facility] [Custom: [id number]]  Order #: [id number]: 1    Priority: Routine  Class: Hospital Performed    Released on: 2025  1:11 PM        Priority: Routine  Class: Hospital Performed    Released on: 2025  1:11 PM

## 2025-06-18 NOTE — SUBJECTIVE & OBJECTIVE
Past Medical History:   Diagnosis Date    Anticoagulant long-term use     Arthritis     General anesthetics causing adverse effect in therapeutic use     slow to wake up    Hypertension     PONV (postoperative nausea and vomiting)     Stroke        Past Surgical History:   Procedure Laterality Date    ANGIOGRAPHY OF LOWER EXTREMITY Right 12/27/2022    Procedure: Angiogram Extremity Unilateral;  Surgeon: Fidencio Valle MD;  Location: [address];  Service: Vascular;  Laterality: Right;  RLE diagnostic angio & R SFA femoral endarterectomy w/ PTA & stenting  GTX032.86  gycm2 149.22  fluoro time 5.4  contrast vol    AORTOGRAPHY N/A 12/27/2022    Procedure: AORTOGRAM;  Surgeon: Fidencio Valle MD;  Location: [address];  Service: Vascular;  Laterality: N/A;    AORTOGRAPHY WITH SERIALOGRAPHY  5/16/2022    Procedure: AORTOGRAM, WITH SERIALOGRAPHY. left femoral access;  Surgeon: Phil Pitts MD;  Location: [facility];  Service: Cardiology;;    ARTHROSCOPY OF KNEE      AUGMENTATION OF BREAST      COSMETIC SURGERY      ENDARTERECTOMY OF FEMORAL ARTERY N/A 12/27/2022    Procedure: ENDARTERECTOMY, FEMORAL;  Surgeon: Fidencio Valle MD;  Location: [address];  Service: Vascular;  Laterality: N/A;  RLE diagnostic angio & R SFA femoral endarterectomy w/ PTA & stenting    EYE SURGERY  12/18/17 & 10/23/17    Cataracts    HYSTERECTOMY      JOINT REPLACEMENT  October 2019    Knee    NASAL SEPTOPLASTY         Review of patient's allergies indicates:   Allergen Reactions    Statins-hmg-coa reductase inhibitors      Myalgias         No current facility-administered medications on file prior to encounter.     Current Outpatient Medications on File Prior to Encounter   Medication Sig    ALPRAZolam (XANAX) 0.5 MG tablet Take 0.5 mg by mouth 2 (two) times daily.    amLODIPine (NORVASC) 5 MG tablet TAKE ONE TABLET BY MOUTH ONCE DAILY    aspirin (ECOTRIN) 81 MG EC tablet Take 81 mg by mouth once daily.    cilostazoL (PLETAL)  100 MG Tab Take 1 tablet (100 mg total) by mouth 2 (two) times daily.    diclofenac sodium (VOLTAREN) 1 % Gel Apply 2 g topically once daily.    estradioL (ESTRACE) 0.01 % (0.1 mg/gram) vaginal cream INSERT ONE GRAM VAGINALLY MONDAY, WEDNESDAY AND FRIDAY    evolocumab (REPATHA SURECLICK) 140 mg/mL PnIj Inject 1 mL (140 mg total) into the skin every 14 (fourteen) days.    ezetimibe (ZETIA) 10 mg tablet TAKE ONE TABLET BY MOUTH ONCE DAILY    fluticasone propionate (FLONASE) 50 mcg/actuation nasal spray 1 spray (50 mcg total) by Each Nostril route once daily.    levocetirizine (XYZAL) 5 MG tablet Take 1 tablet (5 mg total) by mouth every evening.    losartan-hydrochlorothiazide 100-25 mg (HYZAAR) 100-25 mg per tablet TAKE ONE TABLET BY MOUTH DAILY    magnesium oxide (MAG-OX) 400 mg (241.3 mg magnesium) tablet Take 400 mg by mouth once daily. OTC, **pt unsure of dosage**    spironolactone (ALDACTONE) 25 MG tablet Take 1 tablet (25 mg total) by mouth once daily.    valACYclovir (VALTREX) 500 MG tablet Take 500 mg by mouth daily as needed.      Family History       Problem Relation (Age of Onset)    COPD Mother    Heart attack Mother, Father    Heart disease Mother, Father    Hypertension Mother    Miscarriages / Stillbirths Mother          Tobacco Use    Smoking status: Never    Smokeless tobacco: Never   Substance and Sexual Activity    Alcohol use: Yes     Alcohol/week: 1.0 standard drink of alcohol     Types: 1 Unspecified drink type per week     Comment: Socially    Drug use: Never    Sexual activity: Yes     Partners: Male     Birth control/protection: Post-menopausal     Review of Systems   Constitutional:  Negative for activity change, chills, diaphoresis and fever.   HENT:  Negative for trouble swallowing.    Eyes:  Negative for photophobia and visual disturbance.   Respiratory:  Negative for cough, chest tightness and shortness of breath.    Cardiovascular:  Positive for chest pain (rad to back, L arm and neck).  Negative for palpitations and leg swelling.   Gastrointestinal:  Positive for nausea. Negative for abdominal pain, constipation, diarrhea and vomiting.   Genitourinary:  Negative for dysuria, frequency and hematuria.   Musculoskeletal:  Positive for neck pain. Negative for back pain and gait problem.   Skin:  Negative for rash and wound.   Neurological:  Negative for dizziness, syncope, speech difficulty, light-headedness and headaches.   Psychiatric/Behavioral:  Negative for agitation and confusion. The patient is not nervous/anxious.      Objective:     Vital Signs (Most Recent):  Temp: 98.1 °F (36.7 °C) (06/18/25 0800)  Pulse: 73 (06/18/25 1030)  Resp: 19 (06/18/25 1030)  BP: (!) 174/73 (06/18/25 1100)  SpO2: 100 % (06/18/25 1030) Vital Signs (24h Range):  Temp:  [98.1 °F (36.7 °C)] 98.1 °F (36.7 °C)  Pulse:  [73-82] 73  Resp:  [17-20] 19  SpO2:  [96 %-100 %] 100 %  BP: (144-186)/(66-81) 174/73     Weight: 75 kg (165 lb 4.8 oz)  Body mass index is 27.51 kg/m².     Physical Exam  Vitals and nursing note reviewed.   Constitutional:       General: She is not in acute distress.     Appearance: She is well-developed.   HENT:      Head: Normocephalic and atraumatic.   Eyes:      Extraocular Movements: Extraocular movements intact.   Cardiovascular:      Rate and Rhythm: Normal rate and regular rhythm.      Heart sounds: Normal heart sounds. No murmur heard.     Comments: Trace edema to BLE   Pulmonary:      Effort: Pulmonary effort is normal. No respiratory distress.   Chest:      Chest wall: No tenderness.   Abdominal:      General: Bowel sounds are normal. There is no distension.      Tenderness: There is no abdominal tenderness.   Musculoskeletal:         General: No tenderness. Normal range of motion.   Skin:     General: Skin is warm and dry.      Findings: No rash.   Neurological:      Mental Status: She is alert and oriented to person, place, and time.   Psychiatric:         Behavior: Behavior normal.          Thought Content: Thought content normal.         Judgment: Judgment normal.        Significant Labs: All pertinent labs within the past 24 hours have been reviewed.  CBC:   Recent Labs   Lab 06/18/25  0845 06/18/25  0853   WBC 8.59  --    HGB 9.8*  --    HCT 32.1* 31*     --      CMP:   Recent Labs   Lab 06/18/25  0845      K 3.5      CO2 25      BUN 15   CREATININE 0.6   CALCIUM 9.0   PROT 6.2   ALBUMIN 4.0   BILITOT 0.6   ALKPHOS 81   AST 23   ALT 20   ANIONGAP 8     Cardiac Markers:   Recent Labs   Lab 06/18/25  0845   BNP 90     Troponin:   Recent Labs   Lab 06/18/25  0845   TROPONINIHS 90*       Significant Imaging: I have reviewed all pertinent imaging results/findings within the past 24 hours.  Imaging Results              X-Ray Chest AP Portable (Final result)  Result time 06/18/25 08:37:58      Final result by Antonio Pabon MD (06/18/25 08:37:58)                   Impression:      No confluent consolidation or pulmonary edema.      Electronically signed by: Antonio Pabon  Date:    06/18/2025  Time:    08:37               Narrative:    EXAMINATION:  XR CHEST AP PORTABLE    CLINICAL HISTORY:  Chest Pain;    TECHNIQUE:  Single frontal view of the chest was performed.    COMPARISON:  11/07/2022    FINDINGS:  No confluent consolidation or pulmonary edema.  Cardiac silhouette normal in size.  There is calcification of the thoracic aorta.  No blunting of costophrenic angles.  No pneumothorax.

## 2025-06-18 NOTE — H&P
[facility] Dept  Hospital Medicine  History & Physical    Patient Name: Veronica Duque  MRN: [id number]  Patient Class: OP- Observation  Admission Date: 6/18/2025  Attending Physician: Jake Quinn MD   Primary Care Provider: Juju Wallace MD         Patient information was obtained from patient, past medical records, and ER records.     Subjective:     Principal Problem:Chest pain    Chief Complaint:   Chief Complaint   Patient presents with    Chest Pain     Beginning at 5am this morning. Reporting indigestion, pain waking her from sleep. Pain between her shoulder blades and into left neck.         HPI: Veronica Duque is a 69 y.o. female with hypertension, hyperlipidemia, PVD and PAD s/p R SFA stenting being admitted to hospital medicine for evaluation of chest pain. Patient reports sudden onset of constant, waxing and waning intensity substernal chest heaviness that radiated to between her shoulder blades after getting up to go to the bathroom around 5:15 AM this morning. She endorses associated nausea with dry heaves but no true vomiting and belching. She took a 81 mg ASA and tums for the pain without relief - she also had a brief episode of left arm pain at that time. She took her blood pressure at that time and it was 180/83 which is high for her. Because her pain did not improve she called EMS, she was given an additional 81 mg asa x3 and SL NTG which resolved her pain. She did have left sided neck pain for a few minutes that later resolved. She denies familiarity of symptoms, dizziness/lightheadedness, shortness of breath, diaphoresis, cough, congestion, fever or chills. Does not smoke or drink.       In ED: hypertensive to , remaining vitals stable. CBC with hgb 9.8 which is baseline. CMP unremarkable. Initial troponin 90, no baseline for comparison. BNP wnl. CXR without acute process. EKG NSR. Given 40 mEq of potassium in the ED. Admitted to medicine for further  management.     Past Medical History:   Diagnosis Date    Anticoagulant long-term use     Arthritis     General anesthetics causing adverse effect in therapeutic use     slow to wake up    Hypertension     PONV (postoperative nausea and vomiting)     Stroke        Past Surgical History:   Procedure Laterality Date    ANGIOGRAPHY OF LOWER EXTREMITY Right 12/27/2022    Procedure: Angiogram Extremity Unilateral;  Surgeon: Fidencio Valle MD;  Location: [address];  Service: Vascular;  Laterality: Right;  RLE diagnostic angio & R SFA femoral endarterectomy w/ PTA & stenting  QTM280.86  gycm2 149.22  fluoro time 5.4  contrast vol    AORTOGRAPHY N/A 12/27/2022    Procedure: AORTOGRAM;  Surgeon: Fidencio Valle MD;  Location: [address];  Service: Vascular;  Laterality: N/A;    AORTOGRAPHY WITH SERIALOGRAPHY  5/16/2022    Procedure: AORTOGRAM, WITH SERIALOGRAPHY. left femoral access;  Surgeon: Phil Pitts MD;  Location: [facility];  Service: Cardiology;;    ARTHROSCOPY OF KNEE      AUGMENTATION OF BREAST      COSMETIC SURGERY      ENDARTERECTOMY OF FEMORAL ARTERY N/A 12/27/2022    Procedure: ENDARTERECTOMY, FEMORAL;  Surgeon: Fidencio Valle MD;  Location: [address];  Service: Vascular;  Laterality: N/A;  RLE diagnostic angio & R SFA femoral endarterectomy w/ PTA & stenting    EYE SURGERY  12/18/17 & 10/23/17    Cataracts    HYSTERECTOMY      JOINT REPLACEMENT  October 2019    Knee    NASAL SEPTOPLASTY         Review of patient's allergies indicates:   Allergen Reactions    Statins-hmg-coa reductase inhibitors      Myalgias         No current facility-administered medications on file prior to encounter.     Current Outpatient Medications on File Prior to Encounter   Medication Sig    ALPRAZolam (XANAX) 0.5 MG tablet Take 0.5 mg by mouth 2 (two) times daily.    amLODIPine (NORVASC) 5 MG tablet TAKE ONE TABLET BY MOUTH ONCE DAILY    aspirin (ECOTRIN) 81 MG EC tablet Take 81 mg by mouth once daily.     cilostazoL (PLETAL) 100 MG Tab Take 1 tablet (100 mg total) by mouth 2 (two) times daily.    diclofenac sodium (VOLTAREN) 1 % Gel Apply 2 g topically once daily.    estradioL (ESTRACE) 0.01 % (0.1 mg/gram) vaginal cream INSERT ONE GRAM VAGINALLY MONDAY, WEDNESDAY AND FRIDAY    evolocumab (REPATHA SURECLICK) 140 mg/mL PnIj Inject 1 mL (140 mg total) into the skin every 14 (fourteen) days.    ezetimibe (ZETIA) 10 mg tablet TAKE ONE TABLET BY MOUTH ONCE DAILY    fluticasone propionate (FLONASE) 50 mcg/actuation nasal spray 1 spray (50 mcg total) by Each Nostril route once daily.    levocetirizine (XYZAL) 5 MG tablet Take 1 tablet (5 mg total) by mouth every evening.    losartan-hydrochlorothiazide 100-25 mg (HYZAAR) 100-25 mg per tablet TAKE ONE TABLET BY MOUTH DAILY    magnesium oxide (MAG-OX) 400 mg (241.3 mg magnesium) tablet Take 400 mg by mouth once daily. OTC, **pt unsure of dosage**    spironolactone (ALDACTONE) 25 MG tablet Take 1 tablet (25 mg total) by mouth once daily.    valACYclovir (VALTREX) 500 MG tablet Take 500 mg by mouth daily as needed.      Family History       Problem Relation (Age of Onset)    COPD Mother    Heart attack Mother, Father    Heart disease Mother, Father    Hypertension Mother    Miscarriages / Stillbirths Mother          Tobacco Use    Smoking status: Never    Smokeless tobacco: Never   Substance and Sexual Activity    Alcohol use: Yes     Alcohol/week: 1.0 standard drink of alcohol     Types: 1 Unspecified drink type per week     Comment: Socially    Drug use: Never    Sexual activity: Yes     Partners: Male     Birth control/protection: Post-menopausal     Review of Systems   Constitutional:  Negative for activity change, chills, diaphoresis and fever.   HENT:  Negative for trouble swallowing.    Eyes:  Negative for photophobia and visual disturbance.   Respiratory:  Negative for cough, chest tightness and shortness of breath.    Cardiovascular:  Positive for chest pain (rad to  back, L arm and neck). Negative for palpitations and leg swelling.   Gastrointestinal:  Positive for nausea. Negative for abdominal pain, constipation, diarrhea and vomiting.   Genitourinary:  Negative for dysuria, frequency and hematuria.   Musculoskeletal:  Positive for neck pain. Negative for back pain and gait problem.   Skin:  Negative for rash and wound.   Neurological:  Negative for dizziness, syncope, speech difficulty, light-headedness and headaches.   Psychiatric/Behavioral:  Negative for agitation and confusion. The patient is not nervous/anxious.      Objective:     Vital Signs (Most Recent):  Temp: 98.1 °F (36.7 °C) (06/18/25 0800)  Pulse: 73 (06/18/25 1030)  Resp: 19 (06/18/25 1030)  BP: (!) 174/73 (06/18/25 1100)  SpO2: 100 % (06/18/25 1030) Vital Signs (24h Range):  Temp:  [98.1 °F (36.7 °C)] 98.1 °F (36.7 °C)  Pulse:  [73-82] 73  Resp:  [17-20] 19  SpO2:  [96 %-100 %] 100 %  BP: (144-186)/(66-81) 174/73     Weight: 75 kg (165 lb 4.8 oz)  Body mass index is 27.51 kg/m².     Physical Exam  Vitals and nursing note reviewed.   Constitutional:       General: She is not in acute distress.     Appearance: She is well-developed.   HENT:      Head: Normocephalic and atraumatic.   Eyes:      Extraocular Movements: Extraocular movements intact.   Cardiovascular:      Rate and Rhythm: Normal rate and regular rhythm.      Heart sounds: Normal heart sounds. No murmur heard.     Comments: Trace edema to BLE   Pulmonary:      Effort: Pulmonary effort is normal. No respiratory distress.   Chest:      Chest wall: No tenderness.   Abdominal:      General: Bowel sounds are normal. There is no distension.      Tenderness: There is no abdominal tenderness.   Musculoskeletal:         General: No tenderness. Normal range of motion.   Skin:     General: Skin is warm and dry.      Findings: No rash.   Neurological:      Mental Status: She is alert and oriented to person, place, and time.   Psychiatric:         Behavior:  Behavior normal.         Thought Content: Thought content normal.         Judgment: Judgment normal.        Significant Labs: All pertinent labs within the past 24 hours have been reviewed.  CBC:   Recent Labs   Lab 06/18/25  0845 06/18/25  0853   WBC 8.59  --    HGB 9.8*  --    HCT 32.1* 31*     --      CMP:   Recent Labs   Lab 06/18/25  0845      K 3.5      CO2 25      BUN 15   CREATININE 0.6   CALCIUM 9.0   PROT 6.2   ALBUMIN 4.0   BILITOT 0.6   ALKPHOS 81   AST 23   ALT 20   ANIONGAP 8     Cardiac Markers:   Recent Labs   Lab 06/18/25  0845   BNP 90     Troponin:   Recent Labs   Lab 06/18/25  0845   TROPONINIHS 90*       Significant Imaging: I have reviewed all pertinent imaging results/findings within the past 24 hours.  Imaging Results              X-Ray Chest AP Portable (Final result)  Result time 06/18/25 08:37:58      Final result by Antonio Pabon MD (06/18/25 08:37:58)                   Impression:      No confluent consolidation or pulmonary edema.      Electronically signed by: Antonio Pabon  Date:    06/18/2025  Time:    08:37               Narrative:    EXAMINATION:  XR CHEST AP PORTABLE    CLINICAL HISTORY:  Chest Pain;    TECHNIQUE:  Single frontal view of the chest was performed.    COMPARISON:  11/07/2022    FINDINGS:  No confluent consolidation or pulmonary edema.  Cardiac silhouette normal in size.  There is calcification of the thoracic aorta.  No blunting of costophrenic angles.  No pneumothorax.                                    Assessment/Plan:     Assessment & Plan  Chest pain  Substernal chest pain that radiates to back, neck and left arm - resolved with NTG    - EKG NSR  - initial troponin 90, will trend Q4H  - Relieved with nitroglycerin, 325 mg ASA administered  - CXR without acute process, BNP wnl  - cardiology consulted, appreciate recommendations   - Ddx: anxiety, costochondritis, esophageal reflux, pericarditis, biliary disease   - risk factors: age >65,  HTN, HLD,  - HEART score 7  - CBC, CMP (goal Mag>2, K>4) daily; lipid panel ordered  - cardiac telemetry  PAD (peripheral artery disease)  PVD (peripheral vascular disease)  - s/p SFA stenting   - follows with Dr. Valle outpatient  - continue home asa, statin, zetia and cilostaol  Primary hypertension  - chronic  - continue home amlodipine, losartan-HCTZ and spironolactone  - vitals q4h   Anemia  Most recent hemoglobin and hematocrit are listed below.  Recent Labs     06/18/25  0845 06/18/25  0853   HGB 9.8*  --    HCT 32.1* 31*     Plan  - Monitor serial CBC: Daily  - Transfuse PRBC if patient becomes hemodynamically unstable, symptomatic or H/H drops below 7/21.  - Patient has not received any PRBC transfusions to date  - Patient's anemia is currently stable  Bilateral carotid artery stenosis  - noted  - continue ASA and statin  Mixed hyperlipidemia  - continue statin and zetia   History of CVA (cerebrovascular accident)  - remote, no deficits  - continue secondary prevention with ASA and statin  VTE Risk Mitigation (From admission, onward)           Ordered     enoxaparin injection 40 mg  Daily         06/18/25 1029     IP VTE HIGH RISK PATIENT  Once         06/18/25 1029     Place sequential compression device  Until discontinued         06/18/25 1029                On 06/18/2025, patient should be placed in hospital observation services under my care in collaboration with Jake Quinn MD.         Chanell Paiz PA-C  Department of Hospital Medicine  [facility] Dept

## 2025-06-19 LAB
ABSOLUTE EOSINOPHIL (OHS): 0.05 K/UL
ABSOLUTE MONOCYTE (OHS): 0.69 K/UL (ref 0.3–1)
ABSOLUTE NEUTROPHIL COUNT (OHS): 6.72 K/UL (ref 1.8–7.7)
ANION GAP (OHS): 8 MMOL/L (ref 8–16)
AORTIC SIZE INDEX (SOV): 1.4 CM/M2
AORTIC SIZE INDEX: 1.7 CM/M2
ASCENDING AORTA: 3.1 CM
AV AREA BY CONTINUOUS VTI: 2.8 CM2
AV INDEX (PROSTH): 0.91
AV LVOT MEAN GRADIENT: 4 MMHG
AV LVOT PEAK GRADIENT: 8 MMHG
AV MEAN GRADIENT: 7 MMHG
AV PEAK GRADIENT: 10 MMHG
AV VALVE AREA BY VELOCITY RATIO: 2.7 CM²
AV VALVE AREA: 2.8 CM2
AV VELOCITY RATIO: 0.88
BASOPHILS # BLD AUTO: 0.04 K/UL
BASOPHILS NFR BLD AUTO: 0.4 %
BSA FOR ECHO PROCEDURE: 1.85 M2
BUN SERPL-MCNC: 15 MG/DL (ref 8–23)
CALCIUM SERPL-MCNC: 8.9 MG/DL (ref 8.7–10.5)
CHLORIDE SERPL-SCNC: 109 MMOL/L (ref 95–110)
CO2 SERPL-SCNC: 24 MMOL/L (ref 23–29)
CREAT SERPL-MCNC: 0.7 MG/DL (ref 0.5–1.4)
CV ECHO LV RWT: 0.5 CM
DOP CALC AO PEAK VEL: 1.6 M/S
DOP CALC AO VTI: 30 CM
DOP CALC LVOT AREA: 3.1 CM2
DOP CALC LVOT DIAMETER: 2 CM
DOP CALC LVOT PEAK VEL: 1.4 M/S
DOP CALC LVOT STROKE VOLUME: 85.4 CM3
DOP CALCLVOT PEAK VEL VTI: 27.2 CM
E WAVE DECELERATION TIME: 249 MS
E/A RATIO: 0.71
E/E' RATIO: 12 M/S
EAG (OHS): 108 MG/DL (ref 68–131)
ECHO EF ESTIMATED: 56 %
ECHO LV POSTERIOR WALL: 0.8 CM (ref 0.6–1.1)
ERYTHROCYTE [DISTWIDTH] IN BLOOD BY AUTOMATED COUNT: 16.5 % (ref 11.5–14.5)
FRACTIONAL SHORTENING: 28.1 % (ref 28–44)
GFR SERPLBLD CREATININE-BSD FMLA CKD-EPI: >60 ML/MIN/1.73/M2
GLUCOSE SERPL-MCNC: 126 MG/DL (ref 70–110)
HBA1C MFR BLD: 5.4 % (ref 4–5.6)
HCT VFR BLD AUTO: 31 % (ref 37–48.5)
HGB BLD-MCNC: 9.5 GM/DL (ref 12–16)
IMM GRANULOCYTES # BLD AUTO: 0.05 K/UL (ref 0–0.04)
IMM GRANULOCYTES NFR BLD AUTO: 0.5 % (ref 0–0.5)
INTERVENTRICULAR SEPTUM: 1 CM (ref 0.6–1.1)
LA MAJOR: 5.1 CM
LA MINOR: 5.7 CM
LA WIDTH: 3.3 CM
LEFT ATRIUM SIZE: 3 CM
LEFT ATRIUM VOLUME INDEX MOD: 26 ML/M2
LEFT ATRIUM VOLUME INDEX: 25 ML/M2
LEFT ATRIUM VOLUME MOD: 48 ML
LEFT ATRIUM VOLUME: 45 CM3
LEFT INTERNAL DIMENSION IN SYSTOLE: 2.3 CM (ref 2.1–4)
LEFT VENTRICLE DIASTOLIC VOLUME INDEX: 23.08 ML/M2
LEFT VENTRICLE DIASTOLIC VOLUME: 42 ML
LEFT VENTRICLE MASS INDEX: 42.5 G/M2
LEFT VENTRICLE SYSTOLIC VOLUME INDEX: 9.9 ML/M2
LEFT VENTRICLE SYSTOLIC VOLUME: 18 ML
LEFT VENTRICULAR INTERNAL DIMENSION IN DIASTOLE: 3.2 CM (ref 3.5–6)
LEFT VENTRICULAR MASS: 77.3 G
LV LATERAL E/E' RATIO: 12.1
LV SEPTAL E/E' RATIO: 12.1
LYMPHOCYTES # BLD AUTO: 1.7 K/UL (ref 1–4.8)
MAGNESIUM SERPL-MCNC: 1.8 MG/DL (ref 1.6–2.6)
MCH RBC QN AUTO: 19.3 PG (ref 27–31)
MCHC RBC AUTO-ENTMCNC: 30.6 G/DL (ref 32–36)
MCV RBC AUTO: 63 FL (ref 82–98)
MV MEAN GRADIENT: 3 MMHG
MV PEAK A VEL: 1.19 M/S
MV PEAK E VEL: 0.85 M/S
MV PEAK GRADIENT: 7 MMHG
NUCLEATED RBC (/100WBC) (OHS): 0 /100 WBC
OHS CV RV/LV RATIO: 0.91 CM
PHOSPHATE SERPL-MCNC: 3.3 MG/DL (ref 2.7–4.5)
PLATELET # BLD AUTO: 358 K/UL (ref 150–450)
PMV BLD AUTO: 10.2 FL (ref 9.2–12.9)
POCT GLUCOSE: 142 MG/DL (ref 70–110)
POTASSIUM SERPL-SCNC: 4.2 MMOL/L (ref 3.5–5.1)
RA MAJOR: 4.23 CM
RA PRESSURE ESTIMATED: 3 MMHG
RA WIDTH: 2.92 CM
RBC # BLD AUTO: 4.92 M/UL (ref 4–5.4)
RELATIVE EOSINOPHIL (OHS): 0.5 %
RELATIVE LYMPHOCYTE (OHS): 18.4 % (ref 18–48)
RELATIVE MONOCYTE (OHS): 7.5 % (ref 4–15)
RELATIVE NEUTROPHIL (OHS): 72.7 % (ref 38–73)
RIGHT VENTRICLE DIASTOLIC BASEL DIMENSION: 2.9 CM
SINUS: 2.62 CM
SODIUM SERPL-SCNC: 141 MMOL/L (ref 136–145)
STJ: 2.6 CM
TDI LATERAL: 0.07 M/S
TDI SEPTAL: 0.07 M/S
TDI: 0.07 M/S
TRICUSPID ANNULAR PLANE SYSTOLIC EXCURSION: 1.6 CM
TV PEAK GRADIENT: 8 MMHG
WBC # BLD AUTO: 9.25 K/UL (ref 3.9–12.7)
Z-SCORE OF LEFT VENTRICULAR DIMENSION IN END DIASTOLE: -4.54
Z-SCORE OF LEFT VENTRICULAR DIMENSION IN END SYSTOLE: -2.39

## 2025-06-19 PROCEDURE — 36415 COLL VENOUS BLD VENIPUNCTURE: CPT | Mod: HCNC

## 2025-06-19 PROCEDURE — 85025 COMPLETE CBC W/AUTO DIFF WBC: CPT | Mod: HCNC

## 2025-06-19 PROCEDURE — 25000003 PHARM REV CODE 250: Mod: HCNC

## 2025-06-19 PROCEDURE — 84100 ASSAY OF PHOSPHORUS: CPT | Mod: HCNC

## 2025-06-19 PROCEDURE — 11000001 HC ACUTE MED/SURG PRIVATE ROOM: Mod: HCNC

## 2025-06-19 PROCEDURE — 25500020 PHARM REV CODE 255: Mod: HCNC | Performed by: INTERNAL MEDICINE

## 2025-06-19 PROCEDURE — 80048 BASIC METABOLIC PNL TOTAL CA: CPT | Mod: HCNC

## 2025-06-19 PROCEDURE — 83735 ASSAY OF MAGNESIUM: CPT | Mod: HCNC

## 2025-06-19 PROCEDURE — 83036 HEMOGLOBIN GLYCOSYLATED A1C: CPT | Mod: HCNC | Performed by: INTERNAL MEDICINE

## 2025-06-19 PROCEDURE — 25000003 PHARM REV CODE 250: Mod: HCNC | Performed by: INTERNAL MEDICINE

## 2025-06-19 PROCEDURE — 94761 N-INVAS EAR/PLS OXIMETRY MLT: CPT | Mod: HCNC

## 2025-06-19 PROCEDURE — 63600175 PHARM REV CODE 636 W HCPCS: Mod: HCNC

## 2025-06-19 RX ADMIN — ASPIRIN 81 MG: 81 TABLET, COATED ORAL at 09:06

## 2025-06-19 RX ADMIN — EZETIMIBE 10 MG: 10 TABLET ORAL at 09:06

## 2025-06-19 RX ADMIN — SPIRONOLACTONE 25 MG: 25 TABLET ORAL at 09:06

## 2025-06-19 RX ADMIN — ACETAMINOPHEN 650 MG: 325 TABLET ORAL at 09:06

## 2025-06-19 RX ADMIN — LOSARTAN POTASSIUM AND HYDROCHLOROTHIAZIDE 1 TABLET: 100; 25 TABLET, FILM COATED ORAL at 09:06

## 2025-06-19 RX ADMIN — ENOXAPARIN SODIUM 40 MG: 40 INJECTION SUBCUTANEOUS at 05:06

## 2025-06-19 RX ADMIN — HUMAN ALBUMIN MICROSPHERES AND PERFLUTREN 0.11 MG: 10; .22 INJECTION, SOLUTION INTRAVENOUS at 09:06

## 2025-06-19 RX ADMIN — SENNOSIDES 8.6 MG: 8.6 TABLET, FILM COATED ORAL at 09:06

## 2025-06-19 RX ADMIN — CLOPIDOGREL BISULFATE 75 MG: 75 TABLET, FILM COATED ORAL at 09:06

## 2025-06-19 RX ADMIN — CILOSTAZOL 100 MG: 100 TABLET ORAL at 09:06

## 2025-06-19 RX ADMIN — AMLODIPINE BESYLATE 5 MG: 5 TABLET ORAL at 09:06

## 2025-06-19 NOTE — H&P (VIEW-ONLY)
[facility] - Acute Medical Stepdown  Cardiothoracic Surgery  Consult Note    Patient Name: Veronica Duque  MRN: [id number]  Admission Date: 6/18/2025  Attending Physician: Jake Quinn MD  Referring Provider: Self, Aaareferral    Patient information was obtained from patient, relative(s), past medical records, and ER records.     Inpatient consult to Cardiothoracic Surgery  Consult performed by: Anusha Brandon PA-C  Consult ordered by: Jake Quinn MD        Subjective:     Principal Problem: NSTEMI (non-ST elevated myocardial infarction)    History of Present Illness: Veronica Duque is a 69yoF with a hx of HTN, HLD, CVA with no residual deficits, PAD s/p R SFA stenting by Dr. Valle (non-obstructive L CFA/SFA)  who was admitted for NSTEMI. She has no known coronary disease. She said it occered when she woke to go to the bathroom yesterday morning and was a heavy pressure in the substernal region, radiating to the neck/shoulder and associated with nausea. It was coming and going until EMS got there where it resolved with NTG. She has been hypertensive. She has never had this pain before. Her hsTn peaked to 700.  EKG shows SR with RBBB, non-specific t wave inversions likely related to the bundle and similar to prior. LHC significant for RCA 90, LAD 90. She was loaded with plavix 6/18, currently on schedule.     Today patient is chest pain free. Reports daily claudications when walking. No AD for walking. Independently performs ADLs without issues. No prior seizure, blood clots, or sternotomy. No DM hx. No smoking hx. Occasional alcohol use.       CTS consulted for CABG surgery evaluation.     No current facility-administered medications on file prior to encounter.     Current Outpatient Medications on File Prior to Encounter   Medication Sig    ALPRAZolam (XANAX) 0.5 MG tablet Take 0.5 mg by mouth 2 (two) times daily.    amLODIPine (NORVASC) 5 MG tablet TAKE ONE TABLET BY MOUTH ONCE DAILY     aspirin (ECOTRIN) 81 MG EC tablet Take 81 mg by mouth once daily.    cilostazoL (PLETAL) 100 MG Tab Take 1 tablet (100 mg total) by mouth 2 (two) times daily.    diclofenac sodium (VOLTAREN) 1 % Gel Apply 2 g topically once daily.    estradioL (ESTRACE) 0.01 % (0.1 mg/gram) vaginal cream INSERT ONE GRAM VAGINALLY MONDAY, WEDNESDAY AND FRIDAY    evolocumab (REPATHA SURECLICK) 140 mg/mL PnIj Inject 1 mL (140 mg total) into the skin every 14 (fourteen) days.    ezetimibe (ZETIA) 10 mg tablet TAKE ONE TABLET BY MOUTH ONCE DAILY    fluticasone propionate (FLONASE) 50 mcg/actuation nasal spray 1 spray (50 mcg total) by Each Nostril route once daily.    levocetirizine (XYZAL) 5 MG tablet Take 1 tablet (5 mg total) by mouth every evening.    losartan-hydrochlorothiazide 100-25 mg (HYZAAR) 100-25 mg per tablet TAKE ONE TABLET BY MOUTH DAILY    magnesium oxide (MAG-OX) 400 mg (241.3 mg magnesium) tablet Take 400 mg by mouth once daily. OTC, **pt unsure of dosage**    spironolactone (ALDACTONE) 25 MG tablet Take 1 tablet (25 mg total) by mouth once daily.    valACYclovir (VALTREX) 500 MG tablet Take 500 mg by mouth daily as needed.        Review of patient's allergies indicates:   Allergen Reactions    Statins-hmg-coa reductase inhibitors      Myalgias         Past Medical History:   Diagnosis Date    Anticoagulant long-term use     Arthritis     General anesthetics causing adverse effect in therapeutic use     slow to wake up    Hypertension     PONV (postoperative nausea and vomiting)     Stroke      Past Surgical History:   Procedure Laterality Date    ANGIOGRAPHY OF LOWER EXTREMITY Right 12/27/2022    Procedure: Angiogram Extremity Unilateral;  Surgeon: Fidencio Valle MD;  Location: [facility] OR [address] [place] [address];  Service: Vascular;  Laterality: Right;  RLE diagnostic angio & R SFA femoral endarterectomy w/ PTA & stenting  KGK824.86  gycm2 149.22  fluoro time 5.4  contrast vol    AORTOGRAPHY N/A 12/27/2022    Procedure: AORTOGRAM;   Surgeon: Fidencio Valle MD;  [facility];  Service: Vascular;  Laterality: N/A;    AORTOGRAPHY WITH SERIALOGRAPHY  5/16/2022    Procedure: AORTOGRAM, WITH SERIALOGRAPHY. left femoral access;  Surgeon: Phil Pitts MD;  Location: [facility];  Service: Cardiology;;    ARTHROSCOPY OF KNEE      AUGMENTATION OF BREAST      COSMETIC SURGERY      ENDARTERECTOMY OF FEMORAL ARTERY N/A 12/27/2022    Procedure: ENDARTERECTOMY, FEMORAL;  Surgeon: Fidencio Valle MD;  [facility];  Service: Vascular;  Laterality: N/A;  RLE diagnostic angio & R SFA femoral endarterectomy w/ PTA & stenting    EYE SURGERY  12/18/17 & 10/23/17    Cataracts    HYSTERECTOMY      JOINT REPLACEMENT  October 2019    Knee    NASAL SEPTOPLASTY       Family History       Problem Relation (Age of Onset)    COPD Mother    Heart attack Mother, Father    Heart disease Mother, Father    Hypertension Mother    Miscarriages / Stillbirths Mother          Tobacco Use    Smoking status: Never    Smokeless tobacco: Never   Substance and Sexual Activity    Alcohol use: Yes     Alcohol/week: 1.0 standard drink of alcohol     Types: 1 Unspecified drink type per week     Comment: Socially    Drug use: Never    Sexual activity: Yes     Partners: Male     Birth control/protection: Post-menopausal     Review of Systems   Constitutional:  Negative for activity change, appetite change, fatigue and fever.   HENT:  Negative for nosebleeds.    Respiratory:  Negative for cough and shortness of breath.    Cardiovascular:  Negative for chest pain, palpitations and leg swelling.        Claudications   Gastrointestinal:  Negative for abdominal distention, abdominal pain and nausea.   Genitourinary:  Negative for frequency.   Musculoskeletal:  Negative for arthralgias and myalgias.   Skin:  Negative for rash.   Neurological:  Negative for dizziness and numbness.   Hematological:  Does not bruise/bleed easily.     Objective:     Vital Signs (Most  "Recent):  Temp: 98 °F (36.7 °C) (06/19/25 0754)  Pulse: 93 (06/19/25 0800)  Resp: (!) 35 (06/19/25 0754)  BP: (!) 116/59 (06/19/25 0754)  SpO2: (!) 91 % (06/19/25 0754) Vital Signs (24h Range):  Temp:  [97.5 °F (36.4 °C)-98.6 °F (37 °C)] 98 °F (36.7 °C)  Pulse:  [] 93  Resp:  [15-35] 35  SpO2:  [91 %-99 %] 91 %  BP: (110-175)/(55-81) 116/59     Weight: 75 kg (165 lb 5.5 oz)  Body mass index is 27.51 kg/m².    SpO2: (!) 91 %        Intake/Output - Last 3 Shifts         06/17 0700 06/18 0659 06/18 0700 06/19 0659 06/19 0700 06/20 0659    I.V. (mL/kg)  300 (4)     Total Intake(mL/kg)  300 (4)     Urine (mL/kg/hr)  400     Stool  0     Total Output  400     Net  -100            Unmeasured Urine Occurrence  3 x     Unmeasured Stool Occurrence  0 x              Lines/Drains/Airways       Peripheral Intravenous Line  Duration                  Peripheral IV - Single Lumen 06/18/25 0847 20 G Right Antecubital 1 day         Peripheral IV - Single Lumen 06/18/25 20 G Right;Posterior Hand 1 day                          Physical Exam  Constitutional:       Appearance: Normal appearance.   HENT:      Head: Normocephalic and atraumatic.   Eyes:      Extraocular Movements: Extraocular movements intact.   Cardiovascular:      Rate and Rhythm: Normal rate.   Pulmonary:      Effort: Pulmonary effort is normal.   Abdominal:      General: Abdomen is flat.   Musculoskeletal:         General: Normal range of motion.      Cervical back: Normal range of motion.   Skin:     General: Skin is warm and dry.      Capillary Refill: Capillary refill takes less than 2 seconds.      Coloration: Skin is not pale.   Neurological:      General: No focal deficit present.      Mental Status: She is alert.          Significant Labs:  ABGs: No results for input(s): "PH", "PCO2", "PO2", "HCO3", "POCSATURATED", "BE" in the last 48 hours.  Amylase: No results for input(s): "AMYLASE" in the last 48 hours.  BMP:   Recent Labs   Lab 06/19/25  0452   GLU " "126*      K 4.2      CO2 24   BUN 15   CREATININE 0.7   CALCIUM 8.9   MG 1.8     Cardiac markers: No results for input(s): "CKMB", "CPKMB", "TROPONINT", "TROPONINI", "MYOGLOBIN" in the last 48 hours.  CBC:   Recent Labs   Lab 06/19/25  0452   WBC 9.25   RBC 4.92   HGB 9.5*   HCT 31.0*      MCV 63*   MCH 19.3*   MCHC 30.6*     CMP:   Recent Labs   Lab 06/18/25  0845 06/19/25  0452    126*   CALCIUM 9.0 8.9   ALBUMIN 4.0  --    PROT 6.2  --     141   K 3.5 4.2   CO2 25 24    109   BUN 15 15   CREATININE 0.6 0.7   ALKPHOS 81  --    ALT 20  --    AST 23  --    BILITOT 0.6  --      Coagulation: No results for input(s): "PT", "INR", "APTT" in the last 48 hours.  Lactic Acid: No results for input(s): "LACTATE" in the last 48 hours.  LFTs:   Recent Labs   Lab 06/18/25  0845   ALT 20   AST 23   ALKPHOS 81   BILITOT 0.6   PROT 6.2   ALBUMIN 4.0     Lipase: No results for input(s): "LIPASE" in the last 48 hours.    Significant Diagnostics:  TTE 6/19/25    Left Ventricle: The left ventricle is normal in size. Normal wall thickness. There is concentric remodeling. There is normal systolic function with a visually estimated ejection fraction of 60 - 65%.    Right Ventricle: The right ventricle is normal in size Wall thickness is normal. Systolic function is normal.    IVC/SVC: Normal venous pressure at 3 mmHg.    [facility] 6/18/25    The Mid RCA lesion was 90% stenosed.    The Mid LAD lesion was 90% stenosed.    The ejection fraction was greater than 55% by visual estimate.    The pre-procedure left ventricular end diastolic pressure was 14.    The estimated blood loss was none.    There was two vessel coronary artery disease with severe obstruction of mid LAD and RCA. Will consult CTS.     Assessment/Plan:         * NSTEMI (non-ST elevated myocardial infarction)  CTS consulted for CABG surgery evaluation on 68 y/o female with acute exertional angina admitted for NSTEMI. Medical conditions include " prior CVA with no residuals, PAD s/p R SFA stent (current ABIs: right 0.7 and Left 1), carotid stenosis 60-79% bilaterally, HTN, HLD. Hs Trop peaked 700. Echo showed preserve LVEF 60-65 and no significant valvulopathy. She received plavix load 6/18/25, please make last dose tomorrow 6/20/25. Will plan for 2-3 vessel CABG next Thursday, June 26, 2025 with Dr. Flores. Please obtain CT chest prior to discharge if primary team is comfortable with sending home. Will sign consents the morning of surgery. CTS nurse to speak with patient tomorrow regarding which medications need to be held and where to present the morning of operation.       Thank you for your consult. I will follow-up with patient. Please contact us if you have any additional questions.    Anusha Brandon PA-C  Cardiothoracic Surgery  [facility] Acute Medical [facility]down

## 2025-06-19 NOTE — ASSESSMENT & PLAN NOTE
Pt admitted with NSTEMI. Troponin elevated at 701. Associated Substernal chest pain that radiates to back, neck and left arm - resolved with NTG.   LHC on 6/18 significant for RCA 90, LAD 90   Echo showed preserve LVEF 60-65 and no significant valvulopathy.     Plan  -Plavix load on 6/18, Will stop Plavix on 6/20 pending CABG scheduled for 6/26/25  -Continue ASA and Zetia. Pt unable to tolerate statins.   -Monitor for repeat symptoms   -Chest CT ordered, needs to be done prior to discharge.

## 2025-06-19 NOTE — ASSESSMENT & PLAN NOTE
CTS consulted for CABG surgery evaluation on 68 y/o female with acute exertional angina admitted for NSTEMI. Medical conditions include prior CVA with no residuals, PAD s/p R SFA stent (current ABIs: right 0.7 and Left 1), carotid stenosis 60-79% bilaterally, HTN, HLD. Hs Trop peaked 700. Echo showed preserve LVEF 60-65 and no significant valvulopathy. She received plavix load 6/18/25, please make last dose tomorrow 6/20/25. Will plan for 2-3 vessel CABG next Thursday, June 26, 2025 with Dr. Flores. Please obtain CT chest prior to discharge if primary team is comfortable with sending home. Will sign consents the morning of surgery. CTS nurse to speak with patient tomorrow regarding which medications need to be held and where to present the morning of operation.

## 2025-06-19 NOTE — ASSESSMENT & PLAN NOTE
Most recent hemoglobin and hematocrit are listed below.  Recent Labs     06/18/25  0845 06/18/25  0853 06/19/25  0452   HGB 9.8*  --  9.5*   HCT 32.1* 31* 31.0*     Plan  - Monitor serial CBC: Daily  - Transfuse PRBC if patient becomes hemodynamically unstable, symptomatic or H/H drops below 7/21.  - Patient has not received any PRBC transfusions to date  - Patient's anemia is currently stable

## 2025-06-19 NOTE — HPI
Veronica Duque is a 69yoF with a hx of HTN, HLD, CVA with no residual deficits, PAD s/p R SFA stenting by Dr. Valle (non-obstructive L CFA/SFA)  who was admitted for NSTEMI. She has no known coronary disease. She said it occered when she woke to go to the bathroom yesterday morning and was a heavy pressure in the substernal region, radiating to the neck/shoulder and associated with nausea. It was coming and going until EMS got there where it resolved with NTG. She has been hypertensive. She has never had this pain before. Her hsTn peaked to 700.  EKG shows SR with RBBB, non-specific t wave inversions likely related to the bundle and similar to prior. LHC significant for RCA 90, LAD 90. She was loaded with plavix 6/18, currently on schedule.     Today patient is chest pain free. Reports daily claudications when walking. No AD for walking. Independently performs ADLs without issues. No prior seizure, blood clots, or sternotomy. No DM hx. No smoking hx. Occasional alcohol use.       CTS consulted for CABG surgery evaluation.

## 2025-06-19 NOTE — ASSESSMENT & PLAN NOTE
Patients blood pressure range in the last 24 hours was: BP  Min: 110/55  Max: 186/81.The patient's inpatient anti-hypertensive regimen is listed below:  Current Antihypertensives  nitroGLYCERIN SL tablet 0.4 mg, Every 5 min PRN, Sublingual  amLODIPine tablet 5 mg, Daily, Oral  losartan-hydrochlorothiazide 100-25 mg per tablet 1 tablet, Daily, Oral  spironolactone split tablet 25 mg, Daily, Oral    Plan  - BP is controlled, no changes needed to their regimen

## 2025-06-19 NOTE — CONSULTS
[facility]  Cardiothoracic Surgery  Consult Note    Patient Name: Veronica Duque  MRN: [id number]  Admission Date: 6/18/2025  Attending Physician: Jake Quinn MD  Referring Provider: Self, Aaareferral    Patient information was obtained from patient, relative(s), past medical records, and ER records.     Inpatient consult to Cardiothoracic Surgery  Consult performed by: Anusha Brandon PA-C  Consult ordered by: Jake Quinn MD        Subjective:     Principal Problem: NSTEMI (non-ST elevated myocardial infarction)    History of Present Illness: Veronica Duque is a 69yoF with a hx of HTN, HLD, CVA with no residual deficits, PAD s/p R SFA stenting by Dr. Valle (non-obstructive L CFA/SFA)  who was admitted for NSTEMI. She has no known coronary disease. She said it occered when she woke to go to the bathroom yesterday morning and was a heavy pressure in the substernal region, radiating to the neck/shoulder and associated with nausea. It was coming and going until EMS got there where it resolved with NTG. She has been hypertensive. She has never had this pain before. Her hsTn peaked to 700.  EKG shows SR with RBBB, non-specific t wave inversions likely related to the bundle and similar to prior. LHC significant for RCA 90, LAD 90. She was loaded with plavix 6/18, currently on schedule.     Today patient is chest pain free. Reports daily claudications when walking. No AD for walking. Independently performs ADLs without issues. No prior seizure, blood clots, or sternotomy. No DM hx. No smoking hx. Occasional alcohol use.       CTS consulted for CABG surgery evaluation.     No current facility-administered medications on file prior to encounter.     Current Outpatient Medications on File Prior to Encounter   Medication Sig    ALPRAZolam (XANAX) 0.5 MG tablet Take 0.5 mg by mouth 2 (two) times daily.    amLODIPine (NORVASC) 5 MG tablet TAKE ONE TABLET BY MOUTH ONCE DAILY     aspirin (ECOTRIN) 81 MG EC tablet Take 81 mg by mouth once daily.    cilostazoL (PLETAL) 100 MG Tab Take 1 tablet (100 mg total) by mouth 2 (two) times daily.    diclofenac sodium (VOLTAREN) 1 % Gel Apply 2 g topically once daily.    estradioL (ESTRACE) 0.01 % (0.1 mg/gram) vaginal cream INSERT ONE GRAM VAGINALLY MONDAY, WEDNESDAY AND FRIDAY    evolocumab (REPATHA SURECLICK) 140 mg/mL PnIj Inject 1 mL (140 mg total) into the skin every 14 (fourteen) days.    ezetimibe (ZETIA) 10 mg tablet TAKE ONE TABLET BY MOUTH ONCE DAILY    fluticasone propionate (FLONASE) 50 mcg/actuation nasal spray 1 spray (50 mcg total) by Each Nostril route once daily.    levocetirizine (XYZAL) 5 MG tablet Take 1 tablet (5 mg total) by mouth every evening.    losartan-hydrochlorothiazide 100-25 mg (HYZAAR) 100-25 mg per tablet TAKE ONE TABLET BY MOUTH DAILY    magnesium oxide (MAG-OX) 400 mg (241.3 mg magnesium) tablet Take 400 mg by mouth once daily. OTC, **pt unsure of dosage**    spironolactone (ALDACTONE) 25 MG tablet Take 1 tablet (25 mg total) by mouth once daily.    valACYclovir (VALTREX) 500 MG tablet Take 500 mg by mouth daily as needed.        Review of patient's allergies indicates:   Allergen Reactions    Statins-hmg-coa reductase inhibitors      Myalgias         Past Medical History:   Diagnosis Date    Anticoagulant long-term use     Arthritis     General anesthetics causing adverse effect in therapeutic use     slow to wake up    Hypertension     PONV (postoperative nausea and vomiting)     Stroke      Past Surgical History:   Procedure Laterality Date    ANGIOGRAPHY OF LOWER EXTREMITY Right 12/27/2022    Procedure: Angiogram Extremity Unilateral;  Surgeon: Fidencio Valle MD;  Location: [facility] OR [address] [place] [address];  Service: Vascular;  Laterality: Right;  RLE diagnostic angio & R SFA femoral endarterectomy w/ PTA & stenting  SWJ119.86  gycm2 149.22  fluoro time 5.4  contrast vol    AORTOGRAPHY N/A 12/27/2022    Procedure: AORTOGRAM;   Surgeon: Fidencio Valle MD;  [facility];  Service: Vascular;  Laterality: N/A;    AORTOGRAPHY WITH SERIALOGRAPHY  5/16/2022    Procedure: AORTOGRAM, WITH SERIALOGRAPHY. left femoral access;  Surgeon: Phil Pitts MD;  Location: [facility];  Service: Cardiology;;    ARTHROSCOPY OF KNEE      AUGMENTATION OF BREAST      COSMETIC SURGERY      ENDARTERECTOMY OF FEMORAL ARTERY N/A 12/27/2022    Procedure: ENDARTERECTOMY, FEMORAL;  Surgeon: Fidencio Valle MD;  [facility];  Service: Vascular;  Laterality: N/A;  RLE diagnostic angio & R SFA femoral endarterectomy w/ PTA & stenting    EYE SURGERY  12/18/17 & 10/23/17    Cataracts    HYSTERECTOMY      JOINT REPLACEMENT  October 2019    Knee    NASAL SEPTOPLASTY       Family History       Problem Relation (Age of Onset)    COPD Mother    Heart attack Mother, Father    Heart disease Mother, Father    Hypertension Mother    Miscarriages / Stillbirths Mother          Tobacco Use    Smoking status: Never    Smokeless tobacco: Never   Substance and Sexual Activity    Alcohol use: Yes     Alcohol/week: 1.0 standard drink of alcohol     Types: 1 Unspecified drink type per week     Comment: Socially    Drug use: Never    Sexual activity: Yes     Partners: Male     Birth control/protection: Post-menopausal     Review of Systems   Constitutional:  Negative for activity change, appetite change, fatigue and fever.   HENT:  Negative for nosebleeds.    Respiratory:  Negative for cough and shortness of breath.    Cardiovascular:  Negative for chest pain, palpitations and leg swelling.        Claudications   Gastrointestinal:  Negative for abdominal distention, abdominal pain and nausea.   Genitourinary:  Negative for frequency.   Musculoskeletal:  Negative for arthralgias and myalgias.   Skin:  Negative for rash.   Neurological:  Negative for dizziness and numbness.   Hematological:  Does not bruise/bleed easily.     Objective:     Vital Signs (Most  "Recent):  Temp: 98 °F (36.7 °C) (06/19/25 0754)  Pulse: 93 (06/19/25 0800)  Resp: (!) 35 (06/19/25 0754)  BP: (!) 116/59 (06/19/25 0754)  SpO2: (!) 91 % (06/19/25 0754) Vital Signs (24h Range):  Temp:  [97.5 °F (36.4 °C)-98.6 °F (37 °C)] 98 °F (36.7 °C)  Pulse:  [] 93  Resp:  [15-35] 35  SpO2:  [91 %-99 %] 91 %  BP: (110-175)/(55-81) 116/59     Weight: 75 kg (165 lb 5.5 oz)  Body mass index is 27.51 kg/m².    SpO2: (!) 91 %        Intake/Output - Last 3 Shifts         06/17 0700 06/18 0659 06/18 0700 06/19 0659 06/19 0700 06/20 0659    I.V. (mL/kg)  300 (4)     Total Intake(mL/kg)  300 (4)     Urine (mL/kg/hr)  400     Stool  0     Total Output  400     Net  -100            Unmeasured Urine Occurrence  3 x     Unmeasured Stool Occurrence  0 x              Lines/Drains/Airways       Peripheral Intravenous Line  Duration                  Peripheral IV - Single Lumen 06/18/25 0847 20 G Right Antecubital 1 day         Peripheral IV - Single Lumen 06/18/25 20 G Right;Posterior Hand 1 day                          Physical Exam  Constitutional:       Appearance: Normal appearance.   HENT:      Head: Normocephalic and atraumatic.   Eyes:      Extraocular Movements: Extraocular movements intact.   Cardiovascular:      Rate and Rhythm: Normal rate.   Pulmonary:      Effort: Pulmonary effort is normal.   Abdominal:      General: Abdomen is flat.   Musculoskeletal:         General: Normal range of motion.      Cervical back: Normal range of motion.   Skin:     General: Skin is warm and dry.      Capillary Refill: Capillary refill takes less than 2 seconds.      Coloration: Skin is not pale.   Neurological:      General: No focal deficit present.      Mental Status: She is alert.          Significant Labs:  ABGs: No results for input(s): "PH", "PCO2", "PO2", "HCO3", "POCSATURATED", "BE" in the last 48 hours.  Amylase: No results for input(s): "AMYLASE" in the last 48 hours.  BMP:   Recent Labs   Lab 06/19/25  0452   GLU " "126*      K 4.2      CO2 24   BUN 15   CREATININE 0.7   CALCIUM 8.9   MG 1.8     Cardiac markers: No results for input(s): "CKMB", "CPKMB", "TROPONINT", "TROPONINI", "MYOGLOBIN" in the last 48 hours.  CBC:   Recent Labs   Lab 06/19/25  0452   WBC 9.25   RBC 4.92   HGB 9.5*   HCT 31.0*      MCV 63*   MCH 19.3*   MCHC 30.6*     CMP:   Recent Labs   Lab 06/18/25  0845 06/19/25  0452    126*   CALCIUM 9.0 8.9   ALBUMIN 4.0  --    PROT 6.2  --     141   K 3.5 4.2   CO2 25 24    109   BUN 15 15   CREATININE 0.6 0.7   ALKPHOS 81  --    ALT 20  --    AST 23  --    BILITOT 0.6  --      Coagulation: No results for input(s): "PT", "INR", "APTT" in the last 48 hours.  Lactic Acid: No results for input(s): "LACTATE" in the last 48 hours.  LFTs:   Recent Labs   Lab 06/18/25  0845   ALT 20   AST 23   ALKPHOS 81   BILITOT 0.6   PROT 6.2   ALBUMIN 4.0     Lipase: No results for input(s): "LIPASE" in the last 48 hours.    Significant Diagnostics:  TTE 6/19/25    Left Ventricle: The left ventricle is normal in size. Normal wall thickness. There is concentric remodeling. There is normal systolic function with a visually estimated ejection fraction of 60 - 65%.    Right Ventricle: The right ventricle is normal in size Wall thickness is normal. Systolic function is normal.    IVC/SVC: Normal venous pressure at 3 mmHg.    [facility] 6/18/25    The Mid RCA lesion was 90% stenosed.    The Mid LAD lesion was 90% stenosed.    The ejection fraction was greater than 55% by visual estimate.    The pre-procedure left ventricular end diastolic pressure was 14.    The estimated blood loss was none.    There was two vessel coronary artery disease with severe obstruction of mid LAD and RCA. Will consult CTS.     Assessment/Plan:         * NSTEMI (non-ST elevated myocardial infarction)  CTS consulted for CABG surgery evaluation on 68 y/o female with acute exertional angina admitted for NSTEMI. Medical conditions include " prior CVA with no residuals, PAD s/p R SFA stent (current ABIs: right 0.7 and Left 1), carotid stenosis 60-79% bilaterally, HTN, HLD. Hs Trop peaked 700. Echo showed preserve LVEF 60-65 and no significant valvulopathy. She received plavix load 6/18/25, please make last dose tomorrow 6/20/25. Will plan for 2-3 vessel CABG next Thursday, June 26, 2025 with Dr. Flores. Please obtain CT chest prior to discharge if primary team is comfortable with sending home. Will sign consents the morning of surgery. CTS nurse to speak with patient tomorrow regarding which medications need to be held and where to present the morning of operation.       Thank you for your consult. I will follow-up with patient. Please contact us if you have any additional questions.    Anusha Brandon PA-C  Cardiothoracic Surgery  [facility] Medical [facility]down

## 2025-06-19 NOTE — PLAN OF CARE
End of shift summary  Patient A&ox4. VSS ex bp elevated. Spo2 wnl on RA. SR-ST on bedside. No c/o chest pains. C/o dry heaving and belching. PRN Zofran offered and given with relief obtained. BG monitoring, wnl. Dressing to R wrist, clean dry and intact. No signs of bleeding present. Family to remain at bedside. No known needs or concerns at this moment. Bed is locked and low. Safety measures in place. Plan of care is ongoing. Call light is within reach.                                                                                                                                                                                                                                                                                                                                                                                                                                                                                                                                                                                                                                                                                                                                                                                                                                                                                                                         Problem: Adult Inpatient Plan of Care  Goal: Absence of Hospital-Acquired Illness or Injury  6/19/2025 0257 by Janell Almendarez LPN  Outcome: Progressing  6/19/2025 0257 by Janell Almendarez LPN  Outcome: Progressing     Problem: Adult Inpatient Plan of Care  Goal: Plan of Care Review  6/19/2025 0257 by Janell Almendarez LPN  Outcome: Progressing  6/19/2025 0257 by Janell Almendarez LPN  Outcome: Progressing     Problem: Adult Inpatient Plan of Care  Goal: Patient-Specific Goal (Individualized)  6/19/2025 0257 by Janell Almendarez LPN  Outcome: Progressing  6/19/2025 0257 by Janell Almendarez  LPN  Outcome: Progressing     Problem: Adult Inpatient Plan of Care  Goal: Absence of Hospital-Acquired Illness or Injury  6/19/2025 0257 by Janell Almendarez LPN  Outcome: Progressing  6/19/2025 0257 by Janell Almendarez LPN  Outcome: Progressing     Problem: Adult Inpatient Plan of Care  Goal: Optimal Comfort and Wellbeing  6/19/2025 0257 by Janell Almendarez LPN  Outcome: Progressing  6/19/2025 0257 by Janell Almendarez LPN  Outcome: Progressing     Problem: Adult Inpatient Plan of Care  Goal: Readiness for Transition of Care  6/19/2025 0257 by Janell Almendarez LPN  Outcome: Progressing  6/19/2025 0257 by Janell Almendarez LPN  Outcome: Progressing

## 2025-06-19 NOTE — PLAN OF CARE
05/05/20 1219   Subjective/Objective   Attempted, but not seen Yes   Reason not seen Other (comment)  (test results for Covid pending)   Re-Attempt Plan   (test results for Covid pending)   Plan   Frequency Comments attempted PT eval 5/5      Unit [facility] Care Support Interaction      I have reviewed the chart of Veronica Duque who is hospitalized for NSTEMI (non-ST elevated myocardial infarction). The patient is currently located in the following unit: [facility]    I have seen and examined the patient and provided the following support:     Readmission Reduction - Acute Care at Home Referral       Kristen Jamil PA-C  Unit Based [facility]

## 2025-06-19 NOTE — HOSPITAL COURSE
Troponin elevated at 700 on recheck. Pt underwent C day of admission that was significant for multi vessel CAD. Cardiology recommended cardiothoracic surgery consult. Plan for CABG on 6/26/25. Last dose of Plavix on 6/20/25. CT chest  done as per CTS request. CTS will follow as outpatient . DC on aspirin, and stop Cilastol for now

## 2025-06-19 NOTE — PROGRESS NOTES
[facility] Acute Medical [facility] Medicine  Progress Note    Patient Name: Veronica Duque  MRN: [id number]  Patient Class: IP- Inpatient   Admission Date: 6/18/2025  Length of Stay: 0 days  Attending Physician: Jake Quinn MD  Primary Care Provider: Juju Wallace MD        Subjective     Principal Problem:NSTEMI (non-ST elevated myocardial infarction)        HPI:  Veronica Duque is a 69 y.o. female with hypertension, hyperlipidemia, PVD and PAD s/p R SFA stenting being admitted to hospital medicine for evaluation of chest pain. Patient reports sudden onset of constant, waxing and waning intensity substernal chest heaviness that radiated to between her shoulder blades after getting up to go to the bathroom around 5:15 AM this morning. She endorses associated nausea with dry heaves but no true vomiting and belching. She took a 81 mg ASA and tums for the pain without relief - she also had a brief episode of left arm pain at that time. She took her blood pressure at that time and it was 180/83 which is high for her. Because her pain did not improve she called EMS, she was given an additional 81 mg asa x3 and SL NTG which resolved her pain. She did have left sided neck pain for a few minutes that later resolved. She denies familiarity of symptoms, dizziness/lightheadedness, shortness of breath, diaphoresis, cough, congestion, fever or chills. Does not smoke or drink.       In ED: hypertensive to , remaining vitals stable. CBC with hgb 9.8 which is baseline. CMP unremarkable. Initial troponin 90, no baseline for comparison. BNP wnl. CXR without acute process. EKG NSR. Given 40 mEq of potassium in the ED. Admitted to medicine for further management.     Overview/Hospital Course:  Troponin elevated at 700 on recheck. Pt underwent [facility] day of admission that was significant for multi vessel CAD. Cardiology recommended cardiothoracic surgery consult. Plan for CABG on 6/26/25. Last dose  of Plavix on 6/20/25. CT chest needed before discharge.     Interval History: No acute events overnight. Pt denies chest pain or dyspnea. Feels much better compared to yesterday.     Review of Systems   Constitutional:  Positive for fatigue. Negative for chills and fever.   HENT:  Negative for congestion, hearing loss, mouth sores, trouble swallowing and voice change.    Eyes:  Negative for pain.   Respiratory:  Negative for cough and shortness of breath.    Cardiovascular:  Negative for chest pain.   Gastrointestinal:  Negative for abdominal pain, constipation, diarrhea, nausea and vomiting.   Genitourinary:  Negative for difficulty urinating.   Musculoskeletal:  Negative for arthralgias and back pain.   Skin:  Negative for pallor, rash and wound.   Neurological:  Negative for dizziness, weakness and light-headedness.   Psychiatric/Behavioral:  Negative for agitation and behavioral problems.      Objective:     Vital Signs (Most Recent):  Temp: 98.2 °F (36.8 °C) (06/19/25 1110)  Pulse: 109 (06/19/25 1119)  Resp: (!) 22 (06/19/25 1110)  BP: 132/65 (06/19/25 1110)  SpO2: 97 % (06/19/25 1110) Vital Signs (24h Range):  Temp:  [97.5 °F (36.4 °C)-98.6 °F (37 °C)] 98.2 °F (36.8 °C)  Pulse:  [] 109  Resp:  [15-35] 22  SpO2:  [91 %-98 %] 97 %  BP: (110-170)/(55-77) 132/65     Weight: 75 kg (165 lb 5.5 oz)  Body mass index is 27.51 kg/m².    Intake/Output Summary (Last 24 hours) at 6/19/2025 1618  Last data filed at 6/19/2025 0433  Gross per 24 hour   Intake 300 ml   Output 400 ml   Net -100 ml         Physical Exam  Vitals and nursing note reviewed.   Constitutional:       General: She is not in acute distress.  HENT:      Head: Normocephalic and atraumatic.      Nose: Nose normal.      Mouth/Throat:      Mouth: Mucous membranes are moist.   Eyes:      General: No scleral icterus.     Extraocular Movements: Extraocular movements intact.   Cardiovascular:      Rate and Rhythm: Normal rate and regular rhythm.      Heart  sounds: No murmur heard.     No friction rub. No gallop.   Pulmonary:      Effort: Pulmonary effort is normal. No respiratory distress.      Breath sounds: No wheezing, rhonchi or rales.   Abdominal:      General: There is no distension.      Palpations: Abdomen is soft.      Tenderness: There is no abdominal tenderness. There is no guarding or rebound.   Musculoskeletal:         General: Normal range of motion.      Cervical back: Neck supple. No rigidity.   Skin:     General: Skin is warm and dry.      Capillary Refill: Capillary refill takes less than 2 seconds.   Neurological:      General: No focal deficit present.      Mental Status: She is alert and oriented to person, place, and time.   Psychiatric:         Behavior: Behavior normal.               Significant Labs: All pertinent labs within the past 24 hours have been reviewed.    Significant Imaging: I have reviewed all pertinent imaging results/findings within the past 24 hours.      Assessment & Plan  PAD (peripheral artery disease)  PVD (peripheral vascular disease)  - s/p SFA stenting   - follows with Dr. Valle outpatient  - continue home asa, zetia and cilostaol  - s/p SFA stenting   - follows with Dr. Valle outpatient  - continue home asa, zetia and cilostaol  Primary hypertension  Patients blood pressure range in the last 24 hours was: BP  Min: 110/55  Max: 186/81.The patient's inpatient anti-hypertensive regimen is listed below:  Current Antihypertensives  nitroGLYCERIN SL tablet 0.4 mg, Every 5 min PRN, Sublingual  amLODIPine tablet 5 mg, Daily, Oral  losartan-hydrochlorothiazide 100-25 mg per tablet 1 tablet, Daily, Oral  spironolactone split tablet 25 mg, Daily, Oral    Plan  - BP is controlled, no changes needed to their regimen    Anemia  Most recent hemoglobin and hematocrit are listed below.  Recent Labs     06/18/25  0845 06/18/25  0853 06/19/25  0452   HGB 9.8*  --  9.5*   HCT 32.1* 31* 31.0*     Plan  - Monitor serial CBC: Daily  -  Transfuse PRBC if patient becomes hemodynamically unstable, symptomatic or H/H drops below 7/21.  - Patient has not received any PRBC transfusions to date  - Patient's anemia is currently stable  Bilateral carotid artery stenosis  - noted  - continue ASA and Zetia.   Mixed hyperlipidemia  - continue statin and zetia   History of CVA (cerebrovascular accident)  - remote, no deficits  - continue secondary prevention with ASA and Zetia   NSTEMI (non-ST elevated myocardial infarction)  Pt admitted with NSTEMI. Troponin elevated at 701. Associated Substernal chest pain that radiates to back, neck and left arm - resolved with NTG.   LHC on 6/18 significant for RCA 90, LAD 90   Echo showed preserve LVEF 60-65 and no significant valvulopathy.     Plan  -Plavix load on 6/18, Will stop Plavix on 6/20 pending CABG scheduled for 6/26/25  -Continue ASA and Zetia. Pt unable to tolerate statins.   -Monitor for repeat symptoms   -Chest CT ordered, needs to be done prior to discharge.     VTE Risk Mitigation (From admission, onward)           Ordered     enoxaparin injection 40 mg  Daily         06/18/25 1029     IP VTE HIGH RISK PATIENT  Once         06/18/25 1029     Place sequential compression device  Until discontinued         06/18/25 1029                    Discharge Planning   [facility]: 6/21/2025     Code Status: Full Code   Patient is Medically Not Yet Ready for discharge.  Medical Readiness for Discharge Date:   Discharge Plan A: Home with family          SDOH Screening:  The patient was screened for utility difficulties, food insecurity, transport difficulties, housing insecurity, and interpersonal safety and there were no concerns identified this admission.                      Jake Quinn MD  Department of Hospital Medicine   [facility]

## 2025-06-19 NOTE — SUBJECTIVE & OBJECTIVE
Interval History: No acute events overnight. Pt denies chest pain or dyspnea. Feels much better compared to yesterday.     Review of Systems   Constitutional:  Positive for fatigue. Negative for chills and fever.   HENT:  Negative for congestion, hearing loss, mouth sores, trouble swallowing and voice change.    Eyes:  Negative for pain.   Respiratory:  Negative for cough and shortness of breath.    Cardiovascular:  Negative for chest pain.   Gastrointestinal:  Negative for abdominal pain, constipation, diarrhea, nausea and vomiting.   Genitourinary:  Negative for difficulty urinating.   Musculoskeletal:  Negative for arthralgias and back pain.   Skin:  Negative for pallor, rash and wound.   Neurological:  Negative for dizziness, weakness and light-headedness.   Psychiatric/Behavioral:  Negative for agitation and behavioral problems.      Objective:     Vital Signs (Most Recent):  Temp: 98.2 °F (36.8 °C) (06/19/25 1110)  Pulse: 109 (06/19/25 1119)  Resp: (!) 22 (06/19/25 1110)  BP: 132/65 (06/19/25 1110)  SpO2: 97 % (06/19/25 1110) Vital Signs (24h Range):  Temp:  [97.5 °F (36.4 °C)-98.6 °F (37 °C)] 98.2 °F (36.8 °C)  Pulse:  [] 109  Resp:  [15-35] 22  SpO2:  [91 %-98 %] 97 %  BP: (110-170)/(55-77) 132/65     Weight: 75 kg (165 lb 5.5 oz)  Body mass index is 27.51 kg/m².    Intake/Output Summary (Last 24 hours) at 6/19/2025 1618  Last data filed at 6/19/2025 0433  Gross per 24 hour   Intake 300 ml   Output 400 ml   Net -100 ml         Physical Exam  Vitals and nursing note reviewed.   Constitutional:       General: She is not in acute distress.  HENT:      Head: Normocephalic and atraumatic.      Nose: Nose normal.      Mouth/Throat:      Mouth: Mucous membranes are moist.   Eyes:      General: No scleral icterus.     Extraocular Movements: Extraocular movements intact.   Cardiovascular:      Rate and Rhythm: Normal rate and regular rhythm.      Heart sounds: No murmur heard.     No friction rub. No gallop.    Pulmonary:      Effort: Pulmonary effort is normal. No respiratory distress.      Breath sounds: No wheezing, rhonchi or rales.   Abdominal:      General: There is no distension.      Palpations: Abdomen is soft.      Tenderness: There is no abdominal tenderness. There is no guarding or rebound.   Musculoskeletal:         General: Normal range of motion.      Cervical back: Neck supple. No rigidity.   Skin:     General: Skin is warm and dry.      Capillary Refill: Capillary refill takes less than 2 seconds.   Neurological:      General: No focal deficit present.      Mental Status: She is alert and oriented to person, place, and time.   Psychiatric:         Behavior: Behavior normal.               Significant Labs: All pertinent labs within the past 24 hours have been reviewed.    Significant Imaging: I have reviewed all pertinent imaging results/findings within the past 24 hours.

## 2025-06-19 NOTE — SUBJECTIVE & OBJECTIVE
No current facility-administered medications on file prior to encounter.     Current Outpatient Medications on File Prior to Encounter   Medication Sig    ALPRAZolam (XANAX) 0.5 MG tablet Take 0.5 mg by mouth 2 (two) times daily.    amLODIPine (NORVASC) 5 MG tablet TAKE ONE TABLET BY MOUTH ONCE DAILY    aspirin (ECOTRIN) 81 MG EC tablet Take 81 mg by mouth once daily.    cilostazoL (PLETAL) 100 MG Tab Take 1 tablet (100 mg total) by mouth 2 (two) times daily.    diclofenac sodium (VOLTAREN) 1 % Gel Apply 2 g topically once daily.    estradioL (ESTRACE) 0.01 % (0.1 mg/gram) vaginal cream INSERT ONE GRAM VAGINALLY MONDAY, WEDNESDAY AND FRIDAY    evolocumab (REPATHA SURECLICK) 140 mg/mL PnIj Inject 1 mL (140 mg total) into the skin every 14 (fourteen) days.    ezetimibe (ZETIA) 10 mg tablet TAKE ONE TABLET BY MOUTH ONCE DAILY    fluticasone propionate (FLONASE) 50 mcg/actuation nasal spray 1 spray (50 mcg total) by Each Nostril route once daily.    levocetirizine (XYZAL) 5 MG tablet Take 1 tablet (5 mg total) by mouth every evening.    losartan-hydrochlorothiazide 100-25 mg (HYZAAR) 100-25 mg per tablet TAKE ONE TABLET BY MOUTH DAILY    magnesium oxide (MAG-OX) 400 mg (241.3 mg magnesium) tablet Take 400 mg by mouth once daily. OTC, **pt unsure of dosage**    spironolactone (ALDACTONE) 25 MG tablet Take 1 tablet (25 mg total) by mouth once daily.    valACYclovir (VALTREX) 500 MG tablet Take 500 mg by mouth daily as needed.        Review of patient's allergies indicates:   Allergen Reactions    Statins-hmg-coa reductase inhibitors      Myalgias         Past Medical History:   Diagnosis Date    Anticoagulant long-term use     Arthritis     General anesthetics causing adverse effect in therapeutic use     slow to wake up    Hypertension     PONV (postoperative nausea and vomiting)     Stroke      Past Surgical History:   Procedure Laterality Date    ANGIOGRAPHY OF LOWER EXTREMITY Right 12/27/2022     Procedure: Angiogram Extremity Unilateral;  Surgeon: Fidencio Valle MD;  [facility];  Service: Vascular;  Laterality: Right;  RLE diagnostic angio & R SFA femoral endarterectomy w/ PTA & stenting  IHG510.86  gycm2 149.22  fluoro time 5.4  contrast vol    AORTOGRAPHY N/A 12/27/2022    Procedure: AORTOGRAM;  Surgeon: Fidencio Valle MD;  [facility];  Service: Vascular;  Laterality: N/A;    AORTOGRAPHY WITH SERIALOGRAPHY  5/16/2022    Procedure: AORTOGRAM, WITH SERIALOGRAPHY. left femoral access;  Surgeon: Phil Pitts MD;  Location: [facility];  Service: Cardiology;;    ARTHROSCOPY OF KNEE      AUGMENTATION OF BREAST      COSMETIC SURGERY      ENDARTERECTOMY OF FEMORAL ARTERY N/A 12/27/2022    Procedure: ENDARTERECTOMY, FEMORAL;  Surgeon: Fidencio Valle MD;  [facility];  Service: Vascular;  Laterality: N/A;  RLE diagnostic angio & R SFA femoral endarterectomy w/ PTA & stenting    EYE SURGERY  12/18/17 & 10/23/17    Cataracts    HYSTERECTOMY      JOINT REPLACEMENT  October 2019    Knee    NASAL SEPTOPLASTY       Family History       Problem Relation (Age of Onset)    COPD Mother    Heart attack Mother, Father    Heart disease Mother, Father    Hypertension Mother    Miscarriages / Stillbirths Mother          Tobacco Use    Smoking status: Never    Smokeless tobacco: Never   Substance and Sexual Activity    Alcohol use: Yes     Alcohol/week: 1.0 standard drink of alcohol     Types: 1 Unspecified drink type per week     Comment: Socially    Drug use: Never    Sexual activity: Yes     Partners: Male     Birth control/protection: Post-menopausal     Review of Systems   Constitutional:  Negative for activity change, appetite change, fatigue and fever.   HENT:  Negative for nosebleeds.    Respiratory:  Negative for cough and shortness of breath.    Cardiovascular:  Negative for chest pain, palpitations and leg swelling.        Claudications   Gastrointestinal:   Negative for abdominal distention, abdominal pain and nausea.   Genitourinary:  Negative for frequency.   Musculoskeletal:  Negative for arthralgias and myalgias.   Skin:  Negative for rash.   Neurological:  Negative for dizziness and numbness.   Hematological:  Does not bruise/bleed easily.     Objective:     Vital Signs (Most Recent):  Temp: 98 °F (36.7 °C) (06/19/25 0754)  Pulse: 93 (06/19/25 0800)  Resp: (!) 35 (06/19/25 0754)  BP: (!) 116/59 (06/19/25 0754)  SpO2: (!) 91 % (06/19/25 0754) Vital Signs (24h Range):  Temp:  [97.5 °F (36.4 °C)-98.6 °F (37 °C)] 98 °F (36.7 °C)  Pulse:  [] 93  Resp:  [15-35] 35  SpO2:  [91 %-99 %] 91 %  BP: (110-175)/(55-81) 116/59     Weight: 75 kg (165 lb 5.5 oz)  Body mass index is 27.51 kg/m².    SpO2: (!) 91 %        Intake/Output - Last 3 Shifts         06/17 0700 06/18 0659 06/18 0700 06/19 0659 06/19 0700 06/20 0659    I.V. (mL/kg)  300 (4)     Total Intake(mL/kg)  300 (4)     Urine (mL/kg/hr)  400     Stool  0     Total Output  400     Net  -100            Unmeasured Urine Occurrence  3 x     Unmeasured Stool Occurrence  0 x              Lines/Drains/Airways       Peripheral Intravenous Line  Duration                  Peripheral IV - Single Lumen 06/18/25 0847 20 G Right Antecubital 1 day         Peripheral IV - Single Lumen 06/18/25 20 G Right;Posterior Hand 1 day                          Physical Exam  Constitutional:       Appearance: Normal appearance.   HENT:      Head: Normocephalic and atraumatic.   Eyes:      Extraocular Movements: Extraocular movements intact.   Cardiovascular:      Rate and Rhythm: Normal rate.   Pulmonary:      Effort: Pulmonary effort is normal.   Abdominal:      General: Abdomen is flat.   Musculoskeletal:         General: Normal range of motion.      Cervical back: Normal range of motion.   Skin:     General: Skin is warm and dry.      Capillary Refill: Capillary refill takes less than 2 seconds.      Coloration: Skin is not pale.  "  Neurological:      General: No focal deficit present.      Mental Status: She is alert.          Significant Labs:  ABGs: No results for input(s): "PH", "PCO2", "PO2", "HCO3", "POCSATURATED", "BE" in the last 48 hours.  Amylase: No results for input(s): "AMYLASE" in the last 48 hours.  BMP:   Recent Labs   Lab 06/19/25  0452   *      K 4.2      CO2 24   BUN 15   CREATININE 0.7   CALCIUM 8.9   MG 1.8     Cardiac markers: No results for input(s): "CKMB", "CPKMB", "TROPONINT", "TROPONINI", "MYOGLOBIN" in the last 48 hours.  CBC:   Recent Labs   Lab 06/19/25  0452   WBC 9.25   RBC 4.92   HGB 9.5*   HCT 31.0*      MCV 63*   MCH 19.3*   MCHC 30.6*     CMP:   Recent Labs   Lab 06/18/25  0845 06/19/25  0452    126*   CALCIUM 9.0 8.9   ALBUMIN 4.0  --    PROT 6.2  --     141   K 3.5 4.2   CO2 25 24    109   BUN 15 15   CREATININE 0.6 0.7   ALKPHOS 81  --    ALT 20  --    AST 23  --    BILITOT 0.6  --      Coagulation: No results for input(s): "PT", "INR", "APTT" in the last 48 hours.  Lactic Acid: No results for input(s): "LACTATE" in the last 48 hours.  LFTs:   Recent Labs   Lab 06/18/25  0845   ALT 20   AST 23   ALKPHOS 81   BILITOT 0.6   PROT 6.2   ALBUMIN 4.0     Lipase: No results for input(s): "LIPASE" in the last 48 hours.    Significant Diagnostics:  TTE 6/19/25    Left Ventricle: The left ventricle is normal in size. Normal wall thickness. There is concentric remodeling. There is normal systolic function with a visually estimated ejection fraction of 60 - 65%.    Right Ventricle: The right ventricle is normal in size Wall thickness is normal. Systolic function is normal.    IVC/SVC: Normal venous pressure at 3 mmHg.    [facility] 6/18/25    The Mid RCA lesion was 90% stenosed.    The Mid LAD lesion was 90% stenosed.    The ejection fraction was greater than 55% by visual estimate.    The pre-procedure left ventricular end diastolic pressure was 14.    The estimated " blood loss was none.    There was two vessel coronary artery disease with severe obstruction of mid LAD and RCA. Will consult CTS.

## 2025-06-19 NOTE — ASSESSMENT & PLAN NOTE
- s/p SFA stenting   - follows with Dr. Valle outpatient  - continue home asa, zetia and cilostaol  - s/p SFA stenting   - follows with Dr. Valle outpatient  - continue home asa, zetia and cilostaol

## 2025-06-20 ENCOUNTER — DOCUMENTATION ONLY (OUTPATIENT)
Dept: CARDIOTHORACIC SURGERY | Facility: CLINIC | Age: 70
End: 2025-06-20
Payer: MEDICARE

## 2025-06-20 ENCOUNTER — PATIENT MESSAGE (OUTPATIENT)
Dept: CARDIOTHORACIC SURGERY | Facility: CLINIC | Age: 70
End: 2025-06-20
Payer: MEDICARE

## 2025-06-20 ENCOUNTER — TELEPHONE (OUTPATIENT)
Dept: CARDIOTHORACIC SURGERY | Facility: CLINIC | Age: 70
End: 2025-06-20
Payer: MEDICARE

## 2025-06-20 VITALS
HEIGHT: 65 IN | WEIGHT: 165.38 LBS | OXYGEN SATURATION: 97 % | HEART RATE: 104 BPM | RESPIRATION RATE: 16 BRPM | SYSTOLIC BLOOD PRESSURE: 142 MMHG | DIASTOLIC BLOOD PRESSURE: 64 MMHG | BODY MASS INDEX: 27.56 KG/M2 | TEMPERATURE: 98 F

## 2025-06-20 LAB
ABSOLUTE EOSINOPHIL (OHS): 0.16 K/UL
ABSOLUTE MONOCYTE (OHS): 0.95 K/UL (ref 0.3–1)
ABSOLUTE NEUTROPHIL COUNT (OHS): 4.95 K/UL (ref 1.8–7.7)
ANION GAP (OHS): 10 MMOL/L (ref 8–16)
BASOPHILS # BLD AUTO: 0.03 K/UL
BASOPHILS NFR BLD AUTO: 0.3 %
BUN SERPL-MCNC: 19 MG/DL (ref 8–23)
CALCIUM SERPL-MCNC: 8.5 MG/DL (ref 8.7–10.5)
CHLORIDE SERPL-SCNC: 108 MMOL/L (ref 95–110)
CO2 SERPL-SCNC: 22 MMOL/L (ref 23–29)
CREAT SERPL-MCNC: 0.8 MG/DL (ref 0.5–1.4)
ERYTHROCYTE [DISTWIDTH] IN BLOOD BY AUTOMATED COUNT: 16.3 % (ref 11.5–14.5)
GFR SERPLBLD CREATININE-BSD FMLA CKD-EPI: >60 ML/MIN/1.73/M2
GLUCOSE SERPL-MCNC: 106 MG/DL (ref 70–110)
HCT VFR BLD AUTO: 29.3 % (ref 37–48.5)
HGB BLD-MCNC: 9.1 GM/DL (ref 12–16)
IMM GRANULOCYTES # BLD AUTO: 0.03 K/UL (ref 0–0.04)
IMM GRANULOCYTES NFR BLD AUTO: 0.3 % (ref 0–0.5)
LYMPHOCYTES # BLD AUTO: 2.7 K/UL (ref 1–4.8)
MAGNESIUM SERPL-MCNC: 1.8 MG/DL (ref 1.6–2.6)
MCH RBC QN AUTO: 19.2 PG (ref 27–31)
MCHC RBC AUTO-ENTMCNC: 31.1 G/DL (ref 32–36)
MCV RBC AUTO: 62 FL (ref 82–98)
NUCLEATED RBC (/100WBC) (OHS): 0 /100 WBC
PHOSPHATE SERPL-MCNC: 4.4 MG/DL (ref 2.7–4.5)
PLATELET # BLD AUTO: 337 K/UL (ref 150–450)
PMV BLD AUTO: 10.2 FL (ref 9.2–12.9)
POTASSIUM SERPL-SCNC: 3.7 MMOL/L (ref 3.5–5.1)
RBC # BLD AUTO: 4.73 M/UL (ref 4–5.4)
RELATIVE EOSINOPHIL (OHS): 1.8 %
RELATIVE LYMPHOCYTE (OHS): 30.6 % (ref 18–48)
RELATIVE MONOCYTE (OHS): 10.8 % (ref 4–15)
RELATIVE NEUTROPHIL (OHS): 56.2 % (ref 38–73)
SODIUM SERPL-SCNC: 140 MMOL/L (ref 136–145)
TROPONIN I SERPL HS-MCNC: 503 NG/L
WBC # BLD AUTO: 8.82 K/UL (ref 3.9–12.7)

## 2025-06-20 PROCEDURE — 83735 ASSAY OF MAGNESIUM: CPT | Mod: HCNC

## 2025-06-20 PROCEDURE — 84484 ASSAY OF TROPONIN QUANT: CPT | Mod: HCNC | Performed by: INTERNAL MEDICINE

## 2025-06-20 PROCEDURE — 80048 BASIC METABOLIC PNL TOTAL CA: CPT | Mod: HCNC

## 2025-06-20 PROCEDURE — 25000003 PHARM REV CODE 250: Mod: HCNC | Performed by: INTERNAL MEDICINE

## 2025-06-20 PROCEDURE — 84100 ASSAY OF PHOSPHORUS: CPT | Mod: HCNC

## 2025-06-20 PROCEDURE — 25000003 PHARM REV CODE 250: Mod: HCNC

## 2025-06-20 PROCEDURE — 85025 COMPLETE CBC W/AUTO DIFF WBC: CPT | Mod: HCNC

## 2025-06-20 PROCEDURE — 36415 COLL VENOUS BLD VENIPUNCTURE: CPT | Mod: HCNC

## 2025-06-20 RX ORDER — NITROGLYCERIN 0.3 MG/1
0.3 TABLET SUBLINGUAL EVERY 5 MIN PRN
Qty: 25 TABLET | Refills: 0 | Status: ON HOLD | OUTPATIENT
Start: 2025-06-20 | End: 2026-06-20

## 2025-06-20 RX ADMIN — CILOSTAZOL 100 MG: 100 TABLET ORAL at 08:06

## 2025-06-20 RX ADMIN — AMLODIPINE BESYLATE 5 MG: 5 TABLET ORAL at 08:06

## 2025-06-20 RX ADMIN — SPIRONOLACTONE 25 MG: 25 TABLET ORAL at 08:06

## 2025-06-20 RX ADMIN — ASPIRIN 81 MG: 81 TABLET, COATED ORAL at 08:06

## 2025-06-20 RX ADMIN — SENNOSIDES 8.6 MG: 8.6 TABLET, FILM COATED ORAL at 08:06

## 2025-06-20 RX ADMIN — EZETIMIBE 10 MG: 10 TABLET ORAL at 08:06

## 2025-06-20 RX ADMIN — LOSARTAN POTASSIUM AND HYDROCHLOROTHIAZIDE 1 TABLET: 100; 25 TABLET, FILM COATED ORAL at 08:06

## 2025-06-20 RX ADMIN — CLOPIDOGREL BISULFATE 75 MG: 75 TABLET, FILM COATED ORAL at 08:06

## 2025-06-20 NOTE — ASSESSMENT & PLAN NOTE
- s/p SFA stenting   - follows with Dr. Valle outpatient  - continue home asa, zetia and cilostaol  - s/p SFA stenting   - follows with Dr. Valle outpatient  - continue home asa, zetia

## 2025-06-20 NOTE — ASSESSMENT & PLAN NOTE
Most recent hemoglobin and hematocrit are listed below.  Recent Labs     06/18/25  0845 06/18/25  0853 06/19/25  0452 06/20/25  0452   HGB 9.8*  --  9.5* 9.1*   HCT 32.1* 31* 31.0* 29.3*     Plan  - Monitor serial CBC: Daily  - Transfuse PRBC if patient becomes hemodynamically unstable, symptomatic or H/H drops below 7/21.  - Patient has not received any PRBC transfusions to date  - Patient's anemia is currently stable

## 2025-06-20 NOTE — PLAN OF CARE
:no acute chages      Problem: Adult Inpatient Plan of Care  Goal: Plan of Care Review  Outcome: Ongoing  Goal: Patient-Specific Goal (Individualized)  Outcome: Ongoing  Goal: Absence of Hospital-Acquired Illness or Injury  Outcome: Ongoing  Goal: Optimal Comfort and Wellbeing  Outcome: Ongoing  Goal: Readiness for Transition of Care  Outcome: Ongoing     Problem: Infection  Goal: Absence of Infection Signs and Symptoms  Outcome: Ongoing     Problem: Wound  Goal: Optimal Coping  Outcome: Ongoing  Goal: Optimal Functional Ability  Outcome: Ongoing  Goal: Absence of Infection Signs and Symptoms  Outcome: Ongoing  Goal: Improved Oral Intake  Outcome: Ongoing  Goal: Optimal Pain Control and Function  Outcome: Ongoing  Goal: Skin Health and Integrity  Outcome: Ongoing  Goal: Optimal Wound Healing  Outcome: Ongoing

## 2025-06-20 NOTE — DISCHARGE SUMMARY
[facility] Medicine  Discharge Summary      Patient Name: Veronica Duque  MRN: [id number]  ANDREZ: [id number]  Patient Class: IP- Inpatient  Admission Date: 6/18/2025  Hospital Length of Stay: 1 days  Discharge Date and Time: 06/20/2025 3:18 PM  Attending Physician: Amandeep Bustamante MD   Discharging Provider: Amandeep Bustamante MD  Primary Care Provider: Juju Wallace MD  Hospital Medicine Team: [facility] A Amandeep Bustamante MD  Primary Care Team: [facility] A    HPI:   Veronica Duque is a 69 y.o. female with hypertension, hyperlipidemia, PVD and PAD s/p R SFA stenting being admitted to hospital medicine for evaluation of chest pain. Patient reports sudden onset of constant, waxing and waning intensity substernal chest heaviness that radiated to between her shoulder blades after getting up to go to the bathroom around 5:15 AM this morning. She endorses associated nausea with dry heaves but no true vomiting and belching. She took a 81 mg ASA and tums for the pain without relief - she also had a brief episode of left arm pain at that time. She took her blood pressure at that time and it was 180/83 which is high for her. Because her pain did not improve she called EMS, she was given an additional 81 mg asa x3 and SL NTG which resolved her pain. She did have left sided neck pain for a few minutes that later resolved. She denies familiarity of symptoms, dizziness/lightheadedness, shortness of breath, diaphoresis, cough, congestion, fever or chills. Does not smoke or drink.       In ED: hypertensive to , remaining vitals stable. CBC with hgb 9.8 which is baseline. CMP unremarkable. Initial troponin 90, no baseline for comparison. BNP wnl. CXR without acute process. EKG NSR. Given 40 mEq of potassium in the ED. Admitted to medicine for further management.     Procedure(s) (LRB):  Angiogram, Coronary, with Left Heart Cath (N/A)  Ventriculogram, Left       Hospital Course:   Troponin elevated at 700 on recheck. Pt underwent LHC day of admission that was significant for multi vessel CAD. Cardiology recommended cardiothoracic surgery consult. Plan for CABG on 6/26/25. Last dose of Plavix on 6/20/25. CT chest  done as per CTS request. CTS will follow as outpatient . DC on aspirin, and stop Cilastol for now      Goals of Care Treatment Preferences:  Code Status: Full Code    Living Will: Yes                 Consults:   Consults (From admission, onward)          Status Ordering Provider     Inpatient consult to Cardiothoracic Surgery  Once        Provider:  (Not yet assigned)    Completed LANCE HERRERA     Inpatient consult to Interventional Cardiology  Once        Provider:  (Not yet assigned)    Completed LANCE HERRERA            Assessment & Plan  PAD (peripheral artery disease)  PVD (peripheral vascular disease)  - s/p SFA stenting   - follows with Dr. Valle outpatient  - continue home asa, zetia and cilostaol  - s/p SFA stenting   - follows with Dr. Valle outpatient  - continue home asa, zetia    Primary hypertension  Patients blood pressure range in the last 24 hours was: BP  Min: 110/55  Max: 186/81.The patient's inpatient anti-hypertensive regimen is listed below:  Current Antihypertensives  nitroGLYCERIN SL tablet 0.4 mg, Every 5 min PRN, Sublingual  amLODIPine tablet 5 mg, Daily, Oral  losartan-hydrochlorothiazide 100-25 mg per tablet 1 tablet, Daily, Oral  spironolactone split tablet 25 mg, Daily, Oral  nitroGLYCERIN (NITROSTAT) SL tablet, Every 5 min PRN, Sublingual    Plan  - BP is controlled, no changes needed to their regimen    Anemia  Most recent hemoglobin and hematocrit are listed below.  Recent Labs     06/18/25  0845 06/18/25  0853 06/19/25  0452 06/20/25  0452   HGB 9.8*  --  9.5* 9.1*   HCT 32.1* 31* 31.0* 29.3*     Plan  - Monitor serial CBC: Daily  - Transfuse PRBC if patient becomes hemodynamically unstable, symptomatic or H/H drops below  7/21.  - Patient has not received any PRBC transfusions to date  - Patient's anemia is currently stable  Bilateral carotid artery stenosis  - noted  - continue ASA and Zetia.   Mixed hyperlipidemia  - continue statin and zetia   History of CVA (cerebrovascular accident)  - remote, no deficits  - continue secondary prevention with ASA and Zetia   NSTEMI (non-ST elevated myocardial infarction)  Pt admitted with NSTEMI. Troponin elevated at 701. Associated Substernal chest pain that radiates to back, neck and left arm - resolved with NTG.   LHC on 6/18 significant for RCA 90, LAD 90   Echo showed preserve LVEF 60-65 and no significant valvulopathy.     Plan  -Plavix load on 6/18, Will stop Plavix on 6/20 pending CABG scheduled for 6/26/25  -Continue ASA and Zetia. Pt unable to tolerate statins.   -Monitor for repeat symptoms   -Chest CT as per CTS   - Will DC her today and she follows with CTS       Final Active Diagnoses:    Diagnosis Date Noted POA    PRINCIPAL PROBLEM:  NSTEMI (non-ST elevated myocardial infarction) [I21.4] 06/18/2025 Yes    History of CVA (cerebrovascular accident) [Z86.73] 06/18/2025 Not Applicable    Mixed hyperlipidemia [E78.2] 04/14/2025 Yes    Primary hypertension [I10] 08/12/2024 Yes    Bilateral carotid artery stenosis [I65.23] 08/12/2024 Yes    Anemia [D64.9] 08/12/2024 Yes    PAD (peripheral artery disease) [I73.9] 07/13/2023 Yes    PVD (peripheral vascular disease) [I73.9] 05/16/2022 Yes      Problems Resolved During this Admission:       Discharged Condition: stable    Disposition: Home or Self Care    Follow Up:   Follow-up Information       Provider, Thanh .    Specialty: Internal Medicine  Contact information:  [address] [facility]  [address]  [place] [address]                           Patient Instructions:      Ambulatory referral/consult to [facility]   Standing Status: Future   Referral Priority: Routine Referral Type: Consultation   Referred to Provider:  ASYA PROVIDER Requested Specialty: Internal Medicine   Number of Visits Requested: 1       Significant Diagnostic Studies: N/A    Pending Diagnostic Studies:       Procedure Component Value Units Date/Time    Troponin I High Sensitivity [[id number]]     Order Status: Sent Lab Status: No result     Specimen: Blood            Medications:  Reconciled Home Medications:      Medication List        START taking these medications      nitroGLYCERIN 0.3 MG SL tablet  Commonly known as: NITROSTAT  Place 1 tablet (0.3 mg total) under the tongue every 5 (five) minutes as needed for Chest pain.            CONTINUE taking these medications      ALPRAZolam 0.5 MG tablet  Commonly known as: XANAX  Take 0.5 mg by mouth 2 (two) times daily.     amLODIPine 5 MG tablet  Commonly known as: NORVASC  TAKE ONE TABLET BY MOUTH ONCE DAILY     aspirin 81 MG EC tablet  Commonly known as: ECOTRIN  Take 81 mg by mouth once daily.     diclofenac sodium 1 % Gel  Commonly known as: VOLTAREN  Apply 2 g topically once daily.     estradioL 0.01 % (0.1 mg/gram) vaginal cream  Commonly known as: ESTRACE  INSERT ONE GRAM VAGINALLY MONDAY, WEDNESDAY AND FRIDAY     ezetimibe 10 mg tablet  Commonly known as: ZETIA  TAKE ONE TABLET BY MOUTH ONCE DAILY     fluticasone propionate 50 mcg/actuation nasal spray  Commonly known as: FLONASE  1 spray (50 mcg total) by Each Nostril route once daily.     levocetirizine 5 MG tablet  Commonly known as: XYZAL  Take 1 tablet (5 mg total) by mouth every evening.     losartan-hydrochlorothiazide 100-25 mg 100-25 mg per tablet  Commonly known as: HYZAAR  TAKE ONE TABLET BY MOUTH DAILY     magnesium oxide 400 mg (241.3 mg magnesium) tablet  Commonly known as: MAG-OX  Take 400 mg by mouth once daily. OTC, **pt unsure of dosage**     REPATHA SURECLICK 140 mg/mL Pnij  Generic drug: evolocumab  Inject 1 mL (140 mg total) into the skin every 14 (fourteen) days.     spironolactone 25 MG tablet  Commonly known as:  ALDACTONE  Take 1 tablet (25 mg total) by mouth once daily.     valACYclovir 500 MG tablet  Commonly known as: VALTREX  Take 500 mg by mouth daily as needed.            STOP taking these medications      cilostazoL 100 MG Tab  Commonly known as: PLETAL              Indwelling Lines/Drains at time of discharge:   Lines/Drains/Airways       None                       Time spent on the discharge of patient: 35 minutes         Amandeep Bustamante MD  Department of Hospital Medicine  [facility] - Acute Medical Stepdown

## 2025-06-20 NOTE — PLAN OF CARE
[facility]  Discharge Final Note    Primary Care Provider: Juju Wallace MD    Expected Discharge Date: 6/20/2025    Final Discharge Note (most recent)       Final Note - 06/20/25 1548          Final Note    Assessment Type Final Discharge Note (P)      Anticipated Discharge Disposition Home or Self Care (P)      What phone number can be called within the next 1-3 days to see how you are doing after discharge? [phone] (P)      Hospital Resources/Appts/Education Provided Provided patient/caregiver with written discharge plan information;Appointments scheduled and added to AVS (P)         Post-Acute Status    Discharge Delays None known at this time (P)                           Contact Info       [facility]   Specialty: Internal Medicine    [address] [facility]  [address]  [facility] [address]       Next Steps: Follow up          Patient being discharged to self care.  No discharge needs.    Boogie Schneider, RN, Bsn

## 2025-06-20 NOTE — ASSESSMENT & PLAN NOTE
Pt admitted with NSTEMI. Troponin elevated at 701. Associated Substernal chest pain that radiates to back, neck and left arm - resolved with NTG.   LHC on 6/18 significant for RCA 90, LAD 90   Echo showed preserve LVEF 60-65 and no significant valvulopathy.     Plan  -Plavix load on 6/18, Will stop Plavix on 6/20 pending CABG scheduled for 6/26/25  -Continue ASA and Zetia. Pt unable to tolerate statins.   -Monitor for repeat symptoms   -Chest CT as per CTS   - Will DC her today and she follows with CTS

## 2025-06-20 NOTE — PROGRESS NOTES
Pt given verbal instructions for surgery via phone call and sent written instructions via the portal.    Final discharge medication list is not available yet so reviewed current med list in chart.  Instructed patient to stop Plavix and Pletal.  She SHOULD continue asprin.    Instructed her to take her last dose of amlodipine and lorsartan-HCTZ on Sunday, June 22nd and then hold in preparation for surgery.    Spoke with SANCHO Brandon who will review discharge med list tomorrow and let patient know if there is anything else she should stop taking.      PREPARING FOR SURGERY    Your surgery has been scheduled for:    Day: Thursday   Date:  June 26    Arrival Time: 5a    Start Time: 7a    You should report to the second floor surgery center, located on the [facility] side of the second [facility] of the [facility]. The phone number is [phone].       You will report to the [facility], Second Floor - Day  Surgery Center, [facility].  The closest entry to the [facility] Center is the main hospital front doors located directly across from the [facility] statue on the [facility] side of the building.  Use the E elevators inside the hospital front entry door to go to the 2nd floor for surgery check-in.  Free parking is located at the far end of the [facility] property, just past the [facility], int multi-level parking garage. If you park in the garage, use the Gold Atrium elevators to the second floor and walk down the long hallway toward the Day of Surgery Family Waiting sign.  Please check in with the  when you arrive at the 2nd floor Surgery Center waiting area. Please have one responsible family member/friend also check in with their name, relationship and cell phone number to be used as a point of contact while you are in the operating room.  The Surgery Center nurse will call you back shortly after  check-in to prepare you for surgery. A familymember will be able to visit with you when your pre-operative preparations are complete. It usually takes 45 minutes to one hour to complete this process.    Night Before Surgery:   Eat a light supper   DO NOT EAT ANYTHING AFTER MIDNIGHT: including gum, hard candy, mints or chewing tobacco. You may have CLEAR LIQUIDS (Gatorade, water, soda, black coffe or tea (no milk or creamer, and clear juices) until 2am   Take a shower:  o Shampoo your hair with your regular shampoo and wash your body with your regular soap  o After washing with regular soap, wash with one pack of Hibicleans. Wash your body from the neck down to the knees and all the way around. Do not wash genitals with Hibiclens. Wait 3 minutes and then rinse off.  o Use a fresh, clean towel to dry off and wear clean night clothes to bed  o Do not apply deodorant, lotion, perfume, or powder after shower.    Morning of Surgery:    Take another shower following the same steps as last night    Do not apply deodorant, lotion, perfume, or powder after shower    You may brush your teeth and rinse your mouth, do not swallow any water    Wear comfortable clothes, such as a button front shirt and loose-fitting pants    Leave all jewelry, including body piercings and valuables at home  No makeup  Hairpins and clasps must be removed before going into the operating room    Please bring/wear your glasses, contacts, hearing aids, dentures - they will be removed before going to the operating room and be given to your family for safe keeping until they visit you in the SICU (Be sure to bring the cases for your glasses, contacts, hearing aids)    If you have an implantable device, such as a pacemaker or AICD, please bring the device information card     Morning of Surgery Medications:    If you are diabetic and taking medication for diabetes, the pre-op center nurses will call you with instructions about your diabetic medications  Diabetes Medication Notes:   Follow the diabetes medication directions given to you by the nurse at your Anesthesia      Other medication to take on the morning of surgery with a small sip of water: NONE AT THIS TIME.     Packing for the Hospital:    One set of light weight, loose clothes with a button front shirt and snug footwear to wear home    Your cell phone and a long charging cable    Special toiletries you may want (essentials are provided by the hospital)    If you use a CPAP machine have your family bring it up to your room on day 2 or 3 of your hospital stay      Reviewed home care instructions with patient. A copy of these instructions were sent through patient portal.    Instructed patient to call the clinic or send a portal message with any questions. Explained someone will call her Wednesday morning to confirm arrival time for surgery.    Julie Haase RN  [facility] Nurse Navigator [phone]

## 2025-06-20 NOTE — TELEPHONE ENCOUNTER
Returned call to patient and reviewed pre-op instructions and med list.    Julie Haase RN  CTS Nurse Navigator [phone]          Copied from CRM #[id number]. Topic: General Inquiry - Return Call  >> 2025  3:46 PM Taylor wrote:  Pt is returning a missed call from someone in the office and is asking for a return call back soon. Thanks.         Reason for call: returning call in regards to upcoming procedure              Patient requesting call back or [facility] [phone]

## 2025-06-20 NOTE — ASSESSMENT & PLAN NOTE
Patients blood pressure range in the last 24 hours was: BP  Min: 110/55  Max: 186/81.The patient's inpatient anti-hypertensive regimen is listed below:  Current Antihypertensives  nitroGLYCERIN SL tablet 0.4 mg, Every 5 min PRN, Sublingual  amLODIPine tablet 5 mg, Daily, Oral  losartan-hydrochlorothiazide 100-25 mg per tablet 1 tablet, Daily, Oral  spironolactone split tablet 25 mg, Daily, Oral  nitroGLYCERIN (NITROSTAT) SL tablet, Every 5 min PRN, Sublingual    Plan  - BP is controlled, no changes needed to their regimen

## 2025-06-20 NOTE — PROGRESS NOTES
PLEASE NOTE THAT PATIENT WILL BE CONSENTED ON THE MORNING OF SURGERY.         06/20/25 0004   Pre-Operative Teaching Checklist   Pre-Op Home Scrubs and/or clip Complete   Pre-Op Home Treatments (bowl prep, antibiotic prep) Complete   Pre-Operative Teaching Instructions Complete;Oral;Written;Patient   Outpatients - ride home N/A   Verbalizes Understanding of Pre-Operative Instructions Complete   Arrangements for Special Needs N/A   Pre-Op Medical Evaluation By Complete   Pre-Op Medical Evaluation Status at the Time of Teaching Complete   Blood Bank Orders Comments  (Done AM of surgery)   Operative Consent Comments  (PT TO BE CONSENTED AM OF SURGERY)   Blood Consent Comments  (PT TO BE CONSENTED AM OF SURGERY)   Pain Management Complete   Pre-Operative Instructions   Bedtime Medication/Morning Medication Complete   NPO after Complete   Family Lounge Complete   Post Operative Instructions Given Preoperatively Complete   Verbalizes Understanding of Pre-Operative Instructions Complete   Anticoag instructions Complete       Julie Haase RN  CTS Nurse Navigator [phone]       0

## 2025-06-21 ENCOUNTER — HOSPITAL ENCOUNTER (OUTPATIENT)
Facility: HOSPITAL | Age: 70
Discharge: HOME | End: 2025-06-23
Attending: EMERGENCY MEDICINE | Admitting: HOSPITALIST
Payer: MEDICARE

## 2025-06-21 DIAGNOSIS — I47.10 SVT (SUPRAVENTRICULAR TACHYCARDIA): Primary | ICD-10-CM

## 2025-06-21 DIAGNOSIS — R07.9 CHEST PAIN: ICD-10-CM

## 2025-06-21 DIAGNOSIS — R00.0 TACHYCARDIA: ICD-10-CM

## 2025-06-21 LAB
ABSOLUTE EOSINOPHIL (OHS): 0.22 K/UL
ABSOLUTE MONOCYTE (OHS): 1.34 K/UL (ref 0.3–1)
ABSOLUTE NEUTROPHIL COUNT (OHS): 6.83 K/UL (ref 1.8–7.7)
ALBUMIN SERPL BCP-MCNC: 4.3 G/DL (ref 3.5–5.2)
ALP SERPL-CCNC: 102 UNIT/L (ref 40–150)
ALT SERPL W/O P-5'-P-CCNC: 27 UNIT/L (ref 10–44)
ANION GAP (OHS): 14 MMOL/L (ref 8–16)
AST SERPL-CCNC: 28 UNIT/L (ref 11–45)
BASOPHILS # BLD AUTO: 0.06 K/UL
BASOPHILS NFR BLD AUTO: 0.5 %
BILIRUB SERPL-MCNC: 0.5 MG/DL (ref 0.1–1)
BNP SERPL-MCNC: 93 PG/ML (ref 0–99)
BUN SERPL-MCNC: 29 MG/DL (ref 8–23)
CALCIUM SERPL-MCNC: 9.5 MG/DL (ref 8.7–10.5)
CHLORIDE SERPL-SCNC: 106 MMOL/L (ref 95–110)
CO2 SERPL-SCNC: 23 MMOL/L (ref 23–29)
CREAT SERPL-MCNC: 1.2 MG/DL (ref 0.5–1.4)
ERYTHROCYTE [DISTWIDTH] IN BLOOD BY AUTOMATED COUNT: 17.2 % (ref 11.5–14.5)
GFR SERPLBLD CREATININE-BSD FMLA CKD-EPI: 48 ML/MIN/1.73/M2
GLUCOSE SERPL-MCNC: 126 MG/DL (ref 70–110)
HCT VFR BLD AUTO: 31.9 % (ref 37–48.5)
HGB BLD-MCNC: 10 GM/DL (ref 12–16)
IMM GRANULOCYTES # BLD AUTO: 0.04 K/UL (ref 0–0.04)
IMM GRANULOCYTES NFR BLD AUTO: 0.4 % (ref 0–0.5)
LYMPHOCYTES # BLD AUTO: 2.56 K/UL (ref 1–4.8)
MCH RBC QN AUTO: 19.7 PG (ref 27–31)
MCHC RBC AUTO-ENTMCNC: 31.3 G/DL (ref 32–36)
MCV RBC AUTO: 63 FL (ref 82–98)
NUCLEATED RBC (/100WBC) (OHS): 0 /100 WBC
PLATELET # BLD AUTO: 345 K/UL (ref 150–450)
PMV BLD AUTO: 10.2 FL (ref 9.2–12.9)
POTASSIUM SERPL-SCNC: 3.8 MMOL/L (ref 3.5–5.1)
PROT SERPL-MCNC: 6.4 GM/DL (ref 6–8.4)
RBC # BLD AUTO: 5.07 M/UL (ref 4–5.4)
RELATIVE EOSINOPHIL (OHS): 2 %
RELATIVE LYMPHOCYTE (OHS): 23.2 % (ref 18–48)
RELATIVE MONOCYTE (OHS): 12.1 % (ref 4–15)
RELATIVE NEUTROPHIL (OHS): 61.8 % (ref 38–73)
SODIUM SERPL-SCNC: 143 MMOL/L (ref 136–145)
TROPONIN I SERPL HS-MCNC: 195 NG/L
TROPONIN I SERPL HS-MCNC: 205 NG/L
WBC # BLD AUTO: 11.05 K/UL (ref 3.9–12.7)

## 2025-06-21 PROCEDURE — 80053 COMPREHEN METABOLIC PANEL: CPT

## 2025-06-21 PROCEDURE — 85025 COMPLETE CBC W/AUTO DIFF WBC: CPT

## 2025-06-21 PROCEDURE — 99285 EMERGENCY DEPT VISIT HI MDM: CPT | Mod: 25

## 2025-06-21 PROCEDURE — 83880 ASSAY OF NATRIURETIC PEPTIDE: CPT

## 2025-06-21 PROCEDURE — 84484 ASSAY OF TROPONIN QUANT: CPT

## 2025-06-21 PROCEDURE — 93005 ELECTROCARDIOGRAM TRACING: CPT

## 2025-06-21 PROCEDURE — 93010 ELECTROCARDIOGRAM REPORT: CPT | Mod: ,,, | Performed by: INTERNAL MEDICINE

## 2025-06-22 PROBLEM — I47.10 SVT (SUPRAVENTRICULAR TACHYCARDIA): Status: ACTIVE | Noted: 2025-06-22

## 2025-06-22 LAB
ABSOLUTE EOSINOPHIL (OHS): 0.23 K/UL
ABSOLUTE MONOCYTE (OHS): 1.04 K/UL (ref 0.3–1)
ABSOLUTE NEUTROPHIL COUNT (OHS): 5.68 K/UL (ref 1.8–7.7)
ANION GAP (OHS): 12 MMOL/L (ref 8–16)
BASOPHILS # BLD AUTO: 0.03 K/UL
BASOPHILS NFR BLD AUTO: 0.3 %
BUN SERPL-MCNC: 28 MG/DL (ref 8–23)
CALCIUM SERPL-MCNC: 9.2 MG/DL (ref 8.7–10.5)
CHLORIDE SERPL-SCNC: 109 MMOL/L (ref 95–110)
CO2 SERPL-SCNC: 21 MMOL/L (ref 23–29)
CREAT SERPL-MCNC: 1 MG/DL (ref 0.5–1.4)
ERYTHROCYTE [DISTWIDTH] IN BLOOD BY AUTOMATED COUNT: 16.3 % (ref 11.5–14.5)
GFR SERPLBLD CREATININE-BSD FMLA CKD-EPI: 60 ML/MIN/1.73/M2
GLUCOSE SERPL-MCNC: 114 MG/DL (ref 70–110)
HCT VFR BLD AUTO: 29.3 % (ref 37–48.5)
HGB BLD-MCNC: 8.9 GM/DL (ref 12–16)
IMM GRANULOCYTES # BLD AUTO: 0.03 K/UL (ref 0–0.04)
IMM GRANULOCYTES NFR BLD AUTO: 0.3 % (ref 0–0.5)
LYMPHOCYTES # BLD AUTO: 1.93 K/UL (ref 1–4.8)
MAGNESIUM SERPL-MCNC: 2 MG/DL (ref 1.6–2.6)
MCH RBC QN AUTO: 19.1 PG (ref 27–31)
MCHC RBC AUTO-ENTMCNC: 30.4 G/DL (ref 32–36)
MCV RBC AUTO: 63 FL (ref 82–98)
NUCLEATED RBC (/100WBC) (OHS): 0 /100 WBC
OHS QRS DURATION: 138 MS
OHS QTC CALCULATION: 491 MS
PHOSPHATE SERPL-MCNC: 4.8 MG/DL (ref 2.7–4.5)
PLATELET # BLD AUTO: 295 K/UL (ref 150–450)
PMV BLD AUTO: 10 FL (ref 9.2–12.9)
POTASSIUM SERPL-SCNC: 4 MMOL/L (ref 3.5–5.1)
RBC # BLD AUTO: 4.65 M/UL (ref 4–5.4)
RELATIVE EOSINOPHIL (OHS): 2.6 %
RELATIVE LYMPHOCYTE (OHS): 21.6 % (ref 18–48)
RELATIVE MONOCYTE (OHS): 11.6 % (ref 4–15)
RELATIVE NEUTROPHIL (OHS): 63.6 % (ref 38–73)
SODIUM SERPL-SCNC: 142 MMOL/L (ref 136–145)
TROPONIN I SERPL HS-MCNC: 222 NG/L
WBC # BLD AUTO: 8.94 K/UL (ref 3.9–12.7)

## 2025-06-22 PROCEDURE — 94761 N-INVAS EAR/PLS OXIMETRY MLT: CPT

## 2025-06-22 PROCEDURE — G0378 HOSPITAL OBSERVATION PER HR: HCPCS

## 2025-06-22 PROCEDURE — 83735 ASSAY OF MAGNESIUM: CPT

## 2025-06-22 PROCEDURE — 80048 BASIC METABOLIC PNL TOTAL CA: CPT

## 2025-06-22 PROCEDURE — 84100 ASSAY OF PHOSPHORUS: CPT

## 2025-06-22 PROCEDURE — 84484 ASSAY OF TROPONIN QUANT: CPT

## 2025-06-22 PROCEDURE — 25000003 PHARM REV CODE 250

## 2025-06-22 PROCEDURE — 25000003 PHARM REV CODE 250: Performed by: HOSPITALIST

## 2025-06-22 PROCEDURE — 99900035 HC TECH TIME PER 15 MIN (STAT)

## 2025-06-22 PROCEDURE — 85025 COMPLETE CBC W/AUTO DIFF WBC: CPT

## 2025-06-22 RX ORDER — SODIUM CHLORIDE 0.9 % (FLUSH) 0.9 %
10 SYRINGE (ML) INJECTION
Status: DISCONTINUED | OUTPATIENT
Start: 2025-06-22 | End: 2025-06-23 | Stop reason: HOSPADM

## 2025-06-22 RX ORDER — ASPIRIN 81 MG/1
81 TABLET ORAL DAILY
Status: DISCONTINUED | OUTPATIENT
Start: 2025-06-22 | End: 2025-06-23 | Stop reason: HOSPADM

## 2025-06-22 RX ORDER — SPIRONOLACTONE 25 MG/1
25 TABLET ORAL DAILY
Status: DISCONTINUED | OUTPATIENT
Start: 2025-06-22 | End: 2025-06-23 | Stop reason: HOSPADM

## 2025-06-22 RX ORDER — CILOSTAZOL 50 MG/1
100 TABLET ORAL 2 TIMES DAILY
Status: DISCONTINUED | OUTPATIENT
Start: 2025-06-22 | End: 2025-06-22

## 2025-06-22 RX ORDER — NALOXONE HCL 0.4 MG/ML
0.02 VIAL (ML) INJECTION
Status: DISCONTINUED | OUTPATIENT
Start: 2025-06-22 | End: 2025-06-23 | Stop reason: HOSPADM

## 2025-06-22 RX ORDER — ENOXAPARIN SODIUM 100 MG/ML
40 INJECTION SUBCUTANEOUS EVERY 24 HOURS
Status: DISCONTINUED | OUTPATIENT
Start: 2025-06-22 | End: 2025-06-23 | Stop reason: HOSPADM

## 2025-06-22 RX ORDER — EZETIMIBE 10 MG/1
10 TABLET ORAL DAILY
Status: DISCONTINUED | OUTPATIENT
Start: 2025-06-22 | End: 2025-06-23 | Stop reason: HOSPADM

## 2025-06-22 RX ORDER — TALC
6 POWDER (GRAM) TOPICAL NIGHTLY PRN
Status: DISCONTINUED | OUTPATIENT
Start: 2025-06-22 | End: 2025-06-23 | Stop reason: HOSPADM

## 2025-06-22 RX ORDER — METOPROLOL TARTRATE 25 MG/1
25 TABLET, FILM COATED ORAL 2 TIMES DAILY
Status: DISCONTINUED | OUTPATIENT
Start: 2025-06-22 | End: 2025-06-23 | Stop reason: HOSPADM

## 2025-06-22 RX ORDER — ALUMINUM HYDROXIDE, MAGNESIUM HYDROXIDE, AND SIMETHICONE 1200; 120; 1200 MG/30ML; MG/30ML; MG/30ML
30 SUSPENSION ORAL 4 TIMES DAILY PRN
Status: DISCONTINUED | OUTPATIENT
Start: 2025-06-22 | End: 2025-06-23 | Stop reason: HOSPADM

## 2025-06-22 RX ORDER — CLOPIDOGREL BISULFATE 75 MG/1
75 TABLET ORAL DAILY
Status: DISCONTINUED | OUTPATIENT
Start: 2025-06-22 | End: 2025-06-22

## 2025-06-22 RX ORDER — GLUCAGON 1 MG
1 KIT INJECTION
Status: DISCONTINUED | OUTPATIENT
Start: 2025-06-22 | End: 2025-06-23 | Stop reason: HOSPADM

## 2025-06-22 RX ORDER — IBUPROFEN 200 MG
24 TABLET ORAL
Status: DISCONTINUED | OUTPATIENT
Start: 2025-06-22 | End: 2025-06-23 | Stop reason: HOSPADM

## 2025-06-22 RX ORDER — ACETAMINOPHEN 325 MG/1
650 TABLET ORAL EVERY 8 HOURS PRN
Status: DISCONTINUED | OUTPATIENT
Start: 2025-06-22 | End: 2025-06-23 | Stop reason: HOSPADM

## 2025-06-22 RX ORDER — NITROGLYCERIN 0.4 MG/1
0.4 TABLET SUBLINGUAL EVERY 5 MIN PRN
Status: DISCONTINUED | OUTPATIENT
Start: 2025-06-22 | End: 2025-06-23 | Stop reason: HOSPADM

## 2025-06-22 RX ORDER — AMLODIPINE BESYLATE 5 MG/1
5 TABLET ORAL DAILY
Status: DISCONTINUED | OUTPATIENT
Start: 2025-06-22 | End: 2025-06-23 | Stop reason: HOSPADM

## 2025-06-22 RX ORDER — ACETAMINOPHEN 325 MG/1
650 TABLET ORAL EVERY 4 HOURS PRN
Status: DISCONTINUED | OUTPATIENT
Start: 2025-06-22 | End: 2025-06-23 | Stop reason: HOSPADM

## 2025-06-22 RX ORDER — SENNOSIDES 8.6 MG/1
8.6 TABLET ORAL 2 TIMES DAILY
Status: DISCONTINUED | OUTPATIENT
Start: 2025-06-22 | End: 2025-06-23 | Stop reason: HOSPADM

## 2025-06-22 RX ORDER — ONDANSETRON 8 MG/1
8 TABLET, ORALLY DISINTEGRATING ORAL EVERY 8 HOURS PRN
Status: DISCONTINUED | OUTPATIENT
Start: 2025-06-22 | End: 2025-06-23 | Stop reason: HOSPADM

## 2025-06-22 RX ORDER — IPRATROPIUM BROMIDE AND ALBUTEROL SULFATE 2.5; .5 MG/3ML; MG/3ML
3 SOLUTION RESPIRATORY (INHALATION) EVERY 6 HOURS PRN
Status: DISCONTINUED | OUTPATIENT
Start: 2025-06-22 | End: 2025-06-23 | Stop reason: HOSPADM

## 2025-06-22 RX ORDER — LOSARTAN POTASSIUM AND HYDROCHLOROTHIAZIDE 25; 100 MG/1; MG/1
1 TABLET ORAL DAILY
Status: DISCONTINUED | OUTPATIENT
Start: 2025-06-22 | End: 2025-06-23 | Stop reason: HOSPADM

## 2025-06-22 RX ORDER — IBUPROFEN 200 MG
16 TABLET ORAL
Status: DISCONTINUED | OUTPATIENT
Start: 2025-06-22 | End: 2025-06-23 | Stop reason: HOSPADM

## 2025-06-22 RX ORDER — FAMOTIDINE 20 MG/1
20 TABLET, FILM COATED ORAL 2 TIMES DAILY
Status: DISCONTINUED | OUTPATIENT
Start: 2025-06-22 | End: 2025-06-23 | Stop reason: HOSPADM

## 2025-06-22 RX ORDER — SODIUM CHLORIDE 0.9 % (FLUSH) 0.9 %
10 SYRINGE (ML) INJECTION EVERY 12 HOURS PRN
Status: DISCONTINUED | OUTPATIENT
Start: 2025-06-22 | End: 2025-06-23 | Stop reason: HOSPADM

## 2025-06-22 RX ORDER — CETIRIZINE HYDROCHLORIDE 5 MG/1
5 TABLET ORAL NIGHTLY
Status: DISCONTINUED | OUTPATIENT
Start: 2025-06-22 | End: 2025-06-23 | Stop reason: HOSPADM

## 2025-06-22 RX ADMIN — FAMOTIDINE 20 MG: 20 TABLET, FILM COATED ORAL at 11:06

## 2025-06-22 RX ADMIN — METOPROLOL TARTRATE 25 MG: 25 TABLET, FILM COATED ORAL at 08:06

## 2025-06-22 RX ADMIN — SENNOSIDES 8.6 MG: 8.6 TABLET, FILM COATED ORAL at 09:06

## 2025-06-22 RX ADMIN — CILOSTAZOL 100 MG: 50 TABLET ORAL at 09:06

## 2025-06-22 RX ADMIN — LOSARTAN POTASSIUM AND HYDROCHLOROTHIAZIDE 1 TABLET: 100; 25 TABLET, FILM COATED ORAL at 09:06

## 2025-06-22 RX ADMIN — AMLODIPINE BESYLATE 5 MG: 5 TABLET ORAL at 09:06

## 2025-06-22 RX ADMIN — METOPROLOL TARTRATE 25 MG: 25 TABLET, FILM COATED ORAL at 11:06

## 2025-06-22 RX ADMIN — EZETIMIBE 10 MG: 10 TABLET ORAL at 09:06

## 2025-06-22 RX ADMIN — SENNOSIDES 8.6 MG: 8.6 TABLET, FILM COATED ORAL at 08:06

## 2025-06-22 RX ADMIN — ASPIRIN 81 MG: 81 TABLET, COATED ORAL at 09:06

## 2025-06-22 RX ADMIN — FAMOTIDINE 20 MG: 20 TABLET, FILM COATED ORAL at 08:06

## 2025-06-22 NOTE — ASSESSMENT & PLAN NOTE
Patients blood pressure range in the last 24 hours was: BP  Min: 110/55  Max: 186/81.The patient's inpatient anti-hypertensive regimen is listed below:  Current Antihypertensives  amLODIPine tablet 5 mg, Daily, Oral  losartan-hydrochlorothiazide 100-25 mg per tablet 1 tablet, Daily, Oral  nitroGLYCERIN SL tablet 0.4 mg, Every 5 min PRN, Sublingual  spironolactone tablet 25 mg, Daily, Oral    Plan  - BP is controlled, no changes needed to their regimen

## 2025-06-22 NOTE — PLAN OF CARE
Problem: Adult Inpatient Plan of Care  Goal: Plan of Care Review  Outcome: Progressing  Goal: Patient-Specific Goal (Individualized)  Outcome: Progressing  Goal: Absence of Hospital-Acquired Illness or Injury  Outcome: Progressing  Goal: Optimal Comfort and Wellbeing  Outcome: Progressing  Goal: Readiness for Transition of Care  Outcome: Progressing      Patient alert and oriented x 4. Room air. VSS. HR monitored closely. No complaint of pain or discomfort. Family remains at bedside.

## 2025-06-22 NOTE — ED PROVIDER NOTES
Encounter Date: 6/21/2025       History     Chief Complaint   Patient presents with    Fatigue     Complaining of odd feeling and weakness. +nausea without vomiting. Scheduled for bypass on Tues.  bpm on EMS arrival. Adenosine 6mg IV.     69-year-old female presents for tachycardia.  Differential diagnosis in this patient includes but isn't limited to ACS, SVT, electrolyte derangement, CHF exacerbation.  CHF exacerbation less likely as the patient recently had an echo, patient does not have chest pain, no EKG changes however does have an elevated troponin likely secondary to demand.  Patient does not have chest pain at this time, no EKG changes.  Sinus tachycardia on independent interpretation.  Given the patient is high-risk and has a scheduled open heart surgery, we would like to bring her in for cardiac arrhythmia.  I discussed case with hospital medicine who agreed to admit the patient for telemetry.          Review of patient's allergies indicates:   Allergen Reactions    Statins-hmg-coa reductase inhibitors      Myalgias       Past Medical History:   Diagnosis Date    Anticoagulant long-term use     Arthritis     General anesthetics causing adverse effect in therapeutic use     slow to wake up    Hypertension     PONV (postoperative nausea and vomiting)     Stroke      Past Surgical History:   Procedure Laterality Date    ANGIOGRAM, CORONARY, WITH LEFT HEART CATHETERIZATION N/A 6/18/2025    Procedure: Angiogram, Coronary, with Left Heart Cath;  Surgeon: Zechariah Toscano MD;  Location: [facility] CATH LAB;  Service: Cardiology;  Laterality: N/A;    ANGIOGRAPHY OF LOWER EXTREMITY Right 12/27/2022    Procedure: Angiogram Extremity Unilateral;  Surgeon: Fidencio Valle MD;  Location: [facility] OR [address] [place] [address];  Service: Vascular;  Laterality: Right;  RLE diagnostic angio & R SFA femoral endarterectomy w/ PTA & stenting  WDP258.86  gycm2 149.22  fluoro time 5.4  contrast vol    AORTOGRAPHY N/A 12/27/2022    Procedure:  AORTOGRAM;  Surgeon: Fidencio Valle MD;  Location: [address];  Service: Vascular;  Laterality: N/A;    AORTOGRAPHY WITH SERIALOGRAPHY  5/16/2022    Procedure: AORTOGRAM, WITH SERIALOGRAPHY. left femoral access;  Surgeon: Phil Pitts MD;  Location: [facility] CATH;  Service: Cardiology;;    ARTHROSCOPY OF KNEE      AUGMENTATION OF BREAST      COSMETIC SURGERY      ENDARTERECTOMY OF FEMORAL ARTERY N/A 12/27/2022    Procedure: ENDARTERECTOMY, FEMORAL;  Surgeon: Fidencio Valle MD;  Location: [address];  Service: Vascular;  Laterality: N/A;  RLE diagnostic angio & R SFA femoral endarterectomy w/ PTA & stenting    EYE SURGERY  12/18/17 & 10/23/17    Cataracts    HYSTERECTOMY      JOINT REPLACEMENT  October 2019    Knee    NASAL SEPTOPLASTY      VENTRICULOGRAM, LEFT  6/18/2025    Procedure: Ventriculogram, Left;  Surgeon: Zechariah Toscano MD;  Location: [facility] CATH LAB;  Service: Cardiology;;     Family History   Problem Relation Name Age of Onset    Heart attack Mother Lary Garcia     Heart disease Mother Lary Garcia     COPD Mother Lary Garcia         Smoker    Hypertension Mother Lary Garcia     Miscarriages / Stillbirths Mother Lary Garcia     Heart attack Father      Heart disease Father       Social History[1]  Review of Systems    Physical Exam     Initial Vitals [06/21/25 2158]   BP Pulse Resp Temp SpO2   (!) 147/86 (!) 111 18 98.2 °F (36.8 °C) 97 %      MAP       --         Physical Exam    Nursing note and vitals reviewed.  Constitutional: She appears well-developed and well-nourished.   HENT:   Head: Normocephalic and atraumatic.   Right Ear: External ear normal.   Left Ear: External ear normal.   Eyes: EOM are normal. Right eye exhibits no discharge. Left eye exhibits no discharge. No scleral icterus.   Neck: Neck supple. No tracheal deviation present. No JVD present.   Cardiovascular:  Normal heart sounds and intact distal pulses.     Exam reveals no gallop and no friction rub.        No murmur heard.  Pulses:       Radial pulses are 2+ on the right side and 2+ on the left side.        Dorsalis pedis pulses are 2+ on the right side and 2+ on the left side.   Pulmonary/Chest: Breath sounds normal. No stridor. No respiratory distress. She has no wheezes. She has no rhonchi. She has no rales. She exhibits no tenderness.   Abdominal: Abdomen is soft. She exhibits no distension and no mass. There is no abdominal tenderness. There is no rebound and no guarding.   Musculoskeletal:         General: Normal range of motion.      Cervical back: Neck supple.     Lymphadenopathy:     She has no cervical adenopathy.   Neurological: She is alert and oriented to person, place, and time. GCS score is 15.   Skin: Skin is warm and dry. Capillary refill takes less than 2 seconds.   Psychiatric: She has a normal mood and affect. Her behavior is normal. Judgment and thought content normal.         ED Course   Procedures  Labs Reviewed   COMPREHENSIVE METABOLIC PANEL - Abnormal       Result Value    Sodium 143      Potassium 3.8      Chloride 106      CO2 23      Glucose 126 (*)     BUN 29 (*)     Creatinine 1.2      Calcium 9.5      Protein Total 6.4      Albumin 4.3      Bilirubin Total 0.5            AST 28      ALT 27      Anion Gap 14      eGFR 48 (*)    TROPONIN I HIGH SENSITIVITY - Abnormal    Troponin High Sensitive 205 (*)    TROPONIN I HIGH SENSITIVITY - Abnormal    Troponin High Sensitive 195 (*)    CBC WITH DIFFERENTIAL - Abnormal    WBC 11.05      RBC 5.07      HGB 10.0 (*)     HCT 31.9 (*)     MCV 63 (*)     MCH 19.7 (*)     MCHC 31.3 (*)     RDW 17.2 (*)     Platelet Count 345      MPV 10.2      Nucleated RBC 0      Neut % 61.8      Lymph % 23.2      Mono % 12.1      Eos % 2.0      Basophil % 0.5      Imm Grans % 0.4      Neut # 6.83      Lymph # 2.56      Mono # 1.34 (*)     Eos # 0.22      Baso # 0.06      Imm Grans # 0.04     TROPONIN I HIGH SENSITIVITY - Abnormal    Troponin High Sensitive  "222 (*)    B-TYPE NATRIURETIC PEPTIDE - Normal    BNP 93     CBC W/ AUTO DIFFERENTIAL    Narrative:     The following orders were created for panel order CBC auto differential.  Procedure                               Abnormality         Status                     ---------                               -----------         ------                     CBC with Differential[[id number]]       Abnormal            Final result                 Please view results for these tests on the individual orders.          Imaging Results              X-Ray Chest AP Portable (Final result)  Result time 06/21/25 23:57:00      Final result by Nacho Clancy MD (06/21/25 23:57:00)                   Impression:      No acute cardiopulmonary finding identified on this single view.      Electronically signed by: Nacho Clancy MD  Date:    06/21/2025  Time:    23:57               Narrative:    EXAMINATION:  XR CHEST AP PORTABLE    CLINICAL HISTORY:  Provided history is "  Tachycardia, unspecified".    TECHNIQUE:  One view of the chest.    COMPARISON:  06/18/2025.    FINDINGS:  Cardiac wires overlie the chest.  Cardiomediastinal silhouette is not enlarged.  No focal consolidation.  No sizable pleural effusion.  No pneumothorax.                                       Medications - No data to display  Medical Decision Making  Amount and/or Complexity of Data Reviewed  Labs: ordered.  Radiology: ordered.               ED Course as of 06/22/25 0234   Sat Jun 21, 2025   2230 69-year-old female scheduled for open heart surgery this week presents to the emergency department after episode of chest pain and tachycardia.  She was instructed to come to the emergency department by her doctor she had any chest pain or tachycardia.  She was tachy in the 160s, was given adenosine by EMS.  At this time she is pain-free and less tachycardic, tachycardic to 114 on arrival.  She has been feeling well no nausea vomiting diarrhea no falls no trauma no " chest pain.  No cough.  Patient had an echo 2 days ago is not sure of the results. [KW]   Rosanna Jun 22, 2025   0052 Patient is a 69-year-old female past medical history recent open heart surgery that is presenting for palpitations.  Patient states that just prior to arrival she began to have palpitations and burping.  Patient states that she then used her home device, found that she was tachycardic in the 160s.  EMS was called, found the patient to be in SVT and given adenosine.  Patient states that she feels symptomatically improved at this time.  Patient denies any symptoms at this time.    Physical exam: Very well-appearing 69-year-old female, no distress, appropriately conversational.  Patient mildly tachycardic at 110.  Otherwise appears to be in sinus rhythm.  Benign cardiac, respiratory, abdominal exam.    Plan:  Consider risk factors, patient will be admitted for further evaluation.  Patient agrees with treatment and plan.    Attestation of Dr. Sher (Attending Physician):      I have reviewed the record and the patient care provided by the Resident provider and agree with the documented HPI, ROS, PE.  I also agree with the treatment plan and work up and I have performed an independent history / physical exam and MDM.   [RT]   0112 69-year-old female presents for tachycardia.  Differential diagnosis in this patient includes but isn't limited to ACS, SVT, electrolyte derangement, CHF exacerbation.  CHF exacerbation less likely as the patient recently had an echo, patient does not have chest pain, no EKG changes however does have an elevated troponin likely secondary to demand.  Patient does not have chest pain at this time, no EKG changes.  Sinus tachycardia on independent interpretation.  Given the patient is high-risk and has a scheduled open heart surgery, we would like to bring her in for cardiac arrhythmia.  I discussed case with hospital medicine who agreed to admit the patient for telemetry. [KW]      ED  Course User Index  [KW] Rickey Zuniga MD  [RT] Jude Sher MD                           Clinical Impression:  Final diagnoses:  [R07.9] Chest pain  [R00.0] Tachycardia          ED Disposition Condition    Observation                       [1]   Social History  Tobacco Use    Smoking status: Never    Smokeless tobacco: Never   Substance Use Topics    Alcohol use: Yes     Alcohol/week: 1.0 standard drink of alcohol     Types: 1 Unspecified drink type per week     Comment: Socially    Drug use: Never        Rickey Zuniga MD  Resident  06/22/25 0234       Jude Sher MD  06/22/25 0623

## 2025-06-22 NOTE — ED NOTES
Patient identifiers for Veronica Duque 69 y.o. female checked and correct.  Chief Complaint   Patient presents with    Fatigue     Complaining of odd feeling and weakness. +nausea without vomiting. Scheduled for bypass on Tues.  bpm on EMS arrival. Adenosine 6mg IV.     Past Medical History:   Diagnosis Date    Anticoagulant long-term use     Arthritis     General anesthetics causing adverse effect in therapeutic use     slow to wake up    Hypertension     PONV (postoperative nausea and vomiting)     Stroke      Allergies reported:   Review of patient's allergies indicates:   Allergen Reactions    Statins-hmg-coa reductase inhibitors      Myalgias         Appearance: Pt awake, alert & oriented to person, place & time. Pt in no acute distress at present time. Pt is clean and well groomed with clothes appropriately fastened.   Skin: Skin warm, dry & intact. Color consistent with ethnicity. Mucous membranes moist. Bruising located on right wrist noted.   Musculoskeletal: Patient moving all extremities well, no obvious swelling or deformities noted.   Respiratory: Respirations spontaneous, even, and non-labored. Visible chest rise noted. Airway is open and patent. No accessory muscle use noted.   Neurologic: Sensation is intact. Speech is clear and appropriate. Eyes open spontaneously, behavior appropriate to situation, follows commands, facial expression symmetrical, bilateral hand grasp equal and even, purposeful motor response noted.  Cardiac: All peripheral pulses present. No Bilateral lower extremity edema. Cap refill is <3 seconds. tachycardia  Abdomen: Abdomen soft, non distended, non tender to palpation.   : Pt voids independently, denies dysuria, hematuria, frequency.

## 2025-06-22 NOTE — ED NOTES
Assumed care of the patient. Report received from Angela MCKAY. Pt on continuous cardiac monitoring, continuous pulse oximetry, and automatic BP cuff cycling Q15min. Pt in hospital gown, side rails up X2, bed low and locked, and call light is placed within reach. No family/visitors at bedside at this time. Pt denies any complaints or needs.

## 2025-06-22 NOTE — SUBJECTIVE & OBJECTIVE
Past Medical History:   Diagnosis Date    Anticoagulant long-term use     Arthritis     General anesthetics causing adverse effect in therapeutic use     slow to wake up    Hypertension     PONV (postoperative nausea and vomiting)     Stroke        Past Surgical History:   Procedure Laterality Date    ANGIOGRAM, CORONARY, WITH LEFT HEART CATHETERIZATION N/A 6/18/2025    Procedure: Angiogram, Coronary, with Left Heart Cath;  Surgeon: Zechariah Toscano MD;  Location: [facility] CATH LAB;  Service: Cardiology;  Laterality: N/A;    ANGIOGRAPHY OF LOWER EXTREMITY Right 12/27/2022    Procedure: Angiogram Extremity Unilateral;  Surgeon: Fidencio Valle MD;  Location: [address];  Service: Vascular;  Laterality: Right;  RLE diagnostic angio & R SFA femoral endarterectomy w/ PTA & stenting  SML621.86  gycm2 149.22  fluoro time 5.4  contrast vol    AORTOGRAPHY N/A 12/27/2022    Procedure: AORTOGRAM;  Surgeon: Fidencio Valle MD;  Location: [address];  Service: Vascular;  Laterality: N/A;    AORTOGRAPHY WITH SERIALOGRAPHY  5/16/2022    Procedure: AORTOGRAM, WITH SERIALOGRAPHY. left femoral access;  Surgeon: Phil Pitts MD;  Location: [facility] CATH;  Service: Cardiology;;    ARTHROSCOPY OF KNEE      AUGMENTATION OF BREAST      COSMETIC SURGERY      ENDARTERECTOMY OF FEMORAL ARTERY N/A 12/27/2022    Procedure: ENDARTERECTOMY, FEMORAL;  Surgeon: Fidencio Valle MD;  Location: [address];  Service: Vascular;  Laterality: N/A;  RLE diagnostic angio & R SFA femoral endarterectomy w/ PTA & stenting    EYE SURGERY  12/18/17 & 10/23/17    Cataracts    HYSTERECTOMY      JOINT REPLACEMENT  October 2019    Knee    NASAL SEPTOPLASTY      VENTRICULOGRAM, LEFT  6/18/2025    Procedure: Ventriculogram, Left;  Surgeon: Zechariah Toscano MD;  Location: [facility] CATH LAB;  Service: Cardiology;;       Review of patient's allergies indicates:   Allergen Reactions    Statins-hmg-coa reductase inhibitors      Myalgias         No current  facility-administered medications on file prior to encounter.     Current Outpatient Medications on File Prior to Encounter   Medication Sig    ALPRAZolam (XANAX) 0.5 MG tablet Take 0.5 mg by mouth 2 (two) times daily.    amLODIPine (NORVASC) 5 MG tablet TAKE ONE TABLET BY MOUTH ONCE DAILY    aspirin (ECOTRIN) 81 MG EC tablet Take 81 mg by mouth once daily.    diclofenac sodium (VOLTAREN) 1 % Gel Apply 2 g topically once daily.    estradioL (ESTRACE) 0.01 % (0.1 mg/gram) vaginal cream INSERT ONE GRAM VAGINALLY MONDAY, WEDNESDAY AND FRIDAY    evolocumab (REPATHA SURECLICK) 140 mg/mL PnIj Inject 1 mL (140 mg total) into the skin every 14 (fourteen) days.    ezetimibe (ZETIA) 10 mg tablet TAKE ONE TABLET BY MOUTH ONCE DAILY    fluticasone propionate (FLONASE) 50 mcg/actuation nasal spray 1 spray (50 mcg total) by Each Nostril route once daily.    levocetirizine (XYZAL) 5 MG tablet Take 1 tablet (5 mg total) by mouth every evening.    losartan-hydrochlorothiazide 100-25 mg (HYZAAR) 100-25 mg per tablet TAKE ONE TABLET BY MOUTH DAILY    magnesium oxide (MAG-OX) 400 mg (241.3 mg magnesium) tablet Take 400 mg by mouth once daily. OTC, **pt unsure of dosage**    nitroGLYCERIN (NITROSTAT) 0.3 MG SL tablet Place 1 tablet (0.3 mg total) under the tongue every 5 (five) minutes as needed for Chest pain.    spironolactone (ALDACTONE) 25 MG tablet Take 1 tablet (25 mg total) by mouth once daily.    valACYclovir (VALTREX) 500 MG tablet Take 500 mg by mouth daily as needed.      Family History       Problem Relation (Age of Onset)    COPD Mother    Heart attack Mother, Father    Heart disease Mother, Father    Hypertension Mother    Miscarriages / Stillbirths Mother          Tobacco Use    Smoking status: Never    Smokeless tobacco: Never   Substance and Sexual Activity    Alcohol use: Yes     Alcohol/week: 1.0 standard drink of alcohol     Types: 1 Unspecified drink type per week     Comment: Socially    Drug use: Never    Sexual  activity: Yes     Partners: Male     Birth control/protection: Post-menopausal     Review of Systems   Constitutional:  Negative for chills, diaphoresis, fatigue and fever.   Eyes:  Negative for visual disturbance.   Respiratory:  Negative for cough, shortness of breath and wheezing.    Cardiovascular:  Positive for palpitations. Negative for chest pain and leg swelling.   Gastrointestinal:  Positive for nausea. Negative for abdominal pain, diarrhea and vomiting.   Genitourinary:  Negative for dysuria.   Skin:  Negative for color change.   Neurological:  Negative for light-headedness.   Psychiatric/Behavioral:  Negative for confusion and decreased concentration. The patient is nervous/anxious.      Objective:     Vital Signs (Most Recent):  Temp: 98.2 °F (36.8 °C) (06/22/25 0000)  Pulse: 93 (06/22/25 0300)  Resp: (!) 22 (06/22/25 0232)  BP: 127/61 (06/22/25 0300)  SpO2: (!) 94 % (06/22/25 0300) Vital Signs (24h Range):  Temp:  [98.2 °F (36.8 °C)] 98.2 °F (36.8 °C)  Pulse:  [] 93  Resp:  [18-28] 22  SpO2:  [92 %-99 %] 94 %  BP: (112-164)/(59-91) 127/61        There is no height or weight on file to calculate BMI.     Physical Exam  Vitals reviewed.   Constitutional:       Appearance: Normal appearance.   HENT:      Head: Normocephalic and atraumatic.      Mouth/Throat:      Mouth: Mucous membranes are moist.      Pharynx: Oropharynx is clear.   Eyes:      General: No scleral icterus.     Conjunctiva/sclera: Conjunctivae normal.   Cardiovascular:      Rate and Rhythm: Regular rhythm. Tachycardia present.      Pulses: Normal pulses.      Heart sounds: Normal heart sounds.   Pulmonary:      Effort: Pulmonary effort is normal. No respiratory distress.      Breath sounds: Normal breath sounds. No wheezing or rales.   Abdominal:      General: There is no distension.      Palpations: Abdomen is soft.      Tenderness: There is no abdominal tenderness. There is no guarding.   Musculoskeletal:      Right lower leg: No  edema.      Left lower leg: No edema.   Skin:     General: Skin is warm and dry.   Neurological:      Mental Status: She is alert. Mental status is at baseline.   Psychiatric:         Mood and Affect: Mood normal.         Behavior: Behavior normal.                Significant Labs: All pertinent labs within the past 24 hours have been reviewed.  CBC:   Recent Labs   Lab 06/20/25 0452 06/21/25 2216   WBC 8.82 11.05   HGB 9.1* 10.0*   HCT 29.3* 31.9*    345     CMP:   Recent Labs   Lab 06/20/25 0452 06/21/25 2216    143   K 3.7 3.8    106   CO2 22* 23    126*   BUN 19 29*   CREATININE 0.8 1.2   CALCIUM 8.5* 9.5   PROT  --  6.4   ALBUMIN  --  4.3   BILITOT  --  0.5   ALKPHOS  --  102   AST  --  28   ALT  --  27   ANIONGAP 10 14     Troponin:   Recent Labs   Lab 06/20/25 0452 06/21/25 2216 06/22/25  0144   TROPONINIHS 503* 205*  195* 222*       Significant Imaging: I have reviewed all pertinent imaging results/findings within the past 24 hours.  CXR: I have reviewed all pertinent results/findings within the past 24 hours and my personal findings are:  no acute abnormalities

## 2025-06-22 NOTE — ASSESSMENT & PLAN NOTE
Pt admitted with NSTEMI. Troponin elevated at 202 Associated Substernal chest pain that radiates to back, neck and left arm - resolved with NTG.   LHC on 6/18 significant for RCA 90, LAD 90   Echo showed preserve LVEF 60-65 and no significant valvulopathy.     Plan  -Continue ASA and Zetia. Pt unable to tolerate statins.   -Monitor for repeat symptoms

## 2025-06-22 NOTE — ASSESSMENT & PLAN NOTE
Noted by EMS. Rate of 160. Resolved with adenosine.  Denied chest pain and diaphoresis.    -If recurrence, adenosine  -Telemetry

## 2025-06-22 NOTE — PLAN OF CARE
[facility] - Acute Medical Stepdown  Initial Discharge Assessment       Primary Care Provider: Juju Wallace MD    Admission Diagnosis: Tachycardia [R00.0]  Chest pain [R07.9]    Admission Date: 6/21/2025  Expected Discharge Date:     Transition of Care Barriers: (P) None    Payor: [employer] MANAGED MEDICARE / Plan: [employer] MEDICARE HMO / Product Type: Capitation /     Extended Emergency Contact Information  Primary Emergency Contact: Adriana Holguin   [facility]  Home Phone: [phone]  Relation: Daughter    Discharge Plan A: (P) Home with family  Discharge Plan B: (P) Home      [facility] - [place] [facility] - [address]  [address]  [place] [address]  Phone: [phone] Fax: [phone]    [facility]  [address] [facility] [address]  Phone: [phone] Fax: [phone]      Initial Assessment (most recent)       Adult Discharge Assessment - 06/22/25 1101          Discharge Assessment    Assessment Type Discharge Planning Assessment (P)      Confirmed/corrected address, phone number and insurance Yes (P)      Confirmed Demographics Correct on Facesheet (P)      Source of Information patient (P)      People in Home spouse (P)      Do you expect to return to your current living situation? Yes (P)      Do you have help at home or someone to help you manage your care at home? Yes (P)      Prior to hospitilization cognitive status: Alert/Oriented (P)      Current cognitive status: Alert/Oriented (P)      Walking or Climbing Stairs Difficulty no (P)      Dressing/Bathing Difficulty no (P)      Equipment Currently Used at Home none (P)      Readmission within 30 days? No (P)      Patient currently being followed by outpatient case management? No (P)      Do you currently have service(s) that help you manage your care at home? No (P)      Do you take prescription medications? Yes (P)      Do you have prescription coverage? Yes (P)       Do you have any problems affording any of your prescribed medications? No (P)      Is the patient taking medications as prescribed? yes (P)      How do you get to doctors appointments? car, drives self (P)      Are you on dialysis? No (P)      Do you take coumadin? No (P)      Discharge Plan A Home with family (P)      Discharge Plan B Home (P)      DME Needed Upon Discharge  none (P)      Discharge Plan discussed with: Patient (P)      Transition of Care Barriers None (P)         Physical Activity    On average, how many days per week do you engage in moderate to strenuous exercise (like a brisk walk)? 0 days (P)      On average, how many minutes do you engage in exercise at this level? 0 min (P)         Financial Resource Strain    How hard is it for you to pay for the very basics like food, housing, medical care, and heating? Not very hard (P)         Housing Stability    In the last 12 months, was there a time when you were not able to pay the mortgage or rent on time? No (P)      At any time in the past 12 months, were you homeless or living in a shelter (including now)? No (P)         Transportation Needs    In the past 12 months, has lack of transportation kept you from medical appointments or from getting medications? No (P)      In the past 12 months, has lack of transportation kept you from meetings, work, or from getting things needed for daily living? No (P)         Food Insecurity    Within the past 12 months, you worried that your food would run out before you got the money to buy more. Never true (P)      Within the past 12 months, the food you bought just didn't last and you didn't have money to get more. Never true (P)         Stress    Do you feel stress - tense, restless, nervous, or anxious, or unable to sleep at night because your mind is troubled all the time - these days? Not at all (P)         Social Isolation    How often do you feel lonely or isolated from those around you?  Never (P)          Alcohol Use    Q1: How often do you have a drink containing alcohol? Monthly or less (P)      Q3: How often do you have six or more drinks on one occasion? Never (P)         Utilities    In the past 12 months has the electric, gas, oil, or water company threatened to shut off services in your home? No (P)         Health Literacy    How often do you need to have someone help you when you read instructions, pamphlets, or other written material from your doctor or pharmacy? Never (P)                         Met with patient bedside.  Pt lives in one story home with spouse.  No DME or home health services. Pt was on plavix until a few days ago.  Family will transport home at discharge.    Discharge Plan A and Plan B have been determined by review of patient's clinical status, future medical and therapeutic needs, and coverage/benefits for post-acute care in coordination with multidisciplinary team members.     Ijeoma Verdin RN, BSN  Case Management  [phone]

## 2025-06-22 NOTE — H&P
[facility] - Emergency Dept  Hospital Medicine  History & Physical    Patient Name: Veronica Duque  MRN: [id number]  Patient Class: OP- Observation  Admission Date: 6/21/2025  Attending Physician: Amandeep Bustamante MD   Primary Care Provider: Juju Wallace MD         Patient information was obtained from patient, past medical records, and ER records.     Subjective:     Principal Problem:SVT (supraventricular tachycardia)    Chief Complaint:   Chief Complaint   Patient presents with    Fatigue     Complaining of odd feeling and weakness. +nausea without vomiting. Scheduled for bypass on Tues.  bpm on EMS arrival. Adenosine 6mg IV.        HPI: Joe Duque is a 69-year-old female with hypertension, hyperlipidemia, PVD and PAD s/p R SFA stenting being admitted to hospital medicine for evaluation of tachycardia. She was recently admitted 6/18 to 6/20 for evaluation of chest pain. She underwent C day of admission that was significant for multi vessel CAD. Cardiology recommended cardiothoracic surgery consult. Plan for CABG on 6/26/25. Last dose of Plavix on 6/20/25. CT chest done as per CTS request. Today, she experienced tachycardia and stated she could feel her heartbeat in her abdomen. She continues to have associated nausea with dry heaves but no true vomiting and belching. She called EMS who noted that she was in SVT at 160 bpm. Adenosine 6mg IV was administered with improvement in symptoms. Denies chest pain, dizziness/lightheadedness, shortness of breath, diaphoresis, cough, congestion, fever or chills. Does not smoke or drink.     In the ED, /59 HR 94 RR 22 sats adequate on ambient air, afebrile. Labs notable for Hgb 10 Trop 205 -> 222. EKG showed sinus tachycardia with a RBBB. CXR showed no acute abnormality.    The patient was admitted to Hospital Medicine for further workup and management.    Past Medical History:   Diagnosis Date    Anticoagulant long-term use     Arthritis      General anesthetics causing adverse effect in therapeutic use     slow to wake up    Hypertension     PONV (postoperative nausea and vomiting)     Stroke        Past Surgical History:   Procedure Laterality Date    ANGIOGRAM, CORONARY, WITH LEFT HEART CATHETERIZATION N/A 6/18/2025    Procedure: Angiogram, Coronary, with Left Heart Cath;  Surgeon: Zechariah Toscano MD;  Location: [facility] CATH LAB;  Service: Cardiology;  Laterality: N/A;    ANGIOGRAPHY OF LOWER EXTREMITY Right 12/27/2022    Procedure: Angiogram Extremity Unilateral;  Surgeon: Fidencio Valle MD;  Location: [address];  Service: Vascular;  Laterality: Right;  RLE diagnostic angio & R SFA femoral endarterectomy w/ PTA & stenting  [id number]  gycm2 149.22  fluoro time 5.4  contrast vol    AORTOGRAPHY N/A 12/27/2022    Procedure: AORTOGRAM;  Surgeon: Fidencio Valle MD;  Location: [address];  Service: Vascular;  Laterality: N/A;    AORTOGRAPHY WITH SERIALOGRAPHY  5/16/2022    Procedure: AORTOGRAM, WITH SERIALOGRAPHY. left femoral access;  Surgeon: Phil Pitts MD;  Location: [facility] CATH;  Service: Cardiology;;    ARTHROSCOPY OF KNEE      AUGMENTATION OF BREAST      COSMETIC SURGERY      ENDARTERECTOMY OF FEMORAL ARTERY N/A 12/27/2022    Procedure: ENDARTERECTOMY, FEMORAL;  Surgeon: Fidencio Valle MD;  Location: [address];  Service: Vascular;  Laterality: N/A;  RLE diagnostic angio & R SFA femoral endarterectomy w/ PTA & stenting    EYE SURGERY  12/18/17 & 10/23/17    Cataracts    HYSTERECTOMY      JOINT REPLACEMENT  October 2019    Knee    NASAL SEPTOPLASTY      VENTRICULOGRAM, LEFT  6/18/2025    Procedure: Ventriculogram, Left;  Surgeon: Zechariah Toscano MD;  Location: [facility] CATH LAB;  Service: Cardiology;;       Review of patient's allergies indicates:   Allergen Reactions    Statins-hmg-coa reductase inhibitors      Myalgias         No current facility-administered medications on file prior to encounter.     Current Outpatient  Medications on File Prior to Encounter   Medication Sig    ALPRAZolam (XANAX) 0.5 MG tablet Take 0.5 mg by mouth 2 (two) times daily.    amLODIPine (NORVASC) 5 MG tablet TAKE ONE TABLET BY MOUTH ONCE DAILY    aspirin (ECOTRIN) 81 MG EC tablet Take 81 mg by mouth once daily.    diclofenac sodium (VOLTAREN) 1 % Gel Apply 2 g topically once daily.    estradioL (ESTRACE) 0.01 % (0.1 mg/gram) vaginal cream INSERT ONE GRAM VAGINALLY MONDAY, WEDNESDAY AND FRIDAY    evolocumab (REPATHA SURECLICK) 140 mg/mL PnIj Inject 1 mL (140 mg total) into the skin every 14 (fourteen) days.    ezetimibe (ZETIA) 10 mg tablet TAKE ONE TABLET BY MOUTH ONCE DAILY    fluticasone propionate (FLONASE) 50 mcg/actuation nasal spray 1 spray (50 mcg total) by Each Nostril route once daily.    levocetirizine (XYZAL) 5 MG tablet Take 1 tablet (5 mg total) by mouth every evening.    losartan-hydrochlorothiazide 100-25 mg (HYZAAR) 100-25 mg per tablet TAKE ONE TABLET BY MOUTH DAILY    magnesium oxide (MAG-OX) 400 mg (241.3 mg magnesium) tablet Take 400 mg by mouth once daily. OTC, **pt unsure of dosage**    nitroGLYCERIN (NITROSTAT) 0.3 MG SL tablet Place 1 tablet (0.3 mg total) under the tongue every 5 (five) minutes as needed for Chest pain.    spironolactone (ALDACTONE) 25 MG tablet Take 1 tablet (25 mg total) by mouth once daily.    valACYclovir (VALTREX) 500 MG tablet Take 500 mg by mouth daily as needed.      Family History       Problem Relation (Age of Onset)    COPD Mother    Heart attack Mother, Father    Heart disease Mother, Father    Hypertension Mother    Miscarriages / Stillbirths Mother          Tobacco Use    Smoking status: Never    Smokeless tobacco: Never   Substance and Sexual Activity    Alcohol use: Yes     Alcohol/week: 1.0 standard drink of alcohol     Types: 1 Unspecified drink type per week     Comment: Socially    Drug use: Never    Sexual activity: Yes     Partners: Male     Birth control/protection: Post-menopausal      Review of Systems   Constitutional:  Negative for chills, diaphoresis, fatigue and fever.   Eyes:  Negative for visual disturbance.   Respiratory:  Negative for cough, shortness of breath and wheezing.    Cardiovascular:  Positive for palpitations. Negative for chest pain and leg swelling.   Gastrointestinal:  Positive for nausea. Negative for abdominal pain, diarrhea and vomiting.   Genitourinary:  Negative for dysuria.   Skin:  Negative for color change.   Neurological:  Negative for light-headedness.   Psychiatric/Behavioral:  Negative for confusion and decreased concentration. The patient is nervous/anxious.      Objective:     Vital Signs (Most Recent):  Temp: 98.2 °F (36.8 °C) (06/22/25 0000)  Pulse: 93 (06/22/25 0300)  Resp: (!) 22 (06/22/25 0232)  BP: 127/61 (06/22/25 0300)  SpO2: (!) 94 % (06/22/25 0300) Vital Signs (24h Range):  Temp:  [98.2 °F (36.8 °C)] 98.2 °F (36.8 °C)  Pulse:  [] 93  Resp:  [18-28] 22  SpO2:  [92 %-99 %] 94 %  BP: (112-164)/(59-91) 127/61        There is no height or weight on file to calculate BMI.     Physical Exam  Vitals reviewed.   Constitutional:       Appearance: Normal appearance.   HENT:      Head: Normocephalic and atraumatic.      Mouth/Throat:      Mouth: Mucous membranes are moist.      Pharynx: Oropharynx is clear.   Eyes:      General: No scleral icterus.     Conjunctiva/sclera: Conjunctivae normal.   Cardiovascular:      Rate and Rhythm: Regular rhythm. Tachycardia present.      Pulses: Normal pulses.      Heart sounds: Normal heart sounds.   Pulmonary:      Effort: Pulmonary effort is normal. No respiratory distress.      Breath sounds: Normal breath sounds. No wheezing or rales.   Abdominal:      General: There is no distension.      Palpations: Abdomen is soft.      Tenderness: There is no abdominal tenderness. There is no guarding.   Musculoskeletal:      Right lower leg: No edema.      Left lower leg: No edema.   Skin:     General: Skin is warm and dry.    Neurological:      Mental Status: She is alert. Mental status is at baseline.   Psychiatric:         Mood and Affect: Mood normal.         Behavior: Behavior normal.                Significant Labs: All pertinent labs within the past 24 hours have been reviewed.  CBC:   Recent Labs   Lab 06/20/25 0452 06/21/25 2216   WBC 8.82 11.05   HGB 9.1* 10.0*   HCT 29.3* 31.9*    345     CMP:   Recent Labs   Lab 06/20/25 0452 06/21/25 2216    143   K 3.7 3.8    106   CO2 22* 23    126*   BUN 19 29*   CREATININE 0.8 1.2   CALCIUM 8.5* 9.5   PROT  --  6.4   ALBUMIN  --  4.3   BILITOT  --  0.5   ALKPHOS  --  102   AST  --  28   ALT  --  27   ANIONGAP 10 14     Troponin:   Recent Labs   Lab 06/20/25 0452 06/21/25 2216 06/22/25  0144   TROPONINIHS 503* 205*  195* 222*       Significant Imaging: I have reviewed all pertinent imaging results/findings within the past 24 hours.  CXR: I have reviewed all pertinent results/findings within the past 24 hours and my personal findings are:  no acute abnormalities  Assessment/Plan:     Assessment & Plan  SVT (supraventricular tachycardia)  Noted by EMS. Rate of 160. Resolved with adenosine.  Denied chest pain and diaphoresis.    -If recurrence, adenosine  -Telemetry    PVD (peripheral vascular disease)  - s/p SFA stenting   - follows with Dr. Valle outpatient  - continue home asa, zetia and cilostaol  - s/p SFA stenting   - follows with Dr. Valle outpatient  - continue home asa, zetia    Primary hypertension  Patients blood pressure range in the last 24 hours was: BP  Min: 110/55  Max: 186/81.The patient's inpatient anti-hypertensive regimen is listed below:  Current Antihypertensives  amLODIPine tablet 5 mg, Daily, Oral  losartan-hydrochlorothiazide 100-25 mg per tablet 1 tablet, Daily, Oral  nitroGLYCERIN SL tablet 0.4 mg, Every 5 min PRN, Sublingual  spironolactone tablet 25 mg, Daily, Oral    Plan  - BP is controlled, no changes needed to their  regimen  Mixed hyperlipidemia  - continue statin and zetia   History of CVA (cerebrovascular accident)  - remote, no deficits  - continue secondary prevention with ASA and Zetia   NSTEMI (non-ST elevated myocardial infarction)  Pt admitted with NSTEMI. Troponin elevated at 202 Associated Substernal chest pain that radiates to back, neck and left arm - resolved with NTG.   LHC on 6/18 significant for RCA 90, LAD 90   Echo showed preserve LVEF 60-65 and no significant valvulopathy.     Plan  -Continue ASA and Zetia. Pt unable to tolerate statins.   -Monitor for repeat symptoms   VTE Risk Mitigation (From admission, onward)           Ordered     enoxaparin injection 40 mg  Every 24 hours         06/22/25 0324     Place sequential compression device  Until discontinued         06/22/25 0324                                   On 06/22/2025, patient should be placed in hospital observation services under my care.             Sean Hernández MD  Department of Hospital Medicine  [facility] - Emergency Dept

## 2025-06-22 NOTE — HPI
Joe Duque is a 69-year-old female with hypertension, hyperlipidemia, PVD and PAD s/p R SFA stenting being admitted to hospital medicine for evaluation of tachycardia. She was recently admitted 6/18 to 6/20 for evaluation of chest pain. She underwent [facility] day of admission that was significant for multi vessel CAD. Cardiology recommended cardiothoracic surgery consult. Plan for CABG on 6/26/25. Last dose of Plavix on 6/20/25. CT chest done as per CTS request. Today, she experienced tachycardia and stated she could feel her heartbeat in her abdomen. She continues to have associated nausea with dry heaves but no true vomiting and belching. She called EMS who noted that she was in SVT at 160 bpm. Adenosine 6mg IV was administered with improvement in symptoms. Denies chest pain, dizziness/lightheadedness, shortness of breath, diaphoresis, cough, congestion, fever or chills. Does not smoke or drink.     In the ED, /59 HR 94 RR 22 sats adequate on ambient air, afebrile. Labs notable for Hgb 10 Trop 205 -> 222. EKG showed sinus tachycardia with a RBBB. CXR showed no acute abnormality.    The patient was admitted to Hospital Medicine for further workup and management.

## 2025-06-23 ENCOUNTER — PATIENT OUTREACH (OUTPATIENT)
Dept: ADMINISTRATIVE | Facility: CLINIC | Age: 70
End: 2025-06-23
Payer: MEDICARE

## 2025-06-23 VITALS
RESPIRATION RATE: 18 BRPM | OXYGEN SATURATION: 98 % | SYSTOLIC BLOOD PRESSURE: 156 MMHG | TEMPERATURE: 98 F | DIASTOLIC BLOOD PRESSURE: 71 MMHG | HEART RATE: 81 BPM

## 2025-06-23 LAB
ABSOLUTE EOSINOPHIL (OHS): 0.26 K/UL
ABSOLUTE MONOCYTE (OHS): 0.94 K/UL (ref 0.3–1)
ABSOLUTE NEUTROPHIL COUNT (OHS): 5.85 K/UL (ref 1.8–7.7)
ANION GAP (OHS): 10 MMOL/L (ref 8–16)
BASOPHILS # BLD AUTO: 0.06 K/UL
BASOPHILS NFR BLD AUTO: 0.7 %
BUN SERPL-MCNC: 27 MG/DL (ref 8–23)
CALCIUM SERPL-MCNC: 8.8 MG/DL (ref 8.7–10.5)
CHLORIDE SERPL-SCNC: 107 MMOL/L (ref 95–110)
CO2 SERPL-SCNC: 20 MMOL/L (ref 23–29)
CREAT SERPL-MCNC: 0.8 MG/DL (ref 0.5–1.4)
ERYTHROCYTE [DISTWIDTH] IN BLOOD BY AUTOMATED COUNT: 16.4 % (ref 11.5–14.5)
GFR SERPLBLD CREATININE-BSD FMLA CKD-EPI: >60 ML/MIN/1.73/M2
GLUCOSE SERPL-MCNC: 106 MG/DL (ref 70–110)
HCT VFR BLD AUTO: 30.1 % (ref 37–48.5)
HGB BLD-MCNC: 9 GM/DL (ref 12–16)
IMM GRANULOCYTES # BLD AUTO: 0.04 K/UL (ref 0–0.04)
IMM GRANULOCYTES NFR BLD AUTO: 0.4 % (ref 0–0.5)
LYMPHOCYTES # BLD AUTO: 1.92 K/UL (ref 1–4.8)
MAGNESIUM SERPL-MCNC: 1.9 MG/DL (ref 1.6–2.6)
MCH RBC QN AUTO: 19.2 PG (ref 27–31)
MCHC RBC AUTO-ENTMCNC: 29.9 G/DL (ref 32–36)
MCV RBC AUTO: 64 FL (ref 82–98)
NUCLEATED RBC (/100WBC) (OHS): 0 /100 WBC
PHOSPHATE SERPL-MCNC: 3.6 MG/DL (ref 2.7–4.5)
PLATELET # BLD AUTO: 329 K/UL (ref 150–450)
PMV BLD AUTO: 10.4 FL (ref 9.2–12.9)
POTASSIUM SERPL-SCNC: 3.9 MMOL/L (ref 3.5–5.1)
RBC # BLD AUTO: 4.69 M/UL (ref 4–5.4)
RELATIVE EOSINOPHIL (OHS): 2.9 %
RELATIVE LYMPHOCYTE (OHS): 21.2 % (ref 18–48)
RELATIVE MONOCYTE (OHS): 10.4 % (ref 4–15)
RELATIVE NEUTROPHIL (OHS): 64.4 % (ref 38–73)
SODIUM SERPL-SCNC: 137 MMOL/L (ref 136–145)
WBC # BLD AUTO: 9.07 K/UL (ref 3.9–12.7)

## 2025-06-23 PROCEDURE — 85025 COMPLETE CBC W/AUTO DIFF WBC: CPT

## 2025-06-23 PROCEDURE — 80048 BASIC METABOLIC PNL TOTAL CA: CPT

## 2025-06-23 PROCEDURE — 36415 COLL VENOUS BLD VENIPUNCTURE: CPT

## 2025-06-23 PROCEDURE — 83735 ASSAY OF MAGNESIUM: CPT

## 2025-06-23 PROCEDURE — G0378 HOSPITAL OBSERVATION PER HR: HCPCS

## 2025-06-23 PROCEDURE — 25000003 PHARM REV CODE 250: Performed by: HOSPITALIST

## 2025-06-23 PROCEDURE — 25000003 PHARM REV CODE 250

## 2025-06-23 PROCEDURE — 84100 ASSAY OF PHOSPHORUS: CPT

## 2025-06-23 RX ORDER — METOPROLOL TARTRATE 25 MG/1
25 TABLET, FILM COATED ORAL 2 TIMES DAILY
Qty: 180 TABLET | Refills: 3 | Status: ON HOLD | OUTPATIENT
Start: 2025-06-23 | End: 2026-06-23

## 2025-06-23 RX ORDER — FAMOTIDINE 20 MG/1
20 TABLET, FILM COATED ORAL 2 TIMES DAILY
Qty: 60 TABLET | Refills: 11 | Status: ON HOLD | OUTPATIENT
Start: 2025-06-23 | End: 2026-06-23

## 2025-06-23 RX ADMIN — ASPIRIN 81 MG: 81 TABLET, COATED ORAL at 09:06

## 2025-06-23 RX ADMIN — AMLODIPINE BESYLATE 5 MG: 5 TABLET ORAL at 09:06

## 2025-06-23 RX ADMIN — LOSARTAN POTASSIUM AND HYDROCHLOROTHIAZIDE 1 TABLET: 100; 25 TABLET, FILM COATED ORAL at 09:06

## 2025-06-23 RX ADMIN — EZETIMIBE 10 MG: 10 TABLET ORAL at 09:06

## 2025-06-23 RX ADMIN — METOPROLOL TARTRATE 25 MG: 25 TABLET, FILM COATED ORAL at 08:06

## 2025-06-23 RX ADMIN — FAMOTIDINE 20 MG: 20 TABLET, FILM COATED ORAL at 08:06

## 2025-06-23 NOTE — HOSPITAL COURSE
Patient admitted and monitored on Telemetry. Had one episode of SVT responded to adenosine and started on Metoprolol Tartrate BID. Discussed with Dr. Flores who said ok to monitor overnight and discharge if stable. She remained stable overnight with NSR on telemetry. Discussed with family who was comfortable with discharge home on Metoprolol and plan for CABG Thursday.     Pt deemed appropriate for discharge; seen and examined prior to departure. Plan discussed with pt, who was agreeable and amenable; medications were discussed and reviewed, outpatient follow-up scheduled, ER precautions were given, all questions were answered to the pt's satisfaction, and she was subsequently discharged.

## 2025-06-23 NOTE — NURSING
Patient A & O x 4. Room air. VSS. HR stable overnight; am medications given as scheduled. Patient discharge ordered. Iv removed. Tele d/c. Medication delivered to home pharmacy. Discharge paperwork provided and reviewed. Patient discharging home via family.

## 2025-06-23 NOTE — DISCHARGE INSTRUCTIONS
Please follow up with Cardiac team for procedure on Thursday  Continue Metoprolol tartrate two times daily  Please return for worsening sob, palpitations, chest pain

## 2025-06-23 NOTE — PLAN OF CARE
Pt aaox4. Able to make needs known. Denies any pain or discomfort at present moment. % on RA with no distress noted. Tele- NSR. No acute events during this shift. Bed lowered and locked. Call light and personal items within reach. VSS per pt baseline. No known needs voiced at this time.         Problem: Adult Inpatient Plan of Care  Goal: Plan of Care Review  Outcome: Progressing  Goal: Patient-Specific Goal (Individualized)  Outcome: Progressing  Goal: Absence of Hospital-Acquired Illness or Injury  Outcome: Progressing  Goal: Optimal Comfort and Wellbeing  Outcome: Progressing  Goal: Readiness for Transition of Care  Outcome: Progressing

## 2025-06-23 NOTE — ASSESSMENT & PLAN NOTE
Patients blood pressure range in the last 24 hours was: BP  Min: 110/55  Max: 186/81.The patient's inpatient anti-hypertensive regimen is listed below:  Current Antihypertensives  metoprolol tartrate (LOPRESSOR) tablet, 2 times daily, Oral    Plan  - BP is controlled, no changes needed to their regimen

## 2025-06-23 NOTE — PLAN OF CARE
Goals met adequate for discharge.     Problem: Adult Inpatient Plan of Care  Goal: Plan of Care Review  Outcome: Met  Goal: Patient-Specific Goal (Individualized)  Outcome: Met  Goal: Absence of Hospital-Acquired Illness or Injury  Outcome: Met  Goal: Optimal Comfort and Wellbeing  Outcome: Met  Goal: Readiness for Transition of Care  Outcome: Met

## 2025-06-23 NOTE — ASSESSMENT & PLAN NOTE
Noted by EMS. Rate of 160. Resolved with adenosine.  Denied chest pain and diaphoresis.    -continue Metoprolol tartrate BID

## 2025-06-23 NOTE — DISCHARGE SUMMARY
[facility] Medicine  Discharge Summary      Patient Name: Veronica Duque  MRN: [id number]  ANDREZ: [id number]  Patient Class: OP- Observation  Admission Date: 6/21/2025  Hospital Length of Stay: 0 days  Discharge Date and Time: 06/23/2025 3:00 PM  Attending Physician: Brittany att. providers found   Discharging Provider: Tammy Borrero MD  Primary Care Provider: Juju Wallace MD  Hospital Medicine Team: [facility] MED A Tammy Borrero MD  Primary Care Team: [facility] A    HPI:   Joe Duque is a 69-year-old female with hypertension, hyperlipidemia, PVD and PAD s/p R SFA stenting being admitted to hospital medicine for evaluation of tachycardia. She was recently admitted 6/18 to 6/20 for evaluation of chest pain. She underwent C day of admission that was significant for multi vessel CAD. Cardiology recommended cardiothoracic surgery consult. Plan for CABG on 6/26/25. Last dose of Plavix on 6/20/25. CT chest done as per CTS request. Today, she experienced tachycardia and stated she could feel her heartbeat in her abdomen. She continues to have associated nausea with dry heaves but no true vomiting and belching. She called EMS who noted that she was in SVT at 160 bpm. Adenosine 6mg IV was administered with improvement in symptoms. Denies chest pain, dizziness/lightheadedness, shortness of breath, diaphoresis, cough, congestion, fever or chills. Does not smoke or drink.     In the ED, /59 HR 94 RR 22 sats adequate on ambient air, afebrile. Labs notable for Hgb 10 Trop 205 -> 222. EKG showed sinus tachycardia with a RBBB. CXR showed no acute abnormality.    The patient was admitted to Hospital Medicine for further workup and management.    * No surgery found *      Hospital Course:   Patient admitted and monitored on Telemetry. Had one episode of SVT responded to adenosine and started on Metoprolol Tartrate BID. Discussed with Dr. Flores who said ok to monitor  overnight and discharge if stable. She remained stable overnight with NSR on telemetry. Discussed with family who was comfortable with discharge home on Metoprolol and plan for CABG Thursday.     Pt deemed appropriate for discharge; seen and examined prior to departure. Plan discussed with pt, who was agreeable and amenable; medications were discussed and reviewed, outpatient follow-up scheduled, ER precautions were given, all questions were answered to the pt's satisfaction, and she was subsequently discharged.       Goals of Care Treatment Preferences:  Code Status: Full Code    Living Will: Yes                 Consults:     Assessment & Plan  SVT (supraventricular tachycardia)  Noted by EMS. Rate of 160. Resolved with adenosine.  Denied chest pain and diaphoresis.    -continue Metoprolol tartrate BID     PVD (peripheral vascular disease)  - s/p SFA stenting   - follows with Dr. Valle outpatient  - continue home asa, zetia and cilostaol  - s/p SFA stenting   - follows with Dr. Valle outpatient  - continue home asa, zetia    Primary hypertension  Patients blood pressure range in the last 24 hours was: BP  Min: 110/55  Max: 186/81.The patient's inpatient anti-hypertensive regimen is listed below:  Current Antihypertensives  metoprolol tartrate (LOPRESSOR) tablet, 2 times daily, Oral    Plan  - BP is controlled, no changes needed to their regimen  Mixed hyperlipidemia  - continue statin and zetia   History of CVA (cerebrovascular accident)  - remote, no deficits  - continue secondary prevention with ASA and Zetia   NSTEMI (non-ST elevated myocardial infarction)  Pt admitted with NSTEMI. Troponin elevated at 202 Associated Substernal chest pain that radiates to back, neck and left arm - resolved with NTG.   LHC on 6/18 significant for RCA 90, LAD 90   Echo showed preserve LVEF 60-65 and no significant valvulopathy.     Plan  -Continue ASA and Zetia. Pt unable to tolerate statins.   -Monitor for repeat symptoms   Final  Active Diagnoses:    Diagnosis Date Noted POA    PRINCIPAL PROBLEM:  SVT (supraventricular tachycardia) [I47.10] 06/22/2025 Yes    History of CVA (cerebrovascular accident) [Z86.73] 06/18/2025 Not Applicable    NSTEMI (non-ST elevated myocardial infarction) [I21.4] 06/18/2025 Yes    Mixed hyperlipidemia [E78.2] 04/14/2025 Yes    Primary hypertension [I10] 08/12/2024 Yes    PVD (peripheral vascular disease) [I73.9] 05/16/2022 Yes      Problems Resolved During this Admission:       Discharged Condition: good    Disposition: Home or Self Care    Follow Up:   Follow-up Information       Juju Wallace MD Follow up in 2 week(s).    Specialty: Internal Medicine  Contact information:  [address] [facility]  [place] [address] [phone]                           Patient Instructions:      Diet Cardiac     Notify your health care provider if you experience any of the following:  temperature >100.4     Notify your health care provider if you experience any of the following:  persistent nausea and vomiting or diarrhea     Notify your health care provider if you experience any of the following:  severe uncontrolled pain     Notify your health care provider if you experience any of the following:  difficulty breathing or increased cough     Notify your health care provider if you experience any of the following:  severe persistent headache     Notify your health care provider if you experience any of the following:  worsening rash     Notify your health care provider if you experience any of the following:  persistent dizziness, light-headedness, or visual disturbances     Notify your health care provider if you experience any of the following:  increased confusion or weakness     Activity as tolerated       Significant Diagnostic Studies: N/A    Pending Diagnostic Studies:       None           Medications:  Reconciled Home Medications:      Medication List        START taking these medications      famotidine 20  MG tablet  Commonly known as: PEPCID  Take 1 tablet (20 mg total) by mouth 2 (two) times daily.     metoprolol tartrate 25 MG tablet  Commonly known as: LOPRESSOR  Take 1 tablet (25 mg total) by mouth 2 (two) times daily.            CONTINUE taking these medications      ALPRAZolam 0.5 MG tablet  Commonly known as: XANAX  Take 0.5 mg by mouth 2 (two) times daily.     amLODIPine 5 MG tablet  Commonly known as: NORVASC  TAKE ONE TABLET BY MOUTH ONCE DAILY     aspirin 81 MG EC tablet  Commonly known as: ECOTRIN  Take 81 mg by mouth once daily.     diclofenac sodium 1 % Gel  Commonly known as: VOLTAREN  Apply 2 g topically once daily.     estradioL 0.01 % (0.1 mg/gram) vaginal cream  Commonly known as: ESTRACE  INSERT ONE GRAM VAGINALLY MONDAY, WEDNESDAY AND FRIDAY     ezetimibe 10 mg tablet  Commonly known as: ZETIA  TAKE ONE TABLET BY MOUTH ONCE DAILY     fluticasone propionate 50 mcg/actuation nasal spray  Commonly known as: FLONASE  1 spray (50 mcg total) by Each Nostril route once daily.     levocetirizine 5 MG tablet  Commonly known as: XYZAL  Take 1 tablet (5 mg total) by mouth every evening.     losartan-hydrochlorothiazide 100-25 mg 100-25 mg per tablet  Commonly known as: HYZAAR  TAKE ONE TABLET BY MOUTH DAILY     magnesium oxide 400 mg (241.3 mg magnesium) tablet  Commonly known as: MAG-OX  Take 400 mg by mouth once daily. OTC, **pt unsure of dosage**     nitroGLYCERIN 0.3 MG SL tablet  Commonly known as: NITROSTAT  Place 1 tablet (0.3 mg total) under the tongue every 5 (five) minutes as needed for Chest pain.     REPATHA SURECLICK 140 mg/mL Pnij  Generic drug: evolocumab  Inject 1 mL (140 mg total) into the skin every 14 (fourteen) days.     spironolactone 25 MG tablet  Commonly known as: ALDACTONE  Take 1 tablet (25 mg total) by mouth once daily.     valACYclovir 500 MG tablet  Commonly known as: VALTREX  Take 500 mg by mouth daily as needed.              Indwelling Lines/Drains at time of discharge:    Lines/Drains/Airways       None                       Time spent on the discharge of patient: 35 minutes         Tammy Borrero MD  Department of Hospital Medicine  [facility] [address] [facility] - Acute Medical Stepdown

## 2025-06-24 ENCOUNTER — TELEPHONE (OUTPATIENT)
Dept: PRIMARY CARE CLINIC | Facility: CLINIC | Age: 70
End: 2025-06-24
Payer: MEDICARE

## 2025-06-24 NOTE — TELEPHONE ENCOUNTER
Call to patient for follow-up with hospital/ER stay 2/2 SVT, NSTEMI.. Patient planned for CABG on 06/26/2025.    Discussed medications, and follow-up plan noted in discharge summary. Patient received all medications.    Reviewed referrals and assessed appointment status- Discussed usual plan after cardiac surgery being cardiac rehab,  services. HFU made for PCP, with stipulation may  be changed to virtual if needed.     Assessed needs and concerns post hospital, including transportation- will have family for transportation    Scheduled HFU visit on 07/07/2025    Reviewed all new meds, continued medications, follow up appointments and signs and symptoms to report to PCP or seek emergency medical care.     Pt verbalized understanding to all instructions and education. Pt denies any complaints or concerns at this time

## 2025-06-25 ENCOUNTER — TELEPHONE (OUTPATIENT)
Dept: CARDIOTHORACIC SURGERY | Facility: CLINIC | Age: 70
End: 2025-06-25
Payer: MEDICARE

## 2025-06-25 ENCOUNTER — PATIENT MESSAGE (OUTPATIENT)
Dept: CARDIOTHORACIC SURGERY | Facility: CLINIC | Age: 70
End: 2025-06-25
Payer: MEDICARE

## 2025-06-25 ENCOUNTER — ANESTHESIA EVENT (OUTPATIENT)
Dept: SURGERY | Facility: HOSPITAL | Age: 70
End: 2025-06-25
Payer: MEDICARE

## 2025-06-25 NOTE — TELEPHONE ENCOUNTER
Informed pt of arrival time for surgery.  Pt instructed to report to [facility] at 5:00 tomorrow morning.  Pt reminded to perform shower with antibacterial soap tonight and tomorrow morning, and to become NPO at midnight. Pt may take morning dose of metoprolol tomorrow with a small sip of water.

## 2025-06-25 NOTE — TELEPHONE ENCOUNTER
Attempted call to pt to confirm arrival time for surgery and prep instructions. No answer, left detailed VM with request for call back and provided department phone number.

## 2025-06-25 NOTE — ANESTHESIA PREPROCEDURE EVALUATION
[facility]  Anesthesia Pre-Operative Evaluation         Patient Name: Veronica Duque  YOB: 1955  MRN: [id number]    SUBJECTIVE:     Pre-operative evaluation for Procedure(s) (LRB):  CORONARY ARTERY BYPASS GRAFT (CABG) (N/A)     06/25/2025    Veronica Duque is a 69 y.o. female w/ a significant PMHx of HTN, anemia (H/H 9/30), HLD, prior CVA with no residual deficits, PAD, and multivessel CAD.    Patient now presents for the above procedure(s): CABG (lima to LAD, saphenous vein to distal RCA).     Cardiac catheterization 6/2025    The Mid RCA lesion was 90% stenosed.    The Mid LAD lesion was 90% stenosed.    The ejection fraction was greater than 55% by visual estimate.    The pre-procedure left ventricular end diastolic pressure was 14.    The estimated blood loss was none.    There was two vessel coronary artery disease with severe obstruction of mid LAD and RCA. Will consult CTS.    Echo 6/2025    Left Ventricle: The left ventricle is normal in size. Normal wall thickness. There is concentric remodeling. There is normal systolic function with a visually estimated ejection fraction of 60 - 65%.    Right Ventricle: The right ventricle is normal in size Wall thickness is normal. Systolic function is normal.    IVC/SVC: Normal venous pressure at 3 mmHg.      Problem List[1]    Review of patient's allergies indicates:   Allergen Reactions    Statins-hmg-coa reductase inhibitors      Myalgias         Current Inpatient Medications:      Medications Ordered Prior to Encounter[2]    Past Surgical History:   Procedure Laterality Date    ANGIOGRAM, CORONARY, WITH LEFT HEART CATHETERIZATION N/A 6/18/2025    Procedure: Angiogram, Coronary, with Left Heart Cath;  Surgeon: Zechariah Toscano MD;  Location: [facility] CATH LAB;  Service: Cardiology;  Laterality: N/A;    ANGIOGRAPHY OF LOWER EXTREMITY Right 12/27/2022    Procedure: Angiogram Extremity Unilateral;  Surgeon: Fidencio Valle  MD;  [address];  Service: Vascular;  Laterality: Right;  RLE diagnostic angio & R SFA femoral endarterectomy w/ PTA & stenting  [id number]  gycm2 149.22  fluoro time 5.4  contrast vol    AORTOGRAPHY N/A 12/27/2022    Procedure: AORTOGRAM;  Surgeon: Fidencio Valle MD;  [address];  Service: Vascular;  Laterality: N/A;    AORTOGRAPHY WITH SERIALOGRAPHY  5/16/2022    Procedure: AORTOGRAM, WITH SERIALOGRAPHY. left femoral access;  Surgeon: Phil Pitts MD;  Location: [facility] CATH;  Service: Cardiology;;    ARTHROSCOPY OF KNEE      AUGMENTATION OF BREAST      COSMETIC SURGERY      ENDARTERECTOMY OF FEMORAL ARTERY N/A 12/27/2022    Procedure: ENDARTERECTOMY, FEMORAL;  Surgeon: Fidencio Valle MD;  Location: [address];  Service: Vascular;  Laterality: N/A;  RLE diagnostic angio & R SFA femoral endarterectomy w/ PTA & stenting    EYE SURGERY  12/18/17 & 10/23/17    Cataracts    HYSTERECTOMY      JOINT REPLACEMENT  October 2019    Knee    NASAL SEPTOPLASTY      VENTRICULOGRAM, LEFT  6/18/2025    Procedure: Ventriculogram, Left;  Surgeon: Zechariah Toscano MD;  Location: [facility] CATH LAB;  Service: Cardiology;;       Social History[3]    OBJECTIVE:     Vital Signs Range (Last 24H):         Significant Labs:  Lab Results   Component Value Date    WBC 9.07 06/23/2025    HGB 9.0 (L) 06/23/2025    HCT 30.1 (L) 06/23/2025     06/23/2025    CHOL 197 03/28/2025    TRIG 111 03/28/2025    HDL 44 03/28/2025    ALT 27 06/21/2025    AST 28 06/21/2025     06/23/2025    K 3.9 06/23/2025     06/23/2025    CREATININE 0.8 06/23/2025    BUN 27 (H) 06/23/2025    CO2 20 (L) 06/23/2025    TSH 0.670 08/13/2024    HGBA1C 5.4 06/19/2025       Diagnostic Studies: No relevant studies.    EKG:   Results for orders placed or performed during the hospital encounter of 06/21/25   EKG 12-lead    Collection Time: 06/21/25 10:15 PM   Result Value Ref Range    QRS Duration 138 ms    [facility] QTC Calculation  491 ms    Narrative    Test Reason : R07.9,    Vent. Rate : 107 BPM     Atrial Rate : 107 BPM     P-R Int : 192 ms          QRS Dur : 138 ms      QT Int : 368 ms       P-R-T Axes :  67  85  24 degrees    QTcB Int : 491 ms    Sinus tachycardia  Right bundle branch block  Abnormal ECG  When compared with ECG of 18-Jun-2025 15:26,  No significant change was found  Confirmed by Sean Hernández ([id number]) on 6/22/2025 9:15:55 AM    Referred By: AAAREFERRAL SELF           Confirmed By: Sean Hernández       2D ECHO:  TTE:  Results for orders placed or performed during the hospital encounter of 06/18/25   Echo   Result Value Ref Range    LV Diastolic Volume 42 mL    Echo EF Estimated 56 %    LV Systolic Volume 18 mL    IVS 1.0 0.6 - 1.1 cm    LVIDd 3.2 (A) 3.5 - 6.0 cm    LVIDs 2.3 2.1 - 4.0 cm    LVOT diameter 2.0 cm    PW 0.8 0.6 - 1.1 cm    AV LVOT peak gradient 8 mmHg    LVOT mn grad 4 mmHg    LVOT peak josé 1.4 m/s    LVOT peak VTI 27.2 cm    RV- reyes basal diam 2.9 cm    LA size 3.0 cm    Left Atrium Major Axis 5.1 cm    Left Atrium Minor Axis 5.7 cm    LA Vol (MOD) 48 mL    RA Major Hertel 4.23 cm    AV valve area 2.8 cm2    AV area by cont VTI 2.8 cm2    AV peak gradient 10 mmHg    AV mean gradient 7 mmHg    Ao peak josé 1.6 m/s    Ao VTI 30.0 cm    MV Peak A José 1.19 m/s    E wave deceleration time 249 ms    MV Peak E José 0.85 m/s    E/A ratio 0.71     LV LATERAL E/E' RATIO 12.1     LV SEPTAL E/E' RATIO 12.1     TDI LATERAL 0.07 m/s    TDI SEPTAL 0.07 m/s    MV peak gradient 7 mmHg    MV mean gradient 3 mmHg    TV peak gradient 8 mmHg    Ascending aorta 3.1 cm    STJ 2.6 cm    Sinus 2.62 cm    LA WIDTH 3.3 cm    RA Width 2.92 cm    TAPSE 1.6 cm    BSA 1.85 m2    LVOT stroke volume 85.4 cm3    LV Systolic Volume Index 9.9 mL/m2    LV Diastolic Volume Index 23.08 mL/m2    LVOT area 3.1 cm2    FS 28.1 28 - 44 %    Left Ventricle Relative Wall Thickness 0.50 cm    LV mass 77.3 g    LV Mass Index 42.5 g/m2    E/E' ratio 12 m/s    TEERSITA  25 mL/m2    LA Vol 45 cm3    RV/LV Ratio 0.91 cm    TERESITA (MOD) 26 mL/m2    AV Velocity Ratio 0.88     AV index (prosthetic) 0.91     KEKE by Velocity Ratio 2.7 cm²    ASI 1.4 cm/m2    ASI 1.7 cm/m2    Mean e' 0.07 m/s    ZLVIDS -2.39     ZLVIDD -4.54     Est. RA pres 3 mmHg    Narrative      Left Ventricle: The left ventricle is normal in size. Normal wall   thickness. There is concentric remodeling. There is normal systolic   function with a visually estimated ejection fraction of 60 - 65%.    Right Ventricle: The right ventricle is normal in size Wall thickness   is normal. Systolic function is normal.    IVC/SVC: Normal venous pressure at 3 mmHg.         CÉSAR:  No results found for this or any previous visit.    ASSESSMENT/PLAN:           Pre-op Assessment    I have reviewed the Patient Summary Reports.     I have reviewed the Nursing Notes. I have reviewed the NPO Status.   I have reviewed the Medications.     Review of Systems  Anesthesia Hx:  No problems with previous Anesthesia   History of prior surgery of interest to airway management or planning:          Denies Family Hx of Anesthesia complications.    Denies Personal Hx of Anesthesia complications.                    Social:  Non-Smoker, No Alcohol Use       Hematology/Oncology:       -- Denies Anemia:                  Denies Current/Recent Cancer                Cardiovascular:     Hypertension   CAD     Denies Dysrhythmias.    Denies CHF.   PVD hyperlipidemia                               Pulmonary:    Denies COPD.  Denies Asthma.     Denies Sleep Apnea.                Renal/:   Denies Chronic Renal Disease.                Hepatic/GI:      Denies GERD. Denies Liver Disease.               Musculoskeletal:  Arthritis               Neurological:  TIA,  Denies CVA. Denies Neuromuscular Disease.   Denies Seizures.          Denies Chronic Pain Syndrome                         Endocrine:  Denies Diabetes.   Denies Hyperthyroidism.       Denies Obesity / BMI >  30  Psych:  Psychiatric History denies anxiety denies depression                Physical Exam  General: Well nourished    Airway:  Mallampati: II   Mouth Opening: Normal  TM Distance: > 6 cm  Tongue: Normal  Neck ROM: Normal ROM    Dental:  Intact    Chest/Lungs:  Normal Respiratory Rate    Heart:  Rate: Normal        Anesthesia Plan  Type of Anesthesia, risks & benefits discussed:    Anesthesia Type: Gen ETT  Intra-op Monitoring Plan: Standard ASA Monitors, Art Line, Central Line and CÉSAR  Post Op Pain Control Plan: multimodal analgesia, IV/PO Opioids PRN and peripheral nerve block  Induction:  IV and rapid sequence  Airway Plan: Video, Post-Induction  Informed Consent: Informed consent signed with the Patient and all parties understand the risks and agree with anesthesia plan.  All questions answered. Patient consented to blood products? Yes  ASA Score: 4  Day of Surgery Review of History & Physical: H&P Update referred to the surgeon/provider.    Ready For Surgery From Anesthesia Perspective.     .           [1]   Patient Active Problem List  Diagnosis    Bilateral lower extremity edema    Spider veins of both lower extremities    Primary osteoarthritis of left knee    PVD (peripheral vascular disease)    Abnormal mammogram    Allergic rhinitis, unspecified    Breast implant rupture    Cerebral infarction, unspecified    Transient ischemic attack    Femoral artery stenosis, right    PAD (peripheral artery disease)    Aortic atherosclerosis    Senile purpura    Primary hypertension    Anemia    Bilateral carotid artery stenosis    Statin intolerance    Mixed hyperlipidemia    Sinus congestion    History of CVA (cerebrovascular accident)    NSTEMI (non-ST elevated myocardial infarction)    SVT (supraventricular tachycardia)   [2]   No current facility-administered medications on file prior to encounter.     Current Outpatient Medications on File Prior to Encounter   Medication Sig Dispense Refill    ALPRAZolam  (XANAX) 0.5 MG tablet Take 0.5 mg by mouth 2 (two) times daily.      amLODIPine (NORVASC) 5 MG tablet TAKE ONE TABLET BY MOUTH ONCE DAILY 90 tablet 3    aspirin (ECOTRIN) 81 MG EC tablet Take 81 mg by mouth once daily.      diclofenac sodium (VOLTAREN) 1 % Gel Apply 2 g topically once daily. 50 g 1    estradioL (ESTRACE) 0.01 % (0.1 mg/gram) vaginal cream INSERT ONE GRAM VAGINALLY MONDAY, WEDNESDAY AND FRIDAY      evolocumab (REPATHA SURECLICK) 140 mg/mL PnIj Inject 1 mL (140 mg total) into the skin every 14 (fourteen) days. 2 mL 11    ezetimibe (ZETIA) 10 mg tablet TAKE ONE TABLET BY MOUTH ONCE DAILY 90 tablet 3    fluticasone propionate (FLONASE) 50 mcg/actuation nasal spray 1 spray (50 mcg total) by Each Nostril route once daily. 9.9 mL 0    levocetirizine (XYZAL) 5 MG tablet Take 1 tablet (5 mg total) by mouth every evening. 30 tablet 11    losartan-hydrochlorothiazide 100-25 mg (HYZAAR) 100-25 mg per tablet TAKE ONE TABLET BY MOUTH DAILY 90 tablet 3    magnesium oxide (MAG-OX) 400 mg (241.3 mg magnesium) tablet Take 400 mg by mouth once daily. OTC, **pt unsure of dosage**      nitroGLYCERIN (NITROSTAT) 0.3 MG SL tablet Place 1 tablet (0.3 mg total) under the tongue every 5 (five) minutes as needed for Chest pain. 25 tablet 0    spironolactone (ALDACTONE) 25 MG tablet Take 1 tablet (25 mg total) by mouth once daily. 30 tablet 11    valACYclovir (VALTREX) 500 MG tablet Take 500 mg by mouth daily as needed.   0   [3]   Social History  Socioeconomic History    Marital status:    Tobacco Use    Smoking status: Never    Smokeless tobacco: Never   Substance and Sexual Activity    Alcohol use: Yes     Alcohol/week: 1.0 standard drink of alcohol     Types: 1 Unspecified drink type per week     Comment: Socially    Drug use: Never    Sexual activity: Yes     Partners: Male     Birth control/protection: Post-menopausal     Social Drivers of Health     Financial Resource Strain: Low Risk  (6/22/2025)    Overall  Financial Resource Strain (CARDIA)     Difficulty of Paying Living Expenses: Not very hard   Food Insecurity: No Food Insecurity (6/22/2025)    Hunger Vital Sign     Worried About Running Out of Food in the Last Year: Never true     Ran Out of Food in the Last Year: Never true   Transportation Needs: No Transportation Needs (6/22/2025)    PRAPARE - Transportation     Lack of Transportation (Medical): No     Lack of Transportation (Non-Medical): No   Physical Activity: Inactive (6/22/2025)    Exercise Vital Sign     Days of Exercise per Week: 0 days     Minutes of Exercise per Session: 0 min   Stress: No Stress Concern Present (6/22/2025)    [employer] - Occupational Stress Questionnaire     Feeling of Stress : Not at all   Housing Stability: Low Risk  (6/22/2025)    Housing Stability Vital Sign     Unable to Pay for Housing in the Last Year: No     Number of Times Moved in the Last Year: 0     Homeless in the Last Year: No

## 2025-06-26 ENCOUNTER — ANESTHESIA (OUTPATIENT)
Dept: SURGERY | Facility: HOSPITAL | Age: 70
End: 2025-06-26
Payer: MEDICARE

## 2025-06-26 ENCOUNTER — HOSPITAL ENCOUNTER (INPATIENT)
Facility: HOSPITAL | Age: 70
LOS: 6 days | Discharge: HOME | DRG: 235 | End: 2025-07-02
Attending: STUDENT IN AN ORGANIZED HEALTH CARE EDUCATION/TRAINING PROGRAM | Admitting: STUDENT IN AN ORGANIZED HEALTH CARE EDUCATION/TRAINING PROGRAM
Payer: MEDICARE

## 2025-06-26 DIAGNOSIS — I25.10 CORONARY ARTERY DISEASE: ICD-10-CM

## 2025-06-26 DIAGNOSIS — I10 PRIMARY HYPERTENSION: ICD-10-CM

## 2025-06-26 DIAGNOSIS — Z95.1 S/P CABG (CORONARY ARTERY BYPASS GRAFT): Primary | ICD-10-CM

## 2025-06-26 DIAGNOSIS — Z98.890 HISTORY OF HEART SURGERY: ICD-10-CM

## 2025-06-26 DIAGNOSIS — I21.4 NSTEMI (NON-ST ELEVATED MYOCARDIAL INFARCTION): ICD-10-CM

## 2025-06-26 DIAGNOSIS — I48.91 A-FIB: ICD-10-CM

## 2025-06-26 DIAGNOSIS — I49.9 ARRHYTHMIA: ICD-10-CM

## 2025-06-26 DIAGNOSIS — E78.2 MIXED HYPERLIPIDEMIA: ICD-10-CM

## 2025-06-26 DIAGNOSIS — I25.10 CORONARY ARTERY DISEASE, UNSPECIFIED VESSEL OR LESION TYPE, UNSPECIFIED WHETHER ANGINA PRESENT, UNSPECIFIED WHETHER NATIVE OR TRANSPLANTED HEART: ICD-10-CM

## 2025-06-26 DIAGNOSIS — I73.9 PAD (PERIPHERAL ARTERY DISEASE): ICD-10-CM

## 2025-06-26 DIAGNOSIS — R73.9 TRANSIENT HYPERGLYCEMIA POST PROCEDURE: ICD-10-CM

## 2025-06-26 PROBLEM — Z99.11 WEANING FROM MECHANICALLY ASSISTED VENTILATION INITIATED: Status: ACTIVE | Noted: 2025-06-26

## 2025-06-26 PROBLEM — Z93.8 S/P CHEST TUBE PLACEMENT: Status: ACTIVE | Noted: 2025-06-26

## 2025-06-26 PROBLEM — G89.18 POST-OPERATIVE PAIN: Status: ACTIVE | Noted: 2025-06-26

## 2025-06-26 LAB
ABO + RH BLD: NORMAL
ABO + RH BLD: NORMAL
ABSOLUTE EOSINOPHIL (OHS): 0.04 K/UL
ABSOLUTE MONOCYTE (OHS): 0.68 K/UL (ref 0.3–1)
ABSOLUTE NEUTROPHIL COUNT (OHS): 13.95 K/UL (ref 1.8–7.7)
ALLENS TEST: ABNORMAL
ANION GAP (OHS): 11 MMOL/L (ref 8–16)
APTT PPP: 30.6 SECONDS (ref 21–32)
BASOPHILS # BLD AUTO: 0.04 K/UL
BASOPHILS NFR BLD AUTO: 0.3 %
BLD PROD TYP BPU: NORMAL
BLD PROD TYP BPU: NORMAL
BLOOD UNIT EXPIRATION DATE: NORMAL
BLOOD UNIT EXPIRATION DATE: NORMAL
BLOOD UNIT TYPE CODE: 6200
BLOOD UNIT TYPE CODE: 6200
BUN SERPL-MCNC: 25 MG/DL (ref 8–23)
CALCIUM SERPL-MCNC: 8.7 MG/DL (ref 8.7–10.5)
CHLORIDE SERPL-SCNC: 113 MMOL/L (ref 95–110)
CO2 SERPL-SCNC: 18 MMOL/L (ref 23–29)
CREAT SERPL-MCNC: 0.9 MG/DL (ref 0.5–1.4)
CROSSMATCH INTERPRETATION: NORMAL
CROSSMATCH INTERPRETATION: NORMAL
DELSYS: ABNORMAL
DELSYS: ABNORMAL
DISPENSE STATUS: NORMAL
DISPENSE STATUS: NORMAL
ERYTHROCYTE [DISTWIDTH] IN BLOOD BY AUTOMATED COUNT: 24 % (ref 11.5–14.5)
ERYTHROCYTE [SEDIMENTATION RATE] IN BLOOD BY WESTERGREN METHOD: 20 MM/H
FIBRINOGEN PPP-MCNC: 207 MG/DL (ref 182–400)
FIO2: 50
FIO2: 98
GFR SERPLBLD CREATININE-BSD FMLA CKD-EPI: >60 ML/MIN/1.73/M2
GLUCOSE SERPL-MCNC: 167 MG/DL (ref 70–110)
GLUCOSE SERPL-MCNC: 173 MG/DL (ref 70–110)
GLUCOSE SERPL-MCNC: 261 MG/DL (ref 70–110)
GLUCOSE SERPL-MCNC: 270 MG/DL (ref 70–110)
GLUCOSE SERPL-MCNC: 302 MG/DL (ref 70–110)
HCO3 UR-SCNC: 17.8 MMOL/L (ref 24–28)
HCO3 UR-SCNC: 18.7 MMOL/L (ref 24–28)
HCO3 UR-SCNC: 19.6 MMOL/L (ref 24–28)
HCO3 UR-SCNC: 19.6 MMOL/L (ref 24–28)
HCO3 UR-SCNC: 21.6 MMOL/L (ref 24–28)
HCO3 UR-SCNC: 22 MMOL/L (ref 24–28)
HCO3 UR-SCNC: 23.9 MMOL/L (ref 24–28)
HCO3 UR-SCNC: 33.1 MMOL/L (ref 24–28)
HCT VFR BLD AUTO: 29 % (ref 37–48.5)
HCT VFR BLD CALC: 17 %PCV (ref 36–54)
HCT VFR BLD CALC: 26 %PCV (ref 36–54)
HCT VFR BLD CALC: 27 %PCV (ref 36–54)
HCT VFR BLD CALC: 28 %PCV (ref 36–54)
HCT VFR BLD CALC: 29 %PCV (ref 36–54)
HCT VFR BLD CALC: 31 %PCV (ref 36–54)
HGB BLD-MCNC: 9.4 GM/DL (ref 12–16)
IMM GRANULOCYTES # BLD AUTO: 0.13 K/UL (ref 0–0.04)
IMM GRANULOCYTES NFR BLD AUTO: 0.8 % (ref 0–0.5)
INDIRECT COOMBS: NORMAL
INR PPP: 1.3 (ref 0.8–1.2)
LACTATE SERPL-SCNC: 2.8 MMOL/L (ref 0.5–2.2)
LACTATE SERPL-SCNC: 3.3 MMOL/L (ref 0.5–2.2)
LACTATE SERPL-SCNC: 3.3 MMOL/L (ref 0.5–2.2)
LDH SERPL L TO P-CCNC: 0.95 MMOL/L (ref 0.36–1.25)
LDH SERPL L TO P-CCNC: 3.64 MMOL/L (ref 0.36–1.25)
LDH SERPL L TO P-CCNC: 4.26 MMOL/L (ref 0.36–1.25)
LYMPHOCYTES # BLD AUTO: 0.78 K/UL (ref 1–4.8)
MAGNESIUM SERPL-MCNC: 3.6 MG/DL (ref 1.6–2.6)
MCH RBC QN AUTO: 22.9 PG (ref 27–31)
MCHC RBC AUTO-ENTMCNC: 32.4 G/DL (ref 32–36)
MCV RBC AUTO: 71 FL (ref 82–98)
MODE: ABNORMAL
MODE: ABNORMAL
NUCLEATED RBC (/100WBC) (OHS): 0 /100 WBC
OHS QRS DURATION: 140 MS
OHS QRS DURATION: 152 MS
OHS QTC CALCULATION: 513 MS
OHS QTC CALCULATION: 573 MS
PCO2 BLDA: 25.3 MMHG (ref 35–45)
PCO2 BLDA: 31.5 MMHG (ref 35–45)
PCO2 BLDA: 34.3 MMHG (ref 35–45)
PCO2 BLDA: 34.5 MMHG (ref 35–45)
PCO2 BLDA: 39.6 MMHG (ref 35–45)
PCO2 BLDA: 42.8 MMHG (ref 35–45)
PCO2 BLDA: 43.2 MMHG (ref 35–45)
PCO2 BLDA: 59.3 MMHG (ref 35–45)
PEEP: 5
PEEP: 5
PH SMN: 7.32 [PH] (ref 7.35–7.45)
PH SMN: 7.34 [PH] (ref 7.35–7.45)
PH SMN: 7.35 [PH] (ref 7.35–7.45)
PH SMN: 7.36 [PH] (ref 7.35–7.45)
PH SMN: 7.36 [PH] (ref 7.35–7.45)
PH SMN: 7.37 [PH] (ref 7.35–7.45)
PH SMN: 7.38 [PH] (ref 7.35–7.45)
PH SMN: 7.46 [PH] (ref 7.35–7.45)
PHOSPHATE SERPL-MCNC: 3.6 MG/DL (ref 2.7–4.5)
PIP: 19
PLATELET # BLD AUTO: 183 K/UL (ref 150–450)
PLATELET BLD QL SMEAR: NORMAL
PMV BLD AUTO: 10.3 FL (ref 9.2–12.9)
PO2 BLDA: 141 MMHG (ref 80–100)
PO2 BLDA: 142 MMHG (ref 80–100)
PO2 BLDA: 145 MMHG (ref 80–100)
PO2 BLDA: 224 MMHG (ref 80–100)
PO2 BLDA: 281 MMHG (ref 80–100)
PO2 BLDA: 448 MMHG (ref 80–100)
PO2 BLDA: 49 MMHG (ref 40–60)
PO2 BLDA: 82 MMHG (ref 80–100)
POC BE: -2 MMOL/L (ref -2–2)
POC BE: -4 MMOL/L (ref -2–2)
POC BE: -4 MMOL/L (ref -2–2)
POC BE: -6 MMOL/L (ref -2–2)
POC BE: 8 MMOL/L (ref -2–2)
POC IONIZED CALCIUM: 0.92 MMOL/L (ref 1.06–1.42)
POC IONIZED CALCIUM: 1.03 MMOL/L (ref 1.06–1.42)
POC IONIZED CALCIUM: 1.04 MMOL/L (ref 1.06–1.42)
POC IONIZED CALCIUM: 1.1 MMOL/L (ref 1.06–1.42)
POC IONIZED CALCIUM: 1.25 MMOL/L (ref 1.06–1.42)
POC IONIZED CALCIUM: 1.26 MMOL/L (ref 1.06–1.42)
POC IONIZED CALCIUM: 1.28 MMOL/L (ref 1.06–1.42)
POC IONIZED CALCIUM: 1.32 MMOL/L (ref 1.06–1.42)
POC SATURATED O2: 100 % (ref 95–100)
POC SATURATED O2: 81 % (ref 95–100)
POC SATURATED O2: 96 % (ref 95–100)
POC SATURATED O2: 99 % (ref 95–100)
POC TCO2: 19 MMOL/L (ref 23–27)
POC TCO2: 20 MMOL/L (ref 23–27)
POC TCO2: 21 MMOL/L (ref 23–27)
POC TCO2: 21 MMOL/L (ref 23–27)
POC TCO2: 23 MMOL/L (ref 23–27)
POC TCO2: 23 MMOL/L (ref 23–27)
POC TCO2: 25 MMOL/L (ref 23–27)
POC TCO2: 35 MMOL/L (ref 24–29)
POCT GLUCOSE: 134 MG/DL (ref 70–110)
POCT GLUCOSE: 157 MG/DL (ref 70–110)
POCT GLUCOSE: 166 MG/DL (ref 70–110)
POCT GLUCOSE: 170 MG/DL (ref 70–110)
POCT GLUCOSE: 171 MG/DL (ref 70–110)
POCT GLUCOSE: 173 MG/DL (ref 70–110)
POCT GLUCOSE: 180 MG/DL (ref 70–110)
POCT GLUCOSE: 184 MG/DL (ref 70–110)
POCT GLUCOSE: 186 MG/DL (ref 70–110)
POCT GLUCOSE: 196 MG/DL (ref 70–110)
POCT GLUCOSE: 85 MG/DL (ref 70–110)
POCT GLUCOSE: 92 MG/DL (ref 70–110)
POCT GLUCOSE: 94 MG/DL (ref 70–110)
POCT GLUCOSE: 99 MG/DL (ref 70–110)
POTASSIUM BLD-SCNC: 2.9 MMOL/L (ref 3.5–5.1)
POTASSIUM BLD-SCNC: 3.3 MMOL/L (ref 3.5–5.1)
POTASSIUM BLD-SCNC: 3.3 MMOL/L (ref 3.5–5.1)
POTASSIUM BLD-SCNC: 3.5 MMOL/L (ref 3.5–5.1)
POTASSIUM BLD-SCNC: 3.6 MMOL/L (ref 3.5–5.1)
POTASSIUM BLD-SCNC: 3.7 MMOL/L (ref 3.5–5.1)
POTASSIUM BLD-SCNC: 4.2 MMOL/L (ref 3.5–5.1)
POTASSIUM BLD-SCNC: 4.9 MMOL/L (ref 3.5–5.1)
POTASSIUM SERPL-SCNC: 3.6 MMOL/L (ref 3.5–5.1)
POTASSIUM SERPL-SCNC: 4.2 MMOL/L (ref 3.5–5.1)
PROTHROMBIN TIME: 13.7 SECONDS (ref 9–12.5)
PS: 6
RBC # BLD AUTO: 4.1 M/UL (ref 4–5.4)
RELATIVE EOSINOPHIL (OHS): 0.3 %
RELATIVE LYMPHOCYTE (OHS): 5 % (ref 18–48)
RELATIVE MONOCYTE (OHS): 4.4 % (ref 4–15)
RELATIVE NEUTROPHIL (OHS): 89.2 % (ref 38–73)
RH BLD: NORMAL
SAMPLE: ABNORMAL
SAMPLE: NORMAL
SITE: ABNORMAL
SODIUM BLD-SCNC: 138 MMOL/L (ref 136–145)
SODIUM BLD-SCNC: 138 MMOL/L (ref 136–145)
SODIUM BLD-SCNC: 141 MMOL/L (ref 136–145)
SODIUM BLD-SCNC: 143 MMOL/L (ref 136–145)
SODIUM BLD-SCNC: 144 MMOL/L (ref 136–145)
SODIUM BLD-SCNC: 145 MMOL/L (ref 136–145)
SODIUM BLD-SCNC: 146 MMOL/L (ref 136–145)
SODIUM BLD-SCNC: 146 MMOL/L (ref 136–145)
SODIUM SERPL-SCNC: 142 MMOL/L (ref 136–145)
SP02: 50
SP02: 98
SPECIMEN OUTDATE: NORMAL
SPONT RATE: 18
UNIT NUMBER: NORMAL
UNIT NUMBER: NORMAL
VT: 420
WBC # BLD AUTO: 15.62 K/UL (ref 3.9–12.7)

## 2025-06-26 PROCEDURE — 27201037 HC PRESSURE MONITORING SET UP

## 2025-06-26 PROCEDURE — 27000445 HC TEMPORARY PACEMAKER LEADS

## 2025-06-26 PROCEDURE — 93010 ELECTROCARDIOGRAM REPORT: CPT | Mod: ,,, | Performed by: INTERNAL MEDICINE

## 2025-06-26 PROCEDURE — 27100171 HC OXYGEN HIGH FLOW UP TO 24 HOURS

## 2025-06-26 PROCEDURE — 63600175 PHARM REV CODE 636 W HCPCS: Mod: JZ,TB

## 2025-06-26 PROCEDURE — 94002 VENT MGMT INPAT INIT DAY: CPT

## 2025-06-26 PROCEDURE — 27100088 HC CELL SAVER

## 2025-06-26 PROCEDURE — 94640 AIRWAY INHALATION TREATMENT: CPT

## 2025-06-26 PROCEDURE — P9045 ALBUMIN (HUMAN), 5%, 250 ML: HCPCS | Mod: JZ,TB

## 2025-06-26 PROCEDURE — 25000003 PHARM REV CODE 250

## 2025-06-26 PROCEDURE — 37000009 HC ANESTHESIA EA ADD 15 MINS: Performed by: STUDENT IN AN ORGANIZED HEALTH CARE EDUCATION/TRAINING PROGRAM

## 2025-06-26 PROCEDURE — 27201423 OPTIME MED/SURG SUP & DEVICES STERILE SUPPLY: Performed by: STUDENT IN AN ORGANIZED HEALTH CARE EDUCATION/TRAINING PROGRAM

## 2025-06-26 PROCEDURE — 82803 BLOOD GASES ANY COMBINATION: CPT

## 2025-06-26 PROCEDURE — 63600175 PHARM REV CODE 636 W HCPCS: Performed by: STUDENT IN AN ORGANIZED HEALTH CARE EDUCATION/TRAINING PROGRAM

## 2025-06-26 PROCEDURE — 06BQ4ZZ EXCISION OF LEFT SAPHENOUS VEIN, PERCUTANEOUS ENDOSCOPIC APPROACH: ICD-10-PCS | Performed by: STUDENT IN AN ORGANIZED HEALTH CARE EDUCATION/TRAINING PROGRAM

## 2025-06-26 PROCEDURE — C1729 CATH, DRAINAGE: HCPCS | Performed by: STUDENT IN AN ORGANIZED HEALTH CARE EDUCATION/TRAINING PROGRAM

## 2025-06-26 PROCEDURE — 83605 ASSAY OF LACTIC ACID: CPT

## 2025-06-26 PROCEDURE — P9016 RBC LEUKOCYTES REDUCED: HCPCS

## 2025-06-26 PROCEDURE — 99223 1ST HOSP IP/OBS HIGH 75: CPT | Mod: ,,, | Performed by: PHYSICIAN ASSISTANT

## 2025-06-26 PROCEDURE — 86920 COMPATIBILITY TEST SPIN: CPT

## 2025-06-26 PROCEDURE — 27100026 HC SHUNT SENSOR, TERUMO

## 2025-06-26 PROCEDURE — 27000191 HC C-V MONITORING

## 2025-06-26 PROCEDURE — 94010 BREATHING CAPACITY TEST: CPT

## 2025-06-26 PROCEDURE — 33508 ENDOSCOPIC VEIN HARVEST: CPT | Mod: 59,HCNC,, | Performed by: STUDENT IN AN ORGANIZED HEALTH CARE EDUCATION/TRAINING PROGRAM

## 2025-06-26 PROCEDURE — 84295 ASSAY OF SERUM SODIUM: CPT

## 2025-06-26 PROCEDURE — 63600175 PHARM REV CODE 636 W HCPCS

## 2025-06-26 PROCEDURE — 99900035 HC TECH TIME PER 15 MIN (STAT)

## 2025-06-26 PROCEDURE — 25000242 PHARM REV CODE 250 ALT 637 W/ HCPCS

## 2025-06-26 PROCEDURE — 85384 FIBRINOGEN ACTIVITY: CPT

## 2025-06-26 PROCEDURE — 86920 COMPATIBILITY TEST SPIN: CPT | Performed by: STUDENT IN AN ORGANIZED HEALTH CARE EDUCATION/TRAINING PROGRAM

## 2025-06-26 PROCEDURE — 93005 ELECTROCARDIOGRAM TRACING: CPT

## 2025-06-26 PROCEDURE — 63600175 PHARM REV CODE 636 W HCPCS: Performed by: ANESTHESIOLOGY

## 2025-06-26 PROCEDURE — C9248 INJ, CLEVIDIPINE BUTYRATE: HCPCS | Mod: JZ,TB

## 2025-06-26 PROCEDURE — 27201673 HC ANCILLARY CANNULA

## 2025-06-26 PROCEDURE — 99291 CRITICAL CARE FIRST HOUR: CPT | Mod: ,,, | Performed by: STUDENT IN AN ORGANIZED HEALTH CARE EDUCATION/TRAINING PROGRAM

## 2025-06-26 PROCEDURE — 27200953 HC CARDIOPLEGIA SYSTEM

## 2025-06-26 PROCEDURE — 94799 UNLISTED PULMONARY SVC/PX: CPT

## 2025-06-26 PROCEDURE — 99900026 HC AIRWAY MAINTENANCE (STAT)

## 2025-06-26 PROCEDURE — 84132 ASSAY OF SERUM POTASSIUM: CPT

## 2025-06-26 PROCEDURE — 27000175 HC ADULT BYPASS PUMP

## 2025-06-26 PROCEDURE — 36592 COLLECT BLOOD FROM PICC: CPT

## 2025-06-26 PROCEDURE — 33533 CABG ARTERIAL SINGLE: CPT | Mod: HCNC,,, | Performed by: STUDENT IN AN ORGANIZED HEALTH CARE EDUCATION/TRAINING PROGRAM

## 2025-06-26 PROCEDURE — 021009W BYPASS CORONARY ARTERY, ONE ARTERY FROM AORTA WITH AUTOLOGOUS VENOUS TISSUE, OPEN APPROACH: ICD-10-PCS | Performed by: STUDENT IN AN ORGANIZED HEALTH CARE EDUCATION/TRAINING PROGRAM

## 2025-06-26 PROCEDURE — 85014 HEMATOCRIT: CPT

## 2025-06-26 PROCEDURE — P9016 RBC LEUKOCYTES REDUCED: HCPCS | Performed by: STUDENT IN AN ORGANIZED HEALTH CARE EDUCATION/TRAINING PROGRAM

## 2025-06-26 PROCEDURE — 5A1221Z PERFORMANCE OF CARDIAC OUTPUT, CONTINUOUS: ICD-10-PCS | Performed by: STUDENT IN AN ORGANIZED HEALTH CARE EDUCATION/TRAINING PROGRAM

## 2025-06-26 PROCEDURE — 25000003 PHARM REV CODE 250: Performed by: STUDENT IN AN ORGANIZED HEALTH CARE EDUCATION/TRAINING PROGRAM

## 2025-06-26 PROCEDURE — 94150 VITAL CAPACITY TEST: CPT

## 2025-06-26 PROCEDURE — 84100 ASSAY OF PHOSPHORUS: CPT

## 2025-06-26 PROCEDURE — 82310 ASSAY OF CALCIUM: CPT

## 2025-06-26 PROCEDURE — 85520 HEPARIN ASSAY: CPT

## 2025-06-26 PROCEDURE — 94761 N-INVAS EAR/PLS OXIMETRY MLT: CPT | Mod: XB

## 2025-06-26 PROCEDURE — 85610 PROTHROMBIN TIME: CPT

## 2025-06-26 PROCEDURE — 37000008 HC ANESTHESIA 1ST 15 MINUTES: Performed by: STUDENT IN AN ORGANIZED HEALTH CARE EDUCATION/TRAINING PROGRAM

## 2025-06-26 PROCEDURE — 37799 UNLISTED PX VASCULAR SURGERY: CPT

## 2025-06-26 PROCEDURE — 82330 ASSAY OF CALCIUM: CPT

## 2025-06-26 PROCEDURE — 33517 CABG ARTERY-VEIN SINGLE: CPT | Mod: HCNC,,, | Performed by: STUDENT IN AN ORGANIZED HEALTH CARE EDUCATION/TRAINING PROGRAM

## 2025-06-26 PROCEDURE — 83735 ASSAY OF MAGNESIUM: CPT

## 2025-06-26 PROCEDURE — 27200667 HC PACEMAKER, TEMPORARY MONITORING, PER SHIFT

## 2025-06-26 PROCEDURE — 20000000 HC ICU ROOM

## 2025-06-26 PROCEDURE — 30233N1 TRANSFUSION OF NONAUTOLOGOUS RED BLOOD CELLS INTO PERIPHERAL VEIN, PERCUTANEOUS APPROACH: ICD-10-PCS | Performed by: STUDENT IN AN ORGANIZED HEALTH CARE EDUCATION/TRAINING PROGRAM

## 2025-06-26 PROCEDURE — 86901 BLOOD TYPING SEROLOGIC RH(D): CPT

## 2025-06-26 PROCEDURE — 02100Z9 BYPASS CORONARY ARTERY, ONE ARTERY FROM LEFT INTERNAL MAMMARY, OPEN APPROACH: ICD-10-PCS | Performed by: STUDENT IN AN ORGANIZED HEALTH CARE EDUCATION/TRAINING PROGRAM

## 2025-06-26 PROCEDURE — 36000713 HC OR TIME LEV V EA ADD 15 MIN: Performed by: STUDENT IN AN ORGANIZED HEALTH CARE EDUCATION/TRAINING PROGRAM

## 2025-06-26 PROCEDURE — 27201015 HC HEMO-CONCENTRATOR

## 2025-06-26 PROCEDURE — 85025 COMPLETE CBC W/AUTO DIFF WBC: CPT

## 2025-06-26 PROCEDURE — 85730 THROMBOPLASTIN TIME PARTIAL: CPT

## 2025-06-26 PROCEDURE — 99900017 HC EXTUBATION W/PARAMETERS (STAT)

## 2025-06-26 PROCEDURE — 36000712 HC OR TIME LEV V 1ST 15 MIN: Performed by: STUDENT IN AN ORGANIZED HEALTH CARE EDUCATION/TRAINING PROGRAM

## 2025-06-26 RX ORDER — OXYCODONE HYDROCHLORIDE 10 MG/1
10 TABLET ORAL EVERY 4 HOURS PRN
Status: DISCONTINUED | OUTPATIENT
Start: 2025-06-26 | End: 2025-06-29

## 2025-06-26 RX ORDER — ACETAMINOPHEN 500 MG
1000 TABLET ORAL EVERY 8 HOURS
Status: DISCONTINUED | OUTPATIENT
Start: 2025-06-26 | End: 2025-06-27

## 2025-06-26 RX ORDER — PROPOFOL 10 MG/ML
INJECTION, EMULSION INTRAVENOUS
Status: DISPENSED
Start: 2025-06-26 | End: 2025-06-27

## 2025-06-26 RX ORDER — NAPROXEN SODIUM 220 MG/1
81 TABLET, FILM COATED ORAL DAILY
Status: DISCONTINUED | OUTPATIENT
Start: 2025-06-27 | End: 2025-07-02 | Stop reason: HOSPADM

## 2025-06-26 RX ORDER — MUPIROCIN 20 MG/G
1 OINTMENT TOPICAL 2 TIMES DAILY
Status: COMPLETED | OUTPATIENT
Start: 2025-06-26 | End: 2025-06-30

## 2025-06-26 RX ORDER — FENTANYL CITRATE 50 UG/ML
25 INJECTION, SOLUTION INTRAMUSCULAR; INTRAVENOUS
Status: DISCONTINUED | OUTPATIENT
Start: 2025-06-26 | End: 2025-06-26

## 2025-06-26 RX ORDER — NOREPINEPHRINE BITARTRATE 1 MG/ML
INJECTION, SOLUTION INTRAVENOUS
Status: DISCONTINUED | OUTPATIENT
Start: 2025-06-26 | End: 2025-06-26

## 2025-06-26 RX ORDER — HEPARIN SOD,PORCINE/0.9 % NACL 1000/500ML
INTRAVENOUS SOLUTION INTRAVENOUS
Status: DISCONTINUED | OUTPATIENT
Start: 2025-06-26 | End: 2025-06-26 | Stop reason: HOSPADM

## 2025-06-26 RX ORDER — TRANEXAMIC ACID 1 G/10ML
INJECTION, SOLUTION INTRAVENOUS
Status: DISCONTINUED | OUTPATIENT
Start: 2025-06-26 | End: 2025-06-26

## 2025-06-26 RX ORDER — NOREPINEPHRINE BITARTRATE/D5W 4MG/250ML
0-3 PLASTIC BAG, INJECTION (ML) INTRAVENOUS CONTINUOUS
Status: DISCONTINUED | OUTPATIENT
Start: 2025-06-26 | End: 2025-06-26

## 2025-06-26 RX ORDER — POTASSIUM CHLORIDE 14.9 MG/ML
20 INJECTION INTRAVENOUS
Status: DISCONTINUED | OUTPATIENT
Start: 2025-06-26 | End: 2025-06-29

## 2025-06-26 RX ORDER — PROPOFOL 10 MG/ML
VIAL (ML) INTRAVENOUS
Status: DISCONTINUED | OUTPATIENT
Start: 2025-06-26 | End: 2025-06-26

## 2025-06-26 RX ORDER — PHENYLEPHRINE HCL IN 0.9% NACL 1 MG/10 ML
SYRINGE (ML) INTRAVENOUS
Status: DISCONTINUED | OUTPATIENT
Start: 2025-06-26 | End: 2025-06-26

## 2025-06-26 RX ORDER — ASPIRIN 300 MG/1
300 SUPPOSITORY RECTAL ONCE
Status: DISCONTINUED | OUTPATIENT
Start: 2025-06-26 | End: 2025-06-26

## 2025-06-26 RX ORDER — PROTAMINE SULFATE 10 MG/ML
INJECTION, SOLUTION INTRAVENOUS
Status: DISCONTINUED | OUTPATIENT
Start: 2025-06-26 | End: 2025-06-26

## 2025-06-26 RX ORDER — METOPROLOL TARTRATE 1 MG/ML
INJECTION, SOLUTION INTRAVENOUS
Status: COMPLETED
Start: 2025-06-26 | End: 2025-06-26

## 2025-06-26 RX ORDER — EPINEPHRINE 0.1 MG/ML
INJECTION INTRAVENOUS
Status: DISCONTINUED | OUTPATIENT
Start: 2025-06-26 | End: 2025-06-26

## 2025-06-26 RX ORDER — ONDANSETRON HYDROCHLORIDE 2 MG/ML
4 INJECTION, SOLUTION INTRAVENOUS EVERY 12 HOURS PRN
Status: DISCONTINUED | OUTPATIENT
Start: 2025-06-26 | End: 2025-07-02 | Stop reason: HOSPADM

## 2025-06-26 RX ORDER — PROPOFOL 10 MG/ML
0-50 INJECTION, EMULSION INTRAVENOUS CONTINUOUS
Status: DISCONTINUED | OUTPATIENT
Start: 2025-06-26 | End: 2025-06-26

## 2025-06-26 RX ORDER — ACETAMINOPHEN 325 MG/1
650 TABLET ORAL EVERY 4 HOURS PRN
Status: DISCONTINUED | OUTPATIENT
Start: 2025-06-26 | End: 2025-06-26

## 2025-06-26 RX ORDER — OXYCODONE HYDROCHLORIDE 5 MG/1
5 TABLET ORAL EVERY 4 HOURS PRN
Status: DISCONTINUED | OUTPATIENT
Start: 2025-06-26 | End: 2025-06-30

## 2025-06-26 RX ORDER — DEXMEDETOMIDINE HYDROCHLORIDE 4 UG/ML
INJECTION, SOLUTION INTRAVENOUS
Status: DISPENSED
Start: 2025-06-26 | End: 2025-06-27

## 2025-06-26 RX ORDER — PAPAVERINE HYDROCHLORIDE 30 MG/ML
INJECTION INTRAMUSCULAR; INTRAVENOUS
Status: DISCONTINUED | OUTPATIENT
Start: 2025-06-26 | End: 2025-06-26 | Stop reason: HOSPADM

## 2025-06-26 RX ORDER — LIDOCAINE HYDROCHLORIDE 10 MG/ML
1 INJECTION, SOLUTION EPIDURAL; INFILTRATION; INTRACAUDAL; PERINEURAL ONCE AS NEEDED
Status: DISCONTINUED | OUTPATIENT
Start: 2025-06-26 | End: 2025-06-26 | Stop reason: HOSPADM

## 2025-06-26 RX ORDER — HEPARIN SODIUM 1000 [USP'U]/ML
INJECTION, SOLUTION INTRAVENOUS; SUBCUTANEOUS
Status: DISCONTINUED | OUTPATIENT
Start: 2025-06-26 | End: 2025-06-26

## 2025-06-26 RX ORDER — METOCLOPRAMIDE HYDROCHLORIDE 5 MG/ML
5 INJECTION INTRAMUSCULAR; INTRAVENOUS EVERY 6 HOURS PRN
Status: DISCONTINUED | OUTPATIENT
Start: 2025-06-26 | End: 2025-06-28

## 2025-06-26 RX ORDER — METOPROLOL TARTRATE 1 MG/ML
5 INJECTION, SOLUTION INTRAVENOUS ONCE
Status: COMPLETED | OUTPATIENT
Start: 2025-06-26 | End: 2025-06-26

## 2025-06-26 RX ORDER — POTASSIUM CHLORIDE 14.9 MG/ML
60 INJECTION INTRAVENOUS
Status: DISCONTINUED | OUTPATIENT
Start: 2025-06-26 | End: 2025-06-29

## 2025-06-26 RX ORDER — MIDAZOLAM HYDROCHLORIDE 1 MG/ML
INJECTION INTRAMUSCULAR; INTRAVENOUS
Status: DISCONTINUED | OUTPATIENT
Start: 2025-06-26 | End: 2025-06-26

## 2025-06-26 RX ORDER — ACETAMINOPHEN 500 MG
1000 TABLET ORAL
Status: COMPLETED | OUTPATIENT
Start: 2025-06-26 | End: 2025-06-26

## 2025-06-26 RX ORDER — DEXMEDETOMIDINE HYDROCHLORIDE 4 UG/ML
0-1.4 INJECTION, SOLUTION INTRAVENOUS CONTINUOUS
Status: DISCONTINUED | OUTPATIENT
Start: 2025-06-26 | End: 2025-06-26

## 2025-06-26 RX ORDER — LIDOCAINE HYDROCHLORIDE 20 MG/ML
INJECTION, SOLUTION EPIDURAL; INFILTRATION; INTRACAUDAL; PERINEURAL
Status: DISCONTINUED | OUTPATIENT
Start: 2025-06-26 | End: 2025-06-26

## 2025-06-26 RX ORDER — EZETIMIBE 10 MG/1
10 TABLET ORAL NIGHTLY
Status: DISCONTINUED | OUTPATIENT
Start: 2025-06-26 | End: 2025-07-02 | Stop reason: HOSPADM

## 2025-06-26 RX ORDER — IPRATROPIUM BROMIDE AND ALBUTEROL SULFATE 2.5; .5 MG/3ML; MG/3ML
3 SOLUTION RESPIRATORY (INHALATION) EVERY 4 HOURS
Status: COMPLETED | OUTPATIENT
Start: 2025-06-26 | End: 2025-06-27

## 2025-06-26 RX ORDER — NAPROXEN SODIUM 220 MG/1
81 TABLET, FILM COATED ORAL ONCE
Status: COMPLETED | OUTPATIENT
Start: 2025-06-26 | End: 2025-06-26

## 2025-06-26 RX ORDER — TRANEXAMIC ACID 100 MG/ML
INJECTION, SOLUTION INTRAVENOUS CONTINUOUS PRN
Status: DISCONTINUED | OUTPATIENT
Start: 2025-06-26 | End: 2025-06-26

## 2025-06-26 RX ORDER — MAGNESIUM SULFATE HEPTAHYDRATE 500 MG/ML
INJECTION, SOLUTION INTRAMUSCULAR; INTRAVENOUS
Status: DISCONTINUED | OUTPATIENT
Start: 2025-06-26 | End: 2025-06-26

## 2025-06-26 RX ORDER — MAGNESIUM SULFATE HEPTAHYDRATE 40 MG/ML
2 INJECTION, SOLUTION INTRAVENOUS
Status: DISCONTINUED | OUTPATIENT
Start: 2025-06-26 | End: 2025-07-02 | Stop reason: HOSPADM

## 2025-06-26 RX ORDER — DOCUSATE SODIUM 100 MG/1
100 CAPSULE, LIQUID FILLED ORAL 2 TIMES DAILY
Status: DISCONTINUED | OUTPATIENT
Start: 2025-06-26 | End: 2025-06-28

## 2025-06-26 RX ORDER — IPRATROPIUM BROMIDE AND ALBUTEROL SULFATE 2.5; .5 MG/3ML; MG/3ML
3 SOLUTION RESPIRATORY (INHALATION) EVERY 4 HOURS PRN
Status: ACTIVE | OUTPATIENT
Start: 2025-06-26 | End: 2025-06-27

## 2025-06-26 RX ORDER — CEFAZOLIN 2 G/1
2 INJECTION, POWDER, FOR SOLUTION INTRAMUSCULAR; INTRAVENOUS
Status: COMPLETED | OUTPATIENT
Start: 2025-06-26 | End: 2025-06-27

## 2025-06-26 RX ORDER — ROCURONIUM BROMIDE 10 MG/ML
INJECTION, SOLUTION INTRAVENOUS
Status: DISCONTINUED | OUTPATIENT
Start: 2025-06-26 | End: 2025-06-26

## 2025-06-26 RX ORDER — NAPROXEN SODIUM 220 MG/1
81 TABLET, FILM COATED ORAL ONCE
Status: DISCONTINUED | OUTPATIENT
Start: 2025-06-26 | End: 2025-06-26

## 2025-06-26 RX ORDER — VASOPRESSIN 20 [USP'U]/ML
INJECTION, SOLUTION INTRAMUSCULAR; SUBCUTANEOUS
Status: DISCONTINUED | OUTPATIENT
Start: 2025-06-26 | End: 2025-06-26

## 2025-06-26 RX ORDER — ASPIRIN 300 MG/1
300 SUPPOSITORY RECTAL ONCE AS NEEDED
Status: DISCONTINUED | OUTPATIENT
Start: 2025-06-26 | End: 2025-06-26

## 2025-06-26 RX ORDER — POLYETHYLENE GLYCOL 3350 17 G/17G
17 POWDER, FOR SOLUTION ORAL DAILY
Status: DISCONTINUED | OUTPATIENT
Start: 2025-06-26 | End: 2025-06-28

## 2025-06-26 RX ORDER — FENTANYL CITRATE 50 UG/ML
50 INJECTION, SOLUTION INTRAMUSCULAR; INTRAVENOUS
Status: DISCONTINUED | OUTPATIENT
Start: 2025-07-04 | End: 2025-06-26

## 2025-06-26 RX ORDER — BUPIVACAINE HYDROCHLORIDE 2.5 MG/ML
INJECTION, SOLUTION EPIDURAL; INFILTRATION; INTRACAUDAL; PERINEURAL
Status: COMPLETED | OUTPATIENT
Start: 2025-06-26 | End: 2025-06-26

## 2025-06-26 RX ORDER — ALBUMIN HUMAN 50 G/1000ML
SOLUTION INTRAVENOUS
Status: DISCONTINUED | OUTPATIENT
Start: 2025-06-26 | End: 2025-06-26

## 2025-06-26 RX ORDER — CEFAZOLIN SODIUM 1 G/3ML
INJECTION, POWDER, FOR SOLUTION INTRAMUSCULAR; INTRAVENOUS
Status: DISCONTINUED | OUTPATIENT
Start: 2025-06-26 | End: 2025-06-26

## 2025-06-26 RX ORDER — SODIUM CHLORIDE 9 MG/ML
INJECTION, SOLUTION INTRAVENOUS CONTINUOUS
Status: DISCONTINUED | OUTPATIENT
Start: 2025-06-26 | End: 2025-06-29

## 2025-06-26 RX ORDER — FENTANYL CITRATE 50 UG/ML
INJECTION, SOLUTION INTRAMUSCULAR; INTRAVENOUS
Status: DISCONTINUED | OUTPATIENT
Start: 2025-06-26 | End: 2025-06-26

## 2025-06-26 RX ORDER — CALCIUM CHLORIDE DIHYDRATE 100 MG/ML
INJECTION, SOLUTION INTRAVENOUS
Status: DISCONTINUED | OUTPATIENT
Start: 2025-06-26 | End: 2025-06-26

## 2025-06-26 RX ORDER — POTASSIUM CHLORIDE 29.8 MG/ML
40 INJECTION INTRAVENOUS
Status: DISCONTINUED | OUTPATIENT
Start: 2025-06-26 | End: 2025-06-29

## 2025-06-26 RX ORDER — BISACODYL 10 MG/1
10 SUPPOSITORY RECTAL DAILY PRN
Status: DISCONTINUED | OUTPATIENT
Start: 2025-06-26 | End: 2025-06-29

## 2025-06-26 RX ORDER — ALBUMIN HUMAN 50 G/1000ML
25 SOLUTION INTRAVENOUS ONCE AS NEEDED
Status: DISCONTINUED | OUTPATIENT
Start: 2025-06-26 | End: 2025-06-26

## 2025-06-26 RX ORDER — NITROGLYCERIN 5 MG/ML
INJECTION, SOLUTION INTRAVENOUS
Status: DISCONTINUED | OUTPATIENT
Start: 2025-06-26 | End: 2025-06-26

## 2025-06-26 RX ORDER — KETAMINE HCL IN 0.9 % NACL 50 MG/5 ML
SYRINGE (ML) INTRAVENOUS
Status: DISCONTINUED | OUTPATIENT
Start: 2025-06-26 | End: 2025-06-26

## 2025-06-26 RX ORDER — ALBUMIN HUMAN 50 G/1000ML
25 SOLUTION INTRAVENOUS ONCE
Status: COMPLETED | OUTPATIENT
Start: 2025-06-26 | End: 2025-06-26

## 2025-06-26 RX ORDER — BUPIVACAINE HYDROCHLORIDE 7.5 MG/ML
INJECTION, SOLUTION EPIDURAL; RETROBULBAR
Status: COMPLETED | OUTPATIENT
Start: 2025-06-26 | End: 2025-06-26

## 2025-06-26 RX ORDER — FAMOTIDINE 10 MG/ML
20 INJECTION, SOLUTION INTRAVENOUS 2 TIMES DAILY
Status: DISCONTINUED | OUTPATIENT
Start: 2025-06-26 | End: 2025-06-27

## 2025-06-26 RX ADMIN — EPINEPHRINE 0.04 MCG/KG/MIN: 1 INJECTION INTRAMUSCULAR; INTRAVENOUS; SUBCUTANEOUS at 10:06

## 2025-06-26 RX ADMIN — CALCIUM CHLORIDE 0.5 G: 100 INJECTION, SOLUTION INTRAVENOUS at 10:06

## 2025-06-26 RX ADMIN — POTASSIUM CHLORIDE 40 MEQ: 29.8 INJECTION, SOLUTION INTRAVENOUS at 04:06

## 2025-06-26 RX ADMIN — SODIUM CHLORIDE, SODIUM GLUCONATE, SODIUM ACETATE, POTASSIUM CHLORIDE, MAGNESIUM CHLORIDE, SODIUM PHOSPHATE, DIBASIC, AND POTASSIUM PHOSPHATE: .53; .5; .37; .037; .03; .012; .00082 INJECTION, SOLUTION INTRAVENOUS at 09:06

## 2025-06-26 RX ADMIN — CALCIUM CHLORIDE 0.25 G: 100 INJECTION, SOLUTION INTRAVENOUS at 10:06

## 2025-06-26 RX ADMIN — CEFAZOLIN 2 G: 2 INJECTION, POWDER, FOR SOLUTION INTRAMUSCULAR; INTRAVENOUS at 11:06

## 2025-06-26 RX ADMIN — POTASSIUM CHLORIDE 20 MEQ: 200 INJECTION, SOLUTION INTRAVENOUS at 01:06

## 2025-06-26 RX ADMIN — NITROGLYCERIN 50 MCG: 5 INJECTION, SOLUTION INTRAVENOUS at 08:06

## 2025-06-26 RX ADMIN — EZETIMIBE 10 MG: 10 TABLET ORAL at 08:06

## 2025-06-26 RX ADMIN — DOCUSATE SODIUM 100 MG: 100 CAPSULE, LIQUID FILLED ORAL at 08:06

## 2025-06-26 RX ADMIN — OXYCODONE HYDROCHLORIDE 10 MG: 10 TABLET ORAL at 08:06

## 2025-06-26 RX ADMIN — BUPIVACAINE HYDROCHLORIDE 20 ML: 2.5 INJECTION, SOLUTION EPIDURAL; INFILTRATION; INTRACAUDAL; PERINEURAL at 07:06

## 2025-06-26 RX ADMIN — METOPROLOL TARTRATE 5 MG: 1 INJECTION, SOLUTION INTRAVENOUS at 02:06

## 2025-06-26 RX ADMIN — MUPIROCIN 1 G: 20 OINTMENT TOPICAL at 04:06

## 2025-06-26 RX ADMIN — PROTAMINE SULFATE 293 MG: 10 INJECTION, SOLUTION INTRAVENOUS at 11:06

## 2025-06-26 RX ADMIN — Medication 30 MG: at 08:06

## 2025-06-26 RX ADMIN — ROCURONIUM BROMIDE 50 MG: 10 INJECTION, SOLUTION INTRAVENOUS at 11:06

## 2025-06-26 RX ADMIN — PROPOFOL 50 MG: 10 INJECTION, EMULSION INTRAVENOUS at 07:06

## 2025-06-26 RX ADMIN — VASOPRESSIN 2 UNITS: 20 INJECTION INTRAVENOUS at 09:06

## 2025-06-26 RX ADMIN — ACETAMINOPHEN 1000 MG: 500 TABLET ORAL at 02:06

## 2025-06-26 RX ADMIN — LIDOCAINE HYDROCHLORIDE 100 MG: 20 INJECTION, SOLUTION EPIDURAL; INFILTRATION; INTRACAUDAL at 07:06

## 2025-06-26 RX ADMIN — OXYCODONE HYDROCHLORIDE 10 MG: 10 TABLET ORAL at 04:06

## 2025-06-26 RX ADMIN — AMIODARONE HYDROCHLORIDE 1 MG/MIN: 1.8 INJECTION, SOLUTION INTRAVENOUS at 09:06

## 2025-06-26 RX ADMIN — Medication 100 MCG: at 09:06

## 2025-06-26 RX ADMIN — HEPARIN SODIUM 24000 UNITS: 1000 INJECTION INTRAVENOUS; SUBCUTANEOUS at 09:06

## 2025-06-26 RX ADMIN — MIDAZOLAM 2 MG: 1 INJECTION INTRAMUSCULAR; INTRAVENOUS at 07:06

## 2025-06-26 RX ADMIN — PROPOFOL 50 MG: 10 INJECTION, EMULSION INTRAVENOUS at 08:06

## 2025-06-26 RX ADMIN — ONDANSETRON 4 MG: 2 INJECTION INTRAMUSCULAR; INTRAVENOUS at 05:06

## 2025-06-26 RX ADMIN — ROCURONIUM BROMIDE 80 MG: 10 INJECTION, SOLUTION INTRAVENOUS at 07:06

## 2025-06-26 RX ADMIN — ALBUMIN (HUMAN) 25 G: 12.5 SOLUTION INTRAVENOUS at 01:06

## 2025-06-26 RX ADMIN — VASOPRESSIN 3 UNITS: 20 INJECTION INTRAVENOUS at 09:06

## 2025-06-26 RX ADMIN — FAMOTIDINE 20 MG: 10 INJECTION, SOLUTION INTRAVENOUS at 01:06

## 2025-06-26 RX ADMIN — HUMAN INSULIN 6 UNITS: 100 INJECTION, SOLUTION SUBCUTANEOUS at 10:06

## 2025-06-26 RX ADMIN — TRANEXAMIC ACID 800 MG: 100 INJECTION, SOLUTION INTRAVENOUS at 08:06

## 2025-06-26 RX ADMIN — PROPOFOL 50 MCG/KG/MIN: 10 INJECTION, EMULSION INTRAVENOUS at 12:06

## 2025-06-26 RX ADMIN — Medication 100 MCG: at 08:06

## 2025-06-26 RX ADMIN — BUPIVACAINE HYDROCHLORIDE 15 ML: 7.5 INJECTION, SOLUTION EPIDURAL; RETROBULBAR at 07:06

## 2025-06-26 RX ADMIN — MAGNESIUM SULFATE HEPTAHYDRATE 2 G: 500 INJECTION, SOLUTION INTRAMUSCULAR; INTRAVENOUS at 10:06

## 2025-06-26 RX ADMIN — IPRATROPIUM BROMIDE AND ALBUTEROL SULFATE 3 ML: 2.5; .5 SOLUTION RESPIRATORY (INHALATION) at 01:06

## 2025-06-26 RX ADMIN — Medication 50 MCG: at 09:06

## 2025-06-26 RX ADMIN — SODIUM CHLORIDE: 0.9 INJECTION, SOLUTION INTRAVENOUS at 06:06

## 2025-06-26 RX ADMIN — ROCURONIUM BROMIDE 20 MG: 10 INJECTION, SOLUTION INTRAVENOUS at 08:06

## 2025-06-26 RX ADMIN — FAMOTIDINE 20 MG: 10 INJECTION, SOLUTION INTRAVENOUS at 08:06

## 2025-06-26 RX ADMIN — DEXMEDETOMIDINE HYDROCHLORIDE 0.25 MCG/KG/HR: 100 INJECTION, SOLUTION, CONCENTRATE INTRAVENOUS at 08:06

## 2025-06-26 RX ADMIN — CLEVIPIDINE 2 MG/HR: 0.5 EMULSION INTRAVENOUS at 12:06

## 2025-06-26 RX ADMIN — CALCIUM CHLORIDE 1 G: 100 INJECTION, SOLUTION INTRAVENOUS at 10:06

## 2025-06-26 RX ADMIN — ASPIRIN 81 MG CHEWABLE TABLET 81 MG: 81 TABLET CHEWABLE at 05:06

## 2025-06-26 RX ADMIN — NOREPINEPHRINE BITARTRATE 0.05 MCG/KG/MIN: 1 INJECTION, SOLUTION, CONCENTRATE INTRAVENOUS at 09:06

## 2025-06-26 RX ADMIN — SODIUM CHLORIDE, SODIUM GLUCONATE, SODIUM ACETATE, POTASSIUM CHLORIDE, MAGNESIUM CHLORIDE, SODIUM PHOSPHATE, DIBASIC, AND POTASSIUM PHOSPHATE: .53; .5; .37; .037; .03; .012; .00082 INJECTION, SOLUTION INTRAVENOUS at 07:06

## 2025-06-26 RX ADMIN — SUGAMMADEX 200 MG: 100 INJECTION, SOLUTION INTRAVENOUS at 12:06

## 2025-06-26 RX ADMIN — ACETAMINOPHEN 1000 MG: 500 TABLET ORAL at 05:06

## 2025-06-26 RX ADMIN — ALBUMIN (HUMAN) 500 ML: 12.5 SOLUTION INTRAVENOUS at 11:06

## 2025-06-26 RX ADMIN — ACETAMINOPHEN 1000 MG: 500 TABLET ORAL at 09:06

## 2025-06-26 RX ADMIN — CEFAZOLIN 2 G: 2 INJECTION, POWDER, FOR SOLUTION INTRAMUSCULAR; INTRAVENOUS at 04:06

## 2025-06-26 RX ADMIN — VASOPRESSIN 0.04 UNITS/MIN: 20 INJECTION INTRAVENOUS at 09:06

## 2025-06-26 RX ADMIN — NOREPINEPHRINE BITARTRATE 16 MCG: 1 INJECTION, SOLUTION, CONCENTRATE INTRAVENOUS at 09:06

## 2025-06-26 RX ADMIN — METOROPROLOL TARTRATE 5 MG: 5 INJECTION, SOLUTION INTRAVENOUS at 02:06

## 2025-06-26 RX ADMIN — TRANEXAMIC ACID 1 MG/KG/HR: 100 INJECTION, SOLUTION INTRAVENOUS at 08:06

## 2025-06-26 RX ADMIN — FENTANYL CITRATE 200 MCG: 50 INJECTION INTRAMUSCULAR; INTRAVENOUS at 07:06

## 2025-06-26 RX ADMIN — AMIODARONE HYDROCHLORIDE 1 MG/MIN: 1.8 INJECTION, SOLUTION INTRAVENOUS at 04:06

## 2025-06-26 RX ADMIN — IPRATROPIUM BROMIDE AND ALBUTEROL SULFATE 3 ML: 2.5; .5 SOLUTION RESPIRATORY (INHALATION) at 11:06

## 2025-06-26 RX ADMIN — IPRATROPIUM BROMIDE AND ALBUTEROL SULFATE 3 ML: 2.5; .5 SOLUTION RESPIRATORY (INHALATION) at 08:06

## 2025-06-26 RX ADMIN — CEFAZOLIN 2 G: 330 INJECTION, POWDER, FOR SOLUTION INTRAMUSCULAR; INTRAVENOUS at 08:06

## 2025-06-26 RX ADMIN — EPINEPHRINE 20 MCG: 0.1 INJECTION INTRAVENOUS at 10:06

## 2025-06-26 RX ADMIN — HEPARIN SODIUM 12000 UNITS: 1000 INJECTION INTRAVENOUS; SUBCUTANEOUS at 09:06

## 2025-06-26 RX ADMIN — VASOPRESSIN 4 UNITS: 20 INJECTION INTRAVENOUS at 09:06

## 2025-06-26 RX ADMIN — Medication 20 MG: at 07:06

## 2025-06-26 RX ADMIN — MUPIROCIN 1 G: 20 OINTMENT TOPICAL at 08:06

## 2025-06-26 RX ADMIN — ROCURONIUM BROMIDE 20 MG: 10 INJECTION, SOLUTION INTRAVENOUS at 10:06

## 2025-06-26 RX ADMIN — IPRATROPIUM BROMIDE AND ALBUTEROL SULFATE 3 ML: 2.5; .5 SOLUTION RESPIRATORY (INHALATION) at 04:06

## 2025-06-26 RX ADMIN — VASOPRESSIN 0.02 UNITS/MIN: 20 INJECTION INTRAVENOUS at 02:06

## 2025-06-26 RX ADMIN — ROCURONIUM BROMIDE 30 MG: 10 INJECTION, SOLUTION INTRAVENOUS at 09:06

## 2025-06-26 RX ADMIN — SODIUM CHLORIDE 4 UNITS/HR: 9 INJECTION, SOLUTION INTRAVENOUS at 10:06

## 2025-06-26 NOTE — ANESTHESIA PROCEDURE NOTES
L subclavian quad    Diagnosis: CAD  Patient location during procedure: done in OR  Procedure Urgency: Routine  Procedure start time: 6/26/2025 8:25 AM  Timeout: 6/26/2025 8:25 AM  Procedure end time: 6/26/2025 8:35 AM      Staffing  Authorizing Provider: Zuly Oh MD  Performing Provider: Tian Bennett MD    Staffing  Performed by: Tian Bennett MD  Authorized by: Zuly Oh MD    Anesthesiologist was present at the time of the procedure.  Preanesthetic Checklist  Completed: patient identified, IV checked, site marked, risks and benefits discussed, surgical consent, monitors and equipment checked, pre-op evaluation, timeout performed and anesthesia consent given  Indication   Indication: hemodynamic monitoring, vascular access, med administration     Anesthesia   general anesthesia    Central Line   Skin Prep: skin prepped with ChloraPrep, skin prep agent completely dried prior to procedure  Sterile Barriers Followed: Yes    All five maximal barriers used- gloves, gown, cap, mask, and large sterile sheet    hand hygiene performed prior to central venous catheter insertion  Location: left subclavian.   Catheter type: Quad Lumen  Catheter Size: 8.5 Fr  Ultrasound: vascular probe with ultrasound   Vessel Caliber: patent  Needle advanced into vessel with real time Ultrasound guidance.    Manometry: none  Insertion Attempts: 1   Securement:line sutured, chlorhexidine patch, sterile dressing applied and blood return through all ports    Post-Procedure    Adverse Events:none      Guidewire Guidewire removed intact. Guidewire removed intact, verified with nurse.

## 2025-06-26 NOTE — OP NOTE
[facility]  Cardiothoracic Surgery Operative Report    Patient Name:  Veronica Duque; [id number]    Preoperative Diagnosis:   Coronary artery disease with recent non-STEMI  Hypertension  Hyperlipidemia  CVA with no residual deficits  Peripheral arterial disease with history of right superficial femoral artery stenting    Postoperative Diagnosis:  Same    Date of Operation:  06/26/2025     Operation:  Double vessel coronary artery bypass grafting  Left internal mammary artery to the distal left anterior descending artery  Saphenous vein graft to the posterior descending artery  Endoscopic saphenous vein harvest from the  lower extremity.    Surgeon:  Lamont Flores    First assistant:  Liza Reinoso    Anesthesiologist:  Zuly Oh    ---------------------------------------------------------------------------------------------------------------------      Indications for surgery: Veronica Duque is a 69yoF with a hx of HTN, HLD, CVA with no residual deficits, PAD s/p R SFA stenting by Dr. Valle (non-obstructive L CFA/SFA) who was admitted for NSTEMI. She has no known coronary disease. She said it occered when she woke to go to the bathroom yesterday morning and was a heavy pressure in the substernal region, radiating to the neck/shoulder and associated with nausea. It was coming and going until EMS got there where it resolved with NTG. She has been hypertensive. She has never had this pain before. Her hsTn peaked to 700. EKG shows SR with RBBB, non-specific t wave inversions likely related to the bundle and similar to prior. LHC significant for RCA 90, LAD 90.  The risks and benefits were explained and informed consent was obtained.     Gross findings:   No pericardial adhesions.  Thin-walled unhealthy aorta with patchy calcification  Distal RCA also had significant disease and posterior descending artery and posterolateral ventricular branches were small (1 mm)  Left anterior descending was fair  target  Internal mammary artery had good flow  Saphenous vein was of fair quality        Procedure:  The patient was brought to the holding area and the indications for surgery were reviewed.  Afterwards, the patient was placed supine on the operating room table and prepped and draped in the usual sterile fashion. A surgical time out was performed.     A midline incision was followed with division of the sternum. The left internal mammary artery was harvested as a pedicle. Simultaneously, one piece of saphenous vein was harvested endoscopically from the leg. ACT guided heparinization was administered.  The ascending aorta and right atrium were cannulated.  Cardiopulmonary bypass was commenced.  The ascending aorta was cannulated for administration of cardioplegia.  A cross-clamp was applied and 1500cc of antegrade cold blood cardioplegia was administered.     Attention was turned to the posterior descending artery. The heart was positioned and the vessel was exposed. The artery was identified and arteriotomy performed with an 11 blade scalpel and elongated with scissors.  A segment of vein was prepared for use.  An end to side anastomosis was constructed with continuous 7-0 prolene. The vein was infused with 200 cc of cardioplegia to confirm patency and hemostasis.  Afterwards, an aortotomy was made and the proximal anastomosis was constructed with continuous 6-0 prolene suture.    Attention was next turned to the distal left anterior descending artery. The heart was repositioned and the LAD was identified. The vessel was opened and the arteriotomy elongated with scissors.  The left internal mammary artery was brought to the field and prepared for use.  The left internal mammary artery was sewn end to side to the LAD with continuous 7-0 prolene. The vessel was briefly unclamped to assess for hemostasis.     Air was evacuated and the cross-clamp was removed. All anastomoses were checked for hemostasis and the patient  was weaned from bypass. The cannulae were removed and heparin was reversed.  Temporary ventricular pacing wires were placed on the heart.  Drainage tubes were placed behind the sternum and in the pleural space. The chest was closed with steel wires and the soft tissue was closed with absorbable suture.  A sterile dressing was applied and the patient was brought to the ICU in stable condition.      I was present in the operating room for the entire operation and immediately available thereafter.      Lamont Flores MD  Cardiac Surgery

## 2025-06-26 NOTE — ASSESSMENT & PLAN NOTE
A1c:   Lab Results   Component Value Date    HGBA1C 5.4 06/19/2025   . Controlled on at home:     -- Endocrine following, appreciate recs  -- Blood glucose goal <180  -- Continue insulin gtt for post op blood sugar <180  -- SSI  -- Hypoglycemia protocol in place

## 2025-06-26 NOTE — ANESTHESIA PROCEDURE NOTES
Bilateral pecto-intercostal blocks    Patient location during procedure: OR   Block not for primary anesthetic.  Reason for block: at surgeon's request and post-op pain management   Post-op Pain Location: Chest   Start time: 6/26/2025 7:35 AM  Timeout: 6/26/2025 7:35 AM   End time: 6/26/2025 7:40 AM    Staffing  Authorizing Provider: Zuly Oh MD  Performing Provider: Tian Bennett MD    Staffing  Performed by: Tian Bennett MD  Authorized by: Zuly Oh MD    Preanesthetic Checklist  Completed: patient identified, IV checked, site marked, risks and benefits discussed, surgical consent, monitors and equipment checked, pre-op evaluation and timeout performed  Peripheral Block  Patient position: supine  Prep: ChloraPrep  Patient monitoring: heart rate, cardiac monitor, continuous pulse ox and continuous capnometry  Block type: Other (pecto-intercostal)  Laterality: bilateral  Injection technique: single shot  Needle  Needle type: Echogenic   Needle gauge: 20 G  Needle length: 4 in  Needle localization: anatomical landmarks and ultrasound guidance     Assessment  Injection assessment: negative aspiration  Heart rate change: no  Slow fractionated injection: yes  Pain Tolerance: comfortable throughout block and no complaints  Medications:    Medications: bupivacaine (pf) (MARCAINE) injection 0.75% - Perineural   15 mL - 6/26/2025 7:40:00 AM

## 2025-06-26 NOTE — ANESTHESIA POSTPROCEDURE EVALUATION
Anesthesia Post Evaluation    Patient: Veronica Duque    Procedure(s) Performed: Procedure(s) (LRB):  CORONARY ARTERY BYPASS GRAFT (CABG) (N/A)  SURGICAL PROCUREMENT, VEIN, ENDOSCOPIC (Left)    Final Anesthesia Type: general      Patient location during evaluation: ICU  Patient participation: No - Unable to Participate, Sedation  Level of consciousness: sedated  Post-procedure vital signs: reviewed and stable  Pain management: adequate  Airway patency: patent    PONV status at discharge: No PONV  Anesthetic complications: no      Cardiovascular status: hemodynamically stable  Respiratory status: intubated                Vitals Value Taken Time   /60 06/26/25 12:39   Temp 98.0 06/26/25 12:58   Pulse 77 06/26/25 12:57   Resp 14 06/26/25 12:57   SpO2 98 % 06/26/25 12:57   Vitals shown include unfiled device data.      No case tracking events are documented in the log.      Pain/Chelsey Score: Pain Rating Prior to Med Admin: 0 (6/26/2025  5:58 AM)

## 2025-06-26 NOTE — ANESTHESIA PROCEDURE NOTES
Intubation    Date/Time: 6/26/2025 7:14 AM    Performed by: Hilario Quinteros MD  Authorized by: Zuly Oh MD    Intubation:     Induction:  Intravenous    Intubated:  Postinduction    Mask Ventilation:  Easy with oral airway    Attempts:  1    Attempted By:  Resident anesthesiologist    Method of Intubation:  Video laryngoscopy    Blade:  Mcknight 3    Laryngeal View Grade: Grade I - full view of cords      Difficult Airway Encountered?: No      Complications:  None    Airway Device:  Oral endotracheal tube    Airway Device Size:  7.5    Style/Cuff Inflation:  Cuffed (inflated to minimal occlusive pressure)    Tube secured:  22    Secured at:  The lips    Placement Verified By:  Capnometry    Complicating Factors:  None    Findings Post-Intubation:  BS equal bilateral and atraumatic/condition of teeth unchanged

## 2025-06-26 NOTE — ASSESSMENT & PLAN NOTE
S/p R SFA stenting by Dr. Valle   Home meds: cilostazol 100 mg, asa 81 mg     -- will restart as appropriate

## 2025-06-26 NOTE — SUBJECTIVE & OBJECTIVE
Follow-up For: Procedure(s) (LRB):  CORONARY ARTERY BYPASS GRAFT (CABG) (N/A)  SURGICAL PROCUREMENT, VEIN, ENDOSCOPIC (Left)    Post-Operative Day: * Day of Surgery *    Objective:     Vital Signs (Most Recent):  Temp: 97.9 °F (36.6 °C) (06/26/25 0540)  Pulse: 81 (06/26/25 1235)  Resp: 20 (06/26/25 1235)  BP: 132/60 (06/26/25 1235)  SpO2: 98 % (06/26/25 1235) Vital Signs (24h Range):  Temp:  [97.9 °F (36.6 °C)] 97.9 °F (36.6 °C)  Pulse:  [72-81] 81  Resp:  [18-20] 20  SpO2:  [98 %-100 %] 98 %  BP: (132-157)/(60-78) 132/60     Weight: 75 kg (165 lb 5.5 oz)  Body mass index is 27.51 kg/m².      Intake/Output Summary (Last 24 hours) at 6/26/2025 1251  Last data filed at 6/26/2025 1201  Gross per 24 hour   Intake 5348 ml   Output 550 ml   Net 4798 ml          Physical Exam  Constitutional:       General: She is not in acute distress.     Comments: Intubated and sedated   Cardiovascular:      Rate and Rhythm: Normal rate and regular rhythm.      Comments: V wires present  Pulmonary:      Effort: Pulmonary effort is normal. No respiratory distress.      Comments: 2 meds and 2 pleural chest tubes with serosanguinous output  Abdominal:      General: Abdomen is flat. There is no distension.   Genitourinary:     Comments: Larose present with clear, yellow urine  Skin:     General: Skin is warm and dry.      Capillary Refill: Capillary refill takes less than 2 seconds.   Neurological:      Comments: Intubated and sedated            Vents:  Vent Mode: A/C (06/26/25 1230)  Ventilator Initiated: Yes (06/26/25 1230)  Set Rate: 20 BPM (06/26/25 1230)  Vt Set: 420 mL (06/26/25 1230)  PEEP/CPAP: 5 cmH20 (06/26/25 1230)  Oxygen Concentration (%): 100 (06/26/25 1235)  Peak Airway Pressure: 0 cmH20 (06/26/25 1230)  Plateau Pressure: 0 cmH20 (06/26/25 1230)  Total Ve: 0 L/m (06/26/25 1230)  Negative Inspiratory Force (cm H2O): 0 (06/26/25 1230)    Lines/Drains/Airways       Central Venous Catheter Line  Duration              Percutaneous Central Line Quad Lumen 06/26/25 0825 Subclavian Left <1 day              Drain  Duration                  Urethral Catheter 06/26/25 0740 Non-latex;Straight-tip;Temperature probe 14 Fr. <1 day         Y Chest Tube 1 and 2 06/26/25 1049 1 Anterior Mediastinal 28 Fr. 2 Anterior Mediastinal 28 Fr. <1 day         Y Chest Tube 3 and 4 06/26/25 1049 3 Right Pleural 28 Fr. 4 Left Pleural 28 Fr. <1 day              Airway  Duration                  Airway - Non-Surgical 06/26/25 0714 <1 day              Arterial Line  Duration             Arterial Line 06/26/25 0710 Left Radial <1 day              Line  Duration                  Pacer Wires 06/26/25 1028 <1 day              Peripheral Intravenous Line  Duration             Peripheral IV 06/26/25 0545 20 G Anterior;Left Forearm <1 day    Peripheral IV 06/26/25 0805 18 G Left Hand <1 day                    Significant Labs:    CBC/Anemia Profile:  Recent Labs   Lab 06/26/25  1025 06/26/25  1031 06/26/25  1052   HCT 28* 31* 28*

## 2025-06-26 NOTE — HPI
Reason for Consult: Management of  Hyperglycemia     Surgical Procedure: CABG 6/26/25    No known hx of DM and not on meds for DM.    HPI:   Patient is a 69 y.o. female with hx of HTN, HLD, CVA with no residual deficits, PAD s/p R SFA stenting by Dr. Valle (non-obstructive L CFA/SFA) who was recently admitted for NSTEMI. S/p CABG with Dr. Flores on 06/26/2025.  Endocrine consulted for BG management

## 2025-06-26 NOTE — ANESTHESIA PROCEDURE NOTES
CÉSAR    Diagnosis: CAD  Patient location during procedure: OR  Exam type: Baseline    Staffing  Performed: anesthesiologist, fellow and resident     Anesthesiologist: Zuly Oh MD      Resident:Carmen Corcoran MD  Anesthesiologist Present  Yes      Setup & Induction  Patient preparation: bite block inserted  Probe Insertion: easy  Exam: completeDoppler Echo: 2D, continuous wave Doppler, 3D, color flow mapping and pulse wave Doppler.  Exam     Left Heart  Left Atruim: normal  Left appendage velocity:80    Left Ventricle: cm, normal (men 4.2-5.9; women 3.8-5.2)  LV Wall Thickness (posterior wall):cm, mildly abnormal (men 1.1-1.3 cm; women 1.0-1.2 cm)    LVAD:no  Estimated Ejection Fraction: > 55% normal  Regional Wall Abnormalities: no RWMA            Right Heart  Right Ventricle Function: normal  Right Atria:  normal    Intra Atrial Septum  PFO: no shunt by color flow doppler  no IAS aneurysm  no lipomatous hypertrophy  no Atrial Septal Defect (Asd)    Right Ventricle, Free Wall Thickness: normal    Aortic Valve:  Stenosis: none.  Morphology: trileaflet    Regurgitation: trivial      Mitral Valve:   Morphology:normal  Jet Description: trace    Tricuspid Valve:  Morphology: normal  TRICUSPID REGURGITATION: trivial.    Pulmonic Valve:  Morphology:normal  PULMONIC REGURGITATION: trace.    Great Vessels  Ascending Aorta Atherosclerosis: 3=sessile dz (3-5mm)  Aortic Arch Atherosclerosis: 5=mobile dz  IABP: no  Descending Aorta Atherosclerosis: 5=mobile dz  Aorta    Descending aorta IABP: no    Effusions pericardial    SummaryFindings discussed with surgeon.    Other Findings   Diagnostic intraoperative CÉSAR performed at the request of Dr. Flores for CABG.     Baseline exam:  - LV and RV systolic function are normal  - No regional wall motion abnormalities  - Evidence of asymmetric septal hypertrophy with IVS measuring ~2 cm in the ME long axis view. There is no evidence of turbulent flow in the LVOT or evidence of  ZELALEM.   - Trace mitral and pulmonic insufficiency  - Trivial aortic and tricuspid insufficiency  - No PFO via color flow doppler  - Grade V atherosclerotic disease of the descending thoracic aorta and aortic arch with evidence of two mobile plaques- one at the proximal descending and one in the mid arch.   - Small circumferential pericardial effusion    Post-procedure exam:  - s/p 2v CPB CABG  - On inotropic support with norepi and vaso gtts  - LV and RV systolic function are normal  - No new regional wall motion abnormalities  - Re-demonstration of thickened interventricular septum  - No new valvular abnormalities  - No PFO via color flow doppler  - Re-demonstration of the same mobile atherosclerotic plaques in the proximal descending and mid aortic arch  - Small posterior pericardial effusion  - No pleural effusions    Stable s/p chest closure  Probe removed atraumatically

## 2025-06-26 NOTE — CONSULTS
[facility]  Endocrinology  Diabetes Consult Note    Consult Requested by: Lamont Flores MD   Reason for admit: Coronary artery disease    HISTORY OF PRESENT ILLNESS:  Reason for Consult: Management of  Hyperglycemia     Surgical Procedure: CABG 6/26/25    No known hx of DM and not on meds for DM.    HPI:   Patient is a 69 y.o. female with hx of HTN, HLD, CVA with no residual deficits, PAD s/p R SFA stenting by Dr. Valle (non-obstructive L CFA/SFA) who was recently admitted for NSTEMI. S/p CABG with Dr. Flores on 06/26/2025.  Endocrine consulted for BG management    Interval HPI:   No acute events overnight. Patient in room 60831/18025 A. Blood glucose stable. BG at and above goal on current insulin regimen (IIP). Steroid use- None .   * Day of Surgery *  Renal function- Normal        Diet NPO Except for: Sips with Medication     Eating:   NPO  Nausea: No  Hypoglycemia and intervention: No  Fever: No  TPN and/or TF: No     PMH, PSH, FH, SH updated and reviewed     ROS:       Review of Systems   Unable to perform ROS: Acuity of condition       Current Medications and/or Treatments Impacting Glycemic Control  Immunotherapy:    Immunosuppressants       None          Steroids:   Hormones (From admission, onward)      Start     Stop Route Frequency Ordered    06/26/25 1400  vasopressin (PITRESSIN) 0.2 Units/mL in 0.9% NaCl 100 mL infusion         -- IV Continuous 06/26/25 1250          Pressors:    Autonomic Drugs (From admission, onward)      Start     Stop Route Frequency Ordered    06/26/25 1345  EPINEPHrine 5 mg in dextrose 5% 250 mL infusion (premix)        Question Answer Comment   Begin at (in mcg/kg/min): 0.02    Titrate by: (in mcg/kg/min) 0.02    Titrate interval: (in minutes) 5    Titrate to maintain: (SBP or MAP or Cardiac Index) MAP    Greater than: (in mmHg) 65    Cardiac index greater than: (in L/min) 2.2    Maximum dose: (in mcg/kg/min) 2        -- IV Continuous 06/26/25 2914     "06/26/25 1245  NORepinephrine 4 mg in dextrose 5% 250 mL infusion (premix)        Question Answer Comment   Begin at (in mcg/kg/min): 0.02    Titrate by: (in mcg/kg/min (RANGE PREFERRED) 0.02 - 0.2    Titrate interval: (in minutes) (RANGE PREFERRED) 2 - 5    Titrate to maintain: (MAP or SBP) MAP    Titrate to keep MAP within the range or GREATER than (if single number): (in mmHg) OTHER    Other 60-80    Maximum dose: (in mcg/kg/min) 3        -- IV Continuous 06/26/25 1244          Hyperglycemia/Diabetes Medications:   Antihyperglycemics (From admission, onward)      Start     Stop Route Frequency Ordered    06/26/25 1345  insulin regular in 0.9 % NaCl 100 unit/100 mL (1 unit/mL) infusion        Question Answer Comment   Insulin rate changes (DO NOT MODIFY ANSWER) \\[email or web address]\epic\Images\Pharmacy\InsulinInfusions\CTS INSULIN [id number].pdf    Enter initial dose (Units/hr): 8        -- IV Continuous 06/26/25 1244             PHYSICAL EXAMINATION:  Vitals:    06/26/25 1345   BP:    Pulse: 84   Resp: 16   Temp:      Body mass index is 27.51 kg/m².     Physical Exam  Pulmonary:      Comments: Intubated  Neurological:      Comments: Sedated            Labs Reviewed and Include   Recent Labs   Lab 06/26/25  1247   *   CALCIUM 8.7      K 3.6   CO2 18*   *   BUN 25*   CREATININE 0.9     Lab Results   Component Value Date    WBC 15.62 (H) 06/26/2025    HGB 9.4 (L) 06/26/2025    HCT 29 (L) 06/26/2025    MCV 71 (L) 06/26/2025     06/26/2025     No results for input(s): "TSH", "FREET4" in the last 168 hours.  Lab Results   Component Value Date    HGBA1C 5.4 06/19/2025       Nutritional status:   Body mass index is 27.51 kg/m².  Lab Results   Component Value Date    ALBUMIN 4.3 06/21/2025    ALBUMIN 4.0 06/18/2025    ALBUMIN 3.8 05/06/2025     No results found for: "PREALBUMIN"    Estimated Creatinine Clearance: 58.1 mL/min (based on SCr of 0.9 mg/dL).    Accu-Checks  Recent Labs     06/26/25  1237 " 06/26/25  1307 06/26/25  1404 06/26/25  1431 06/26/25  1447 06/26/25  1503   POCTGLUCOSE 170* 134* 99 85 94 92        ASSESSMENT and PLAN    Cardiac/Vascular  * Coronary artery disease  Managed by primary team  Optimize BG control      PAD (peripheral artery disease)  Managed by primary team  Optimize BG control      Endocrine  Transient hyperglycemia post procedure  BG goal: 110-140   No known history of diabetes. S/p CTS sx. Will monitor    - Continue IIP per CTS protcol  - POCT Glucose every hour  - Hypoglycemia protocol in place      ** Please notify Endocrine for any change and/or advance in diet**  ** Please call Endocrine for any BG related issues **     Discharge Planning:   TBD. Please notify endocrinology prior to discharge.            Plan discussed with patient, family, and RN at bedside.     Rohan Tavares PA-C  Endocrinology  [facility]

## 2025-06-26 NOTE — NURSING
Pt report given to receiving RN; Pt plan of care discussed; Pt VSS; Pt has no c/o pain or discomfort at this time: Pt bed locked + lowered, Srx3, + call bell within reach; Pt family updated

## 2025-06-26 NOTE — ASSESSMENT & PLAN NOTE
Home meds: amlodipine 5 mg, losartan-HCTZ 100-35 mg     -- will restart antihypertensives when appropriate

## 2025-06-26 NOTE — H&P
[facility]  Critical Care - Surgery  History & Physical    Patient Name: Veronica Duque  MRN: [id number]  Admission Date: 6/26/2025  Code Status: Prior  Attending Physician: Lamont Flores MD   Primary Care Provider: Juju Wallace MD   Principal Problem: Coronary artery disease    Subjective:     HPI:  70 yo female with hx of HTN, HLD, CVA with no residual deficits, PAD s/p R SFA stenting by Dr. Valle (non-obstructive L CFA/SFA) who was recently admitted for NSTEMI. The patient presents to the SICU s/p CABG with Dr. Flores on 06/26/2025.     On admission, they are intubated, sedated with propofol, and in stable condition. Inotropic and vasopressor requirements upon admission are 0.04 mcg/kg/min of norepinephrine to maintain blood pressure at a MAP 60-80. Central access includes a L subclavian CVC, arterial access includes a left radial arterial line. They also have 2 pleural and 2 mediastinal chest tubes with appropriate output.    Intraoperatively, they received 3.6 of crystalloid, 770 of cell saver, and 3 PRBCs. Urine output intraoperatively was 550cc. The pre-operative echocardiogram was notable for normal biventricular function, no valve abnormalities, thick nonobstructive ventricular septum. Post-operative echo was notable for normal biventricular function, no valve abnormalities with a small posterior pericardial effusion.     Immediate post-operative plans include hemodynamic stabilization and weaning of cardiac and respiratory support. Plan to wean vasopressors and inotropes as tolerated, wean ventilator support with goal of early extubation, and closely monitor fluid status with strict Is/Os and continued fluid resuscitation as needed.     Hospital/ICU Course:  No notes on file        Follow-up For: Procedure(s) (LRB):  CORONARY ARTERY BYPASS GRAFT (CABG) (N/A)  SURGICAL PROCUREMENT, VEIN, ENDOSCOPIC (Left)    Post-Operative Day: * Day of Surgery *    Objective:     Vital  Signs (Most Recent):  Temp: 97.9 °F (36.6 °C) (06/26/25 0540)  Pulse: 81 (06/26/25 1235)  Resp: 20 (06/26/25 1235)  BP: 132/60 (06/26/25 1235)  SpO2: 98 % (06/26/25 1235) Vital Signs (24h Range):  Temp:  [97.9 °F (36.6 °C)] 97.9 °F (36.6 °C)  Pulse:  [72-81] 81  Resp:  [18-20] 20  SpO2:  [98 %-100 %] 98 %  BP: (132-157)/(60-78) 132/60     Weight: 75 kg (165 lb 5.5 oz)  Body mass index is 27.51 kg/m².      Intake/Output Summary (Last 24 hours) at 6/26/2025 1251  Last data filed at 6/26/2025 1201  Gross per 24 hour   Intake 5348 ml   Output 550 ml   Net 4798 ml          Physical Exam  Constitutional:       General: She is not in acute distress.     Comments: Intubated and sedated   Cardiovascular:      Rate and Rhythm: Normal rate and regular rhythm.      Comments: V wires present  Pulmonary:      Effort: Pulmonary effort is normal. No respiratory distress.      Comments: 2 meds and 2 pleural chest tubes with serosanguinous output  Abdominal:      General: Abdomen is flat. There is no distension.   Genitourinary:     Comments: Larose present with clear, yellow urine  Skin:     General: Skin is warm and dry.      Capillary Refill: Capillary refill takes less than 2 seconds.   Neurological:      Comments: Intubated and sedated            Vents:  Vent Mode: A/C (06/26/25 1230)  Ventilator Initiated: Yes (06/26/25 1230)  Set Rate: 20 BPM (06/26/25 1230)  Vt Set: 420 mL (06/26/25 1230)  PEEP/CPAP: 5 cmH20 (06/26/25 1230)  Oxygen Concentration (%): 100 (06/26/25 1235)  Peak Airway Pressure: 0 cmH20 (06/26/25 1230)  Plateau Pressure: 0 cmH20 (06/26/25 1230)  Total Ve: 0 L/m (06/26/25 1230)  Negative Inspiratory Force (cm H2O): 0 (06/26/25 1230)    Lines/Drains/Airways       Central Venous Catheter Line  Duration             Percutaneous Central Line Quad Lumen 06/26/25 0825 Subclavian Left <1 day              Drain  Duration                  Urethral Catheter 06/26/25 0740 Non-latex;Straight-tip;Temperature probe 14 Fr. <1  day         Y Chest Tube 1 and 2 06/26/25 1049 1 Anterior Mediastinal 28 Fr. 2 Anterior Mediastinal 28 Fr. <1 day         Y Chest Tube 3 and 4 06/26/25 1049 3 Right Pleural 28 Fr. 4 Left Pleural 28 Fr. <1 day              Airway  Duration                  Airway - Non-Surgical 06/26/25 0714 <1 day              Arterial Line  Duration             Arterial Line 06/26/25 0710 Left Radial <1 day              Line  Duration                  Pacer Wires 06/26/25 1028 <1 day              Peripheral Intravenous Line  Duration             Peripheral IV 06/26/25 0545 20 G Anterior;Left Forearm <1 day    Peripheral IV 06/26/25 0805 18 G Left Hand <1 day                    Significant Labs:    CBC/Anemia Profile:  Recent Labs   Lab 06/26/25  1025 06/26/25  1031 06/26/25  1052   HCT 28* 31* 28*          Assessment/Plan:     Neuro  Post-operative pain  -- Multimodal pain control  -- Acetaminophen 1g scheduled q8hrs  -- Oxycodone IR 5 and 10 mg q6hrs prn   -- Fentanyl IVP while intubated, discontinue once extubated  -- Lidocaine patch prn     Pulmonary  S/P chest tube placement  -- Chest tubes x4 ( 2 Meds & 2 pleural)  -- Monitor and document chest tube output hourly  -- Maintain chest tube to -20 cm suction  -- Chest tube site care daily and prn     Cardiac/Vascular  * Coronary artery disease  -- S/P CABG with Dr. Flores on 6/26  -- MAP Goal: 60-80  -- Cleviprex PRN  -- Pressors:  -- Oral Anti-HTNs: Will start when appropriate  -- Rhythm: NSR  -- Beta blocker: Will start when appropriate  -- Statin: home ezetimibe 10mg due to hx of statin intolerance  -- CBC, CMP, Mg, Phos, Lactic Acid, PT/INR, Fibrinogen immediately post-op  -- CBC, CMP, Mg, Phos, PT/INR daily  -- Lactic q2hrs until <2.0  -- ASA 81 mg within 6 hrs of surgery if not bleeding,  then daily      Mixed hyperlipidemia  Home meds: ezetimibe 10mg     -- ezetimibe restarted 6/26      Primary hypertension  Home meds: amlodipine 5 mg, losartan-HCTZ 100-35 mg     -- will  restart antihypertensives when appropriate    PAD (peripheral artery disease)  S/p R SFA stenting by Dr. Valle   Home meds: cilostazol 100 mg, asa 81 mg     -- will restart as appropriate    Endocrine  Transient hyperglycemia post procedure  A1c:   Lab Results   Component Value Date    HGBA1C 5.4 06/19/2025   . Controlled on at home:     -- Endocrine following, appreciate recs  -- Blood glucose goal <180  -- Continue insulin gtt for post op blood sugar <180  -- SSI  -- Hypoglycemia protocol in place          Tiana Rodarte NP  Critical Care - Surgery  [facility]   Plan:   - routine care, strict I and O, daily weights  - bilirubin prior to discharge   - hearing screen  - CCHD,  screen  - parental education and anticipatory guidance.

## 2025-06-26 NOTE — PROGRESS NOTES
Autotransfusion/Rapid Infusion Record:      06/26/2025  Autotransfusionist:  Winsome Em    Surgeons and Role:     * Lamont Flores MD - Primary  Anesthesiologist:  Zuly Oh MD    Past Medical History:   Diagnosis Date    Anticoagulant long-term use     Arthritis     General anesthetics causing adverse effect in therapeutic use     slow to wake up    Hypertension     PONV (postoperative nausea and vomiting)     Stroke        Procedure(s) (LRB):  CORONARY ARTERY BYPASS GRAFT (CABG) (N/A)  SURGICAL PROCUREMENT, VEIN, ENDOSCOPIC (Left)     12:02 PM    Equipment:    Cell Saver     R.I.S.  : [employer] Model: CATSmart or CATSplus : Assemblage   Model: [id number]     Serial number: [id number]   Serial number:    Disposable lot #: [id number]   Disposable lot #:      Were extra cardiotomies used for cell saver?  N   if yes, #:  0    Solutions:  Anticoagulant: ACD-A   Expiration date: 02/27 Volume used: 500   Wash solution: 0.9% NaCl   Expiration date: 09/26 Volume used: 2787     Cell saver checklist  Time completed:           []   Circuit assembled correctly     [x]   Cell saver powered and operational     [x]   Vacuum connected, functional, adjust to max -150mmHg     [x]   Anticoagulant drip rate adjusted     [x]   Transfer bag properly labeled with patient name, expiration time, volume,       anticoagulant, OR number, and initials     [x]   Cell saver disinfected after use (completed at end of case)       Cell Saver volumes:    Total volume processed:     4521 mL     Total volume pRBCs recovered     765 mL     Volume pRBCs infused     765 mL         RIS checklist   Time completed:  []   RIS circuit assembled correctly     []   RIS power and operational     []   RIS disinfected after use (completed at end of case)       RIS volumes:    Total volume infused:    (see anesthesia record for blood       product information)    mL       Additional comments:

## 2025-06-26 NOTE — NURSING
Pt extuabted per MD Mandel orders to 4L NC; RT and MD Mandel at [facility]; Pt VSS and A&O; Prosper continue to monitor.

## 2025-06-26 NOTE — TRANSFER OF CARE
"Anesthesia Transfer of Care Note    Patient: Veronica Duque    Procedure(s) Performed: Procedure(s) (LRB):  CORONARY ARTERY BYPASS GRAFT (CABG) (N/A)  SURGICAL PROCUREMENT, VEIN, ENDOSCOPIC (Left)    Patient location: ICU    Anesthesia Type: general    Transport from OR: Transported from OR intubated on 100% O2  with adequate ventilation controlled by transport ventilator    Post pain: adequate analgesia    Post assessment: no apparent anesthetic complications    Post vital signs: stable    Level of consciousness: awake and alert    Nausea/Vomiting: no nausea/vomiting    Complications: none    Transfer of care protocol was followed      Last vitals: Visit Vitals  /60   Pulse 81   Temp 36.6 °C (97.9 °F) (Skin)   Resp 20   Ht 5' 5" (1.651 m)   Wt 75 kg (165 lb 5.5 oz)   SpO2 98%   Breastfeeding No   BMI 27.51 kg/m²     "

## 2025-06-26 NOTE — SUBJECTIVE & OBJECTIVE
Interval HPI:   No acute events overnight. Patient in room 73254/85742 A. Blood glucose stable. BG at and above goal on current insulin regimen (IIP). Steroid use- None .   * Day of Surgery *  Renal function- Normal        Diet NPO Except for: Sips with Medication     Eating:   NPO  Nausea: No  Hypoglycemia and intervention: No  Fever: No  TPN and/or TF: No     PMH, PSH, FH, SH updated and reviewed     ROS:       Review of Systems   Unable to perform ROS: Acuity of condition       Current Medications and/or Treatments Impacting Glycemic Control  Immunotherapy:    Immunosuppressants       None          Steroids:   Hormones (From admission, onward)      Start     Stop Route Frequency Ordered    06/26/25 1400  vasopressin (PITRESSIN) 0.2 Units/mL in 0.9% NaCl 100 mL infusion         -- IV Continuous 06/26/25 1250          Pressors:    Autonomic Drugs (From admission, onward)      Start     Stop Route Frequency Ordered    06/26/25 1345  EPINEPHrine 5 mg in dextrose 5% 250 mL infusion (premix)        Question Answer Comment   Begin at (in mcg/kg/min): 0.02    Titrate by: (in mcg/kg/min) 0.02    Titrate interval: (in minutes) 5    Titrate to maintain: (SBP or MAP or Cardiac Index) MAP    Greater than: (in mmHg) 65    Cardiac index greater than: (in L/min) 2.2    Maximum dose: (in mcg/kg/min) 2        -- IV Continuous 06/26/25 1244    06/26/25 1245  NORepinephrine 4 mg in dextrose 5% 250 mL infusion (premix)        Question Answer Comment   Begin at (in mcg/kg/min): 0.02    Titrate by: (in mcg/kg/min (RANGE PREFERRED) 0.02 - 0.2    Titrate interval: (in minutes) (RANGE PREFERRED) 2 - 5    Titrate to maintain: (MAP or SBP) MAP    Titrate to keep MAP within the range or GREATER than (if single number): (in mmHg) OTHER    Other 60-80    Maximum dose: (in mcg/kg/min) 3        -- IV Continuous 06/26/25 1244          Hyperglycemia/Diabetes Medications:   Antihyperglycemics (From admission, onward)      Start     Stop Route  Frequency Ordered    06/26/25 1345  insulin regular in 0.9 % NaCl 100 unit/100 mL (1 unit/mL) infusion        Question Answer Comment   Insulin rate changes (DO NOT MODIFY ANSWER) \\[email or web address]\epic\Images\Pharmacy\InsulinInfusions\CTS INSULIN [id number].pdf    Enter initial dose (Units/hr): 8        -- IV Continuous 06/26/25 1244             PHYSICAL EXAMINATION:  Vitals:    06/26/25 1345   BP:    Pulse: 84   Resp: 16   Temp:      Body mass index is 27.51 kg/m².     Physical Exam  Pulmonary:      Comments: Intubated  Neurological:      Comments: Sedated

## 2025-06-26 NOTE — ASSESSMENT & PLAN NOTE
BG goal: 110-140   No known history of diabetes. S/p CTS sx. Will monitor    - Continue IIP per CTS protcol  - POCT Glucose every hour  - Hypoglycemia protocol in place      ** Please notify Endocrine for any change and/or advance in diet**  ** Please call Endocrine for any BG related issues **     Discharge Planning:   TBD. Please notify endocrinology prior to discharge.

## 2025-06-26 NOTE — H&P
[facility]  Critical Care - Surgery  History & Physical    Patient Name: Veronica Duque  MRN: [id number]  Admission Date: 6/26/2025  Code Status: Prior  Attending Physician: Lamont Flores MD   Primary Care Provider: Juju Wallace MD   Principal Problem: Coronary artery disease    Subjective:     HPI:  70 yo female with hx of HTN, HLD, CVA with no residual deficits, PAD s/p R SFA stenting by Dr. Valle (non-obstructive L CFA/SFA) who was recently admitted for NSTEMI. The patient presents to the SICU s/p CABG with Dr. Flores on 06/26/2025.     On admission, they are intubated, sedated with propofol, and in stable condition. Inotropic and vasopressor requirements upon admission are 0.04 mcg/kg/min of norepinephrine to maintain blood pressure at a MAP 60-80. Central access includes a L subclavian CVC, arterial access includes a left radial arterial line. They also have 2 pleural and 2 mediastinal chest tubes with appropriate output.    Intraoperatively, they received 3.6 of crystalloid, 770 of cell saver, and 3 PRBCs. Urine output intraoperatively was 550cc. The pre-operative echocardiogram was notable for normal biventricular function, no valve abnormalities, thick nonobstructive ventricular septum. Post-operative echo was notable for normal biventricular function, no valve abnormalities with a small posterior pericardial effusion.     Immediate post-operative plans include hemodynamic stabilization and weaning of cardiac and respiratory support. Plan to wean vasopressors and inotropes as tolerated, wean ventilator support with goal of early extubation, and closely monitor fluid status with strict Is/Os and continued fluid resuscitation as needed.     Hospital/ICU Course:  No notes on file    No new subjective & objective note has been filed under this hospital service since the last note was generated.    Assessment/Plan:     No new Assessment & Plan notes have been filed under this  hospital service since the last note was generated.  Service: Critical Care Surgery        Critical Care Daily Checklist:    A: Awake: RASS Goal/Actual Goal:  -1-0  Actual:  -`   B: Spontaneous Breathing Trial Performed?     C: SAT & SBT Coordinated?                        D: Delirium: CAM-ICU     E: Early Mobility Performed? No; currently intubated    F: Feeding Goal:    Status:     Current Diet Order   Procedures    Diet NPO Except for: Sips with Medication     Except for::   Sips with Medication      AS: Analgesia/Sedation Propofol, prn oxy and fentanyl   T: Thromboembolic Prophylaxis SCDs   H: HOB > 300 Yes   U: Stress Ulcer Prophylaxis (if needed) famotidine   G: Glucose Control <180. Insulin gtt, endocrine following   B: Bowel Function     I: Indwelling Catheter (Lines & Myrick) Necessity ETT, arterial line, central line, myrick, vwires, 2 meds, and 2 pleural chest tubes    D: De-escalation of Antimicrobials/Pharmacotherapies Cefazolin 2g q8 x5    Plan for the day/ETD     Code Status:  Family/Goals of Care: Prior         Tiana Rodarte NP  Critical Care - Surgery  [facility]

## 2025-06-26 NOTE — HPI
70 yo female with hx of HTN, HLD, CVA with no residual deficits, PAD s/p R SFA stenting by Dr. Valle (non-obstructive L CFA/SFA) who was recently admitted for NSTEMI. The patient presents to the SICU s/p CABG with Dr. Flores on 06/26/2025.     On admission, they are intubated, sedated with propofol, and in stable condition. Inotropic and vasopressor requirements upon admission are 0.04 mcg/kg/min of norepinephrine to maintain blood pressure at a MAP 60-80. Central access includes a L subclavian CVC, arterial access includes a left radial arterial line. They also have 2 pleural and 2 mediastinal chest tubes with appropriate output.    Intraoperatively, they received 3.6 of crystalloid, 770 of cell saver, and 3 PRBCs. Urine output intraoperatively was 550cc. The pre-operative echocardiogram was notable for normal biventricular function, no valve abnormalities, thick nonobstructive ventricular septum. Post-operative echo was notable for normal biventricular function, no valve abnormalities with a small posterior pericardial effusion.     Immediate post-operative plans include hemodynamic stabilization and weaning of cardiac and respiratory support. Plan to wean vasopressors and inotropes as tolerated, wean ventilator support with goal of early extubation, and closely monitor fluid status with strict Is/Os and continued fluid resuscitation as needed.

## 2025-06-26 NOTE — ANESTHESIA PROCEDURE NOTES
Bilateral external oblique blocks    Patient location during procedure: OR   Block not for primary anesthetic.  Reason for block: at surgeon's request and post-op pain management   Post-op Pain Location: Chest   Start time: 6/26/2025 7:35 AM  Timeout: 6/26/2025 7:35 AM   End time: 6/26/2025 7:40 AM    Staffing  Authorizing Provider: Zuly Oh MD  Performing Provider: Tian Bennett MD    Staffing  Performed by: Tian Bennett MD  Authorized by: Zuly Oh MD    Preanesthetic Checklist  Completed: patient identified, IV checked, site marked, risks and benefits discussed, surgical consent, monitors and equipment checked, pre-op evaluation and timeout performed  Peripheral Block  Patient position: supine  Prep: ChloraPrep  Patient monitoring: cardiac monitor, continuous pulse ox, heart rate and continuous capnometry  Block type: Other (external oblique)  Laterality: bilateral  Injection technique: single shot  Needle  Needle type: Echogenic   Needle gauge: 20 G  Needle length: 4 in  Needle localization: anatomical landmarks     Assessment  Injection assessment: negative aspiration  Paresthesia pain: none  Heart rate change: no  Slow fractionated injection: yes  Pain Tolerance: comfortable throughout block  Medications:    Medications: bupivacaine (pf) (MARCAINE) injection 0.25% - Perineural   20 mL - 6/26/2025 7:40:00 AM

## 2025-06-26 NOTE — CARE UPDATE
Pt extubated per MD order with documented vent parameters. Pt tolerated extubation well and placed on 4 LPM NC.

## 2025-06-26 NOTE — ASSESSMENT & PLAN NOTE
-- Chest tubes x4 ( 2 Meds & 2 pleural)  -- Monitor and document chest tube output hourly  -- Maintain chest tube to -20 cm suction  -- Chest tube site care daily and prn

## 2025-06-26 NOTE — ASSESSMENT & PLAN NOTE
-- Multimodal pain control  -- Acetaminophen 1g scheduled q8hrs  -- Oxycodone IR 5 and 10 mg q6hrs prn   -- Fentanyl IVP while intubated, discontinue once extubated  -- Lidocaine patch prn

## 2025-06-26 NOTE — ASSESSMENT & PLAN NOTE
-- S/P CABG with Dr. Flores on 6/26  -- MAP Goal: 60-80  -- Cleviprex PRN  -- Pressors:  -- Oral Anti-HTNs: Will start when appropriate  -- Rhythm: NSR  -- Beta blocker: Will start when appropriate  -- Statin: home ezetimibe 10mg due to hx of statin intolerance  -- CBC, CMP, Mg, Phos, Lactic Acid, PT/INR, Fibrinogen immediately post-op  -- CBC, CMP, Mg, Phos, PT/INR daily  -- Lactic q2hrs until <2.0  -- ASA 81 mg within 6 hrs of surgery if not bleeding,  then daily

## 2025-06-26 NOTE — ANESTHESIA PROCEDURE NOTES
Arterial    Diagnosis: CAD    Patient location during procedure: done in OR  Timeout: 6/26/2025 7:10 AM  Procedure end time: 6/26/2025 7:14 AM    Staffing  Authorizing Provider: Zuly Oh MD  Performing Provider: Tian Bennett MD    Staffing  Performed by: Tian Bennett MD  Authorized by: Zuly Oh MD    Anesthesiologist was present at the time of the procedure.    Preanesthetic Checklist  Completed: patient identified, IV checked, site marked, risks and benefits discussed, surgical consent, monitors and equipment checked, pre-op evaluation, timeout performed and anesthesia consent givenArterial  Skin Prep: chlorhexidine gluconate  Local Infiltration: lidocaine  Orientation: left  Location: radial    Catheter Size: 20 G  Catheter placement by Ultrasound guidance. Heme positive aspiration all ports.   Vessel Caliber: patent  Needle advanced into vessel with real time Ultrasound guidance.Insertion Attempts: 1  Assessment  Dressing: secured with tape and tegaderm  Patient: Tolerated well

## 2025-06-27 LAB
ABSOLUTE EOSINOPHIL (OHS): 0 K/UL
ABSOLUTE MONOCYTE (OHS): 1.2 K/UL (ref 0.3–1)
ABSOLUTE NEUTROPHIL COUNT (OHS): 12.85 K/UL (ref 1.8–7.7)
ALLENS TEST: ABNORMAL
ANION GAP (OHS): 11 MMOL/L (ref 8–16)
ANION GAP (OHS): 12 MMOL/L (ref 8–16)
APTT PPP: 29 SECONDS (ref 21–32)
BASOPHILS # BLD AUTO: 0.03 K/UL
BASOPHILS NFR BLD AUTO: 0.2 %
BUN SERPL-MCNC: 22 MG/DL (ref 8–23)
BUN SERPL-MCNC: 30 MG/DL (ref 8–23)
CALCIUM SERPL-MCNC: 8.1 MG/DL (ref 8.7–10.5)
CALCIUM SERPL-MCNC: 8.6 MG/DL (ref 8.7–10.5)
CHLORIDE SERPL-SCNC: 109 MMOL/L (ref 95–110)
CHLORIDE SERPL-SCNC: 112 MMOL/L (ref 95–110)
CO2 SERPL-SCNC: 19 MMOL/L (ref 23–29)
CO2 SERPL-SCNC: 19 MMOL/L (ref 23–29)
CREAT SERPL-MCNC: 0.9 MG/DL (ref 0.5–1.4)
CREAT SERPL-MCNC: 1.1 MG/DL (ref 0.5–1.4)
DELSYS: ABNORMAL
ERYTHROCYTE [DISTWIDTH] IN BLOOD BY AUTOMATED COUNT: 22.4 % (ref 11.5–14.5)
ERYTHROCYTE [SEDIMENTATION RATE] IN BLOOD BY WESTERGREN METHOD: 21 MM/H
FIO2: 36
FLOW: 4
GFR SERPLBLD CREATININE-BSD FMLA CKD-EPI: 54 ML/MIN/1.73/M2
GFR SERPLBLD CREATININE-BSD FMLA CKD-EPI: >60 ML/MIN/1.73/M2
GLUCOSE SERPL-MCNC: 118 MG/DL (ref 70–110)
GLUCOSE SERPL-MCNC: 121 MG/DL (ref 70–110)
GLUCOSE SERPL-MCNC: 170 MG/DL (ref 70–110)
GLUCOSE SERPL-MCNC: 208 MG/DL (ref 70–110)
GLUCOSE SERPL-MCNC: 283 MG/DL (ref 70–110)
HCO3 UR-SCNC: 19.1 MMOL/L (ref 24–28)
HCO3 UR-SCNC: 19.5 MMOL/L (ref 24–28)
HCO3 UR-SCNC: 20.1 MMOL/L (ref 24–28)
HCO3 UR-SCNC: 21.3 MMOL/L (ref 24–28)
HCT VFR BLD AUTO: 29.9 % (ref 37–48.5)
HCT VFR BLD CALC: 18 %PCV (ref 36–54)
HCT VFR BLD CALC: 20 %PCV (ref 36–54)
HCT VFR BLD CALC: 25 %PCV (ref 36–54)
HCT VFR BLD CALC: 28 %PCV (ref 36–54)
HGB BLD-MCNC: 9.8 GM/DL (ref 12–16)
IMM GRANULOCYTES # BLD AUTO: 0.05 K/UL (ref 0–0.04)
IMM GRANULOCYTES NFR BLD AUTO: 0.3 % (ref 0–0.5)
INR PPP: 1.2 (ref 0.8–1.2)
LYMPHOCYTES # BLD AUTO: 1.16 K/UL (ref 1–4.8)
MAGNESIUM SERPL-MCNC: 2 MG/DL (ref 1.6–2.6)
MAGNESIUM SERPL-MCNC: 2.3 MG/DL (ref 1.6–2.6)
MCH RBC QN AUTO: 22.6 PG (ref 27–31)
MCHC RBC AUTO-ENTMCNC: 32.8 G/DL (ref 32–36)
MCV RBC AUTO: 69 FL (ref 82–98)
MODE: ABNORMAL
NUCLEATED RBC (/100WBC) (OHS): 0 /100 WBC
PCO2 BLDA: 28.6 MMHG (ref 35–45)
PCO2 BLDA: 35 MMHG (ref 35–45)
PCO2 BLDA: 36.5 MMHG (ref 35–45)
PCO2 BLDA: 37.7 MMHG (ref 35–45)
PH SMN: 7.31 [PH] (ref 7.35–7.45)
PH SMN: 7.37 [PH] (ref 7.35–7.45)
PH SMN: 7.37 [PH] (ref 7.35–7.45)
PH SMN: 7.44 [PH] (ref 7.35–7.45)
PHOSPHATE SERPL-MCNC: 3.7 MG/DL (ref 2.7–4.5)
PHOSPHATE SERPL-MCNC: 3.8 MG/DL (ref 2.7–4.5)
PLATELET # BLD AUTO: 212 K/UL (ref 150–450)
PMV BLD AUTO: 10.2 FL (ref 9.2–12.9)
PO2 BLDA: 230 MMHG (ref 80–100)
PO2 BLDA: 336 MMHG (ref 80–100)
PO2 BLDA: 433 MMHG (ref 80–100)
PO2 BLDA: 74 MMHG (ref 80–100)
POC ACTIVATED CLOTTING TIME K: 394 SEC (ref 74–137)
POC BE: -4 MMOL/L (ref -2–2)
POC BE: -5 MMOL/L (ref -2–2)
POC BE: -5 MMOL/L (ref -2–2)
POC BE: -7 MMOL/L (ref -2–2)
POC IONIZED CALCIUM: 1.15 MMOL/L (ref 1.06–1.42)
POC IONIZED CALCIUM: 1.19 MMOL/L (ref 1.06–1.42)
POC IONIZED CALCIUM: 1.29 MMOL/L (ref 1.06–1.42)
POC IONIZED CALCIUM: 1.64 MMOL/L (ref 1.06–1.42)
POC SATURATED O2: 100 % (ref 95–100)
POC SATURATED O2: 95 % (ref 95–100)
POC TCO2: 20 MMOL/L (ref 23–27)
POC TCO2: 20 MMOL/L (ref 23–27)
POC TCO2: 21 MMOL/L (ref 23–27)
POC TCO2: 22 MMOL/L (ref 23–27)
POCT GLUCOSE: 136 MG/DL (ref 70–110)
POCT GLUCOSE: 141 MG/DL (ref 70–110)
POCT GLUCOSE: 148 MG/DL (ref 70–110)
POCT GLUCOSE: 152 MG/DL (ref 70–110)
POCT GLUCOSE: 153 MG/DL (ref 70–110)
POCT GLUCOSE: 154 MG/DL (ref 70–110)
POCT GLUCOSE: 157 MG/DL (ref 70–110)
POCT GLUCOSE: 158 MG/DL (ref 70–110)
POCT GLUCOSE: 160 MG/DL (ref 70–110)
POCT GLUCOSE: 195 MG/DL (ref 70–110)
POCT GLUCOSE: 196 MG/DL (ref 70–110)
POTASSIUM BLD-SCNC: 3.3 MMOL/L (ref 3.5–5.1)
POTASSIUM BLD-SCNC: 3.4 MMOL/L (ref 3.5–5.1)
POTASSIUM BLD-SCNC: 3.4 MMOL/L (ref 3.5–5.1)
POTASSIUM BLD-SCNC: 4.3 MMOL/L (ref 3.5–5.1)
POTASSIUM SERPL-SCNC: 3.7 MMOL/L (ref 3.5–5.1)
POTASSIUM SERPL-SCNC: 4.2 MMOL/L (ref 3.5–5.1)
PROTHROMBIN TIME: 13 SECONDS (ref 9–12.5)
RBC # BLD AUTO: 4.33 M/UL (ref 4–5.4)
RELATIVE EOSINOPHIL (OHS): 0 %
RELATIVE LYMPHOCYTE (OHS): 7.6 % (ref 18–48)
RELATIVE MONOCYTE (OHS): 7.8 % (ref 4–15)
RELATIVE NEUTROPHIL (OHS): 84.1 % (ref 38–73)
SAMPLE: ABNORMAL
SITE: ABNORMAL
SODIUM BLD-SCNC: 140 MMOL/L (ref 136–145)
SODIUM BLD-SCNC: 140 MMOL/L (ref 136–145)
SODIUM BLD-SCNC: 141 MMOL/L (ref 136–145)
SODIUM BLD-SCNC: 144 MMOL/L (ref 136–145)
SODIUM SERPL-SCNC: 140 MMOL/L (ref 136–145)
SODIUM SERPL-SCNC: 142 MMOL/L (ref 136–145)
SP02: 95
WBC # BLD AUTO: 15.29 K/UL (ref 3.9–12.7)

## 2025-06-27 PROCEDURE — 85025 COMPLETE CBC W/AUTO DIFF WBC: CPT

## 2025-06-27 PROCEDURE — 97530 THERAPEUTIC ACTIVITIES: CPT

## 2025-06-27 PROCEDURE — 97165 OT EVAL LOW COMPLEX 30 MIN: CPT

## 2025-06-27 PROCEDURE — 63600175 PHARM REV CODE 636 W HCPCS: Mod: JZ,TB

## 2025-06-27 PROCEDURE — 63600175 PHARM REV CODE 636 W HCPCS

## 2025-06-27 PROCEDURE — 94799 UNLISTED PULMONARY SVC/PX: CPT

## 2025-06-27 PROCEDURE — 63600175 PHARM REV CODE 636 W HCPCS: Performed by: PHYSICIAN ASSISTANT

## 2025-06-27 PROCEDURE — 25000242 PHARM REV CODE 250 ALT 637 W/ HCPCS

## 2025-06-27 PROCEDURE — 25000003 PHARM REV CODE 250: Performed by: PHYSICIAN ASSISTANT

## 2025-06-27 PROCEDURE — 99233 SBSQ HOSP IP/OBS HIGH 50: CPT | Mod: ,,, | Performed by: PHYSICIAN ASSISTANT

## 2025-06-27 PROCEDURE — 25000003 PHARM REV CODE 250: Performed by: STUDENT IN AN ORGANIZED HEALTH CARE EDUCATION/TRAINING PROGRAM

## 2025-06-27 PROCEDURE — 99900035 HC TECH TIME PER 15 MIN (STAT)

## 2025-06-27 PROCEDURE — 82803 BLOOD GASES ANY COMBINATION: CPT

## 2025-06-27 PROCEDURE — 85610 PROTHROMBIN TIME: CPT

## 2025-06-27 PROCEDURE — 94640 AIRWAY INHALATION TREATMENT: CPT

## 2025-06-27 PROCEDURE — 80048 BASIC METABOLIC PNL TOTAL CA: CPT

## 2025-06-27 PROCEDURE — 93005 ELECTROCARDIOGRAM TRACING: CPT

## 2025-06-27 PROCEDURE — 20000000 HC ICU ROOM

## 2025-06-27 PROCEDURE — 93010 ELECTROCARDIOGRAM REPORT: CPT | Mod: ,,, | Performed by: INTERNAL MEDICINE

## 2025-06-27 PROCEDURE — 27000221 HC OXYGEN, UP TO 24 HOURS

## 2025-06-27 PROCEDURE — 63600175 PHARM REV CODE 636 W HCPCS: Performed by: STUDENT IN AN ORGANIZED HEALTH CARE EDUCATION/TRAINING PROGRAM

## 2025-06-27 PROCEDURE — 25000003 PHARM REV CODE 250

## 2025-06-27 PROCEDURE — 97535 SELF CARE MNGMENT TRAINING: CPT

## 2025-06-27 PROCEDURE — 84295 ASSAY OF SERUM SODIUM: CPT

## 2025-06-27 PROCEDURE — 94761 N-INVAS EAR/PLS OXIMETRY MLT: CPT | Mod: XB

## 2025-06-27 PROCEDURE — C9248 INJ, CLEVIDIPINE BUTYRATE: HCPCS | Mod: JZ,TB

## 2025-06-27 PROCEDURE — 84100 ASSAY OF PHOSPHORUS: CPT

## 2025-06-27 PROCEDURE — 99291 CRITICAL CARE FIRST HOUR: CPT | Mod: ,,, | Performed by: STUDENT IN AN ORGANIZED HEALTH CARE EDUCATION/TRAINING PROGRAM

## 2025-06-27 PROCEDURE — 84132 ASSAY OF SERUM POTASSIUM: CPT

## 2025-06-27 PROCEDURE — 83735 ASSAY OF MAGNESIUM: CPT

## 2025-06-27 PROCEDURE — 37799 UNLISTED PX VASCULAR SURGERY: CPT

## 2025-06-27 PROCEDURE — 85730 THROMBOPLASTIN TIME PARTIAL: CPT

## 2025-06-27 PROCEDURE — 85014 HEMATOCRIT: CPT

## 2025-06-27 PROCEDURE — 27200667 HC PACEMAKER, TEMPORARY MONITORING, PER SHIFT

## 2025-06-27 PROCEDURE — 82330 ASSAY OF CALCIUM: CPT

## 2025-06-27 PROCEDURE — 82310 ASSAY OF CALCIUM: CPT

## 2025-06-27 PROCEDURE — 97116 GAIT TRAINING THERAPY: CPT

## 2025-06-27 PROCEDURE — 97162 PT EVAL MOD COMPLEX 30 MIN: CPT

## 2025-06-27 RX ORDER — FUROSEMIDE 10 MG/ML
20 INJECTION INTRAMUSCULAR; INTRAVENOUS EVERY 12 HOURS
Status: DISCONTINUED | OUTPATIENT
Start: 2025-06-27 | End: 2025-06-27

## 2025-06-27 RX ORDER — SIMETHICONE 80 MG
1 TABLET,CHEWABLE ORAL
Status: DISCONTINUED | OUTPATIENT
Start: 2025-06-27 | End: 2025-06-28

## 2025-06-27 RX ORDER — ACETAMINOPHEN 500 MG
1000 TABLET ORAL EVERY 8 HOURS
Status: DISCONTINUED | OUTPATIENT
Start: 2025-06-27 | End: 2025-06-29

## 2025-06-27 RX ORDER — GLUCAGON 1 MG
1 KIT INJECTION
Status: DISCONTINUED | OUTPATIENT
Start: 2025-06-27 | End: 2025-07-01

## 2025-06-27 RX ORDER — METOPROLOL TARTRATE 1 MG/ML
5 INJECTION, SOLUTION INTRAVENOUS EVERY 5 MIN PRN
Status: DISCONTINUED | OUTPATIENT
Start: 2025-06-27 | End: 2025-06-28

## 2025-06-27 RX ORDER — AMLODIPINE BESYLATE 5 MG/1
5 TABLET ORAL DAILY
Status: DISCONTINUED | OUTPATIENT
Start: 2025-06-27 | End: 2025-06-28

## 2025-06-27 RX ORDER — CALCIUM CARBONATE 200(500)MG
1000 TABLET,CHEWABLE ORAL DAILY
Status: DISCONTINUED | OUTPATIENT
Start: 2025-06-27 | End: 2025-06-28

## 2025-06-27 RX ORDER — IBUPROFEN 200 MG
24 TABLET ORAL
Status: DISCONTINUED | OUTPATIENT
Start: 2025-06-27 | End: 2025-07-01

## 2025-06-27 RX ORDER — IBUPROFEN 200 MG
16 TABLET ORAL
Status: DISCONTINUED | OUTPATIENT
Start: 2025-06-27 | End: 2025-07-01

## 2025-06-27 RX ORDER — METOPROLOL TARTRATE 25 MG/1
12.5 TABLET ORAL 2 TIMES DAILY
Status: DISCONTINUED | OUTPATIENT
Start: 2025-06-27 | End: 2025-06-27

## 2025-06-27 RX ORDER — METOPROLOL TARTRATE 25 MG/1
25 TABLET, FILM COATED ORAL 2 TIMES DAILY
Status: DISCONTINUED | OUTPATIENT
Start: 2025-06-27 | End: 2025-07-02 | Stop reason: HOSPADM

## 2025-06-27 RX ORDER — FAMOTIDINE 20 MG/1
20 TABLET, FILM COATED ORAL DAILY
Status: DISCONTINUED | OUTPATIENT
Start: 2025-06-28 | End: 2025-06-30

## 2025-06-27 RX ORDER — AMLODIPINE BESYLATE 5 MG/1
5 TABLET ORAL DAILY
Status: DISCONTINUED | OUTPATIENT
Start: 2025-06-27 | End: 2025-06-27

## 2025-06-27 RX ORDER — FUROSEMIDE 10 MG/ML
40 INJECTION INTRAMUSCULAR; INTRAVENOUS EVERY 12 HOURS
Status: DISCONTINUED | OUTPATIENT
Start: 2025-06-27 | End: 2025-06-28

## 2025-06-27 RX ORDER — AMIODARONE HYDROCHLORIDE 200 MG/1
400 TABLET ORAL DAILY
Status: DISCONTINUED | OUTPATIENT
Start: 2025-06-27 | End: 2025-06-27

## 2025-06-27 RX ORDER — FAMOTIDINE 20 MG/1
20 TABLET, FILM COATED ORAL 2 TIMES DAILY
Status: DISCONTINUED | OUTPATIENT
Start: 2025-06-27 | End: 2025-06-27

## 2025-06-27 RX ORDER — INSULIN ASPART 100 [IU]/ML
0-5 INJECTION, SOLUTION INTRAVENOUS; SUBCUTANEOUS
Status: DISCONTINUED | OUTPATIENT
Start: 2025-06-27 | End: 2025-06-29

## 2025-06-27 RX ORDER — METOPROLOL TARTRATE 25 MG/1
12.5 TABLET ORAL ONCE
Status: COMPLETED | OUTPATIENT
Start: 2025-06-27 | End: 2025-06-27

## 2025-06-27 RX ADMIN — IPRATROPIUM BROMIDE AND ALBUTEROL SULFATE 3 ML: 2.5; .5 SOLUTION RESPIRATORY (INHALATION) at 03:06

## 2025-06-27 RX ADMIN — ONDANSETRON 4 MG: 2 INJECTION INTRAMUSCULAR; INTRAVENOUS at 06:06

## 2025-06-27 RX ADMIN — OXYCODONE HYDROCHLORIDE 10 MG: 10 TABLET ORAL at 03:06

## 2025-06-27 RX ADMIN — INSULIN ASPART 1 UNITS: 100 INJECTION, SOLUTION INTRAVENOUS; SUBCUTANEOUS at 06:06

## 2025-06-27 RX ADMIN — ACETAMINOPHEN 1000 MG: 500 TABLET ORAL at 05:06

## 2025-06-27 RX ADMIN — METOPROLOL TARTRATE 12.5 MG: 25 TABLET, FILM COATED ORAL at 08:06

## 2025-06-27 RX ADMIN — METOCLOPRAMIDE 5 MG: 5 INJECTION, SOLUTION INTRAMUSCULAR; INTRAVENOUS at 12:06

## 2025-06-27 RX ADMIN — CLEVIPIDINE 13 MG/HR: 0.5 EMULSION INTRAVENOUS at 09:06

## 2025-06-27 RX ADMIN — EZETIMIBE 10 MG: 10 TABLET ORAL at 09:06

## 2025-06-27 RX ADMIN — OXYCODONE HYDROCHLORIDE 5 MG: 5 TABLET ORAL at 06:06

## 2025-06-27 RX ADMIN — CLEVIPIDINE 10 MG/HR: 0.5 EMULSION INTRAVENOUS at 01:06

## 2025-06-27 RX ADMIN — MUPIROCIN 1 G: 20 OINTMENT TOPICAL at 08:06

## 2025-06-27 RX ADMIN — FAMOTIDINE 20 MG: 20 TABLET, FILM COATED ORAL at 08:06

## 2025-06-27 RX ADMIN — ASPIRIN 81 MG CHEWABLE TABLET 81 MG: 81 TABLET CHEWABLE at 08:06

## 2025-06-27 RX ADMIN — ACETAMINOPHEN 1000 MG: 500 TABLET ORAL at 09:06

## 2025-06-27 RX ADMIN — CEFAZOLIN 2 G: 2 INJECTION, POWDER, FOR SOLUTION INTRAMUSCULAR; INTRAVENOUS at 09:06

## 2025-06-27 RX ADMIN — AMLODIPINE BESYLATE 5 MG: 5 TABLET ORAL at 03:06

## 2025-06-27 RX ADMIN — FUROSEMIDE 40 MG: 10 INJECTION, SOLUTION INTRAVENOUS at 08:06

## 2025-06-27 RX ADMIN — METOROPROLOL TARTRATE 5 MG: 5 INJECTION, SOLUTION INTRAVENOUS at 10:06

## 2025-06-27 RX ADMIN — DOCUSATE SODIUM 100 MG: 100 CAPSULE, LIQUID FILLED ORAL at 08:06

## 2025-06-27 RX ADMIN — POLYETHYLENE GLYCOL 3350 17 G: 17 POWDER, FOR SOLUTION ORAL at 09:06

## 2025-06-27 RX ADMIN — CALCIUM CARBONATE (ANTACID) CHEW TAB 500 MG 1000 MG: 500 CHEW TAB at 03:06

## 2025-06-27 RX ADMIN — METOPROLOL TARTRATE 25 MG: 25 TABLET, FILM COATED ORAL at 09:06

## 2025-06-27 RX ADMIN — CEFAZOLIN 2 G: 2 INJECTION, POWDER, FOR SOLUTION INTRAMUSCULAR; INTRAVENOUS at 05:06

## 2025-06-27 RX ADMIN — DOCUSATE SODIUM 100 MG: 100 CAPSULE, LIQUID FILLED ORAL at 09:06

## 2025-06-27 RX ADMIN — ACETAMINOPHEN 1000 MG: 500 TABLET ORAL at 02:06

## 2025-06-27 RX ADMIN — IPRATROPIUM BROMIDE AND ALBUTEROL SULFATE 3 ML: 2.5; .5 SOLUTION RESPIRATORY (INHALATION) at 09:06

## 2025-06-27 RX ADMIN — FUROSEMIDE 20 MG: 10 INJECTION, SOLUTION INTRAVENOUS at 09:06

## 2025-06-27 RX ADMIN — METOPROLOL TARTRATE 12.5 MG: 25 TABLET, FILM COATED ORAL at 02:06

## 2025-06-27 RX ADMIN — INSULIN ASPART 1 UNITS: 100 INJECTION, SOLUTION INTRAVENOUS; SUBCUTANEOUS at 09:06

## 2025-06-27 RX ADMIN — MUPIROCIN 1 G: 20 OINTMENT TOPICAL at 09:06

## 2025-06-27 RX ADMIN — INSULIN HUMAN 0.8 UNITS/HR: 1 INJECTION, SOLUTION INTRAVENOUS at 08:06

## 2025-06-27 RX ADMIN — CLEVIPIDINE 8 MG/HR: 0.5 EMULSION INTRAVENOUS at 04:06

## 2025-06-27 RX ADMIN — OXYCODONE HYDROCHLORIDE 10 MG: 10 TABLET ORAL at 08:06

## 2025-06-27 RX ADMIN — AMIODARONE HYDROCHLORIDE 150 MG: 1.5 INJECTION, SOLUTION INTRAVENOUS at 11:06

## 2025-06-27 RX ADMIN — AMIODARONE HYDROCHLORIDE 1 MG/MIN: 1.8 INJECTION, SOLUTION INTRAVENOUS at 11:06

## 2025-06-27 RX ADMIN — CEFAZOLIN 2 G: 2 INJECTION, POWDER, FOR SOLUTION INTRAMUSCULAR; INTRAVENOUS at 11:06

## 2025-06-27 RX ADMIN — ONDANSETRON 4 MG: 2 INJECTION INTRAMUSCULAR; INTRAVENOUS at 08:06

## 2025-06-27 NOTE — PLAN OF CARE
[facility]  Initial Discharge Assessment       Primary Care Provider: Juju Wallace MD    Admission Diagnosis: NSTEMI (non-ST elevated myocardial infarction) [I21.4]  Coronary artery disease [I25.10]    Admission Date: 6/26/2025  Expected Discharge Date: 7/4/2025    Transition of Care Barriers: None    Payor: HU[employer] MANAGED MEDICARE / Plan: HU[employer] MEDICARE HMO / Product Type: Capitation /     Extended Emergency Contact Information  Primary Emergency Contact: Adriana Holguin   [facility]  Home Phone: [phone]  Relation: Daughter    Discharge Plan A: Home with family  Discharge Plan B: Home      [facility] - [place] [facility] - [address]  [address]  [place] [address]  Phone: [phone] Fax: [phone]    [facility]  [address] [facility] [address]  Phone: [phone] Fax: [phone]      Initial Assessment (most recent)       Adult Discharge Assessment - 06/27/25 1328          Discharge Assessment    Assessment Type Discharge Planning Assessment     Confirmed/corrected address, phone number and insurance Yes     Confirmed Demographics Correct on Facesheet     Source of Information patient     Communicated SHITAL with patient/caregiver Yes     People in Home spouse     Name(s) of People in Home Mr. Duque     Do you expect to return to your current living situation? Yes     Do you have help at home or someone to help you manage your care at home? Yes     Who are your caregiver(s) and their phone number(s)? Adriana Holguin (Daughter)  [phone] (Home Phone)     Prior to hospitilization cognitive status: Alert/Oriented;No Deficits     Current cognitive status: Alert/Oriented;No Deficits     Walking or Climbing Stairs Difficulty no     Dressing/Bathing Difficulty no     Home Accessibility wheelchair accessible     Home Layout Able to live on 1st floor     Equipment Currently Used at Home none      Readmission within 30 days? Yes     Patient currently being followed by outpatient case management? No     Do you currently have service(s) that help you manage your care at home? No     Do you take prescription medications? Yes     Do you have prescription coverage? Yes     Do you have any problems affording any of your prescribed medications? No     Is the patient taking medications as prescribed? yes     Who is going to help you get home at discharge? Adriana Holguin (Daughter)  [phone] (Home Phone)     How do you get to doctors appointments? family or friend will provide     Are you on dialysis? No     Do you take coumadin? No     Discharge Plan A Home with family     Discharge Plan B Home     DME Needed Upon Discharge  none     Discharge Plan discussed with: Adult children;Patient     Transition of Care Barriers None        Financial Resource Strain    How hard is it for you to pay for the very basics like food, housing, medical care, and heating? Not hard at all        Housing Stability    In the last 12 months, was there a time when you were not able to pay the mortgage or rent on time? No     At any time in the past 12 months, were you homeless or living in a shelter (including now)? No        Transportation Needs    In the past 12 months, has lack of transportation kept you from medical appointments or from getting medications? No     In the past 12 months, has lack of transportation kept you from meetings, work, or from getting things needed for daily living? No        Food Insecurity    Within the past 12 months, you worried that your food would run out before you got the money to buy more. Never true     Within the past 12 months, the food you bought just didn't last and you didn't have money to get more. Never true        Stress    Do you feel stress - tense, restless, nervous, or anxious, or unable to sleep at night because your mind is troubled all the time - these days? Not at all        Social  Isolation    How often do you feel lonely or isolated from those around you?  Never        Alcohol Use    Q1: How often do you have a drink containing alcohol? Never     Q2: How many drinks containing alcohol do you have on a typical day when you are drinking? Patient does not drink     Q3: How often do you have six or more drinks on one occasion? Never        Utilities    In the past 12 months has the electric, gas, oil, or water company threatened to shut off services in your home? No        Health Literacy    How often do you need to have someone help you when you read instructions, pamphlets, or other written material from your doctor or pharmacy? Never                   CM spoke with patient in Room 54930 at bedside. All information was verified on facesheet. Patient lives in a 2 story house with spouse, 1 step to get inside with no pets. Patient states no assistance is needed. Patient can ambulate, drive, run errands, and complete ADL's independently. Patient does not use any DME or any in-home assistive equipment. Patient has not used Home Health previously. Patient is not on dialysis nor use Coumadin as a blood thinner. Patient takes medication as prescribed and has resources for all prescriptive needs. Patient will have help from family member upon discharge. Family will provide transportation home. All Questions and concerns addressed and whiteboard updated with CM contact information. Will continue to follow for course of hospitalization.      Discharge Plan A and Plan B have been determined by review of patient's clinical status, future medical and therapeutic needs, and coverage/benefits for post-acute care in coordination with multidisciplinary team members.     Jayme Viera RN, BSN  Case Management  [phone]

## 2025-06-27 NOTE — PLAN OF CARE
Problem: Physical Therapy  Goal: Physical Therapy Goal  Description: Goals to be met by: 7/15/2025    Patient will increase functional independence with mobility by performin. Supine to sit with Supervision  2. Sit to stand transfer with Supervision  3. Gait  x 400 feet with Supervision.   4. Ascend/descend 5 stairs with bilateral Handrails Supervision.     Outcome: Progressing     Eval completed. Goals appropriate.

## 2025-06-27 NOTE — ASSESSMENT & PLAN NOTE
Neuro/Psych:     - Sedation: none    - Pain:     - Scheduled Tylenol 1000mg Q8H   - PRN Oxycodone 5mg/10mg             Cardiac:     - S/p CABG x 2 with Dr. Flores 6/26    - BP Goal: MAP 60-80    - Inotropes/Pressors: none    - Anti-HTNs: cleviprex 8    - Rhythm: NSR 70s    - episode of SVT post op, resolved    - Beta Blocker: metop 12.5 BID, increase to 25 this PM if tolerates well    - Statin: ezetimibe       Pulmonary:     - 4L NC     - Goal SpO2 >90%    - extubated POD 0    - Chest Tubes x 4 (2 Meds & 2 Pleural): meds 340cc, pleural 210cc output    - ABGS PRN       Renal:    - Trend BUN/Cr     - Maintain Larose, record strict Is/Os    - UOP 1960cc since admit    - net +4612    - likely diurese today given oxygen requirements and hypertension    Recent Labs   Lab 06/23/25  0657 06/26/25  1247 06/27/25  0307   BUN 27* 25* 22   CREATININE 0.8 0.9 0.9         FEN / GI:     - Daily CMP, PRN K/Mag/Phos per protocol     - Replace electrolytes as needed    - Nutrition: CLD, ADAT    - Bowel Regimen: Miralax, docusate      ID:     - Aebrile    - Abx: Complete perioperative cefazolin 2g Q8H x 5 doses    Recent Labs   Lab 06/23/25  0657 06/26/25  1247 06/27/25  0307   WBC 9.07 15.62* 15.29*         Heme/Onc:     - Hgb 9.8 from 9.4    - CBC daily    - ASA 81 daily    - DVT ppx today    Recent Labs   Lab 06/23/25  0657 06/26/25  1247 06/27/25  0307   HGB 9.0* 9.4* 9.8*    183 212   APTT  --  30.6 29.0   INR  --  1.3* 1.2         Endocrine:     - CTS Goal -140    - HgbA1c: 5.4    - Endocrinology consulted for insulin management    - insulin gtt       PPx:   Feeding: CLD, advance today  Analgesia/Sedation: tylenol, oxy PRN  Thromboembolic Prevention: start today  HOB >30: Yes  Stress Ulcer: Yes - famotidine 20mg PO BID  Glucose Control: Yes, insulin management per Endocrinology - insulin gtt now     Lines/Drains/Airway:   Central Line: left subclavian   Larose    Chest Tubes: 4 (2 Mediastinal, 2 Pleural)    Pacing  Wires: Temporary V pacing wires      Dispo/Code Status/Palliative:     - Continue SICU Care    - Full Code

## 2025-06-27 NOTE — PLAN OF CARE
Problem: Adult Inpatient Plan of Care  Goal: Plan of Care Review  Outcome: Progressing     Problem: Wound  Goal: Improved Oral Intake  Outcome: Progressing     Problem: Adult Inpatient Plan of Care  Goal: Optimal Comfort and Wellbeing  Outcome: Progressing     Problem: Infection  Goal: Absence of Infection Signs and Symptoms  Outcome: Progressing       Problem: Wound  Goal: Optimal Pain Control and Function  Outcome: Progressing     Problem: Wound  Goal: Optimal Wound Healing  Outcome: Progressing     Problem: Fall Injury Risk  Goal: Absence of Fall and Fall-Related Injury  Outcome: Progressing

## 2025-06-27 NOTE — PLAN OF CARE
Problem: Adult Inpatient Plan of Care  Goal: Plan of Care Review  Outcome: Progressing     Problem: Delirium  Goal: Improved Sleep  Outcome: Progressing     Problem: Skin Injury Risk Increased  Goal: Skin Health and Integrity  Outcome: Progressing      Progress Note      SUBJECTIVE     Feels ok. Just move from zayda to       Inpatient Meds  Current Facility-Administered Medications   Medication Dose Route Frequency Provider Last Rate Last Admin   • furosemide (LASIX) tablet 40 mg  40 mg Oral BID Hadley Boone MD   40 mg at 09/16/21 1000   • metoPROLOL tartrate (LOPRESSOR) tablet 12.5 mg  12.5 mg Oral 2 times per day Fco Feliciano MD   12.5 mg at 09/16/21 0845   • AMIODarone (PACERONE) tablet 400 mg  400 mg Oral 2 times per day Hadley Boone MD   400 mg at 09/16/21 0845   • insulin lispro (ADMELOG, HumaLOG) injection 10 Units  10 Units Subcutaneous TID WC Ingrid Hernandez MD   10 Units at 09/16/21 1429   • insulin glargine (LANTUS) injection 24 Units  24 Units Subcutaneous Daily Ingrid Hernandez MD   24 Units at 09/16/21 0845   • famotidine (PEPCID) tablet 20 mg  20 mg Oral Nightly Stefan Mcfadden MD   20 mg at 09/15/21 2120   • sodium chloride 0.9% infusion   Intravenous Continuous PRN Annalise Guillen MD       • magnesium hydroxide (MILK OF MAGNESIA) 400 MG/5ML suspension 30 mL  30 mL Oral Daily PRN Stefan Mcfadden MD   30 mL at 09/12/21 0610   • digoxin (LANOXIN) tablet 125 mcg  125 mcg Oral Daily Fco Feliciano MD   125 mcg at 09/16/21 0845   • magnesium sulfate 2 g in 50 mL premix IVPB  2 g Intravenous PRN Stefan Mcfadden MD 25 mL/hr at 09/14/21 0534 2 g at 09/14/21 0534   • insulin lispro (ADMELOG,HumaLOG) - Correction Dose   Subcutaneous 4x Daily AC & HS Tacos Vasquez MD   2 Units at 09/15/21 2144   • rosuvastatin (CRESTOR) tablet 10 mg  10 mg Oral Nightly Juan Fleming MD   10 mg at 09/15/21 2121   • [Held by provider] nebivolol (BYSTOLIC) 20 mg  20 mg Oral Daily Rodrigue Berman MD   20 mg at 09/11/21 0902   • acetaminophen (TYLENOL) tablet 650 mg  650 mg Oral Q6H PRN Ovi Wilson PA-C   650 mg at 09/15/21 1532   • morphine injection 4 mg  4 mg Intravenous Q1H PRN Ovi Wilson PA-C       • fentaNYL (SUBLIMAZE) injection 25  mcg  25 mcg Intravenous Q1H PRN Ovi Wilson PA-C   25 mcg at 09/10/21 2018   • ondansetron (ZOFRAN ODT) disintegrating tablet 4 mg  4 mg Oral Q12H PRN Ovi Wilson PA-C        Or   • ondansetron (ZOFRAN) injection 4 mg  4 mg Intravenous Q6H PRN Ovi Wilson PA-C   4 mg at 09/16/21 1244   • niCARdipine (CARDENE) 40 mg/200 mL in NaCl infusion  0-15 mg/hr Intravenous Continuous PRN Ovi Wilson PA-C   Completed at 09/09/21 1709   • MIDAZolam (VERSED) injection 1 mg  1 mg Intravenous Q1H PRN Ovi Wilson PA-C       • ALPRAZolam (XANAX) tablet 0.25 mg  0.25 mg Oral Q12H PRN Ovi Wilson PA-C       • aspirin chewable 324 mg  324 mg Per NG tube Daily Ovi Wilson PA-C   324 mg at 09/16/21 0845   • docusate sodium-sennosides (SENOKOT S) 50-8.6 MG 2 tablet  2 tablet Oral BID Ovi Wilson PA-C   2 tablet at 09/16/21 0845   • bisacodyl (DULCOLAX) suppository 10 mg  10 mg Rectal Daily PRN Ovi Wilson PA-C   10 mg at 09/16/21 1245   • dexMEDETomidine (PRECEDEX) 400 mcg/100 mL in sodium chloride 0.9 % infusion  0-0.7 mcg/kg/hr (Order-Specific) Intravenous Continuous PRN Ovi Wilson PA-C       • sodium chloride (PF) 0.9 % injection 2 mL  2 mL Intracatheter 2 times per day Ovi Wilson PA-C   2 mL at 09/16/21 0850   • dextrose 5 % / sodium chloride 0.45% with KCl 40 mEq infusion   Intravenous Continuous Ovi Wilson PA-C   Completed at 09/11/21 1639   • sodium chloride 0.9% infusion   Intravenous Continuous Ovi Wilson PA-C 3 mL/hr at 09/14/21 2159 Rate Verify at 09/14/21 2159   • sodium chloride 0.9% infusion   Intravenous Continuous Ovi Wilson PA-C   Completed at 09/15/21 0849   • sodium chloride 0.9% infusion   Intravenous Continuous Ovi Wilson PA-C   Paused at 09/14/21 2128   • dextrose 5 % infusion   Intravenous Continuous Ovi Wilson PA-C 3 mL/hr at 09/14/21 2159 Rate Verify at 09/14/21 2159   • furosemide (LASIX INJECT) injection 20 mg  20 mg Intravenous PRN Ovi  KRSYTAL Wilson   20 mg at 09/09/21 2127   • sodium bicarbonate 8.4 % injection 50 mEq  50 mEq Intravenous PRN Ovi Wilson PA-C       • oxyCODONE-acetaminophen (PERCOCET)  MG tablet 1 tablet  1 tablet Oral Q4H PRN Ovi Wilson PA-C        Or   • HYDROcodone-acetaminophen (NORCO)  MG per tablet 1 tablet  1 tablet Oral Q4H PRN Ovi Wilson PA-C   1 tablet at 09/10/21 0616   • HYDROcodone-acetaminophen (NORCO) 5-325 MG per tablet 1 tablet  1 tablet Oral Q4H PRN Ovi Wilson PA-C   1 tablet at 09/15/21 2120    Or   • oxyCODONE-acetaminophen (PERCOCET) 5-325 MG tablet 1 tablet  1 tablet Oral Q4H PRN Ovi Wilson PA-C       • Potassium Standard Replacement Protocol   Does not apply See Admin Instructions Ovi Wilson PA-C       • Magnesium Standard Replacement Protocol   Does not apply See Admin Instructions Ovi Wilson PA-C       • sodium chloride 0.9 % flush bag 25 mL  25 mL Intravenous PRN Ovi Wilson PA-C       • dextrose 50 % injection 25 g  25 g Intravenous PRN Ovi Wilson PA-C       • dextrose 50 % injection 12.5 g  12.5 g Intravenous PRN Ovi Wilson PA-C       • glucagon (GLUCAGEN) injection 1 mg  1 mg Intramuscular PRN Ovi Wilson PA-C       • dextrose (GLUTOSE) 40 % gel 15 g  15 g Oral PRN Ovi Wilson PA-C       • dextrose (GLUTOSE) 40 % gel 30 g  30 g Oral PRN Ovi Wilson PA-C       • dextrose 5 % / sodium chloride 0.45% infusion   Intravenous Continuous Ovi Wilson PA-C       • NORepinephrine (LEVOPHED) 8 mg/250 mL in sodium chloride 0.9 % infusion  0-199 mcg/min Intravenous Continuous Stefan Mcfadden MD   Paused at 09/09/21 1315   • heparin (porcine) injection 5,000 Units  5,000 Units Subcutaneous 3 times per day Ovi Wilson PA-C   5,000 Units at 09/16/21 1430   • potassium CHLORIDE 40 mEq/100 mL IVPB premix  40 mEq Intravenous Q4H PRN Stefan Mcfadden MD 50 mL/hr at 09/15/21 0349 40 mEq at 09/15/21 0349   • sodium chloride 0.9% infusion    Intravenous Continuous PRN Stefan Mcfadden MD             OBJECTIVE     VITAL SIGNS:     Vital Last Value 24 Hour Range   Temperature 98.2 °F (36.8 °C) (09/16/21 1303) Temp  Min: 97.5 °F (36.4 °C)  Max: 98.8 °F (37.1 °C)   Pulse 86 (09/16/21 1303) Pulse  Min: 75  Max: 97   Respiratory 16 (09/16/21 1303) Resp  Min: 13  Max: 29   Non-Invasive  Blood Pressure 131/89 (09/16/21 1303) BP  Min: 107/82  Max: 143/70   Pulse Oximetry 95 % (09/16/21 1303) SpO2  Min: 78 %  Max: 100 %       IINTAKE/OUTPUT:  I/O last 3 completed shifts:  In: 490 [P.O.:490]  Out: 2420 [Urine:2420]  No intake/output data recorded.    Intake/Output Summary (Last 24 hours) at 9/16/2021 1523  Last data filed at 9/16/2021 0959  Gross per 24 hour   Intake 490 ml   Output 2420 ml   Net -1930 ml       PHYSICAL EXAM    General:  In no apparent distress.    Head:  No signs of head trauma.  Eyes:  Pupils are equal.  Extraocular motions intact.    Ears:  Hearing grossly intact.  Mouth:  Oropharynx is normal.   Neck:  No adenopathy, no JVD.     Chest:  Surgical scar looks good. Chest with clear breath sounds bilaterally.  No wheezes, rales, or rhonchi.    Cardiac:  Regular rate and rhythm.  S1 and S2, without murmurs, gallops, or rubs.  Abdomen:  Soft, without detectable tenderness.  No sign of distention.  No   rebound or guarding, and no masses palpated.   Bowel Sounds normal.  Musculoskeletal:  Good range of motion of all major joints. Extremities without clubbing, cyanosis or edema.  Vascular:  No Edema.  Peripheral pulses normal and equal in all extremities.    Neuro:  Alert and oriented x 3.  No focal sensory or strength deficits.   Speech normal.  Follows commands.  Psychiatric:  Mood normal.  Skin:  No rash or lesions.         LABS    Recent Results (from the past 24 hour(s))   GLUCOSE, BEDSIDE - POINT OF CARE    Collection Time: 09/15/21  6:34 PM   Result Value Ref Range    GLUCOSE, BEDSIDE - POINT OF CARE 145 (H) 70 - 99 mg/dL   Comprehensive  Metabolic Panel    Collection Time: 09/15/21 10:24 PM   Result Value Ref Range    Fasting Status      Sodium 131 (L) 135 - 145 mmol/L    Potassium 4.4 3.4 - 5.1 mmol/L    Chloride 89 (L) 98 - 107 mmol/L    Carbon Dioxide 37 (H) 21 - 32 mmol/L    Anion Gap 9 (L) 10 - 20 mmol/L    Glucose 192 (H) 65 - 99 mg/dL    BUN 27 (H) 6 - 20 mg/dL    Creatinine 1.21 (H) 0.51 - 0.95 mg/dL    Glomerular Filtration Rate 44 (L) >=60    BUN/ Creatinine Ratio 22 7 - 25    Calcium 9.3 8.4 - 10.2 mg/dL    Bilirubin, Total 0.6 0.2 - 1.0 mg/dL    GOT/AST 15 <=37 Units/L    GPT/ALT 16 <64 Units/L    Alkaline Phosphatase 95 45 - 117 Units/L    Albumin 2.7 (L) 3.6 - 5.1 g/dL    Protein, Total 6.8 6.4 - 8.2 g/dL    Globulin 4.1 (H) 2.0 - 4.0 g/dL    A/G Ratio 0.7 (L) 1.0 - 2.4   Magnesium    Collection Time: 09/15/21 10:24 PM   Result Value Ref Range    Magnesium 2.0 1.7 - 2.4 mg/dL   Phosphorus    Collection Time: 09/15/21 10:24 PM   Result Value Ref Range    Phosphorus 4.4 2.4 - 4.7 mg/dL   TYPE/SCREEN    Collection Time: 09/15/21 10:24 PM   Result Value Ref Range    ABO/RH(D) O Rh Negative     ANTIBODY SCREEN Negative     TYPE AND SCREEN EXPIRATION DATE 09/18/2021 23:59    CBC with Automated Differential (performable only)    Collection Time: 09/15/21 10:24 PM   Result Value Ref Range    WBC 9.4 4.2 - 11.0 K/mcL    RBC 4.11 4.00 - 5.20 mil/mcL    HGB 10.8 (L) 12.0 - 15.5 g/dL    HCT 34.0 (L) 36.0 - 46.5 %    MCV 82.7 78.0 - 100.0 fl    MCH 26.3 26.0 - 34.0 pg    MCHC 31.8 (L) 32.0 - 36.5 g/dL    RDW-CV 14.1 11.0 - 15.0 %    RDW-SD 42.5 39.0 - 50.0 fL     140 - 450 K/mcL    NRBC 0 <=0 /100 WBC    Neutrophil, Percent 67 %    Lymphocytes, Percent 15 %    Mono, Percent 10 %    Eosinophils, Percent 5 %    Basophils, Percent 1 %    Immature Granulocytes 2 %    Absolute Neutrophils 6.3 1.8 - 7.7 K/mcL    Absolute Lymphocytes 1.4 1.0 - 4.0 K/mcL    Absolute Monocytes 1.0 (H) 0.3 - 0.9 K/mcL    Absolute Eosinophils  0.5 0.0 - 0.5 K/mcL     Absolute Basophils 0.1 0.0 - 0.3 K/mcL    Absolute Immmature Granulocytes 0.2 0.0 - 0.2 K/mcL   Comprehensive Metabolic Panel    Collection Time: 09/16/21  3:21 AM   Result Value Ref Range    Fasting Status      Sodium 132 (L) 135 - 145 mmol/L    Potassium 3.7 3.4 - 5.1 mmol/L    Chloride 91 (L) 98 - 107 mmol/L    Carbon Dioxide 37 (H) 21 - 32 mmol/L    Anion Gap 8 (L) 10 - 20 mmol/L    Glucose 122 (H) 65 - 99 mg/dL    BUN 23 (H) 6 - 20 mg/dL    Creatinine 1.19 (H) 0.51 - 0.95 mg/dL    Glomerular Filtration Rate 45 (L) >=60    BUN/ Creatinine Ratio 19 7 - 25    Calcium 9.2 8.4 - 10.2 mg/dL    Bilirubin, Total 0.6 0.2 - 1.0 mg/dL    GOT/AST 11 <=37 Units/L    GPT/ALT 11 <64 Units/L    Alkaline Phosphatase 84 45 - 117 Units/L    Albumin 2.4 (L) 3.6 - 5.1 g/dL    Protein, Total 6.5 6.4 - 8.2 g/dL    Globulin 4.1 (H) 2.0 - 4.0 g/dL    A/G Ratio 0.6 (L) 1.0 - 2.4   Magnesium    Collection Time: 09/16/21  3:21 AM   Result Value Ref Range    Magnesium 2.0 1.7 - 2.4 mg/dL   Phosphorus    Collection Time: 09/16/21  3:21 AM   Result Value Ref Range    Phosphorus 4.5 2.4 - 4.7 mg/dL   Prothrombin Time    Collection Time: 09/16/21  3:21 AM   Result Value Ref Range    Prothrombin Time 11.8 9.7 - 11.8 sec    INR 1.1     Partial Thromboplastin Time    Collection Time: 09/16/21  3:21 AM   Result Value Ref Range    PTT 25 22 - 30 sec   Fibrinogen Activity    Collection Time: 09/16/21  3:21 AM   Result Value Ref Range    Fibrinogen 582 (H) 190 - 425 mg/dL   NT proBNP    Collection Time: 09/16/21  3:21 AM   Result Value Ref Range    NT-proBNP 4,199 (H) <=125 pg/mL   Digoxin    Collection Time: 09/16/21  3:21 AM   Result Value Ref Range    Digoxin 0.9 0.8 - 2.1 ng/mL   CBC with Automated Differential (performable only)    Collection Time: 09/16/21  3:21 AM   Result Value Ref Range    WBC 8.5 4.2 - 11.0 K/mcL    RBC 3.89 (L) 4.00 - 5.20 mil/mcL    HGB 10.2 (L) 12.0 - 15.5 g/dL    HCT 32.4 (L) 36.0 - 46.5 %    MCV 83.3 78.0 - 100.0 fl     MCH 26.2 26.0 - 34.0 pg    MCHC 31.5 (L) 32.0 - 36.5 g/dL    RDW-CV 14.1 11.0 - 15.0 %    RDW-SD 42.8 39.0 - 50.0 fL     140 - 450 K/mcL    NRBC 0 <=0 /100 WBC    Neutrophil, Percent 66 %    Lymphocytes, Percent 15 %    Mono, Percent 11 %    Eosinophils, Percent 6 %    Basophils, Percent 1 %    Immature Granulocytes 1 %    Absolute Neutrophils 5.6 1.8 - 7.7 K/mcL    Absolute Lymphocytes 1.3 1.0 - 4.0 K/mcL    Absolute Monocytes 0.9 0.3 - 0.9 K/mcL    Absolute Eosinophils  0.5 0.0 - 0.5 K/mcL    Absolute Basophils 0.1 0.0 - 0.3 K/mcL    Absolute Immmature Granulocytes 0.1 0.0 - 0.2 K/mcL   GLUCOSE, BEDSIDE - POINT OF CARE    Collection Time: 09/16/21  8:26 AM   Result Value Ref Range    GLUCOSE, BEDSIDE - POINT OF CARE 174 (H) 70 - 99 mg/dL   GLUCOSE, BEDSIDE - POINT OF CARE    Collection Time: 09/16/21 12:24 PM   Result Value Ref Range    GLUCOSE, BEDSIDE - POINT OF CARE 139 (H) 70 - 99 mg/dL       UA  No results found for: UWBC, URBC     IMAGING  XR CHEST AP OR PA 1 VIEW    Result Date: 9/16/2021  Patient: DIDIER BILLS  Time Out: 06:39 Exam(s): FILM CXR 1 VIEW EXAM:   XR Chest, 1 View CLINICAL HISTORY:    Reason for exam: cardiac surg patient. Unspecified atrial flutter (CMS/HCC). Atherosclerotic heart disease of native coronary artery without angina pectoris. TECHNIQUE:   Frontal view of the chest. COMPARISON: CXR, yesterday. FINDINGS/IMPRESSION: Worsening subsegmental atelectasis in the left midlung field. Emphysema.  Trace effusions, unchanged.  No pneumothorax.  Retrocardiac opacity, may represent mild infiltrate.  Correlate with lateral view of clinically indicated. Cardiomegaly.  Sternotomy wires.    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  - HR currently in low 100s  -Started amiodarone 400 mg BID  - Continue digoxin 125 mcg daily   - Resumed Nebivolol 20 mg daily    Home Eliquis held post-op   - Monitor telemetry   - Cardiology is following, will ultimately need anticoagulation here. Defer to CV surgery and the safety initiating that therapy.      Acute on Chronic diastolic CHF   NTproBNP 4456 (9/13)  - Continue IV lasix changed to po lasix 40 mg bid       Post Op Thrombocytopenia and coagulopathy  Sp 2U FFP (9/9)  Fibrinogen WNL -> 543 -> 516->552 (9/15)              -  -> 139 -> 125 -> remains WNL (9/15)              - Monitor     Normocytic Anemia with acute post-op blood loss     Hypertension    -metoprolol     Alex  -Possibly prerenal or mild atn  -Monitor w diuresis    Hyperlipidemia   -Statin     Hyponatremia  -mild, monitor     Diabetes mellitus with hypoglycemia  -Endocrinology following and adjusting insulin    History of TIA   In 2019              Ultrasound pre op with <50% MOR carotids  - Continue ASA 324mg daily and rosuvastatin 10 mg nightly      GERD  -Pepcid     DUTCH    Non-toxic multinodular goiter  Thyroid US in 8/2016  TSH 0.406 on 9/9/21  - Further management as outpatient       DVT PROPHYLAXIS:  [facility]   Code Status: Full Resuscitation        Lena Rueda MD

## 2025-06-27 NOTE — PLAN OF CARE
Problem: Occupational Therapy  Goal: Occupational Therapy Goal  Description: Goals to be met by: 7/4/25     Patient will increase functional independence with ADLs by performing:    LE Dressing with Stand-by Assistance.  Grooming while standing at sink with Stand-by Assistance.  Toileting from toilet with Stand-by Assistance for hygiene and clothing management.   Toilet transfer to toilet with Stand-by Assistance.    Outcome: Progressing

## 2025-06-27 NOTE — SUBJECTIVE & OBJECTIVE
"Interval HPI:   No acute events overnight. Patient in room 93492/65426 A. Blood glucose stable. BG at goal on current insulin regimen (IIP). Steroid use- None .   1 Day Post-Op  Renal function- Normal   Vasopressors-  None     Diet Clear Liquid Fluid - 1500mL     Eating:   Clear liquid diet  Nausea: No  Hypoglycemia and intervention: No  Fever: No  TPN and/or TF: No     BP (!) 124/59   Pulse 82   Temp 98 °F (36.7 °C) (Oral)   Resp 18   Ht 5' 5" (1.651 m)   Wt 75 kg (165 lb 5.5 oz)   SpO2 (!) 94%   Breastfeeding No   BMI 27.51 kg/m²     Labs Reviewed and Include    Recent Labs   Lab 06/27/25  0307   *   CALCIUM 8.1*      K 4.2   CO2 19*   *   BUN 22   CREATININE 0.9     Lab Results   Component Value Date    WBC 15.29 (H) 06/27/2025    HGB 9.8 (L) 06/27/2025    HCT 29.9 (L) 06/27/2025    MCV 69 (L) 06/27/2025     06/27/2025     No results for input(s): "TSH", "FREET4" in the last 168 hours.  Lab Results   Component Value Date    HGBA1C 5.4 06/19/2025       Nutritional status:   Body mass index is 27.51 kg/m².  Lab Results   Component Value Date    ALBUMIN 4.3 06/21/2025    ALBUMIN 4.0 06/18/2025    ALBUMIN 3.8 05/06/2025     No results found for: "PREALBUMIN"    Estimated Creatinine Clearance: 58.1 mL/min (based on SCr of 0.9 mg/dL).    Accu-Checks  Recent Labs     06/26/25  2205 06/26/25  2300 06/27/25  0006 06/27/25  0103 06/27/25  0205 06/27/25  0307 06/27/25  0502 06/27/25  0626 06/27/25  0714 06/27/25  0841   POCTGLUCOSE 180* 157* 158* 148* 152* 136* 141* 157* 160* 154*       Current Medications and/or Treatments Impacting Glycemic Control  Immunotherapy:    Immunosuppressants       None          Steroids:   Hormones (From admission, onward)      None          Pressors:    Autonomic Drugs (From admission, onward)      Start     Stop Route Frequency Ordered    06/27/25 1030  metoprolol tartrate (LOPRESSOR) split tablet 12.5 mg         -- Oral Once 06/27/25 0941      "     Hyperglycemia/Diabetes Medications:   Antihyperglycemics (From admission, onward)      Start     Stop Route Frequency Ordered    06/27/25 0830  insulin regular in 0.9 % NaCl 100 unit/100 mL (1 unit/mL) infusion        Question:  Enter initial dose (Units/hr):  Answer:  0.8    -- IV Continuous 06/27/25 0723    06/27/25 0822  insulin aspart U-100 pen 0-5 Units         -- SubQ Before meals, nightly and at 0200 PRN 06/27/25 0723

## 2025-06-27 NOTE — ASSESSMENT & PLAN NOTE
BG goal: 110-140   No known history of diabetes. S/p CTS sx.  Discontinue IIP and monitor on transition drip    - discontinue IIP   -start transition drip at 0.8 units/hour with step-down parameters  -start NovoLog low-dose correction 150/50  - POCT Glucose a.c. HS and 2 a.m.  - Hypoglycemia protocol in place      ** Please notify Endocrine for any change and/or advance in diet**  ** Please call Endocrine for any BG related issues **     Discharge Planning:   TBD. Please notify endocrinology prior to discharge.

## 2025-06-27 NOTE — EICU
Intervention Initiated From:  [facility] / EICU    Elvin intervened regarding:  Rounding (Video assessment)    VICU Night Rounds Checklist  24H Vital Sign Range:  Temp:  [97.8 °F (36.6 °C)-98.2 °F (36.8 °C)]   Pulse:  []   Resp:  [10-37]   BP: (100-157)/(54-78)   SpO2:  [92 %-100 %]   Arterial Line BP: ()/(38-81)     Video rounds and LDA reconciliation    Nursing orders placed : IP SALINAS Central Line Care and IP SALINAS Peripheral IV Access

## 2025-06-27 NOTE — PROGRESS NOTES
[facility] - Surgical Intensive Care  Critical Care - Surgery  Progress Note    Patient Name: Veronica Duque  MRN: [id number]  Admission Date: 6/26/2025  Hospital Length of Stay: 1 days  Code Status: Full Code  Attending Provider: Lamont Flores MD  Primary Care Provider: Juju Wallace MD   Principal Problem: Coronary artery disease    Subjective:     Hospital/ICU Course:  No notes on file    Interval History/Significant Events: extubated yesterday afternoon, off pressors. Clevi at 8. Pain well controlled, complaining of mostly back and shoulder pain. 4L O2.    Follow-up For: Procedure(s) (LRB):  CORONARY ARTERY BYPASS GRAFT (CABG) (N/A)  SURGICAL PROCUREMENT, VEIN, ENDOSCOPIC (Left)    Post-Operative Day: 1 Day Post-Op    Objective:     Vital Signs (Most Recent):  Temp: 98 °F (36.7 °C) (06/27/25 0300)  Pulse: 79 (06/27/25 0645)  Resp: 18 (06/27/25 0645)  BP: 124/60 (06/27/25 0645)  SpO2: 100 % (06/27/25 0645) Vital Signs (24h Range):  Temp:  [97.8 °F (36.6 °C)-98.2 °F (36.8 °C)] 98 °F (36.7 °C)  Pulse:  [] 79  Resp:  [10-37] 18  SpO2:  [86 %-100 %] 100 %  BP: (100-143)/(53-67) 124/60  Arterial Line BP: ()/(38-84) 111/84     Weight: 75 kg (165 lb 5.5 oz)  Body mass index is 27.51 kg/m².      Intake/Output Summary (Last 24 hours) at 6/27/2025 0743  Last data filed at 6/27/2025 0701  Gross per 24 hour   Intake 7121.95 ml   Output 2535 ml   Net 4586.95 ml          Physical Exam  Constitutional:       General: She is not in acute distress.     Appearance: Normal appearance.   Cardiovascular:      Rate and Rhythm: Normal rate and regular rhythm.      Comments: V wires present  Pulmonary:      Effort: Pulmonary effort is normal. No respiratory distress.      Comments: 2 meds and 2 pleural chest tubes with serosanguinous output  Abdominal:      General: Abdomen is flat. There is no distension.   Genitourinary:     Comments: Larose present with clear, yellow urine  Skin:     General: Skin is warm and  dry.      Capillary Refill: Capillary refill takes less than 2 seconds.   Neurological:      General: No focal deficit present.      Mental Status: She is alert and oriented to person, place, and time.            Vents:  Vent Mode: Spont (06/26/25 1509)  Ventilator Initiated: Yes (06/26/25 1230)  Set Rate: 20 BPM (06/26/25 1316)  Vt Set: 420 mL (06/26/25 1316)  Pressure Support: 6 cmH20 (06/26/25 1509)  PEEP/CPAP: 5 cmH20 (06/26/25 1509)  Oxygen Concentration (%): 36 (06/27/25 0324)  Peak Airway Pressure: 12 cmH20 (06/26/25 1509)  Plateau Pressure: 0 cmH20 (06/26/25 1509)  Total Ve: 13.2 L/m (06/26/25 1509)  Negative Inspiratory Force (cm H2O): -26 (06/26/25 1509)  F/VT Ratio<105 (RSBI): (!) 52.06 (06/26/25 1509)    Lines/Drains/Airways       Central Venous Catheter Line  Duration             Percutaneous Central Line Quad Lumen 06/26/25 0825 Subclavian Left <1 day              Drain  Duration                  Urethral Catheter 06/26/25 0740 Non-latex;Straight-tip;Temperature probe 14 Fr. 1 day         Y Chest Tube 1 and 2 06/26/25 1049 1 Anterior Mediastinal 28 Fr. 2 Anterior Mediastinal 28 Fr. <1 day         Y Chest Tube 3 and 4 06/26/25 1049 3 Right Pleural 28 Fr. 4 Left Pleural 28 Fr. <1 day              Arterial Line  Duration             Arterial Line 06/26/25 0710 Left Radial 1 day              Line  Duration                  Pacer Wires 06/26/25 1028 <1 day              Peripheral Intravenous Line  Duration             Peripheral IV 06/26/25 0545 20 G Anterior;Left Forearm 1 day    Peripheral IV 06/26/25 0805 18 G Left Hand <1 day                    Significant Labs:    CBC/Anemia Profile:  Recent Labs   Lab 06/26/25  1247 06/26/25  1332 06/26/25  1659 06/27/25  0008 06/27/25  0307   WBC 15.62*  --   --   --  15.29*   HGB 9.4*  --   --   --  9.8*   HCT 29.0*   < > 28* 28* 29.9*     --   --   --  212   MCV 71*  --   --   --  69*   RDW 24.0*  --   --   --  22.4*    < > = values in this interval not  displayed.        Chemistries:  Recent Labs   Lab 06/26/25  1247 06/26/25  2206 06/27/25  0307     --  142   K 3.6 4.2 4.2   *  --  112*   CO2 18*  --  19*   BUN 25*  --  22   CREATININE 0.9  --  0.9   CALCIUM 8.7  --  8.1*   MG 3.6*  --  2.3   PHOS 3.6  --  3.8       All pertinent labs within the past 24 hours have been reviewed.    Significant Imaging:  I have reviewed all pertinent imaging results/findings within the past 24 hours.  Assessment/Plan:     Cardiac/Vascular  * Coronary artery disease    Neuro/Psych:     - Sedation: none    - Pain:     - Scheduled Tylenol 1000mg Q8H   - PRN Oxycodone 5mg/10mg             Cardiac:     - S/p CABG x 2 with Dr. Flores 6/26    - BP Goal: MAP 60-80    - Inotropes/Pressors: none    - Anti-HTNs: cleviprex 8    - Rhythm: NSR 70s    - episode of SVT post op, resolved    - Beta Blocker: metop 12.5 BID, increase to 25 this PM if tolerates well    - Statin: ezetimibe       Pulmonary:     - 4L NC     - Goal SpO2 >90%    - extubated POD 0    - Chest Tubes x 4 (2 Meds & 2 Pleural): meds 340cc, pleural 210cc output    - ABGS PRN       Renal:    - Trend BUN/Cr     - Maintain Larose, record strict Is/Os    - UOP 1960cc since admit    - net +4612    - likely diurese today given oxygen requirements and hypertension    Recent Labs   Lab 06/23/25  0657 06/26/25  1247 06/27/25  0307   BUN 27* 25* 22   CREATININE 0.8 0.9 0.9         FEN / GI:     - Daily CMP, PRN K/Mag/Phos per protocol     - Replace electrolytes as needed    - Nutrition: CLD, ADAT    - Bowel Regimen: Miralax, docusate      ID:     - Aebrile    - Abx: Complete perioperative cefazolin 2g Q8H x 5 doses    Recent Labs   Lab 06/23/25  0657 06/26/25  1247 06/27/25  0307   WBC 9.07 15.62* 15.29*         Heme/Onc:     - Hgb 9.8 from 9.4    - CBC daily    - ASA 81 daily    - DVT ppx today    Recent Labs   Lab 06/23/25  0657 06/26/25  1247 06/27/25  0307   HGB 9.0* 9.4* 9.8*    183 212   APTT  --  30.6 29.0   INR  --   1.3* 1.2         Endocrine:     - CTS Goal -140    - HgbA1c: 5.4    - Endocrinology consulted for insulin management    - insulin gtt       PPx:   Feeding: CLD, advance today  Analgesia/Sedation: tylenol, oxy PRN  Thromboembolic Prevention: start today  HOB >30: Yes  Stress Ulcer: Yes - famotidine 20mg PO BID  Glucose Control: Yes, insulin management per Endocrinology - insulin gtt now     Lines/Drains/Airway:   Central Line: left subclavian   Larose    Chest Tubes: 4 (2 Mediastinal, 2 Pleural)    Pacing Wires: Temporary V pacing wires      Dispo/Code Status/Palliative:     - Continue SICU Care    - Full Code          Emily Mandel MD  Critical Care - Surgery  [facility] - Surgical Intensive Care

## 2025-06-27 NOTE — PLAN OF CARE
SICU PLAN OF CARE    Dx: Coronary artery disease    Goals of Care: MAP 60-80    Vital Signs (last 12 hours):   Temp:  [98 °F (36.7 °C)-98.2 °F (36.8 °C)]   Pulse:  [72-87]   Resp:  [12-37]   BP: (106-136)/(53-64)   SpO2:  [86 %-100 %]   Arterial Line BP: ()/(38-84)      Neuro: AAOx4, Follows commands , and Moves all extremities spontaneously     Cardiac: Rhythm: normal sinus rhythm    Respiratory: 4L Nasal Cannula     Gtts: Insulin and Cleviprex     Urine Output: Urethral Catheter  700 mL/shift    Drains: Pleural chest tubes: 150 mL/shift  Mediastinal chest tubes: 220 mL/shift    Diet: Clear Liquid      Labs/Accuchecks: am labs, Q1H accucheck    Skin:  No new breakdown noted.  Patient turned q2h, bony prominences protected, and mattress inflated/working correctly.     Shift Events:  Titrated cleviprex to maintain MAP goal. Arterial line no longer working this am, MD said okay to titrate cleviprex off of cuff pressures. OOBTC.  See flowsheet for further assessment/details.  Family updated on current condition/plan of care, questions answered, and emotional support provided.  MD updated on current condition, vitals, labs, and gtts.

## 2025-06-27 NOTE — PLAN OF CARE
06/27/25 1135   Rounds   Attendance Provider;Nurse    Discharge Plan A Home   Why the patient remains in the hospital Requires continued medical care   Transition of Care Barriers None     CM met with Provider during rounds to discuss discharge planning.  POD 1 cabg. PT/OT needs to assess.     Jayme Viera, RN, BSN  Case Management  [phone]

## 2025-06-27 NOTE — PT/OT/SLP EVAL
Physical Therapy Evaluation    Patient Name:  Veronica Duque   MRN:  [id number]  Admit Date: 6/26/2025  Admitting Diagnosis:  Coronary artery disease  Length of Stay: 1 days  Recent Surgery: Procedure(s) (LRB):  CORONARY ARTERY BYPASS GRAFT (CABG) (N/A)  SURGICAL PROCUREMENT, VEIN, ENDOSCOPIC (Left) 1 Day Post-Op    Recommendations:     Discharge Recommendations:  Low Intensity Therapy   Discharge Equipment Recommendations: none   Barriers to discharge: none    Assessment:     Veronica Duque is a 69 y.o. female admitted with a medical diagnosis of Coronary artery disease. Pt limited by nausea and pain. Pt was able to ambulate in the room with 1 person assistance and no AD. Will continue to progress mobility per patient's tolerance. Pt would benefit from continued acute PT to maximize functional mobility after surgical intervention.       Problem List: weakness, impaired endurance, impaired functional mobility, gait instability, impaired balance, impaired cardiopulmonary response to activity, pain  Rehab Prognosis: Good; patient would benefit from acute skilled PT services to address these deficits and reach maximum level of function.      Plan:     During this hospitalization, patient to be seen 5 x/week to address the identified rehab impairments via gait training, therapeutic activities, therapeutic exercises, neuromuscular re-education and progress towards the established goals.    Plan of Care Expires:  07/23/25    Subjective   Communicated with RN prior to session.  Patient found up in chair upon PT entry to room, agreeable to evaluation. Veronica Duque's  and daughter present during session.    Chief Complaint: No chief complaint on file.    Patient/Family Comments/goals: to get better  Pain/Comfort:  Pain Rating 1: 9/10  Location 1:  (back)  Pain Addressed 1: Reposition, Distraction, Pre-medicate for activity  Pain Rating Post-Intervention 1: 9/10  Location 2:  (sternum;  "unrated)  Pain Addressed 2: Reposition, Distraction, Pre-medicate for activity    Living Environment:  Living Environment: lives with  in 2SH only lives on the 1st floor 1 ELLA to enter and back entrance with ramp. Walk in shower  Previous level of function: ind with all ADLs and IADLs. Driving PTA  Roles and Routines: wife   Equipment Used at Home: none  Assistance upon Discharge:  and daughter    Objective:   Patient found with: telemetry, pulse ox (continuous), blood pressure cuff, central line, peripheral IV, myrick catheter, chest tube, arterial line, external pacer, oxygen     General Precautions: Standard, Cardiac fall (KMIT)   Orthopedic Precautions:N/A   Braces: N/A   Oxygen Device: Nasal Cannula   Vitals: /63   Pulse 77   Temp 98 °F (36.7 °C) (Oral)   Resp 20   Ht 5' 5" (1.651 m)   Wt 75 kg (165 lb 5.5 oz)   SpO2 97%   Breastfeeding No   BMI 27.51 kg/m²     Exams:  Cognition:   Alert and Cooperative  Ox4  Command following: Follows multistep  commands  Fluency: clear/fluent    RLE ROM: WFL  RLE Strength: WFL  LLE ROM: WFL  LLE Strength: WFL      Outcome Measures:  AM-PAC 6 CLICK MOBILITY  Turning over in bed (including adjusting bedclothes, sheets and blankets)?: 3  Sitting down on and standing up from a chair with arms (e.g., wheelchair, bedside commode, etc.): 3  Moving from lying on back to sitting on the side of the bed?: 3  Moving to and from a bed to a chair (including a wheelchair)?: 3  Need to walk in hospital room?: 3  Climbing 3-5 steps with a railing?: 2  Basic Mobility Total Score: 17     Functional Mobility:  Additional staff present: OT - due to pt requiring 2 skilled therapists to safely perform functional mobility and to accommodate pt's activity tolerance    Bed Mobility:  Not performed 2nd to pt found in the chair     Transfers:   Sit <> Stand Transfer: contact guard assistance with no assistive device from chair       Gait:   Patient ambulated: 10ft + 20ft (ADLs " "standing at the sink between trials)   Patient required: contact guard  Patient used: no assistive device  Gait Pattern observed: reciprocal gait  Gait Deviation(s): decreased step length, decreased arm swing, flexed posture, and decreased jess  Impairments due to: pain, nausea, post surgical instability  Comments:   Pt significantly limited by pain and nausea. Pt overwhelmed. RN administered pain medication and zofran.   No LOB  Mild SOB  Verbal cueing for upright posture, attention to task, and normal arm swing      Therapeutic Activities, Exercises, & Education:   Educated pt on PT role/POC  Educated pt on importance of OOB activity and daily ambulation   Pt verbalized understanding     Therapist provided one-on-one education to patient regarding principles " Keep Your Move in the Tube" protocol. Explained the concept of keeping elbows close to the body to protect the sternum during healing.  Discussed load bearing and non-load-bearing movements "in the tube" and "out of the tube". Reviewed recommendation using baseline pain to guide tolerated movements in and out of the tube. Patient understood the instructions and had no questions.           Patient left up in chair with all lines intact, call button in reach, and RN notified.    GOALS:   Multidisciplinary Problems       Physical Therapy Goals          Problem: Physical Therapy    Goal Priority Disciplines Outcome Interventions   Physical Therapy Goal     PT, PT/OT Progressing    Description: Goals to be met by: 7/15/2025    Patient will increase functional independence with mobility by performin. Supine to sit with Supervision  2. Sit to stand transfer with Supervision  3. Gait  x 400 feet with Supervision.   4. Ascend/descend 5 stairs with bilateral Handrails Supervision.                          DME Justifications:  No DME recommended requiring DME justifications    History:     Past Medical History:   Diagnosis Date    Anticoagulant long-term use  "    Arthritis     General anesthetics causing adverse effect in therapeutic use     slow to wake up    Hypertension     PONV (postoperative nausea and vomiting)     Stroke        Past Surgical History:   Procedure Laterality Date    ANGIOGRAM, CORONARY, WITH LEFT HEART CATHETERIZATION N/A 6/18/2025    Procedure: Angiogram, Coronary, with Left Heart Cath;  Surgeon: Zechariah Toscano MD;  Location: [facility] CATH LAB;  Service: Cardiology;  Laterality: N/A;    ANGIOGRAPHY OF LOWER EXTREMITY Right 12/27/2022    Procedure: Angiogram Extremity Unilateral;  Surgeon: Fidencio Valle MD;  Location: [address];  Service: Vascular;  Laterality: Right;  RLE diagnostic angio & R SFA femoral endarterectomy w/ PTA & stenting  RJS624.86  gycm2 149.22  fluoro time 5.4  contrast vol    AORTOGRAPHY N/A 12/27/2022    Procedure: AORTOGRAM;  Surgeon: Fidencio Valle MD;  Location: [address];  Service: Vascular;  Laterality: N/A;    AORTOGRAPHY WITH SERIALOGRAPHY  5/16/2022    Procedure: AORTOGRAM, WITH SERIALOGRAPHY. left femoral access;  Surgeon: Phil Pitts MD;  Location: [facility] CATH;  Service: Cardiology;;    ARTHROSCOPY OF KNEE      AUGMENTATION OF BREAST      CORONARY ARTERY BYPASS GRAFT (CABG) N/A 6/26/2025    Procedure: CORONARY ARTERY BYPASS GRAFT (CABG);  Surgeon: Lamont Flores MD;  Location: [address];  Service: Cardiothoracic;  Laterality: N/A;    COSMETIC SURGERY      ENDARTERECTOMY OF FEMORAL ARTERY N/A 12/27/2022    Procedure: ENDARTERECTOMY, FEMORAL;  Surgeon: Fidencio Valle MD;  Location: [address];  Service: Vascular;  Laterality: N/A;  RLE diagnostic angio & R SFA femoral endarterectomy w/ PTA & stenting    ENDOSCOPIC HARVEST OF VEIN Left 6/26/2025    Procedure: SURGICAL PROCUREMENT, VEIN, ENDOSCOPIC;  Surgeon: Lamont Flores MD;  Location: [address];  Service: Cardiothoracic;  Laterality: Left;  harvest [id number] / prep [phone]    EYE SURGERY  12/18/17 & 10/23/17    Cataracts     HYSTERECTOMY      JOINT REPLACEMENT  October 2019    Knee    NASAL SEPTOPLASTY      VENTRICULOGRAM, LEFT  6/18/2025    Procedure: Ventriculogram, Left;  Surgeon: Zechariah Toscano MD;  Location: [facility] CATH LAB;  Service: Cardiology;;       Time Tracking:     PT Received On: 06/27/25  PT Start Time: 0829     PT Stop Time: 0859  PT Total Time (min): 30 min     Billable Minutes: Evaluation 5, Gait Training 8, and Therapeutic Activity 15

## 2025-06-27 NOTE — PT/OT/SLP EVAL
Occupational Therapy  Co Evaluation    Name: Veronica Duque  MRN: [id number]  Admitting Diagnosis: Coronary artery disease  Recent Surgery: Procedure(s) (LRB):  CORONARY ARTERY BYPASS GRAFT (CABG) (N/A)  SURGICAL PROCUREMENT, VEIN, ENDOSCOPIC (Left) 1 Day Post-Op  Pt s/p CABG 6/26/25  Recommendations:     Discharge Recommendations: No Therapy Indicated    Assessment:     Veronica Duque is a 69 y.o. female with a medical diagnosis of Coronary artery disease. Performance deficits affecting function: weakness, impaired endurance, impaired self care skills, impaired functional mobility, pain, impaired cardiopulmonary response to activity. Pt tolerated session fairly well with premedication. VSS throughout and brief nausea after stand.     Rehab Prognosis: Good; patient would benefit from acute skilled OT services to address these deficits and reach maximum level of function.       Plan:     Patient to be seen 5 x/week to address the above listed problems via self-care/home management, neuromuscular re-education, therapeutic activities, therapeutic exercises  Plan of Care Expires: 08/01/25  Plan of Care Reviewed with: patient, spouse, daughter    Subjective     Chief Complaint: pt reported not wanting to move because of pain   Patient/Family Comments/goals: to get better    Occupational Profile: family reports  Living Environment: lives with  in 2SH only lives on the 1st floor 1 ELLA to enter and back entrance with ramp. Walk in shower  Previous level of function: ind with all ADLs and IADLs. Driving PTA  Roles and Routines: wife   Equipment Used at Home: none  Assistance upon Discharge:  and daughter    Pain/Comfort:  Pain Rating 1: 9/10  Location 1: back  Pain Addressed 1: Pre-medicate for activity, Reposition, Distraction  Pain Rating Post-Intervention 1: other (see comments) (unrated)  Location 2: chest  Pain Addressed 2: Pre-medicate for activity, Reposition, Distraction    Patients  cultural, spiritual, Rastafarian conflicts given the current situation: no    Objective:     Communicated with: nsg prior to session.  Patient found up in chair with blood pressure cuff, Trialysis, myrick catheter, chest tube, telemetry, pulse ox (continuous), peripheral IV, external pacer upon OT entry to room.    Coeval completed this date to optimize functional safety and performance due to impaired tolerance to activity and increased medical acuity in the setting of the ICU    General Precautions: Standard, fall KMIT  Respiratory Status: Nasal cannula, flow 4 L/min nsg increased to 6L during movement     Occupational Performance:  Functional Mobility/Transfers:  Patient completed Sit <> Stand Transfer with contact guard assistance  with  no assistive device   Functional Mobility: completed to the sink and around the room to simulate participation in household tasks    Activities of Daily Living:  Grooming: contact guard assistance standing at the sink   Anticipate CGA for ADLs    Cognitive/Visual Perceptual:  Cognitive/Psychosocial Skills:     -       Oriented to: Person, Place, Time, and Situation   Visual/Perceptual:      -Intact      Physical Exam:  Upper Extremity Range of Motion:     -       Right Upper Extremity: WFL  -       Left Upper Extremity: WFL    AMPAC 6 Click ADL:  AMPAC Total Score: 18    Treatment & Education:  Pt stood from chair with CGA. Pt had brief moment of nausea but no vomiting. Pt completed functional mobility with CGA to the sink and performed face washing with CGA. Pt completed functional mobility around the room and returned to the chair with CGA. Pt session limited to pain and nausea.   Edu provided re role of OT and safety with ADLs  Edu provided re keep your move in the tube. Including using baseline pain to guide tolerated movements in and out of the tube.   Edu provided re importance of movement to maintain strength and ROM.     Patient left up in chair with all lines intact, call  button in reach, and nsg present    GOALS:   Multidisciplinary Problems       Occupational Therapy Goals          Problem: Occupational Therapy    Goal Priority Disciplines Outcome Interventions   Occupational Therapy Goal     OT, PT/OT Progressing    Description: Goals to be met by: 7/4/25     Patient will increase functional independence with ADLs by performing:    LE Dressing with Stand-by Assistance.  Grooming while standing at sink with Stand-by Assistance.  Toileting from toilet with Stand-by Assistance for hygiene and clothing management.   Toilet transfer to toilet with Stand-by Assistance.                         History:     Past Medical History:   Diagnosis Date    Anticoagulant long-term use     Arthritis     General anesthetics causing adverse effect in therapeutic use     slow to wake up    Hypertension     PONV (postoperative nausea and vomiting)     Stroke          Past Surgical History:   Procedure Laterality Date    ANGIOGRAM, CORONARY, WITH LEFT HEART CATHETERIZATION N/A 6/18/2025    Procedure: Angiogram, Coronary, with Left Heart Cath;  Surgeon: Zechariah Toscano MD;  Location: [facility] CATH LAB;  Service: Cardiology;  Laterality: N/A;    ANGIOGRAPHY OF LOWER EXTREMITY Right 12/27/2022    Procedure: Angiogram Extremity Unilateral;  Surgeon: Fidencio Valle MD;  Location: [address];  Service: Vascular;  Laterality: Right;  RLE diagnostic angio & R SFA femoral endarterectomy w/ PTA & stenting  [id number]  gycm2 149.22  fluoro time 5.4  contrast vol    AORTOGRAPHY N/A 12/27/2022    Procedure: AORTOGRAM;  Surgeon: Fidencio Valle MD;  Location: [address];  Service: Vascular;  Laterality: N/A;    AORTOGRAPHY WITH SERIALOGRAPHY  5/16/2022    Procedure: AORTOGRAM, WITH SERIALOGRAPHY. left femoral access;  Surgeon: Phil Pitts MD;  Location: [facility] CATH;  Service: Cardiology;;    ARTHROSCOPY OF KNEE      AUGMENTATION OF BREAST      CORONARY ARTERY BYPASS GRAFT (CABG) N/A 6/26/2025     Procedure: CORONARY ARTERY BYPASS GRAFT (CABG);  Surgeon: Lamont Flores MD;  Location: [address];  Service: Cardiothoracic;  Laterality: N/A;    COSMETIC SURGERY      ENDARTERECTOMY OF FEMORAL ARTERY N/A 12/27/2022    Procedure: ENDARTERECTOMY, FEMORAL;  Surgeon: Fidencio Valle MD;  Location: [facility] OR [address] [place] [address];  Service: Vascular;  Laterality: N/A;  RLE diagnostic angio & R SFA femoral endarterectomy w/ PTA & stenting    ENDOSCOPIC HARVEST OF VEIN Left 6/26/2025    Procedure: SURGICAL PROCUREMENT, VEIN, ENDOSCOPIC;  Surgeon: Lamont Flores MD;  Location: [address];  Service: Cardiothoracic;  Laterality: Left;  harvest [id number] / prep [phone]    EYE SURGERY  12/18/17 & 10/23/17    Cataracts    HYSTERECTOMY      JOINT REPLACEMENT  October 2019    Knee    NASAL SEPTOPLASTY      VENTRICULOGRAM, LEFT  6/18/2025    Procedure: Ventriculogram, Left;  Surgeon: Zechariah Toscano MD;  Location: [facility] CATH LAB;  Service: Cardiology;;       Time Tracking:     OT Date of Treatment: 06/27/25  OT Start Time: 0828  OT Stop Time: 0859  OT Total Time (min): 31 min    Billable Minutes:Evaluation 8  Self Care/Home Management 13  Therapeutic Activity 10    6/27/2025

## 2025-06-27 NOTE — SUBJECTIVE & OBJECTIVE
Interval History/Significant Events: extubated yesterday afternoon, off pressors. Clevi at 8. Pain well controlled, complaining of mostly back and shoulder pain. 4L O2.    Follow-up For: Procedure(s) (LRB):  CORONARY ARTERY BYPASS GRAFT (CABG) (N/A)  SURGICAL PROCUREMENT, VEIN, ENDOSCOPIC (Left)    Post-Operative Day: 1 Day Post-Op    Objective:     Vital Signs (Most Recent):  Temp: 98 °F (36.7 °C) (06/27/25 0300)  Pulse: 79 (06/27/25 0645)  Resp: 18 (06/27/25 0645)  BP: 124/60 (06/27/25 0645)  SpO2: 100 % (06/27/25 0645) Vital Signs (24h Range):  Temp:  [97.8 °F (36.6 °C)-98.2 °F (36.8 °C)] 98 °F (36.7 °C)  Pulse:  [] 79  Resp:  [10-37] 18  SpO2:  [86 %-100 %] 100 %  BP: (100-143)/(53-67) 124/60  Arterial Line BP: ()/(38-84) 111/84     Weight: 75 kg (165 lb 5.5 oz)  Body mass index is 27.51 kg/m².      Intake/Output Summary (Last 24 hours) at 6/27/2025 0743  Last data filed at 6/27/2025 0701  Gross per 24 hour   Intake 7121.95 ml   Output 2535 ml   Net 4586.95 ml          Physical Exam  Constitutional:       General: She is not in acute distress.     Appearance: Normal appearance.   Cardiovascular:      Rate and Rhythm: Normal rate and regular rhythm.      Comments: V wires present  Pulmonary:      Effort: Pulmonary effort is normal. No respiratory distress.      Comments: 2 meds and 2 pleural chest tubes with serosanguinous output  Abdominal:      General: Abdomen is flat. There is no distension.   Genitourinary:     Comments: Larose present with clear, yellow urine  Skin:     General: Skin is warm and dry.      Capillary Refill: Capillary refill takes less than 2 seconds.   Neurological:      General: No focal deficit present.      Mental Status: She is alert and oriented to person, place, and time.            Vents:  Vent Mode: Spont (06/26/25 1509)  Ventilator Initiated: Yes (06/26/25 1230)  Set Rate: 20 BPM (06/26/25 1316)  Vt Set: 420 mL (06/26/25 1316)  Pressure Support: 6 cmH20 (06/26/25  1509)  PEEP/CPAP: 5 cmH20 (06/26/25 1509)  Oxygen Concentration (%): 36 (06/27/25 0324)  Peak Airway Pressure: 12 cmH20 (06/26/25 1509)  Plateau Pressure: 0 cmH20 (06/26/25 1509)  Total Ve: 13.2 L/m (06/26/25 1509)  Negative Inspiratory Force (cm H2O): -26 (06/26/25 1509)  F/VT Ratio<105 (RSBI): (!) 52.06 (06/26/25 1509)    Lines/Drains/Airways       Central Venous Catheter Line  Duration             Percutaneous Central Line Quad Lumen 06/26/25 0825 Subclavian Left <1 day              Drain  Duration                  Urethral Catheter 06/26/25 0740 Non-latex;Straight-tip;Temperature probe 14 Fr. 1 day         Y Chest Tube 1 and 2 06/26/25 1049 1 Anterior Mediastinal 28 Fr. 2 Anterior Mediastinal 28 Fr. <1 day         Y Chest Tube 3 and 4 06/26/25 1049 3 Right Pleural 28 Fr. 4 Left Pleural 28 Fr. <1 day              Arterial Line  Duration             Arterial Line 06/26/25 0710 Left Radial 1 day              Line  Duration                  Pacer Wires 06/26/25 1028 <1 day              Peripheral Intravenous Line  Duration             Peripheral IV 06/26/25 0545 20 G Anterior;Left Forearm 1 day    Peripheral IV 06/26/25 0805 18 G Left Hand <1 day                    Significant Labs:    CBC/Anemia Profile:  Recent Labs   Lab 06/26/25  1247 06/26/25  1332 06/26/25  1659 06/27/25  0008 06/27/25  0307   WBC 15.62*  --   --   --  15.29*   HGB 9.4*  --   --   --  9.8*   HCT 29.0*   < > 28* 28* 29.9*     --   --   --  212   MCV 71*  --   --   --  69*   RDW 24.0*  --   --   --  22.4*    < > = values in this interval not displayed.        Chemistries:  Recent Labs   Lab 06/26/25  1247 06/26/25  2206 06/27/25  0307     --  142   K 3.6 4.2 4.2   *  --  112*   CO2 18*  --  19*   BUN 25*  --  22   CREATININE 0.9  --  0.9   CALCIUM 8.7  --  8.1*   MG 3.6*  --  2.3   PHOS 3.6  --  3.8       All pertinent labs within the past 24 hours have been reviewed.    Significant Imaging:  I have reviewed all pertinent  imaging results/findings within the past 24 hours.

## 2025-06-27 NOTE — PROGRESS NOTES
"[facility]  Endocrinology  Progress Note    Admit Date: 6/26/2025     Reason for Consult: Management of  Hyperglycemia     Surgical Procedure: CABG 6/26/25    No known hx of DM and not on meds for DM.    HPI:   Patient is a 69 y.o. female with hx of HTN, HLD, CVA with no residual deficits, PAD s/p R SFA stenting by Dr. Valle (non-obstructive L CFA/SFA) who was recently admitted for NSTEMI. S/p CABG with Dr. Flores on 06/26/2025.  Endocrine consulted for BG management    Interval HPI:   No acute events overnight. Patient in room 84086/55644 A. Blood glucose stable. BG at goal on current insulin regimen (IIP). Steroid use- None .   1 Day Post-Op  Renal function- Normal   Vasopressors-  None     Diet Clear Liquid Fluid - 1500mL     Eating:   Clear liquid diet  Nausea: No  Hypoglycemia and intervention: No  Fever: No  TPN and/or TF: No     BP (!) 124/59   Pulse 82   Temp 98 °F (36.7 °C) (Oral)   Resp 18   Ht 5' 5" (1.651 m)   Wt 75 kg (165 lb 5.5 oz)   SpO2 (!) 94%   Breastfeeding No   BMI 27.51 kg/m²     Labs Reviewed and Include    Recent Labs   Lab 06/27/25  0307   *   CALCIUM 8.1*      K 4.2   CO2 19*   *   BUN 22   CREATININE 0.9     Lab Results   Component Value Date    WBC 15.29 (H) 06/27/2025    HGB 9.8 (L) 06/27/2025    HCT 29.9 (L) 06/27/2025    MCV 69 (L) 06/27/2025     06/27/2025     No results for input(s): "TSH", "FREET4" in the last 168 hours.  Lab Results   Component Value Date    HGBA1C 5.4 06/19/2025       Nutritional status:   Body mass index is 27.51 kg/m².  Lab Results   Component Value Date    ALBUMIN 4.3 06/21/2025    ALBUMIN 4.0 06/18/2025    ALBUMIN 3.8 05/06/2025     No results found for: "PREALBUMIN"    Estimated Creatinine Clearance: 58.1 mL/min (based on SCr of 0.9 mg/dL).    Accu-Checks  Recent Labs     06/26/25  2205 06/26/25  2300 06/27/25  0006 06/27/25  0103 06/27/25  0205 06/27/25  0307 06/27/25  0502 06/27/25  0626 " 06/27/25  0714 06/27/25  0841   POCTGLUCOSE 180* 157* 158* 148* 152* 136* 141* 157* 160* 154*       Current Medications and/or Treatments Impacting Glycemic Control  Immunotherapy:    Immunosuppressants       None          Steroids:   Hormones (From admission, onward)      None          Pressors:    Autonomic Drugs (From admission, onward)      Start     Stop Route Frequency Ordered    06/27/25 1030  metoprolol tartrate (LOPRESSOR) split tablet 12.5 mg         -- Oral Once 06/27/25 0925          Hyperglycemia/Diabetes Medications:   Antihyperglycemics (From admission, onward)      Start     Stop Route Frequency Ordered    06/27/25 0830  insulin regular in 0.9 % NaCl 100 unit/100 mL (1 unit/mL) infusion        Question:  Enter initial dose (Units/hr):  Answer:  0.8    -- IV Continuous 06/27/25 0723    06/27/25 0822  insulin aspart U-100 pen 0-5 Units         -- SubQ Before meals, nightly and at 0200 PRN 06/27/25 0723            ASSESSMENT and PLAN    Cardiac/Vascular  * Coronary artery disease  Managed by primary team  Optimize BG control      PAD (peripheral artery disease)  Managed by primary team  Optimize BG control      Endocrine  Transient hyperglycemia post procedure  BG goal: 110-140   No known history of diabetes. S/p CTS sx.  Discontinue IIP and monitor on transition drip    - discontinue IIP   -start transition drip at 0.8 units/hour with step-down parameters  -start NovoLog low-dose correction 150/50  - POCT Glucose a.c. HS and 2 a.m.  - Hypoglycemia protocol in place      ** Please notify Endocrine for any change and/or advance in diet**  ** Please call Endocrine for any BG related issues **     Discharge Planning:   TBD. Please notify endocrinology prior to discharge.            Rohan Tavares PA-C  Endocrinology  [facility]

## 2025-06-27 NOTE — EICU
Virtual ICU Quality Rounds    Admit Date: 6/26/2025  Hospital Day: 1    ICU Day: 20h    24H Vital Sign Range:  Temp:  [97.8 °F (36.6 °C)-98.2 °F (36.8 °C)]   Pulse:  []   Resp:  [10-37]   BP: (100-143)/(53-67)   SpO2:  [86 %-100 %]   Arterial Line BP: ()/(38-84)     VICU Surveillance Screening    Daily review of  line necessity(optional): Central line(s) in place and Urinary catheter in place    Central line type (optional): Quad lumen catheter    Central line indications : Hemodynamic Instability    Larose Indications : Critically ill in the intensive care unit requiring hourly urine output monitoring     LDA reconciliation : Yes

## 2025-06-28 LAB
ABSOLUTE EOSINOPHIL (OHS): 0.01 K/UL
ABSOLUTE MONOCYTE (OHS): 1.75 K/UL (ref 0.3–1)
ABSOLUTE NEUTROPHIL COUNT (OHS): 15.46 K/UL (ref 1.8–7.7)
ANION GAP (OHS): 11 MMOL/L (ref 8–16)
ANION GAP (OHS): 11 MMOL/L (ref 8–16)
ANION GAP (OHS): 12 MMOL/L (ref 8–16)
BASOPHILS # BLD AUTO: 0.07 K/UL
BASOPHILS NFR BLD AUTO: 0.4 %
BUN SERPL-MCNC: 32 MG/DL (ref 8–23)
BUN SERPL-MCNC: 34 MG/DL (ref 8–23)
BUN SERPL-MCNC: 37 MG/DL (ref 8–23)
CALCIUM SERPL-MCNC: 8.6 MG/DL (ref 8.7–10.5)
CALCIUM SERPL-MCNC: 8.7 MG/DL (ref 8.7–10.5)
CALCIUM SERPL-MCNC: 8.9 MG/DL (ref 8.7–10.5)
CHLORIDE SERPL-SCNC: 106 MMOL/L (ref 95–110)
CHLORIDE SERPL-SCNC: 106 MMOL/L (ref 95–110)
CHLORIDE SERPL-SCNC: 108 MMOL/L (ref 95–110)
CO2 SERPL-SCNC: 21 MMOL/L (ref 23–29)
CO2 SERPL-SCNC: 21 MMOL/L (ref 23–29)
CO2 SERPL-SCNC: 22 MMOL/L (ref 23–29)
CREAT SERPL-MCNC: 1.1 MG/DL (ref 0.5–1.4)
CREAT SERPL-MCNC: 1.1 MG/DL (ref 0.5–1.4)
CREAT SERPL-MCNC: 1.2 MG/DL (ref 0.5–1.4)
ERYTHROCYTE [DISTWIDTH] IN BLOOD BY AUTOMATED COUNT: 23.3 % (ref 11.5–14.5)
GFR SERPLBLD CREATININE-BSD FMLA CKD-EPI: 48 ML/MIN/1.73/M2
GFR SERPLBLD CREATININE-BSD FMLA CKD-EPI: 54 ML/MIN/1.73/M2
GFR SERPLBLD CREATININE-BSD FMLA CKD-EPI: 54 ML/MIN/1.73/M2
GLUCOSE SERPL-MCNC: 117 MG/DL (ref 70–110)
GLUCOSE SERPL-MCNC: 125 MG/DL (ref 70–110)
GLUCOSE SERPL-MCNC: 142 MG/DL (ref 70–110)
HCT VFR BLD AUTO: 30.2 % (ref 37–48.5)
HGB BLD-MCNC: 9.8 GM/DL (ref 12–16)
IMM GRANULOCYTES # BLD AUTO: 0.17 K/UL (ref 0–0.04)
IMM GRANULOCYTES NFR BLD AUTO: 0.9 % (ref 0–0.5)
LYMPHOCYTES # BLD AUTO: 1.48 K/UL (ref 1–4.8)
MAGNESIUM SERPL-MCNC: 2.1 MG/DL (ref 1.6–2.6)
MCH RBC QN AUTO: 22.4 PG (ref 27–31)
MCHC RBC AUTO-ENTMCNC: 32.5 G/DL (ref 32–36)
MCV RBC AUTO: 69 FL (ref 82–98)
NUCLEATED RBC (/100WBC) (OHS): 0 /100 WBC
OHS QRS DURATION: 134 MS
OHS QTC CALCULATION: 527 MS
PHOSPHATE SERPL-MCNC: 3.7 MG/DL (ref 2.7–4.5)
PLATELET # BLD AUTO: 237 K/UL (ref 150–450)
PMV BLD AUTO: 11.1 FL (ref 9.2–12.9)
POCT GLUCOSE: 131 MG/DL (ref 70–110)
POCT GLUCOSE: 142 MG/DL (ref 70–110)
POCT GLUCOSE: 149 MG/DL (ref 70–110)
POCT GLUCOSE: 168 MG/DL (ref 70–110)
POCT GLUCOSE: 168 MG/DL (ref 70–110)
POTASSIUM SERPL-SCNC: 3.5 MMOL/L (ref 3.5–5.1)
POTASSIUM SERPL-SCNC: 3.6 MMOL/L (ref 3.5–5.1)
POTASSIUM SERPL-SCNC: 3.6 MMOL/L (ref 3.5–5.1)
RBC # BLD AUTO: 4.37 M/UL (ref 4–5.4)
RELATIVE EOSINOPHIL (OHS): 0.1 %
RELATIVE LYMPHOCYTE (OHS): 7.8 % (ref 18–48)
RELATIVE MONOCYTE (OHS): 9.2 % (ref 4–15)
RELATIVE NEUTROPHIL (OHS): 81.6 % (ref 38–73)
SODIUM SERPL-SCNC: 139 MMOL/L (ref 136–145)
SODIUM SERPL-SCNC: 139 MMOL/L (ref 136–145)
SODIUM SERPL-SCNC: 140 MMOL/L (ref 136–145)
WBC # BLD AUTO: 18.94 K/UL (ref 3.9–12.7)

## 2025-06-28 PROCEDURE — 83735 ASSAY OF MAGNESIUM: CPT

## 2025-06-28 PROCEDURE — 85025 COMPLETE CBC W/AUTO DIFF WBC: CPT

## 2025-06-28 PROCEDURE — 97116 GAIT TRAINING THERAPY: CPT

## 2025-06-28 PROCEDURE — 80048 BASIC METABOLIC PNL TOTAL CA: CPT

## 2025-06-28 PROCEDURE — 25000003 PHARM REV CODE 250: Performed by: STUDENT IN AN ORGANIZED HEALTH CARE EDUCATION/TRAINING PROGRAM

## 2025-06-28 PROCEDURE — 99900035 HC TECH TIME PER 15 MIN (STAT)

## 2025-06-28 PROCEDURE — 25000003 PHARM REV CODE 250

## 2025-06-28 PROCEDURE — 63600175 PHARM REV CODE 636 W HCPCS

## 2025-06-28 PROCEDURE — 63600175 PHARM REV CODE 636 W HCPCS: Performed by: STUDENT IN AN ORGANIZED HEALTH CARE EDUCATION/TRAINING PROGRAM

## 2025-06-28 PROCEDURE — 99291 CRITICAL CARE FIRST HOUR: CPT | Mod: ,,, | Performed by: STUDENT IN AN ORGANIZED HEALTH CARE EDUCATION/TRAINING PROGRAM

## 2025-06-28 PROCEDURE — 99232 SBSQ HOSP IP/OBS MODERATE 35: CPT | Mod: ,,, | Performed by: PHYSICIAN ASSISTANT

## 2025-06-28 PROCEDURE — 84100 ASSAY OF PHOSPHORUS: CPT

## 2025-06-28 PROCEDURE — 20000000 HC ICU ROOM

## 2025-06-28 PROCEDURE — 94664 DEMO&/EVAL PT USE INHALER: CPT

## 2025-06-28 PROCEDURE — 25000003 PHARM REV CODE 250: Performed by: PHYSICIAN ASSISTANT

## 2025-06-28 PROCEDURE — 94761 N-INVAS EAR/PLS OXIMETRY MLT: CPT

## 2025-06-28 PROCEDURE — 94799 UNLISTED PULMONARY SVC/PX: CPT

## 2025-06-28 PROCEDURE — 27200667 HC PACEMAKER, TEMPORARY MONITORING, PER SHIFT

## 2025-06-28 PROCEDURE — 27000646 HC AEROBIKA DEVICE

## 2025-06-28 RX ORDER — POTASSIUM CHLORIDE 20 MEQ/1
60 TABLET, EXTENDED RELEASE ORAL ONCE
Status: DISCONTINUED | OUTPATIENT
Start: 2025-06-29 | End: 2025-06-28

## 2025-06-28 RX ORDER — SENNOSIDES 8.6 MG/1
8.6 TABLET ORAL EVERY 12 HOURS
Status: DISCONTINUED | OUTPATIENT
Start: 2025-06-28 | End: 2025-07-02 | Stop reason: HOSPADM

## 2025-06-28 RX ORDER — BISACODYL 10 MG/1
10 SUPPOSITORY RECTAL ONCE
Status: COMPLETED | OUTPATIENT
Start: 2025-06-28 | End: 2025-06-28

## 2025-06-28 RX ORDER — AMIODARONE HYDROCHLORIDE 200 MG/1
400 TABLET ORAL 3 TIMES DAILY
Status: DISCONTINUED | OUTPATIENT
Start: 2025-06-28 | End: 2025-06-28

## 2025-06-28 RX ORDER — SIMETHICONE 80 MG
1 TABLET,CHEWABLE ORAL 3 TIMES DAILY PRN
Status: DISCONTINUED | OUTPATIENT
Start: 2025-06-28 | End: 2025-07-02 | Stop reason: HOSPADM

## 2025-06-28 RX ORDER — AMIODARONE HYDROCHLORIDE 150 MG/3ML
150 INJECTION, SOLUTION INTRAVENOUS ONCE
Status: DISCONTINUED | OUTPATIENT
Start: 2025-06-28 | End: 2025-06-28

## 2025-06-28 RX ORDER — AMIODARONE HYDROCHLORIDE 200 MG/1
400 TABLET ORAL 3 TIMES DAILY
Status: DISCONTINUED | OUTPATIENT
Start: 2025-06-28 | End: 2025-06-30

## 2025-06-28 RX ORDER — FUROSEMIDE 10 MG/ML
40 INJECTION INTRAMUSCULAR; INTRAVENOUS ONCE
Status: COMPLETED | OUTPATIENT
Start: 2025-06-28 | End: 2025-06-28

## 2025-06-28 RX ORDER — POLYETHYLENE GLYCOL 3350 17 G/17G
17 POWDER, FOR SOLUTION ORAL 2 TIMES DAILY
Status: DISCONTINUED | OUTPATIENT
Start: 2025-06-28 | End: 2025-07-02 | Stop reason: HOSPADM

## 2025-06-28 RX ORDER — POTASSIUM CHLORIDE 20 MEQ/1
60 TABLET, EXTENDED RELEASE ORAL ONCE
Status: COMPLETED | OUTPATIENT
Start: 2025-06-28 | End: 2025-06-28

## 2025-06-28 RX ADMIN — AMLODIPINE BESYLATE 5 MG: 5 TABLET ORAL at 08:06

## 2025-06-28 RX ADMIN — POTASSIUM BICARBONATE 40 MEQ: 391 TABLET, EFFERVESCENT ORAL at 02:06

## 2025-06-28 RX ADMIN — ACETAMINOPHEN 1000 MG: 500 TABLET ORAL at 02:06

## 2025-06-28 RX ADMIN — AMIODARONE HYDROCHLORIDE 400 MG: 200 TABLET ORAL at 08:06

## 2025-06-28 RX ADMIN — OXYCODONE HYDROCHLORIDE 10 MG: 10 TABLET ORAL at 10:06

## 2025-06-28 RX ADMIN — POTASSIUM BICARBONATE 40 MEQ: 391 TABLET, EFFERVESCENT ORAL at 10:06

## 2025-06-28 RX ADMIN — SIMETHICONE 80 MG: 80 TABLET, CHEWABLE ORAL at 08:06

## 2025-06-28 RX ADMIN — AMIODARONE HYDROCHLORIDE 0.5 MG/MIN: 1.8 INJECTION, SOLUTION INTRAVENOUS at 05:06

## 2025-06-28 RX ADMIN — MUPIROCIN 1 G: 20 OINTMENT TOPICAL at 09:06

## 2025-06-28 RX ADMIN — SENNOSIDES 8.6 MG: 8.6 TABLET, FILM COATED ORAL at 09:06

## 2025-06-28 RX ADMIN — METOCLOPRAMIDE 5 MG: 5 INJECTION, SOLUTION INTRAMUSCULAR; INTRAVENOUS at 02:06

## 2025-06-28 RX ADMIN — BISACODYL 10 MG: 10 SUPPOSITORY RECTAL at 08:06

## 2025-06-28 RX ADMIN — AMIODARONE HYDROCHLORIDE 150 MG: 1.5 INJECTION, SOLUTION INTRAVENOUS at 03:06

## 2025-06-28 RX ADMIN — FUROSEMIDE 40 MG: 10 INJECTION, SOLUTION INTRAVENOUS at 05:06

## 2025-06-28 RX ADMIN — ACETAMINOPHEN 1000 MG: 500 TABLET ORAL at 05:06

## 2025-06-28 RX ADMIN — INSULIN HUMAN 0.8 UNITS/HR: 1 INJECTION, SOLUTION INTRAVENOUS at 12:06

## 2025-06-28 RX ADMIN — METOPROLOL TARTRATE 25 MG: 25 TABLET, FILM COATED ORAL at 08:06

## 2025-06-28 RX ADMIN — POTASSIUM CHLORIDE 60 MEQ: 1500 TABLET, EXTENDED RELEASE ORAL at 08:06

## 2025-06-28 RX ADMIN — EZETIMIBE 10 MG: 10 TABLET ORAL at 08:06

## 2025-06-28 RX ADMIN — AMIODARONE HYDROCHLORIDE 150 MG: 1.5 INJECTION, SOLUTION INTRAVENOUS at 09:06

## 2025-06-28 RX ADMIN — AMIODARONE HYDROCHLORIDE 150 MG: 1.5 INJECTION, SOLUTION INTRAVENOUS at 04:06

## 2025-06-28 RX ADMIN — FUROSEMIDE 40 MG: 10 INJECTION, SOLUTION INTRAVENOUS at 08:06

## 2025-06-28 RX ADMIN — POTASSIUM CHLORIDE 20 MEQ: 200 INJECTION, SOLUTION INTRAVENOUS at 05:06

## 2025-06-28 RX ADMIN — SENNOSIDES 8.6 MG: 8.6 TABLET, FILM COATED ORAL at 08:06

## 2025-06-28 RX ADMIN — AMIODARONE HYDROCHLORIDE 400 MG: 200 TABLET ORAL at 11:06

## 2025-06-28 RX ADMIN — ACETAMINOPHEN 1000 MG: 500 TABLET ORAL at 09:06

## 2025-06-28 RX ADMIN — POLYETHYLENE GLYCOL 3350 17 G: 17 POWDER, FOR SOLUTION ORAL at 08:06

## 2025-06-28 RX ADMIN — FAMOTIDINE 20 MG: 20 TABLET, FILM COATED ORAL at 08:06

## 2025-06-28 RX ADMIN — ASPIRIN 81 MG CHEWABLE TABLET 81 MG: 81 TABLET CHEWABLE at 08:06

## 2025-06-28 RX ADMIN — INSULIN ASPART 1 UNITS: 100 INJECTION, SOLUTION INTRAVENOUS; SUBCUTANEOUS at 04:06

## 2025-06-28 RX ADMIN — MUPIROCIN 1 G: 20 OINTMENT TOPICAL at 08:06

## 2025-06-28 RX ADMIN — AMIODARONE HYDROCHLORIDE 400 MG: 200 TABLET ORAL at 02:06

## 2025-06-28 NOTE — SUBJECTIVE & OBJECTIVE
"Interval HPI:   No acute events overnight. Patient in room 67744/27434 A. Blood glucose stable. BG at goal on transition drip . Steroid use- None .      Renal function- Normal   Vasopressors-  None      Diet Clear Liquid Fluid - 1500mL      Eating:   Clear liquid diet  Nausea: No  Hypoglycemia and intervention: No  Fever: No  TPN and/or TF: No     BP (!) 153/69   Pulse 69   Temp 97.9 °F (36.6 °C) (Oral)   Resp (!) 23   Ht 5' 5" (1.651 m)   Wt 75 kg (165 lb 5.5 oz)   SpO2 (!) 77%   Breastfeeding No   BMI 27.51 kg/m²     Labs Reviewed and Include    Recent Labs   Lab 06/28/25  0405   *   CALCIUM 8.9      K 3.6   CO2 21*      BUN 32*   CREATININE 1.2     Lab Results   Component Value Date    WBC 18.94 (H) 06/28/2025    HGB 9.8 (L) 06/28/2025    HCT 30.2 (L) 06/28/2025    MCV 69 (L) 06/28/2025     06/28/2025     No results for input(s): "TSH", "FREET4" in the last 168 hours.  Lab Results   Component Value Date    HGBA1C 5.4 06/19/2025       Nutritional status:   Body mass index is 27.51 kg/m².  Lab Results   Component Value Date    ALBUMIN 4.3 06/21/2025    ALBUMIN 4.0 06/18/2025    ALBUMIN 3.8 05/06/2025     No results found for: "PREALBUMIN"    Estimated Creatinine Clearance: 43.6 mL/min (based on SCr of 1.2 mg/dL).    Accu-Checks  Recent Labs     06/27/25  0205 06/27/25  0307 06/27/25  0502 06/27/25  0626 06/27/25  0714 06/27/25  0841 06/27/25  1253 06/27/25  1809 06/27/25  2105 06/28/25  0407   POCTGLUCOSE 152* 136* 141* 157* 160* 154* 195* 196* 153* 168*       Current Medications and/or Treatments Impacting Glycemic Control  Immunotherapy:    Immunosuppressants       None          Steroids:   Hormones (From admission, onward)      None          Pressors:    Autonomic Drugs (From admission, onward)      None          Hyperglycemia/Diabetes Medications:   Antihyperglycemics (From admission, onward)      Start     Stop Route Frequency Ordered    06/27/25 0830  insulin regular in 0.9 % " NaCl 100 unit/100 mL (1 unit/mL) infusion        Question:  Enter initial dose (Units/hr):  Answer:  0.6    -- IV Continuous 06/27/25 0723    06/27/25 0822  insulin aspart U-100 pen 0-5 Units         -- SubQ Before meals, nightly and at 0200 PRN 06/27/25 0723

## 2025-06-28 NOTE — ASSESSMENT & PLAN NOTE
BG goal: 110-140   No known history of diabetes. S/p CTS sx.  Decrease transition drip    - decreased transition drip to 0.6 units/hour with step-down parameters  - continue NovoLog low-dose correction 150/50  - POCT Glucose a.c. HS and 2 a.m.  - Hypoglycemia protocol in place      ** Please notify Endocrine for any change and/or advance in diet**  ** Please call Endocrine for any BG related issues **     Discharge Planning:   TBD. Please notify endocrinology prior to discharge.

## 2025-06-28 NOTE — NURSING
SICU PLAN OF CARE NOTE    Dx: Coronary artery disease    Vital Signs (last 12 hours):   Temp:  [97.9 °F (36.6 °C)-98 °F (36.7 °C)]   Pulse:  [73-82]   Resp:  [16-29]   BP: (123-138)/(57-66)   SpO2:  [93 %-100 %]      Neuro: AAO x4, Follows Commands, and Moves All Extremities    Cardiac: NSR    Respiratory: Room air    Gtts: Insulin    Urine Output: Urinary Catheter 565 cc/shift    Drains: Chest Tube, total output 175 cc /  shift    Diet: Clear Liquid     Labs/Accuchecks: Daily labs, Accuchecks q6    Skin:  All skin remains free from injury.  Patient turned Q2,  mattress inflated, and bed working correctly. Up in chair for duration of shift.    Shift Events:  KRISH. Insulin drip transitioned to   See flowsheet for further assessment/details.  Family updated on current condition/plan of care, questions answered, and emotional support provided.  MD updated on current condition, vitals, labs, and gtts.  No new orders received, will continue to monitor.

## 2025-06-28 NOTE — SUBJECTIVE & OBJECTIVE
Interval History/Significant Events:   Patient went in Afib RVR overnight for which amio drip and bolus were started. Rhythm seen going in and out of Afib this morning. Given another amio 150mg bolus and started on po 400mg TID  Metoprolol 25mg BID, amlodipine, holding lasix    Tolerating diet      Follow-up For: Procedure(s) (LRB):  CORONARY ARTERY BYPASS GRAFT (CABG) (N/A)  SURGICAL PROCUREMENT, VEIN, ENDOSCOPIC (Left)    Post-Operative Day: 2 Days Post-Op    Objective:     Vital Signs (Most Recent):  Temp: 97.9 °F (36.6 °C) (06/28/25 0730)  Pulse: 70 (06/28/25 1600)  Resp: 20 (06/28/25 1600)  BP: 105/84 (06/28/25 1200)  SpO2: 95 % (06/28/25 1600) Vital Signs (24h Range):  Temp:  [97.9 °F (36.6 °C)] 97.9 °F (36.6 °C)  Pulse:  [] 70  Resp:  [15-38] 20  SpO2:  [75 %-100 %] 95 %  BP: ()/(48-84) 105/84  Arterial Line BP: ()/(38-74) 97/58     Weight: 75 kg (165 lb 5.5 oz)  Body mass index is 27.51 kg/m².      Intake/Output Summary (Last 24 hours) at 6/28/2025 1630  Last data filed at 6/28/2025 1600  Gross per 24 hour   Intake 859.67 ml   Output 1643 ml   Net -783.33 ml          Physical Exam  Constitutional:       General: She is not in acute distress.     Appearance: Normal appearance.   Cardiovascular:      Rate and Rhythm: Normal rate and regular rhythm.      Comments: V wires present  Pulmonary:      Effort: Pulmonary effort is normal. No respiratory distress.      Comments: 1 meds and 1 pleural chest tubes with serosanguinous output  Abdominal:      General: Abdomen is flat. There is no distension.   Genitourinary:     Comments: Larose present with clear, yellow urine  Skin:     General: Skin is warm and dry.      Capillary Refill: Capillary refill takes less than 2 seconds.   Neurological:      General: No focal deficit present.      Mental Status: She is alert and oriented to person, place, and time.            Vents:  Vent Mode: Spont (06/26/25 1509)  Ventilator Initiated: Yes (06/26/25  1230)  Set Rate: 20 BPM (06/26/25 1316)  Vt Set: 420 mL (06/26/25 1316)  Pressure Support: 6 cmH20 (06/26/25 1509)  PEEP/CPAP: 5 cmH20 (06/26/25 1509)  Oxygen Concentration (%): 36 (06/27/25 0324)  Peak Airway Pressure: 12 cmH20 (06/26/25 1509)  Plateau Pressure: 0 cmH20 (06/26/25 1509)  Total Ve: 13.2 L/m (06/26/25 1509)  Negative Inspiratory Force (cm H2O): -26 (06/26/25 1509)  F/VT Ratio<105 (RSBI): (!) 52.06 (06/26/25 1509)    Lines/Drains/Airways       Central Venous Catheter Line  Duration             Percutaneous Central Line Quad Lumen 06/26/25 0825 Subclavian Left 2 days              Drain  Duration                  Urethral Catheter 06/26/25 0740 Non-latex;Straight-tip;Temperature probe 14 Fr. 2 days         Y Chest Tube 1 and 2 06/26/25 1049 1 Anterior Mediastinal 28 Fr. 2 Anterior Mediastinal 28 Fr. 2 days         Y Chest Tube 3 and 4 06/26/25 1049 3 Right Pleural 28 Fr. 4 Left Pleural 28 Fr. 2 days              Arterial Line  Duration             Arterial Line 06/26/25 0710 Left Radial 2 days              Line  Duration                  Pacer Wires 06/26/25 1028 2 days              Peripheral Intravenous Line  Duration             Peripheral IV 06/26/25 0545 20 G Anterior;Left Forearm 2 days    Peripheral IV 06/26/25 0805 18 G Left Hand 2 days                    Significant Labs:    CBC/Anemia Profile:  Recent Labs   Lab 06/27/25  0008 06/27/25  0307 06/28/25  0405   WBC  --  15.29* 18.94*   HGB  --  9.8* 9.8*   HCT 28* 29.9* 30.2*   PLT  --  212 237   MCV  --  69* 69*   RDW  --  22.4* 23.3*        Chemistries:  Recent Labs   Lab 06/27/25  0307 06/27/25  2147 06/28/25  0405 06/28/25  1252    140 140 139   K 4.2 3.7 3.6 3.6   * 109 108 106   CO2 19* 19* 21* 21*   BUN 22 30* 32* 34*   CREATININE 0.9 1.1 1.2 1.1   CALCIUM 8.1* 8.6* 8.9 8.7   MG 2.3 2.0 2.1  --    PHOS 3.8 3.7 3.7  --        All pertinent labs within the past 24 hours have been reviewed.    Significant Imaging:  I have reviewed  all pertinent imaging results/findings within the past 24 hours.

## 2025-06-28 NOTE — EICU
Intervention Initiated From:  [facility] / [facility]    Elvin intervened regarding:  Rounding (Video assessment)    VICU Night Rounds Checklist  24H Vital Sign Range:  Temp:  [97.9 °F (36.6 °C)-98.2 °F (36.8 °C)]   Pulse:  []   Resp:  [12-36]   BP: ()/(48-77)   SpO2:  [75 %-100 %]   Arterial Line BP: ()/(45-84)     Video rounds and LDA reconciliation

## 2025-06-28 NOTE — ASSESSMENT & PLAN NOTE
Neuro/Psych:     - Sedation: none    - Pain:     - Scheduled Tylenol 1000mg Q8H   - PRN Oxycodone 5mg/10mg             Cardiac:     - S/p CABG x 2 with Dr. Flores 6/26    - BP Goal: MAP 60-80    - Inotropes/Pressors: none    - Anti-HTNs: off gtt    - Rhythm: NSR 70s    - episode of SVT post op, resolved    - Beta Blocker: metoprolol increased to 25 BID on 6/28    - Statin: ezetimibe     - holding lasix afternoon 6/28      Pulmonary:     - 1L NC     - Goal SpO2 >90%    - Chest Tubes x 4 (2 Meds & 2 Pleural): 235cc total output 6/27    - ABGS PRN   ABG  Recent Labs   Lab 06/27/25  0008   PH 7.441   PO2 74*   PCO2 28.6*   HCO3 19.5*   BE -5*           Renal:    - Trend BUN/Cr     - Maintain Larose, record strict Is/Os    - UOP 1163cc yesterday    - net +3.6L since admit      Recent Labs   Lab 06/27/25  2147 06/28/25  0405 06/28/25  1252   BUN 30* 32* 34*   CREATININE 1.1 1.2 1.1           FEN / GI:     - Daily CMP, PRN K/Mag/Phos per protocol     - Replace electrolytes as needed    - Nutrition: heart healthy diet    - Bowel Regimen: Miralax, docusate, titrated to 1 BM per day      ID:     - Aebrile    - Abx: Complete perioperative cefazolin 2g Q8H x 5 doses    Recent Labs   Lab 06/26/25  1247 06/27/25  0307 06/28/25  0405   WBC 15.62* 15.29* 18.94*           Heme/Onc:     - Hgb 9.8 from 9.4    - CBC daily    - ASA 81 daily    - DVT ppx initiated    Recent Labs   Lab 06/26/25  1247 06/27/25  0307 06/28/25  0405   HGB 9.4* 9.8* 9.8*    212 237   APTT 30.6 29.0  --    INR 1.3* 1.2  --            Endocrine:     - CTS Goal -140    - HgbA1c: 5.4    - Endocrinology consulted for insulin management    - insulin gtt       PPx:   Feeding: CLD, advance today  Analgesia/Sedation: tylenol, oxy PRN  Thromboembolic Prevention: start today  HOB >30: Yes  Stress Ulcer: Yes - famotidine 20mg PO BID  Glucose Control: Yes, insulin management per Endocrinology - insulin gtt now     Lines/Drains/Airway:   Central Line: left  subclavian   Larose    Chest Tubes: 4 (2 Mediastinal, 2 Pleural)    Pacing Wires: Temporary V pacing wires      Dispo/Code Status/Palliative:     - Continue SICU Care    - Full Code

## 2025-06-28 NOTE — PLAN OF CARE
Please link with resident progress note from 6/28/25    CVICU Staff Addendum  I have reviewed and concur with the resident's history, physical, assessment, and plan.  I have personally interviewed and examined the patient at bedside.  See below for any additional findings.    Reason for admission:  Coronary artery disease  Present on Admission:   Primary hypertension   Mixed hyperlipidemia   PAD (peripheral artery disease)   Coronary artery disease      Goals for Today:   - Patient went in Afib RVR overnight for which amio drip and bolus were started. Rhythm seen going in and out of Afib this morning. Patient otherwise progressing appropriately, ambulating, diet advanced to cardiac today.  - Multimodal analgesia  - OOB/PT/OT/IS  - Metoprolol 25mg BID, c/w home amlodipine, lasix 40mg BID  - ASA/SQH/SCDs/Famotidine  - CBC/CMP daily, BMP trends and keep k>4, mg>2  - Heart healthy diet, bowel regimen titrated to 1BM/Day  - can d/c pleural CTs today, d/c CVL/Larose/A line    35 minutes of critical care time was spent personally by me on the following activities: development of treatment plan with patient or surrogate and bedside caregivers, discussions with consultants, evaluation of patient's response to treatment, examination of patient, ordering and performing treatments and interventions, ordering and review of laboratory studies, ordering and review of radiographic studies, pulse oximetry, re-evaluation of patient's condition.  This critical care time did not overlap with that of any other provider or involve time for any procedures.    Daniel Flores MD  Anesthesiology and Critical Care Medicine  [facility], [address]

## 2025-06-28 NOTE — EICU
Virtual ICU Quality Rounds    Admit Date: 6/26/2025  Hospital Day: 2    ICU Day: 2d 1h    24H Vital Sign Range:  Temp:  [97.9 °F (36.6 °C)]   Pulse:  []   Resp:  [16-36]   BP: ()/(48-77)   SpO2:  [75 %-100 %]   Arterial Line BP: ()/(38-62)     VICU Surveillance Screening    Daily review of  line necessity(optional): Central line(s) in place and Urinary catheter in place    Central line type (optional): Quad lumen catheter    Central line indications : Hemodynamic instability    Larose Indications : Critically ill in the intensive care unit requiring hourly urine output monitoring     LDA reconciliation : Yes

## 2025-06-28 NOTE — PT/OT/SLP PROGRESS
Physical Therapy Treatment    Patient Name:  Veronica Duque   MRN:  [id number]  Admitting Diagnosis:  Coronary artery disease   Recent Surgery: Procedure(s) (LRB):  CORONARY ARTERY BYPASS GRAFT (CABG) (N/A)  SURGICAL PROCUREMENT, VEIN, ENDOSCOPIC (Left) 2 Days Post-Op  Admit Date: 6/26/2025  Length of Stay: 2 days    Recommendations:     Discharge Recommendations: Low Intensity Therapy   Discharge Equipment Recommendations: none   Barriers to discharge: None    Plan:     During this hospitalization, patient to be seen 5 x/week to address the identified rehab impairments via gait training, therapeutic activities, therapeutic exercises, neuromuscular re-education and progress towards the established goals.  Plan of Care Expires:  07/23/25  Plan of Care Reviewed with: patient    Assessment:     Veronica Duque is a 69 y.o. female admitted with a medical diagnosis of Coronary artery disease. Pt found up in chair agreeable to therapy session. Pt demo'd improvements today as she increased distance gait trained with VSS. Pt has good potential to progress towards her goals. Patient would benefit from skilled therapy services to maximize safety and independence, increase activity tolerance, decrease fall risk, decrease caregiver burden, improve QOL, improve patient's functional mobility, and decrease risk of contractures and pressure sores.      Patient would benefit from skilled therapy services to maximize safety and independence, increase activity tolerance, decrease fall risk, decrease caregiver burden, improve QOL, improve patient's functional mobility, and decrease risk of contractures and pressure sores.  Patient continues to demonstrate the need for low intensity therapy on a scheduled basis exhibited by decreased independence with functional mobility  Problem List: weakness, impaired endurance, impaired functional mobility, gait instability, impaired balance, pain.  Rehab Prognosis: Good; patient would  "benefit from acute skilled PT services to address these deficits and reach maximum level of function.    Subjective     RN notified prior to session. Spouse present upon PT entrance into room. Patient agreeable to PT treatment session.    Chief Complaint: "did I get an A+?"  Patient/Family Comments/goals: increase mobility   Pain/Comfort:  Pain Rating 1: 5/10  Location - Orientation 1: generalized  Location 1: sternal  Pain Addressed 1: Reposition, Distraction  Pain Rating Post-Intervention 1: 5/10    Objective:     Additional staff present: RN    Patient found up in chair with: pulse ox (continuous), blood pressure cuff, telemetry, myrick catheter, chest tube, external pacer, peripheral IV, arterial line, central line, oxygen   Cognition:   Alert and Cooperative  Patient is oriented to Person, Place, Time, Situation  General Precautions: Standard, Cardiac fall, other (see comments) ([facility])   Orthopedic Precautions:N/A   Braces: N/A   Body mass index is 27.51 kg/m².  Oxygen Device: Nasal Cannula 1L  Vitals: BP (!) 153/69   Pulse 69   Temp 97.9 °F (36.6 °C) (Oral)   Resp 17   Ht 5' 5" (1.651 m)   Wt 75 kg (165 lb 5.5 oz)   SpO2 (!) 77%   Breastfeeding No   BMI 27.51 kg/m²     Functional Mobility:    Bed Mobility:   Pt found/returned to bedside chair    Transfers:   Sit <> Stand Transfer: contact guard assistance with no assistive device from chair x2 trials     Balance:  Standing:  Static: contact guard assistance  Dynamic: contact guard assistance      Gait:  Patient ambulated: 110' x2 trials (seated break between)   Patient required: contact guard  Patient used:  no assistive device and hand-held assist   Gait Deviation(s): steady gait, decreased step length, narrow base of support, and decreased arm swing  Portable monitor utilized  all lines remained intact throughout ambulation trial  Nurse present for vital sign monitoring  Chair follow for patient safety  Comments: Patient required cues for increased arm " swing and step length, but overall steady with no LOB.     Treatment and Education:    Time provided for education, counseling and discussion of health disposition in regards to patient's current status  All questions answered within PT scope of practice and to patient's satisfaction  PT role in POC to address current functional deficits  Call nursing/pct to transfer to chair/use bathroom. Pt stated understanding.    Patient left up in chair with all lines intact, call button in reach, and RN and spouse present.    AM-PAC 6 CLICK MOBILITY  Turning over in bed (including adjusting bedclothes, sheets and blankets)?: 3  Sitting down on and standing up from a chair with arms (e.g., wheelchair, bedside commode, etc.): 3  Moving from lying on back to sitting on the side of the bed?: 3  Moving to and from a bed to a chair (including a wheelchair)?: 3  Need to walk in hospital room?: 3  Climbing 3-5 steps with a railing?: 2  Basic Mobility Total Score: 17     Goals:   Multidisciplinary Problems       Physical Therapy Goals          Problem: Physical Therapy    Goal Priority Disciplines Outcome Interventions   Physical Therapy Goal     PT, PT/OT Progressing    Description: Goals to be met by: 7/15/2025    Patient will increase functional independence with mobility by performin. Supine to sit with Supervision  2. Sit to stand transfer with Supervision  3. Gait  x 400 feet with Supervision.   4. Ascend/descend 5 stairs with bilateral Handrails Supervision.                          DME Justifications:  No DME recommended requiring DME justifications  Time Tracking:     PT Received On: 25  PT Start Time: 1316     PT Stop Time: 1345  PT Total Time (min): 29 min     Billable Minutes:   Gait Training 24 minutes    Treatment Type: Treatment  PT/PTA: PT       2025

## 2025-06-28 NOTE — PROGRESS NOTES
"[facility]  Endocrinology  Progress Note    Admit Date: 6/26/2025     Reason for Consult: Management of  Hyperglycemia     Surgical Procedure: CABG 6/26/25    No known hx of DM and not on meds for DM.    HPI:   Patient is a 69 y.o. female with hx of HTN, HLD, CVA with no residual deficits, PAD s/p R SFA stenting by Dr. Valle (non-obstructive L CFA/SFA) who was recently admitted for NSTEMI. S/p CABG with Dr. Flores on 06/26/2025.  Endocrine consulted for BG management    Interval HPI:   No acute events overnight. Patient in room 66096/32543 A. Blood glucose stable. BG at goal on transition drip . Steroid use- None .      Renal function- Normal   Vasopressors-  None      Diet Clear Liquid Fluid - 1500mL      Eating:   Clear liquid diet  Nausea: No  Hypoglycemia and intervention: No  Fever: No  TPN and/or TF: No     BP (!) 153/69   Pulse 69   Temp 97.9 °F (36.6 °C) (Oral)   Resp (!) 23   Ht 5' 5" (1.651 m)   Wt 75 kg (165 lb 5.5 oz)   SpO2 (!) 77%   Breastfeeding No   BMI 27.51 kg/m²     Labs Reviewed and Include    Recent Labs   Lab 06/28/25  0405   *   CALCIUM 8.9      K 3.6   CO2 21*      BUN 32*   CREATININE 1.2     Lab Results   Component Value Date    WBC 18.94 (H) 06/28/2025    HGB 9.8 (L) 06/28/2025    HCT 30.2 (L) 06/28/2025    MCV 69 (L) 06/28/2025     06/28/2025     No results for input(s): "TSH", "FREET4" in the last 168 hours.  Lab Results   Component Value Date    HGBA1C 5.4 06/19/2025       Nutritional status:   Body mass index is 27.51 kg/m².  Lab Results   Component Value Date    ALBUMIN 4.3 06/21/2025    ALBUMIN 4.0 06/18/2025    ALBUMIN 3.8 05/06/2025     No results found for: "PREALBUMIN"    Estimated Creatinine Clearance: 43.6 mL/min (based on SCr of 1.2 mg/dL).    Accu-Checks  Recent Labs     06/27/25  0205 06/27/25  0307 06/27/25  0502 06/27/25  0626 06/27/25  0714 06/27/25  0841 06/27/25  1253 06/27/25  1809 06/27/25  2105 06/28/25  0407 "   POCTGLUCOSE 152* 136* 141* 157* 160* 154* 195* 196* 153* 168*       Current Medications and/or Treatments Impacting Glycemic Control  Immunotherapy:    Immunosuppressants       None          Steroids:   Hormones (From admission, onward)      None          Pressors:    Autonomic Drugs (From admission, onward)      None          Hyperglycemia/Diabetes Medications:   Antihyperglycemics (From admission, onward)      Start     Stop Route Frequency Ordered    06/27/25 0830  insulin regular in 0.9 % NaCl 100 unit/100 mL (1 unit/mL) infusion        Question:  Enter initial dose (Units/hr):  Answer:  0.6    -- IV Continuous 06/27/25 0723    06/27/25 0822  insulin aspart U-100 pen 0-5 Units         -- SubQ Before meals, nightly and at 0200 PRN 06/27/25 0723            ASSESSMENT and PLAN    Cardiac/Vascular  * Coronary artery disease  Managed by primary team  Optimize BG control      PAD (peripheral artery disease)  Managed by primary team  Optimize BG control      Endocrine  Transient hyperglycemia post procedure  BG goal: 110-140   No known history of diabetes. S/p CTS sx.  Decrease transition drip    - decreased transition drip to 0.6 units/hour with step-down parameters  - continue NovoLog low-dose correction 150/50  - POCT Glucose a.c. HS and 2 a.m.  - Hypoglycemia protocol in place      ** Please notify Endocrine for any change and/or advance in diet**  ** Please call Endocrine for any BG related issues **     Discharge Planning:   TBD. Please notify endocrinology prior to discharge.            Rohan Tavares PA-C  Endocrinology  [facility]

## 2025-06-28 NOTE — PROGRESS NOTES
[facility]  Critical Care - Surgery  Progress Note    Patient Name: Veronica Duque  MRN: [id number]  Admission Date: 6/26/2025  Hospital Length of Stay: 2 days  Code Status: Full Code  Attending Provider: Lamont Flores MD  Primary Care Provider: Juju Wallace MD   Principal Problem: Coronary artery disease    Subjective:     Hospital/ICU Course:  No notes on file    Interval History/Significant Events:   Patient went in Afib RVR overnight for which amio drip and bolus were started. Rhythm seen going in and out of Afib this morning. Given another amio 150mg bolus and started on po 400mg TID  Metoprolol 25mg BID, amlodipine, holding lasix    Tolerating diet      Follow-up For: Procedure(s) (LRB):  CORONARY ARTERY BYPASS GRAFT (CABG) (N/A)  SURGICAL PROCUREMENT, VEIN, ENDOSCOPIC (Left)    Post-Operative Day: 2 Days Post-Op    Objective:     Vital Signs (Most Recent):  Temp: 97.9 °F (36.6 °C) (06/28/25 0730)  Pulse: 70 (06/28/25 1600)  Resp: 20 (06/28/25 1600)  BP: 105/84 (06/28/25 1200)  SpO2: 95 % (06/28/25 1600) Vital Signs (24h Range):  Temp:  [97.9 °F (36.6 °C)] 97.9 °F (36.6 °C)  Pulse:  [] 70  Resp:  [15-38] 20  SpO2:  [75 %-100 %] 95 %  BP: ()/(48-84) 105/84  Arterial Line BP: ()/(38-74) 97/58     Weight: 75 kg (165 lb 5.5 oz)  Body mass index is 27.51 kg/m².      Intake/Output Summary (Last 24 hours) at 6/28/2025 1630  Last data filed at 6/28/2025 1600  Gross per 24 hour   Intake 859.67 ml   Output 1643 ml   Net -783.33 ml          Physical Exam  Constitutional:       General: She is not in acute distress.     Appearance: Normal appearance.   Cardiovascular:      Rate and Rhythm: Normal rate and regular rhythm.      Comments: V wires present  Pulmonary:      Effort: Pulmonary effort is normal. No respiratory distress.      Comments: 1 meds and 1 pleural chest tubes with serosanguinous output  Abdominal:      General: Abdomen is flat. There is no distension.    Genitourinary:     Comments: Larose present with clear, yellow urine  Skin:     General: Skin is warm and dry.      Capillary Refill: Capillary refill takes less than 2 seconds.   Neurological:      General: No focal deficit present.      Mental Status: She is alert and oriented to person, place, and time.            Vents:  Vent Mode: Spont (06/26/25 1509)  Ventilator Initiated: Yes (06/26/25 1230)  Set Rate: 20 BPM (06/26/25 1316)  Vt Set: 420 mL (06/26/25 1316)  Pressure Support: 6 cmH20 (06/26/25 1509)  PEEP/CPAP: 5 cmH20 (06/26/25 1509)  Oxygen Concentration (%): 36 (06/27/25 0324)  Peak Airway Pressure: 12 cmH20 (06/26/25 1509)  Plateau Pressure: 0 cmH20 (06/26/25 1509)  Total Ve: 13.2 L/m (06/26/25 1509)  Negative Inspiratory Force (cm H2O): -26 (06/26/25 1509)  F/VT Ratio<105 (RSBI): (!) 52.06 (06/26/25 1509)    Lines/Drains/Airways       Central Venous Catheter Line  Duration             Percutaneous Central Line Quad Lumen 06/26/25 0825 Subclavian Left 2 days              Drain  Duration                  Urethral Catheter 06/26/25 0740 Non-latex;Straight-tip;Temperature probe 14 Fr. 2 days         Y Chest Tube 1 and 2 06/26/25 1049 1 Anterior Mediastinal 28 Fr. 2 Anterior Mediastinal 28 Fr. 2 days         Y Chest Tube 3 and 4 06/26/25 1049 3 Right Pleural 28 Fr. 4 Left Pleural 28 Fr. 2 days              Arterial Line  Duration             Arterial Line 06/26/25 0710 Left Radial 2 days              Line  Duration                  Pacer Wires 06/26/25 1028 2 days              Peripheral Intravenous Line  Duration             Peripheral IV 06/26/25 0545 20 G Anterior;Left Forearm 2 days    Peripheral IV 06/26/25 0805 18 G Left Hand 2 days                    Significant Labs:    CBC/Anemia Profile:  Recent Labs   Lab 06/27/25  0008 06/27/25  0307 06/28/25  0405   WBC  --  15.29* 18.94*   HGB  --  9.8* 9.8*   HCT 28* 29.9* 30.2*   PLT  --  212 237   MCV  --  69* 69*   RDW  --  22.4* 23.3*         Chemistries:  Recent Labs   Lab 06/27/25  0307 06/27/25  2147 06/28/25  0405 06/28/25  1252    140 140 139   K 4.2 3.7 3.6 3.6   * 109 108 106   CO2 19* 19* 21* 21*   BUN 22 30* 32* 34*   CREATININE 0.9 1.1 1.2 1.1   CALCIUM 8.1* 8.6* 8.9 8.7   MG 2.3 2.0 2.1  --    PHOS 3.8 3.7 3.7  --        All pertinent labs within the past 24 hours have been reviewed.    Significant Imaging:  I have reviewed all pertinent imaging results/findings within the past 24 hours.  Assessment/Plan:     Cardiac/Vascular  * Coronary artery disease    Neuro/Psych:     - Sedation: none    - Pain:     - Scheduled Tylenol 1000mg Q8H   - PRN Oxycodone 5mg/10mg             Cardiac:     - S/p CABG x 2 with Dr. Flores 6/26    - BP Goal: MAP 60-80    - Inotropes/Pressors: none    - Anti-HTNs: off gtt    - Rhythm: NSR 70s    - episode of SVT post op, resolved    - Beta Blocker: metoprolol increased to 25 BID on 6/28    - Statin: ezetimibe     - holding lasix afternoon 6/28      Pulmonary:     - 1L NC     - Goal SpO2 >90%    - Chest Tubes x 4 (2 Meds & 2 Pleural): 235cc total output 6/27    - ABGS PRN   ABG  Recent Labs   Lab 06/27/25  0008   PH 7.441   PO2 74*   PCO2 28.6*   HCO3 19.5*   BE -5*           Renal:    - Trend BUN/Cr     - Maintain Larose, record strict Is/Os    - UOP 1163cc yesterday    - net +3.6L since admit      Recent Labs   Lab 06/27/25  2147 06/28/25  0405 06/28/25  1252   BUN 30* 32* 34*   CREATININE 1.1 1.2 1.1           FEN / GI:     - Daily CMP, PRN K/Mag/Phos per protocol     - Replace electrolytes as needed    - Nutrition: heart healthy diet    - Bowel Regimen: Miralax, docusate, titrated to 1 BM per day      ID:     - Aebrile    - Abx: Complete perioperative cefazolin 2g Q8H x 5 doses    Recent Labs   Lab 06/26/25  1247 06/27/25  0307 06/28/25  0405   WBC 15.62* 15.29* 18.94*           Heme/Onc:     - Hgb 9.8 from 9.4    - CBC daily    - ASA 81 daily    - DVT ppx initiated    Recent Labs   Lab 06/26/25  1247  06/27/25  0307 06/28/25  0405   HGB 9.4* 9.8* 9.8*    212 237   APTT 30.6 29.0  --    INR 1.3* 1.2  --            Endocrine:     - CTS Goal -140    - HgbA1c: 5.4    - Endocrinology consulted for insulin management    - insulin gtt       PPx:   Feeding: CLD, advance today  Analgesia/Sedation: tylenol, oxy PRN  Thromboembolic Prevention: start today  HOB >30: Yes  Stress Ulcer: Yes - famotidine 20mg PO BID  Glucose Control: Yes, insulin management per Endocrinology - insulin gtt now     Lines/Drains/Airway:   Central Line: left subclavian   Larose    Chest Tubes: 4 (2 Mediastinal, 2 Pleural)    Pacing Wires: Temporary V pacing wires      Dispo/Code Status/Palliative:     - Continue SICU Care    - Full Code          Critical care was time spent personally by me on the following activities: development of treatment plan with patient or surrogate and bedside caregivers, discussions with consultants, evaluation of patient's response to treatment, examination of patient, ordering and performing treatments and interventions, ordering and review of laboratory studies, ordering and review of radiographic studies, pulse oximetry, re-evaluation of patient's condition.  This critical care time did not overlap with that of any other provider or involve time for any procedures.     Robert Dill MD  Critical Care - Surgery  [facility]

## 2025-06-28 NOTE — NURSING
SICU PLAN OF CARE NOTE    Dx: Coronary artery disease    Vital Signs (last 12 hours):   Temp:  [97.9 °F (36.6 °C)-98 °F (36.7 °C)]   Pulse:  [73-82]   Resp:  [16-29]   BP: (119-138)/(57-70)   SpO2:  [93 %-100 %]      Neuro: AAO x4, Follows Commands, and Moves All Extremities    Cardiac: NSR    Respiratory: Room Air    Gtts: Insulin    Urine Output: Urinary Catheter 565 cc/shift    Drains: Chest Tube, total output 175 cc /  shift    Diet: Clear Liquid     Labs/Accuchecks: Daily labs, Accuchecks AC/HS    Skin:  All skin remains free from injury.  Patient turned Q2, mattress inflated, and bed working correctly.    Shift Events:  KRISH.  See flowsheet for further assessment/details.  Family updated on current condition/plan of care, questions answered, and emotional support provided.  MD updated on current condition, vitals, labs, and gtts.  No new orders received, will continue to monitor.

## 2025-06-29 LAB
ABSOLUTE EOSINOPHIL (OHS): 0.06 K/UL
ABSOLUTE MONOCYTE (OHS): 1.29 K/UL (ref 0.3–1)
ABSOLUTE NEUTROPHIL COUNT (OHS): 11.78 K/UL (ref 1.8–7.7)
ANION GAP (OHS): 10 MMOL/L (ref 8–16)
ANION GAP (OHS): 8 MMOL/L (ref 8–16)
BASOPHILS # BLD AUTO: 0.05 K/UL
BASOPHILS NFR BLD AUTO: 0.3 %
BUN SERPL-MCNC: 34 MG/DL (ref 8–23)
BUN SERPL-MCNC: 37 MG/DL (ref 8–23)
CALCIUM SERPL-MCNC: 8.6 MG/DL (ref 8.7–10.5)
CALCIUM SERPL-MCNC: 9 MG/DL (ref 8.7–10.5)
CHLORIDE SERPL-SCNC: 107 MMOL/L (ref 95–110)
CHLORIDE SERPL-SCNC: 108 MMOL/L (ref 95–110)
CO2 SERPL-SCNC: 24 MMOL/L (ref 23–29)
CO2 SERPL-SCNC: 25 MMOL/L (ref 23–29)
CREAT SERPL-MCNC: 0.8 MG/DL (ref 0.5–1.4)
CREAT SERPL-MCNC: 1 MG/DL (ref 0.5–1.4)
ERYTHROCYTE [DISTWIDTH] IN BLOOD BY AUTOMATED COUNT: 23.6 % (ref 11.5–14.5)
GFR SERPLBLD CREATININE-BSD FMLA CKD-EPI: 60 ML/MIN/1.73/M2
GFR SERPLBLD CREATININE-BSD FMLA CKD-EPI: >60 ML/MIN/1.73/M2
GLUCOSE SERPL-MCNC: 105 MG/DL (ref 70–110)
GLUCOSE SERPL-MCNC: 122 MG/DL (ref 70–110)
HCT VFR BLD AUTO: 29.3 % (ref 37–48.5)
HGB BLD-MCNC: 9.4 GM/DL (ref 12–16)
IMM GRANULOCYTES # BLD AUTO: 0.09 K/UL (ref 0–0.04)
IMM GRANULOCYTES NFR BLD AUTO: 0.6 % (ref 0–0.5)
LYMPHOCYTES # BLD AUTO: 1.99 K/UL (ref 1–4.8)
MAGNESIUM SERPL-MCNC: 1.9 MG/DL (ref 1.6–2.6)
MAGNESIUM SERPL-MCNC: 2.1 MG/DL (ref 1.6–2.6)
MCH RBC QN AUTO: 22.2 PG (ref 27–31)
MCHC RBC AUTO-ENTMCNC: 32.1 G/DL (ref 32–36)
MCV RBC AUTO: 69 FL (ref 82–98)
NUCLEATED RBC (/100WBC) (OHS): 0 /100 WBC
PHOSPHATE SERPL-MCNC: 1.9 MG/DL (ref 2.7–4.5)
PHOSPHATE SERPL-MCNC: 2.7 MG/DL (ref 2.7–4.5)
PLATELET # BLD AUTO: 234 K/UL (ref 150–450)
PMV BLD AUTO: 10.5 FL (ref 9.2–12.9)
POCT GLUCOSE: 110 MG/DL (ref 70–110)
POCT GLUCOSE: 110 MG/DL (ref 70–110)
POCT GLUCOSE: 121 MG/DL (ref 70–110)
POCT GLUCOSE: 137 MG/DL (ref 70–110)
POCT GLUCOSE: 173 MG/DL (ref 70–110)
POTASSIUM SERPL-SCNC: 3.6 MMOL/L (ref 3.5–5.1)
POTASSIUM SERPL-SCNC: 3.7 MMOL/L (ref 3.5–5.1)
RBC # BLD AUTO: 4.24 M/UL (ref 4–5.4)
RELATIVE EOSINOPHIL (OHS): 0.4 %
RELATIVE LYMPHOCYTE (OHS): 13 % (ref 18–48)
RELATIVE MONOCYTE (OHS): 8.5 % (ref 4–15)
RELATIVE NEUTROPHIL (OHS): 77.2 % (ref 38–73)
SODIUM SERPL-SCNC: 141 MMOL/L (ref 136–145)
SODIUM SERPL-SCNC: 141 MMOL/L (ref 136–145)
WBC # BLD AUTO: 15.26 K/UL (ref 3.9–12.7)

## 2025-06-29 PROCEDURE — 94664 DEMO&/EVAL PT USE INHALER: CPT

## 2025-06-29 PROCEDURE — 25000003 PHARM REV CODE 250

## 2025-06-29 PROCEDURE — 99900035 HC TECH TIME PER 15 MIN (STAT)

## 2025-06-29 PROCEDURE — 80048 BASIC METABOLIC PNL TOTAL CA: CPT

## 2025-06-29 PROCEDURE — 99291 CRITICAL CARE FIRST HOUR: CPT | Mod: ,,, | Performed by: STUDENT IN AN ORGANIZED HEALTH CARE EDUCATION/TRAINING PROGRAM

## 2025-06-29 PROCEDURE — 20000000 HC ICU ROOM

## 2025-06-29 PROCEDURE — 94761 N-INVAS EAR/PLS OXIMETRY MLT: CPT

## 2025-06-29 PROCEDURE — 63600175 PHARM REV CODE 636 W HCPCS

## 2025-06-29 PROCEDURE — 27000221 HC OXYGEN, UP TO 24 HOURS

## 2025-06-29 PROCEDURE — 84100 ASSAY OF PHOSPHORUS: CPT

## 2025-06-29 PROCEDURE — 94799 UNLISTED PULMONARY SVC/PX: CPT | Mod: XB

## 2025-06-29 PROCEDURE — 85025 COMPLETE CBC W/AUTO DIFF WBC: CPT

## 2025-06-29 PROCEDURE — 83735 ASSAY OF MAGNESIUM: CPT

## 2025-06-29 PROCEDURE — 99232 SBSQ HOSP IP/OBS MODERATE 35: CPT | Mod: ,,, | Performed by: PHYSICIAN ASSISTANT

## 2025-06-29 PROCEDURE — 25000003 PHARM REV CODE 250: Performed by: STUDENT IN AN ORGANIZED HEALTH CARE EDUCATION/TRAINING PROGRAM

## 2025-06-29 PROCEDURE — 27000646 HC AEROBIKA DEVICE

## 2025-06-29 RX ORDER — FUROSEMIDE 10 MG/ML
40 INJECTION INTRAMUSCULAR; INTRAVENOUS 2 TIMES DAILY
Status: DISCONTINUED | OUTPATIENT
Start: 2025-06-29 | End: 2025-07-02 | Stop reason: HOSPADM

## 2025-06-29 RX ORDER — SODIUM,POTASSIUM PHOSPHATES 280-250MG
2 POWDER IN PACKET (EA) ORAL
Status: DISCONTINUED | OUTPATIENT
Start: 2025-06-29 | End: 2025-07-02 | Stop reason: HOSPADM

## 2025-06-29 RX ORDER — ENOXAPARIN SODIUM 100 MG/ML
40 INJECTION SUBCUTANEOUS EVERY 24 HOURS
Status: DISCONTINUED | OUTPATIENT
Start: 2025-06-29 | End: 2025-07-02 | Stop reason: HOSPADM

## 2025-06-29 RX ORDER — BISACODYL 10 MG/1
10 SUPPOSITORY RECTAL DAILY
Status: DISCONTINUED | OUTPATIENT
Start: 2025-06-29 | End: 2025-07-02 | Stop reason: HOSPADM

## 2025-06-29 RX ORDER — ACETAMINOPHEN 500 MG
1000 TABLET ORAL EVERY 8 HOURS PRN
Status: DISCONTINUED | OUTPATIENT
Start: 2025-06-29 | End: 2025-06-30

## 2025-06-29 RX ORDER — INSULIN ASPART 100 [IU]/ML
0-5 INJECTION, SOLUTION INTRAVENOUS; SUBCUTANEOUS
Status: DISCONTINUED | OUTPATIENT
Start: 2025-06-29 | End: 2025-07-01

## 2025-06-29 RX ADMIN — SENNOSIDES 8.6 MG: 8.6 TABLET, FILM COATED ORAL at 08:06

## 2025-06-29 RX ADMIN — MUPIROCIN 1 G: 20 OINTMENT TOPICAL at 08:06

## 2025-06-29 RX ADMIN — POTASSIUM BICARBONATE 40 MEQ: 391 TABLET, EFFERVESCENT ORAL at 02:06

## 2025-06-29 RX ADMIN — AMIODARONE HYDROCHLORIDE 400 MG: 200 TABLET ORAL at 08:06

## 2025-06-29 RX ADMIN — METOPROLOL TARTRATE 25 MG: 25 TABLET, FILM COATED ORAL at 08:06

## 2025-06-29 RX ADMIN — POTASSIUM CHLORIDE 20 MEQ: 200 INJECTION, SOLUTION INTRAVENOUS at 06:06

## 2025-06-29 RX ADMIN — POTASSIUM BICARBONATE 50 MEQ: 978 TABLET, EFFERVESCENT ORAL at 08:06

## 2025-06-29 RX ADMIN — FUROSEMIDE 40 MG: 10 INJECTION, SOLUTION INTRAVENOUS at 09:06

## 2025-06-29 RX ADMIN — POTASSIUM & SODIUM PHOSPHATES POWDER PACK 280-160-250 MG 2 PACKET: 280-160-250 PACK at 08:06

## 2025-06-29 RX ADMIN — FUROSEMIDE 40 MG: 10 INJECTION, SOLUTION INTRAVENOUS at 05:06

## 2025-06-29 RX ADMIN — BISACODYL 10 MG: 10 SUPPOSITORY RECTAL at 09:06

## 2025-06-29 RX ADMIN — EZETIMIBE 10 MG: 10 TABLET ORAL at 08:06

## 2025-06-29 RX ADMIN — ACETAMINOPHEN 1000 MG: 500 TABLET ORAL at 06:06

## 2025-06-29 RX ADMIN — INSULIN ASPART 1 UNITS: 100 INJECTION, SOLUTION INTRAVENOUS; SUBCUTANEOUS at 10:06

## 2025-06-29 RX ADMIN — ENOXAPARIN SODIUM 40 MG: 40 INJECTION SUBCUTANEOUS at 05:06

## 2025-06-29 RX ADMIN — AMIODARONE HYDROCHLORIDE 400 MG: 200 TABLET ORAL at 02:06

## 2025-06-29 RX ADMIN — POLYETHYLENE GLYCOL 3350 17 G: 17 POWDER, FOR SOLUTION ORAL at 08:06

## 2025-06-29 RX ADMIN — ASPIRIN 81 MG CHEWABLE TABLET 81 MG: 81 TABLET CHEWABLE at 08:06

## 2025-06-29 RX ADMIN — FAMOTIDINE 20 MG: 20 TABLET, FILM COATED ORAL at 08:06

## 2025-06-29 RX ADMIN — OXYCODONE HYDROCHLORIDE 5 MG: 5 TABLET ORAL at 02:06

## 2025-06-29 NOTE — ASSESSMENT & PLAN NOTE
68 y/o female with hx of CAD admitted to SICU s/p CABG with Dr. Flores 6/26. POD 3       Neuro/Psych:     - Sedation: none    - Pain:     - Tylenol 1000mg Q8H PRN   - PRN Oxycodone 5mg/10mg. 10 mg x1 over last 24 hrs              Cardiac:     - S/p CABG x 2 with Dr. Flores 6/26    - BP Goal: MAP . HR 65-70s. MAP  last 24 hrs    - amiodarone 400 TID, Metoprolol 25 BID    - CT output meds 150, pleural 210    - Anti-HTNs: off gtt    - Rhythm: NSR 70s. Intermittently afib    - episode of SVT post op, resolved    - Beta Blocker: metoprolol increased to 25 BID on 6/28    - Statin: ezetimibe       Pulmonary:     - 2L NC     - Goal SpO2 >90%    - Chest Tubes x 4 (2 Meds & 2 Pleural):    - ABGS PRN   ABG  Recent Labs   Lab 06/27/25  0008   PH 7.441   PO2 74*   PCO2 28.6*   HCO3 19.5*   BE -5*             Renal:    - Trend BUN/Cr. Baseline 0.8     - remove Larose, record strict Is/Os    - UOP 1345 last 24hrs. Net -1300     - Consider Lasix 40 iv x1      Recent Labs   Lab 06/28/25  1252 06/28/25  2105 06/29/25  0248   BUN 34* 37* 37*   CREATININE 1.1 1.1 1.0           FEN / GI:     K 3.6. repleted to get above 4.0 BMP check pending     - Daily CMP, PRN K/Mag/Phos per protocol     - Replace electrolytes as needed    - Nutrition: heart healthy diet    - Bowel Regimen: Miralax, docusate, titrated to 1 BM per day      ID:     - Aebrile    Recent Labs   Lab 06/27/25  0307 06/28/25  0405 06/29/25  0248   WBC 15.29* 18.94* 15.26*           Heme/Onc:     - Hgb 9.8 from 9.4    - CBC daily    - ASA 81 daily    - DVT ppx - consider lovenox    Recent Labs   Lab 06/26/25  1247 06/27/25  0307 06/28/25  0405 06/29/25  0248   HGB 9.4* 9.8* 9.8* 9.4*    212 237 234   APTT 30.6 29.0  --   --    INR 1.3* 1.2  --   --            Endocrine:     - CTS Goal -140. Ranged 110-168 last 24hrs     - HgbA1c: 5.4    - Endocrinology consulted for insulin management    - insulin gtt. Transition to subq       PPx:   Feeding: CLD, advance  today  Analgesia/Sedation: tylenol prn   Thromboembolic Prevention: Lovenox   HOB >30: Yes  Stress Ulcer: Yes - famotidine 20mg PO daily   Glucose Control: Yes, insulin management per Endocrinology - insulin gtt now     Lines/Drains/Airway:   Central Line: left subclavian - dc   Larose - dc   Chest Tubes: 4 (2 Mediastinal, 2 Pleural)    Pacing Wires: Temporary V pacing wires      Dispo/Code Status/Palliative:     - Continue SICU Care    - Full Code

## 2025-06-29 NOTE — PLAN OF CARE
Please link with resident progress note from 6/29/25    CV[facility] Staff Addendum  I have reviewed and concur with the resident's history, physical, assessment, and plan.  I have personally interviewed and examined the patient at bedside.  See below for any additional findings.    Reason for admission:  Coronary artery disease  Present on Admission:   Primary hypertension   Mixed hyperlipidemia   PAD (peripheral artery disease)   Coronary artery disease      Goals for Today:   - Patient went into Afib RVR once yesterday but converted back to NSR after amio bolus. Diuresing adequately, K on all BMPs <3.7 despite additional doses. Patient otherwise progressing appropriately, ambulating, tolerating cardiac diet.  - Multimodal analgesia  - OOB/PT/OT/IS  - Metoprolol 25mg BID, c/w home amlodipine, lasix 40mg BID  - ASA/SQH/SCDs/Famotidine  - CBC/CMP daily, BMP trends and keep k>4, mg>2  - Heart healthy diet, bowel regimen titrated to 1BM/Day  -  d/c CVL/Larose/A line. Will keep CTs    35 minutes of critical care time was spent personally by me on the following activities: development of treatment plan with patient or surrogate and bedside caregivers, discussions with consultants, evaluation of patient's response to treatment, examination of patient, ordering and performing treatments and interventions, ordering and review of laboratory studies, ordering and review of radiographic studies, pulse oximetry, re-evaluation of patient's condition.  This critical care time did not overlap with that of any other provider or involve time for any procedures.    Daniel Flores MD  Anesthesiology and Critical Care Medicine  [facility], [address]

## 2025-06-29 NOTE — PROGRESS NOTES
"[facility]  Endocrinology  Progress Note    Admit Date: 2025     Reason for Consult: Management of  Hyperglycemia     Surgical Procedure: CABG 25    No known hx of DM and not on meds for DM.    HPI:   Patient is a 69 y.o. female with hx of HTN, HLD, CVA with no residual deficits, PAD s/p R SFA stenting by Dr. Valle (non-obstructive L CFA/SFA) who was recently admitted for NSTEMI. S/p CABG with Dr. Flores on 2025.  Endocrine consulted for BG management    Interval HPI:   No acute events overnight. Patient in room 04935/51542 A. Blood glucose stable. BG at goal on transition drip . Steroid use- None .      Renal function- Normal   Vasopressors-  None           Eatin%  Nausea: No  Hypoglycemia and intervention: No  Fever: No  TPN and/or TF: No      BP (!) 142/65 (BP Location: Left arm, Patient Position: Lying)   Pulse 81   Temp 97.8 °F (36.6 °C) (Oral)   Resp (!) 28   Ht 5' 5" (1.651 m)   Wt 75 kg (165 lb 5.5 oz)   SpO2 97%   Breastfeeding No   BMI 27.51 kg/m²     Labs Reviewed and Include    Recent Labs   Lab 25  0248      CALCIUM 8.6*      K 3.6   CO2 25      BUN 37*   CREATININE 1.0     Lab Results   Component Value Date    WBC 15.26 (H) 2025    HGB 9.4 (L) 2025    HCT 29.3 (L) 2025    MCV 69 (L) 2025     2025     No results for input(s): "TSH", "FREET4" in the last 168 hours.  Lab Results   Component Value Date    HGBA1C 5.4 2025       Nutritional status:   Body mass index is 27.51 kg/m².  Lab Results   Component Value Date    ALBUMIN 4.3 2025    ALBUMIN 4.0 2025    ALBUMIN 3.8 2025     No results found for: "PREALBUMIN"    Estimated Creatinine Clearance: 52.3 mL/min (based on SCr of 1 mg/dL).    Accu-Checks  Recent Labs     25  1253 25  1809 25  2105 25  0407 25  0909 25  1253 25  1710 25  2105 25  0249 25  0758 "   POCTGLUCOSE 195* 196* 153* 168* 149* 142* 168* 131* 110 110       Current Medications and/or Treatments Impacting Glycemic Control  Immunotherapy:    Immunosuppressants       None          Steroids:   Hormones (From admission, onward)      None          Pressors:    Autonomic Drugs (From admission, onward)      None          Hyperglycemia/Diabetes Medications:   Antihyperglycemics (From admission, onward)      Start     Stop Route Frequency Ordered    06/27/25 0822  insulin aspart U-100 pen 0-5 Units         -- SubQ Before meals, nightly and at 0200 PRN 06/27/25 0723            ASSESSMENT and PLAN    Cardiac/Vascular  * Coronary artery disease  Managed by primary team  Optimize BG control      PAD (peripheral artery disease)  Managed by primary team  Optimize BG control      Endocrine  Transient hyperglycemia post procedure  BG goal: 110-140   No known history of diabetes. S/p CTS sx.       - discontinue transition drip    - continue NovoLog low-dose correction 150/50  - POCT Glucose a.c. HS  - Hypoglycemia protocol in place      ** Please notify Endocrine for any change and/or advance in diet**  ** Please call Endocrine for any BG related issues **     Discharge Planning:   TBD. Please notify endocrinology prior to discharge.            Rohan Tavares PA-C  Endocrinology  [facility]

## 2025-06-29 NOTE — PLAN OF CARE
SICU PLAN OF CARE    Dx: Coronary artery disease    Goals of Care: MAP     Vital Signs (last 12 hours):   Temp:  [98 °F (36.7 °C)-98.2 °F (36.8 °C)]   Pulse:  []   Resp:  [18-34]   BP: (117-145)/(55-72)   SpO2:  [81 %-97 %]   Arterial Line BP: ()/(44-89)      Neuro: AAOx4, Follows commands , and Moves all extremities spontaneously     Cardiac: Rhythm: normal sinus rhythm    Respiratory: Room Air    Gtts: Insulin    Urine Output: Urethral Catheter  485 mL/shift    Drains: Chest Tube, total output 180mL/shift    Diet: Cardiac  and 1500mL Fluid Restriction     Labs/Accuchecks: Accuchecks ACHS + 0200, daily labs; all labs reviewed with MD    Skin:  No new skin breakdown noted this shift. All wounds/incisions per documentation.  Patient turned q2h, bony prominences protected, and mattress inflated/working correctly.     Shift Events:  No acute events overnight. D/c'd jackelin per orders. Pt OOBTC. See flowsheet for further assessment/details.  Family updated on current condition/plan of care, questions answered, and emotional support provided.  MD updated on current condition, vitals, labs, and gtts.

## 2025-06-29 NOTE — EICU
Virtual ICU Quality Rounds    Admit Date: 6/26/2025  Hospital Day: 3    ICU Day: 3d 3h    24H Vital Sign Range:  Temp:  [97.8 °F (36.6 °C)-98.2 °F (36.8 °C)]   Pulse:  []   Resp:  [17-46]   BP: (111-165)/(55-90)   SpO2:  [90 %-99 %]   Arterial Line BP: ()/(58-89)     VICU Surveillance Screening    Daily review of  line necessity(optional): Central line(s) in place and Urinary catheter in place    Central line type (optional): Quad lumen catheter    Central line indications : Hemodynamic monitoring    Larose Indications : Critically ill in the intensive care unit requiring hourly urine output monitoring     LDA reconciliation : Yes

## 2025-06-29 NOTE — PLAN OF CARE
SICU PLAN OF CARE    Dx: Coronary artery disease    Goals of Care: MAP:     Vital Signs (last 12 hours):   Temp:  [97.8 °F (36.6 °C)-97.9 °F (36.6 °C)]   Pulse:  [73-86]   Resp:  [18-38]   BP: (111-165)/(56-90)   SpO2:  [89 %-99 %]      Neuro: AAOx4, Follows commands , and Moves all extremities spontaneously     Cardiac: Rhythm: normal sinus rhythm    Respiratory: 2L Nasal Cannula     Gtts: None    Urine Output: External Catheter  1140 mL/shift    Drains: Y Chest tube 1 and 2 mediastinal, Total Output 50mL/shift; Y Chest tube 3 and 4 Plueral, Total output 90mL/shift    Diet: Cardiac      Labs/Accuchecks: Accuchecks ACHS    Skin:  No new skin breakdowns noted.  Patient turned q2h, bony prominences protected, and mattress inflated/working correctly.     Shift Events:  Pt up in chair and ambulated around room. Myrick D/C; pt has yet to void since myrick removal. Insulin gtt d/c. See flowsheet for further assessment/details.  Family updated on current condition/plan of care, questions answered, and emotional support provided.  MD updated on current condition, vitals, labs, and gtts.

## 2025-06-29 NOTE — ASSESSMENT & PLAN NOTE
BG goal: 110-140   No known history of diabetes. S/p CTS sx.       - discontinue transition drip    - continue NovoLog low-dose correction 150/50  - POCT Glucose a.c. HS  - Hypoglycemia protocol in place      ** Please notify Endocrine for any change and/or advance in diet**  ** Please call Endocrine for any BG related issues **     Discharge Planning:   TBD. Please notify endocrinology prior to discharge.

## 2025-06-29 NOTE — SUBJECTIVE & OBJECTIVE
Interval History/Significant Events: NAEON. No further episodes of afib with RVR. Denies CP, dyspnea, n/v/d, abd pain.     Follow-up For: Procedure(s) (LRB):  CORONARY ARTERY BYPASS GRAFT (CABG) (N/A)  SURGICAL PROCUREMENT, VEIN, ENDOSCOPIC (Left)    Post-Operative Day: 3 Days Post-Op    Objective:     Vital Signs (Most Recent):  Temp: 97.8 °F (36.6 °C) (06/29/25 0715)  Pulse: 76 (06/29/25 0745)  Resp: (!) 24 (06/29/25 0745)  BP: (!) 155/61 (06/29/25 0730)  SpO2: 97 % (06/29/25 0745) Vital Signs (24h Range):  Temp:  [97.8 °F (36.6 °C)-98.2 °F (36.8 °C)] 97.8 °F (36.6 °C)  Pulse:  [] 76  Resp:  [15-46] 24  SpO2:  [81 %-99 %] 97 %  BP: (105-158)/(55-84) 155/61  Arterial Line BP: ()/(40-89) 135/75     Weight: 75 kg (165 lb 5.5 oz)  Body mass index is 27.51 kg/m².      Intake/Output Summary (Last 24 hours) at 6/29/2025 0809  Last data filed at 6/29/2025 0701  Gross per 24 hour   Intake 387.85 ml   Output 1635 ml   Net -1247.15 ml          Physical Exam  Constitutional:       Appearance: Normal appearance.   HENT:      Head: Normocephalic and atraumatic.      Mouth/Throat:      Mouth: Mucous membranes are moist.      Pharynx: Oropharynx is clear.   Eyes:      Extraocular Movements: Extraocular movements intact.      Pupils: Pupils are equal, round, and reactive to light.   Cardiovascular:      Rate and Rhythm: Normal rate and regular rhythm.   Musculoskeletal:      Cervical back: Normal range of motion. No tenderness.      Right lower leg: Edema present.      Left lower leg: Edema present.   Neurological:      Mental Status: She is alert.            Lines/Drains/Airways       Central Venous Catheter Line  Duration             Percutaneous Central Line Quad Lumen 06/26/25 0825 Subclavian Left 2 days              Drain  Duration                  Urethral Catheter 06/26/25 0740 Non-latex;Straight-tip;Temperature probe 14 Fr. 3 days         Y Chest Tube 1 and 2 06/26/25 1049 1 Anterior Mediastinal 28 Fr. 2  Anterior Mediastinal 28 Fr. 2 days         Y Chest Tube 3 and 4 06/26/25 1049 3 Right Pleural 28 Fr. 4 Left Pleural 28 Fr. 2 days              Line  Duration                  Pacer Wires 06/26/25 1028 2 days              Peripheral Intravenous Line  Duration             Peripheral IV 06/26/25 0545 20 G Anterior;Left Forearm 3 days    Peripheral IV 06/26/25 0805 18 G Left Hand 3 days                    Significant Labs:    CBC/Anemia Profile:  Recent Labs   Lab 06/28/25  0405 06/29/25  0248   WBC 18.94* 15.26*   HGB 9.8* 9.4*   HCT 30.2* 29.3*    234   MCV 69* 69*   RDW 23.3* 23.6*        Chemistries:  Recent Labs   Lab 06/27/25  2147 06/28/25  0405 06/28/25  1252 06/28/25  2105 06/29/25  0248    140 139 139 141   K 3.7 3.6 3.6 3.5 3.6    108 106 106 108   CO2 19* 21* 21* 22* 25   BUN 30* 32* 34* 37* 37*   CREATININE 1.1 1.2 1.1 1.1 1.0   CALCIUM 8.6* 8.9 8.7 8.6* 8.6*   MG 2.0 2.1  --   --  2.1   PHOS 3.7 3.7  --   --  2.7           Significant Imaging:  I have reviewed all pertinent imaging results/findings within the past 24 hours.

## 2025-06-29 NOTE — EICU
Intervention Initiated From:  [facility]    Elvin intervened regarding:  Rounding (Video assessment)  VICU Night Rounds Checklist  24H Vital Sign Range:  Temp:  [97.9 °F (36.6 °C)-98.2 °F (36.8 °C)]   Pulse:  []   Resp:  [15-38]   BP: ()/(48-84)   SpO2:  [75 %-100 %]   Arterial Line BP: ()/(38-89)     Video rounds

## 2025-06-29 NOTE — PROGRESS NOTES
[facility]  Critical Care - Surgery  Progress Note    Patient Name: Veronica Duque  MRN: [id number]  Admission Date: 6/26/2025  Hospital Length of Stay: 3 days  Code Status: Full Code  Attending Provider: Lamont Flores MD  Primary Care Provider: Juju Wallace MD   Principal Problem: Coronary artery disease    Subjective:     Hospital/ICU Course:  No notes on file    Interval History/Significant Events: NAEON. No further episodes of afib with RVR. Denies CP, dyspnea, n/v/d, abd pain.     Follow-up For: Procedure(s) (LRB):  CORONARY ARTERY BYPASS GRAFT (CABG) (N/A)  SURGICAL PROCUREMENT, VEIN, ENDOSCOPIC (Left)    Post-Operative Day: 3 Days Post-Op    Objective:     Vital Signs (Most Recent):  Temp: 97.8 °F (36.6 °C) (06/29/25 0715)  Pulse: 76 (06/29/25 0745)  Resp: (!) 24 (06/29/25 0745)  BP: (!) 155/61 (06/29/25 0730)  SpO2: 97 % (06/29/25 0745) Vital Signs (24h Range):  Temp:  [97.8 °F (36.6 °C)-98.2 °F (36.8 °C)] 97.8 °F (36.6 °C)  Pulse:  [] 76  Resp:  [15-46] 24  SpO2:  [81 %-99 %] 97 %  BP: (105-158)/(55-84) 155/61  Arterial Line BP: ()/(40-89) 135/75     Weight: 75 kg (165 lb 5.5 oz)  Body mass index is 27.51 kg/m².      Intake/Output Summary (Last 24 hours) at 6/29/2025 0809  Last data filed at 6/29/2025 0701  Gross per 24 hour   Intake 387.85 ml   Output 1635 ml   Net -1247.15 ml          Physical Exam  Constitutional:       Appearance: Normal appearance.   HENT:      Head: Normocephalic and atraumatic.      Mouth/Throat:      Mouth: Mucous membranes are moist.      Pharynx: Oropharynx is clear.   Eyes:      Extraocular Movements: Extraocular movements intact.      Pupils: Pupils are equal, round, and reactive to light.   Cardiovascular:      Rate and Rhythm: Normal rate and regular rhythm.   Musculoskeletal:      Cervical back: Normal range of motion. No tenderness.      Right lower leg: Edema present.      Left lower leg: Edema present.   Neurological:       Mental Status: She is alert.            Lines/Drains/Airways       Central Venous Catheter Line  Duration             Percutaneous Central Line Quad Lumen 06/26/25 0825 Subclavian Left 2 days              Drain  Duration                  Urethral Catheter 06/26/25 0740 Non-latex;Straight-tip;Temperature probe 14 Fr. 3 days         Y Chest Tube 1 and 2 06/26/25 1049 1 Anterior Mediastinal 28 Fr. 2 Anterior Mediastinal 28 Fr. 2 days         Y Chest Tube 3 and 4 06/26/25 1049 3 Right Pleural 28 Fr. 4 Left Pleural 28 Fr. 2 days              Line  Duration                  Pacer Wires 06/26/25 1028 2 days              Peripheral Intravenous Line  Duration             Peripheral IV 06/26/25 0545 20 G Anterior;Left Forearm 3 days    Peripheral IV 06/26/25 0805 18 G Left Hand 3 days                    Significant Labs:    CBC/Anemia Profile:  Recent Labs   Lab 06/28/25  0405 06/29/25  0248   WBC 18.94* 15.26*   HGB 9.8* 9.4*   HCT 30.2* 29.3*    234   MCV 69* 69*   RDW 23.3* 23.6*        Chemistries:  Recent Labs   Lab 06/27/25  2147 06/28/25  0405 06/28/25  1252 06/28/25  2105 06/29/25  0248    140 139 139 141   K 3.7 3.6 3.6 3.5 3.6    108 106 106 108   CO2 19* 21* 21* 22* 25   BUN 30* 32* 34* 37* 37*   CREATININE 1.1 1.2 1.1 1.1 1.0   CALCIUM 8.6* 8.9 8.7 8.6* 8.6*   MG 2.0 2.1  --   --  2.1   PHOS 3.7 3.7  --   --  2.7           Significant Imaging:  I have reviewed all pertinent imaging results/findings within the past 24 hours.  Assessment/Plan:     Cardiac/Vascular  * Coronary artery disease  68 y/o female with hx of CAD admitted to SICU s/p CABG with Dr. Flores 6/26. POD 3       Neuro/Psych:     - Sedation: none    - Pain:     - Tylenol 1000mg Q8H PRN   - PRN Oxycodone 5mg/10mg. 10 mg x1 over last 24 hrs              Cardiac:     - S/p CABG x 2 with Dr. Flores 6/26    - BP Goal: MAP . HR 65-70s. MAP  last 24 hrs    - amiodarone 400 TID, Metoprolol 25 BID    - CT output meds 150, pleural  210    - Anti-HTNs: off gtt    - Rhythm: NSR 70s. Intermittently afib    - episode of SVT post op, resolved    - Beta Blocker: metoprolol increased to 25 BID on 6/28    - Statin: ezetimibe       Pulmonary:     - 2L NC     - Goal SpO2 >90%    - Chest Tubes x 4 (2 Meds & 2 Pleural):    - ABGS PRN   ABG  Recent Labs   Lab 06/27/25  0008   PH 7.441   PO2 74*   PCO2 28.6*   HCO3 19.5*   BE -5*             Renal:    - Trend BUN/Cr. Baseline 0.8     - remove Larose, record strict Is/Os    - UOP 1345 last 24hrs. Net -1300     - Consider Lasix 40 iv x1      Recent Labs   Lab 06/28/25  1252 06/28/25  2105 06/29/25  0248   BUN 34* 37* 37*   CREATININE 1.1 1.1 1.0           FEN / GI:     K 3.6. repleted to get above 4.0 BMP check pending     - Daily CMP, PRN K/Mag/Phos per protocol     - Replace electrolytes as needed    - Nutrition: heart healthy diet    - Bowel Regimen: Miralax, docusate, titrated to 1 BM per day      ID:     - Aebrile    Recent Labs   Lab 06/27/25  0307 06/28/25  0405 06/29/25  0248   WBC 15.29* 18.94* 15.26*           Heme/Onc:     - Hgb 9.8 from 9.4    - CBC daily    - ASA 81 daily    - DVT ppx - consider lovenox    Recent Labs   Lab 06/26/25  1247 06/27/25  0307 06/28/25  0405 06/29/25  0248   HGB 9.4* 9.8* 9.8* 9.4*    212 237 234   APTT 30.6 29.0  --   --    INR 1.3* 1.2  --   --            Endocrine:     - CTS Goal -140. Ranged 110-168 last 24hrs     - HgbA1c: 5.4    - Endocrinology consulted for insulin management    - insulin gtt. Transition to subq       PPx:   Feeding: CLD, advance today  Analgesia/Sedation: tylenol prn   Thromboembolic Prevention: Lovenox   HOB >30: Yes  Stress Ulcer: Yes - famotidine 20mg PO daily   Glucose Control: Yes, insulin management per Endocrinology - insulin gtt now  Bowel regimen -  miralax, senna changed to BID.      Lines/Drains/Airway:   Central Line: left subclavian - dc   Larose - dc   Chest Tubes: 4 (2 Mediastinal, 2 Pleural)    Pacing Wires: Temporary  V pacing wires      Dispo/Code Status/Palliative:     - Continue SICU Care    - Full Code          Danielle Eaton MD  Critical Care - Surgery  [facility] - Surgical Intensive Care

## 2025-06-29 NOTE — SUBJECTIVE & OBJECTIVE
"Interval HPI:   No acute events overnight. Patient in room 83134/13490 A. Blood glucose stable. BG at goal on transition drip . Steroid use- None .      Renal function- Normal   Vasopressors-  None           Eatin%  Nausea: No  Hypoglycemia and intervention: No  Fever: No  TPN and/or TF: No      BP (!) 142/65 (BP Location: Left arm, Patient Position: Lying)   Pulse 81   Temp 97.8 °F (36.6 °C) (Oral)   Resp (!) 28   Ht 5' 5" (1.651 m)   Wt 75 kg (165 lb 5.5 oz)   SpO2 97%   Breastfeeding No   BMI 27.51 kg/m²     Labs Reviewed and Include    Recent Labs   Lab 25  0248      CALCIUM 8.6*      K 3.6   CO2 25      BUN 37*   CREATININE 1.0     Lab Results   Component Value Date    WBC 15.26 (H) 2025    HGB 9.4 (L) 2025    HCT 29.3 (L) 2025    MCV 69 (L) 2025     2025     No results for input(s): "TSH", "FREET4" in the last 168 hours.  Lab Results   Component Value Date    HGBA1C 5.4 2025       Nutritional status:   Body mass index is 27.51 kg/m².  Lab Results   Component Value Date    ALBUMIN 4.3 2025    ALBUMIN 4.0 2025    ALBUMIN 3.8 2025     No results found for: "PREALBUMIN"    Estimated Creatinine Clearance: 52.3 mL/min (based on SCr of 1 mg/dL).    Accu-Checks  Recent Labs     25  1253 25  1809 25  2105 25  0407 25  0909 25  1253 25  1710 25  2105 25  0249 25  0758   POCTGLUCOSE 195* 196* 153* 168* 149* 142* 168* 131* 110 110       Current Medications and/or Treatments Impacting Glycemic Control  Immunotherapy:    Immunosuppressants       None          Steroids:   Hormones (From admission, onward)      None          Pressors:    Autonomic Drugs (From admission, onward)      None          Hyperglycemia/Diabetes Medications:   Antihyperglycemics (From admission, onward)      Start     Stop Route Frequency Ordered    25 0822  insulin aspart U-100 pen 0-5 " Units         -- SubQ Before meals, nightly and at 0200 PRN 06/27/25 0723

## 2025-06-30 LAB
ABO + RH BLD: NORMAL
ABO + RH BLD: NORMAL
ABSOLUTE EOSINOPHIL (OHS): 0.21 K/UL
ABSOLUTE MONOCYTE (OHS): 1.21 K/UL (ref 0.3–1)
ABSOLUTE NEUTROPHIL COUNT (OHS): 7.97 K/UL (ref 1.8–7.7)
ALBUMIN SERPL BCP-MCNC: 2.9 G/DL (ref 3.5–5.2)
ALP SERPL-CCNC: 78 UNIT/L (ref 40–150)
ALT SERPL W/O P-5'-P-CCNC: 12 UNIT/L (ref 10–44)
ANION GAP (OHS): 8 MMOL/L (ref 8–16)
AST SERPL-CCNC: 19 UNIT/L (ref 11–45)
BASOPHILS # BLD AUTO: 0.03 K/UL
BASOPHILS NFR BLD AUTO: 0.3 %
BILIRUB SERPL-MCNC: 0.8 MG/DL (ref 0.1–1)
BLD PROD TYP BPU: NORMAL
BLD PROD TYP BPU: NORMAL
BLOOD UNIT EXPIRATION DATE: NORMAL
BLOOD UNIT EXPIRATION DATE: NORMAL
BLOOD UNIT TYPE CODE: 6200
BLOOD UNIT TYPE CODE: 6200
BUN SERPL-MCNC: 31 MG/DL (ref 8–23)
CALCIUM SERPL-MCNC: 8.3 MG/DL (ref 8.7–10.5)
CHLORIDE SERPL-SCNC: 107 MMOL/L (ref 95–110)
CO2 SERPL-SCNC: 26 MMOL/L (ref 23–29)
CREAT SERPL-MCNC: 0.8 MG/DL (ref 0.5–1.4)
CROSSMATCH INTERPRETATION: NORMAL
CROSSMATCH INTERPRETATION: NORMAL
DISPENSE STATUS: NORMAL
DISPENSE STATUS: NORMAL
ERYTHROCYTE [DISTWIDTH] IN BLOOD BY AUTOMATED COUNT: 23.6 % (ref 11.5–14.5)
GFR SERPLBLD CREATININE-BSD FMLA CKD-EPI: >60 ML/MIN/1.73/M2
GLUCOSE SERPL-MCNC: 114 MG/DL (ref 70–110)
HCT VFR BLD AUTO: 28.6 % (ref 37–48.5)
HGB BLD-MCNC: 9.1 GM/DL (ref 12–16)
IMM GRANULOCYTES # BLD AUTO: 0.07 K/UL (ref 0–0.04)
IMM GRANULOCYTES NFR BLD AUTO: 0.6 % (ref 0–0.5)
INDIRECT COOMBS: NORMAL
LYMPHOCYTES # BLD AUTO: 1.8 K/UL (ref 1–4.8)
MAGNESIUM SERPL-MCNC: 1.8 MG/DL (ref 1.6–2.6)
MCH RBC QN AUTO: 22.2 PG (ref 27–31)
MCHC RBC AUTO-ENTMCNC: 31.8 G/DL (ref 32–36)
MCV RBC AUTO: 70 FL (ref 82–98)
NUCLEATED RBC (/100WBC) (OHS): 0 /100 WBC
PHOSPHATE SERPL-MCNC: 2.6 MG/DL (ref 2.7–4.5)
PLATELET # BLD AUTO: 258 K/UL (ref 150–450)
PMV BLD AUTO: 10.3 FL (ref 9.2–12.9)
POCT GLUCOSE: 105 MG/DL (ref 70–110)
POCT GLUCOSE: 127 MG/DL (ref 70–110)
POCT GLUCOSE: 150 MG/DL (ref 70–110)
POTASSIUM SERPL-SCNC: 3.5 MMOL/L (ref 3.5–5.1)
PROT SERPL-MCNC: 5.1 GM/DL (ref 6–8.4)
RBC # BLD AUTO: 4.1 M/UL (ref 4–5.4)
RELATIVE EOSINOPHIL (OHS): 1.9 %
RELATIVE LYMPHOCYTE (OHS): 15.9 % (ref 18–48)
RELATIVE MONOCYTE (OHS): 10.7 % (ref 4–15)
RELATIVE NEUTROPHIL (OHS): 70.6 % (ref 38–73)
RH BLD: NORMAL
SODIUM SERPL-SCNC: 141 MMOL/L (ref 136–145)
SPECIMEN OUTDATE: NORMAL
UNIT NUMBER: NORMAL
UNIT NUMBER: NORMAL
WBC # BLD AUTO: 11.29 K/UL (ref 3.9–12.7)

## 2025-06-30 PROCEDURE — 11000001 HC ACUTE MED/SURG PRIVATE ROOM

## 2025-06-30 PROCEDURE — 25000003 PHARM REV CODE 250

## 2025-06-30 PROCEDURE — 97116 GAIT TRAINING THERAPY: CPT

## 2025-06-30 PROCEDURE — 85025 COMPLETE CBC W/AUTO DIFF WBC: CPT

## 2025-06-30 PROCEDURE — 99222 1ST HOSP IP/OBS MODERATE 55: CPT | Mod: GC,,, | Performed by: ANESTHESIOLOGY

## 2025-06-30 PROCEDURE — 63600175 PHARM REV CODE 636 W HCPCS

## 2025-06-30 PROCEDURE — 25000003 PHARM REV CODE 250: Performed by: STUDENT IN AN ORGANIZED HEALTH CARE EDUCATION/TRAINING PROGRAM

## 2025-06-30 PROCEDURE — 27200667 HC PACEMAKER, TEMPORARY MONITORING, PER SHIFT

## 2025-06-30 PROCEDURE — 97530 THERAPEUTIC ACTIVITIES: CPT

## 2025-06-30 PROCEDURE — 83735 ASSAY OF MAGNESIUM: CPT

## 2025-06-30 PROCEDURE — 84100 ASSAY OF PHOSPHORUS: CPT

## 2025-06-30 PROCEDURE — 99232 SBSQ HOSP IP/OBS MODERATE 35: CPT | Mod: ,,, | Performed by: PHYSICIAN ASSISTANT

## 2025-06-30 PROCEDURE — 97535 SELF CARE MNGMENT TRAINING: CPT

## 2025-06-30 PROCEDURE — 80053 COMPREHEN METABOLIC PANEL: CPT

## 2025-06-30 PROCEDURE — 86900 BLOOD TYPING SEROLOGIC ABO: CPT

## 2025-06-30 RX ORDER — AMIODARONE HYDROCHLORIDE 200 MG/1
400 TABLET ORAL 3 TIMES DAILY
Status: DISCONTINUED | OUTPATIENT
Start: 2025-06-30 | End: 2025-07-02 | Stop reason: HOSPADM

## 2025-06-30 RX ORDER — ACETAMINOPHEN 500 MG
1000 TABLET ORAL EVERY 8 HOURS PRN
Status: DISCONTINUED | OUTPATIENT
Start: 2025-06-30 | End: 2025-07-02 | Stop reason: HOSPADM

## 2025-06-30 RX ORDER — OXYCODONE HYDROCHLORIDE 5 MG/1
5 TABLET ORAL EVERY 6 HOURS PRN
Refills: 0 | Status: DISCONTINUED | OUTPATIENT
Start: 2025-06-30 | End: 2025-07-02 | Stop reason: HOSPADM

## 2025-06-30 RX ORDER — FAMOTIDINE 20 MG/1
20 TABLET, FILM COATED ORAL 2 TIMES DAILY
Status: DISCONTINUED | OUTPATIENT
Start: 2025-06-30 | End: 2025-07-02 | Stop reason: HOSPADM

## 2025-06-30 RX ORDER — AMIODARONE HYDROCHLORIDE 200 MG/1
200 TABLET ORAL DAILY
Status: DISCONTINUED | OUTPATIENT
Start: 2025-07-05 | End: 2025-07-02 | Stop reason: HOSPADM

## 2025-06-30 RX ORDER — POTASSIUM CHLORIDE 20 MEQ/1
40 TABLET, EXTENDED RELEASE ORAL ONCE
Status: COMPLETED | OUTPATIENT
Start: 2025-06-30 | End: 2025-06-30

## 2025-06-30 RX ADMIN — ENOXAPARIN SODIUM 40 MG: 40 INJECTION SUBCUTANEOUS at 05:06

## 2025-06-30 RX ADMIN — AMIODARONE HYDROCHLORIDE 400 MG: 200 TABLET ORAL at 03:06

## 2025-06-30 RX ADMIN — AMIODARONE HYDROCHLORIDE 400 MG: 200 TABLET ORAL at 09:06

## 2025-06-30 RX ADMIN — FAMOTIDINE 20 MG: 20 TABLET, FILM COATED ORAL at 08:06

## 2025-06-30 RX ADMIN — POTASSIUM & SODIUM PHOSPHATES POWDER PACK 280-160-250 MG 2 PACKET: 280-160-250 PACK at 06:06

## 2025-06-30 RX ADMIN — FUROSEMIDE 40 MG: 10 INJECTION, SOLUTION INTRAVENOUS at 05:06

## 2025-06-30 RX ADMIN — METOPROLOL TARTRATE 25 MG: 25 TABLET, FILM COATED ORAL at 08:06

## 2025-06-30 RX ADMIN — SENNOSIDES 8.6 MG: 8.6 TABLET, FILM COATED ORAL at 09:06

## 2025-06-30 RX ADMIN — MAGNESIUM SULFATE HEPTAHYDRATE 2 G: 40 INJECTION, SOLUTION INTRAVENOUS at 10:06

## 2025-06-30 RX ADMIN — SENNOSIDES 8.6 MG: 8.6 TABLET, FILM COATED ORAL at 08:06

## 2025-06-30 RX ADMIN — FUROSEMIDE 40 MG: 10 INJECTION, SOLUTION INTRAVENOUS at 08:06

## 2025-06-30 RX ADMIN — POTASSIUM & SODIUM PHOSPHATES POWDER PACK 280-160-250 MG 2 PACKET: 280-160-250 PACK at 11:06

## 2025-06-30 RX ADMIN — ASPIRIN 81 MG CHEWABLE TABLET 81 MG: 81 TABLET CHEWABLE at 08:06

## 2025-06-30 RX ADMIN — AMIODARONE HYDROCHLORIDE 400 MG: 200 TABLET ORAL at 08:06

## 2025-06-30 RX ADMIN — METOPROLOL TARTRATE 25 MG: 25 TABLET, FILM COATED ORAL at 09:06

## 2025-06-30 RX ADMIN — FAMOTIDINE 20 MG: 20 TABLET, FILM COATED ORAL at 09:06

## 2025-06-30 RX ADMIN — MUPIROCIN 1 G: 20 OINTMENT TOPICAL at 09:06

## 2025-06-30 RX ADMIN — MUPIROCIN 1 G: 20 OINTMENT TOPICAL at 08:06

## 2025-06-30 RX ADMIN — POTASSIUM CHLORIDE 40 MEQ: 1500 TABLET, EXTENDED RELEASE ORAL at 06:06

## 2025-06-30 RX ADMIN — EZETIMIBE 10 MG: 10 TABLET ORAL at 09:06

## 2025-06-30 NOTE — RESIDENT HANDOFF
ICU Transfer of Care Note  Critical Care Medicine    Admit Date: 6/26/2025  LOS: 4    CC: Coronary artery disease    Code Status: Full Code       HPI and Hospital Course:     HPI:  68 yo female with hx of HTN, HLD, CVA with no residual deficits, PAD s/p R SFA stenting by Dr. Valle (non-obstructive L CFA/SFA) who was recently admitted for NSTEMI. The patient presents to the SICU s/p CABG with Dr. Flores on 06/26/2025.     On admission, they are intubated, sedated with propofol, and in stable condition. Inotropic and vasopressor requirements upon admission are 0.04 mcg/kg/min of norepinephrine to maintain blood pressure at a MAP 60-80. Central access includes a L subclavian CVC, arterial access includes a left radial arterial line. They also have 2 pleural and 2 mediastinal chest tubes with appropriate output.    Intraoperatively, they received 3.6 of crystalloid, 770 of cell saver, and 3 PRBCs. Urine output intraoperatively was 550cc. The pre-operative echocardiogram was notable for normal biventricular function, no valve abnormalities, thick nonobstructive ventricular septum. Post-operative echo was notable for normal biventricular function, no valve abnormalities with a small posterior pericardial effusion.     Immediate post-operative plans include hemodynamic stabilization and weaning of cardiac and respiratory support. Plan to wean vasopressors and inotropes as tolerated, wean ventilator support with goal of early extubation, and closely monitor fluid status with strict Is/Os and continued fluid resuscitation as needed.     Hospital/ICU Course:  68 yo female with hx of HTN, HLD, CVA with no residual deficits, PAD s/p R SFA stenting by Dr. Valle (non-obstructive L CFA/SFA) who was recently admitted for NSTEMI. The patient presents to the SICU s/p CABG with Dr. Flores on 06/26/2025. She was fast tracked and extubated on 6/26.     6/28: intermittently went into afib with RVR. Started on amiodarone.  6/29: had bowel  movement. Lines removed. Lasix 40 BID.   6/30: ready for stepdown. Chest tubes removed.       To Follow Up:   Afib w/ RVR - 6/27 went into afib w/ RVR and was started on amio bolus and drip. She is currently on amiodarone 400 TID. Please wean and discontinue in the clinic if she is no longer going into afib.    BB- continue metoprolol 25 BID. Change to XL 25 daily on discharge    Electrolytes - continue Lasix 40 BID. Wean to PO dose based on In/Out    Discharge Plan:   PT - no barrier to discharge    DC home    Call with questions.

## 2025-06-30 NOTE — ASSESSMENT & PLAN NOTE
BG goal: 110-140   No known history of diabetes. S/p CTS sx.         - continue NovoLog low-dose correction 150/50  - POCT Glucose a.c. HS  - Hypoglycemia protocol in place      ** Please notify Endocrine for any change and/or advance in diet**  ** Please call Endocrine for any BG related issues **     Discharge Planning:   TBD. Please notify endocrinology prior to discharge.

## 2025-06-30 NOTE — NURSING
Patient arrived to the unit showing no signs of distress. Patient on R.A. Patient have a back up pace battery connected set @50. Patient belongings was with her. Ivs intact.

## 2025-06-30 NOTE — SUBJECTIVE & OBJECTIVE
Interval History/Significant Events: NAEON. Denies any acute complaints. Denies n/v/d, abd pain, chest pain, dyspnea.     Follow-up For: Procedure(s) (LRB):  CORONARY ARTERY BYPASS GRAFT (CABG) (N/A)  SURGICAL PROCUREMENT, VEIN, ENDOSCOPIC (Left)    Post-Operative Day: 4 Days Post-Op    Objective:     Vital Signs (Most Recent):  Temp: 97.8 °F (36.6 °C) (06/30/25 0301)  Pulse: 79 (06/30/25 0737)  Resp: (!) 29 (06/30/25 0737)  BP: (!) 160/69 (06/30/25 0700)  SpO2: 96 % (06/30/25 0737) Vital Signs (24h Range):  Temp:  [97.7 °F (36.5 °C)-98.2 °F (36.8 °C)] 97.8 °F (36.6 °C)  Pulse:  [70-88] 79  Resp:  [18-40] 29  SpO2:  [89 %-100 %] 96 %  BP: (111-171)/(56-90) 160/69     Weight: 75 kg (165 lb 5.5 oz)  Body mass index is 27.51 kg/m².      Intake/Output Summary (Last 24 hours) at 6/30/2025 0743  Last data filed at 6/30/2025 0301  Gross per 24 hour   Intake 65.59 ml   Output 1950 ml   Net -1884.41 ml          Physical Exam  Constitutional:       Appearance: Normal appearance.   HENT:      Head: Normocephalic and atraumatic.      Mouth/Throat:      Mouth: Mucous membranes are moist.      Pharynx: Oropharynx is clear.   Eyes:      Extraocular Movements: Extraocular movements intact.      Pupils: Pupils are equal, round, and reactive to light.   Cardiovascular:      Rate and Rhythm: Normal rate and regular rhythm.   Pulmonary:      Effort: No respiratory distress.      Breath sounds: Normal breath sounds.   Abdominal:      Palpations: Abdomen is soft.      Tenderness: There is no abdominal tenderness.   Musculoskeletal:         General: No tenderness. Normal range of motion.      Cervical back: Normal range of motion and neck supple.      Right lower leg: Edema present.      Left lower leg: Edema present.   Skin:     General: Skin is warm and dry.   Neurological:      General: No focal deficit present.      Mental Status: She is alert. Mental status is at baseline.            Lines/Drains/Airways       Drain  Duration                   Y Chest Tube 1 and 2 06/26/25 1049 1 Anterior Mediastinal 28 Fr. 2 Anterior Mediastinal 28 Fr. 3 days         Y Chest Tube 3 and 4 06/26/25 1049 3 Right Pleural 28 Fr. 4 Left Pleural 28 Fr. 3 days    Female External Urinary Catheter w/ Suction 06/29/25 1737 <1 day              Line  Duration                  Pacer Wires 06/26/25 1028 3 days              Peripheral Intravenous Line  Duration             Peripheral IV 06/26/25 0545 20 G Anterior;Left Forearm 4 days    Peripheral IV 06/26/25 0805 18 G Left Hand 3 days                    Significant Labs:    CBC/Anemia Profile:  Recent Labs   Lab 06/29/25  0248 06/30/25  0312   WBC 15.26* 11.29   HGB 9.4* 9.1*   HCT 29.3* 28.6*    258   MCV 69* 70*   RDW 23.6* 23.6*        Chemistries:  Recent Labs   Lab 06/29/25  0248 06/29/25  1448 06/30/25  0312    141 141   K 3.6 3.7 3.5    107 107   CO2 25 24 26   BUN 37* 34* 31*   CREATININE 1.0 0.8 0.8   CALCIUM 8.6* 9.0 8.3*   ALBUMIN  --   --  2.9*   PROT  --   --  5.1*   BILITOT  --   --  0.8   ALKPHOS  --   --  78   ALT  --   --  12   AST  --   --  19   MG 2.1 1.9 1.8   PHOS 2.7 1.9* 2.6*       Significant Imaging:  I have reviewed all pertinent imaging results/findings within the past 24 hours.

## 2025-06-30 NOTE — PROGRESS NOTES
[facility] - [facility]ensive [facility]  Critical Care - Surgery  Progress Note    Patient Name: Veronica Duque  MRN: [id number]  Admission Date: 6/26/2025  Hospital Length of Stay: 4 days  Code Status: Full Code  Attending Provider: Lamont Flores MD  Primary Care Provider: Juju Wallace MD   Principal Problem: Coronary artery disease    Subjective:     Hospital/ICU Course:  68 yo female with hx of HTN, HLD, CVA with no residual deficits, PAD s/p R SFA stenting by Dr. Valle (non-obstructive L CFA/SFA) who was recently admitted for NSTEMI. The patient presents to the SICU s/p CABG with Dr. Flores on 06/26/2025. She was fast tracked and extubated on 6/26.     6/28: intermittently went into afib with RVR. Started on amiodarone.  6/29: had bowel movement. Lines removed. Lasix 40 BID.   6/30: ready for stepdown     Interval History/Significant Events: NAEON. Denies any acute complaints. Denies n/v/d, abd pain, chest pain, dyspnea.     Follow-up For: Procedure(s) (LRB):  CORONARY ARTERY BYPASS GRAFT (CABG) (N/A)  SURGICAL PROCUREMENT, VEIN, ENDOSCOPIC (Left)    Post-Operative Day: 4 Days Post-Op    Objective:     Vital Signs (Most Recent):  Temp: 97.8 °F (36.6 °C) (06/30/25 0301)  Pulse: 72 (06/30/25 0500)  Resp: (!) 21 (06/30/25 0500)  BP: (!) 144/65 (06/30/25 0500)  SpO2: 96 % (06/30/25 0500) Vital Signs (24h Range):  Temp:  [97.7 °F (36.5 °C)-98.2 °F (36.8 °C)] 97.8 °F (36.6 °C)  Pulse:  [68-88] 72  Resp:  [17-46] 21  SpO2:  [89 %-100 %] 96 %  BP: (111-165)/(56-90) 144/65     Weight: 75 kg (165 lb 5.5 oz)  Body mass index is 27.51 kg/m².      Intake/Output Summary (Last 24 hours) at 6/30/2025 0529  Last data filed at 6/30/2025 0301  Gross per 24 hour   Intake 100.03 ml   Output 1665 ml   Net -1564.97 ml          Physical Exam  Constitutional:       Appearance: Normal appearance.   HENT:      Head: Normocephalic and atraumatic.      Mouth/Throat:      Mouth: Mucous membranes are moist.      Pharynx: Oropharynx is  clear.   Eyes:      Extraocular Movements: Extraocular movements intact.      Pupils: Pupils are equal, round, and reactive to light.   Cardiovascular:      Rate and Rhythm: Normal rate and regular rhythm.   Pulmonary:      Effort: No respiratory distress.      Breath sounds: Normal breath sounds.   Abdominal:      Palpations: Abdomen is soft.      Tenderness: There is no abdominal tenderness.   Musculoskeletal:         General: No tenderness. Normal range of motion.      Cervical back: Normal range of motion and neck supple.   Skin:     General: Skin is warm and dry.   Neurological:      General: No focal deficit present.      Mental Status: She is alert. Mental status is at baseline.            Lines/Drains/Airways       Central Venous Catheter Line  Duration             Percutaneous Central Line Quad Lumen 06/26/25 0825 Subclavian Left 3 days              Drain  Duration                  Y Chest Tube 1 and 2 06/26/25 1049 1 Anterior Mediastinal 28 Fr. 2 Anterior Mediastinal 28 Fr. 3 days         Y Chest Tube 3 and 4 06/26/25 1049 3 Right Pleural 28 Fr. 4 Left Pleural 28 Fr. 3 days    Female External Urinary Catheter w/ Suction 06/29/25 1737 <1 day              Line  Duration                  Pacer Wires 06/26/25 1028 3 days              Peripheral Intravenous Line  Duration             Peripheral IV 06/26/25 0545 20 G Anterior;Left Forearm 3 days    Peripheral IV 06/26/25 0805 18 G Left Hand 3 days                    Significant Labs:    CBC/Anemia Profile:  Recent Labs   Lab 06/29/25  0248 06/30/25  0312   WBC 15.26* 11.29   HGB 9.4* 9.1*   HCT 29.3* 28.6*    258   MCV 69* 70*   RDW 23.6* 23.6*        Chemistries:  Recent Labs   Lab 06/29/25  0248 06/29/25  1448 06/30/25  0312    141 141   K 3.6 3.7 3.5    107 107   CO2 25 24 26   BUN 37* 34* 31*   CREATININE 1.0 0.8 0.8   CALCIUM 8.6* 9.0 8.3*   ALBUMIN  --   --  2.9*   PROT  --   --  5.1*   BILITOT  --   --  0.8   ALKPHOS  --   --  78   ALT   --   --  12   AST  --   --  19   MG 2.1 1.9  --    PHOS 2.7 1.9*  --        Significant Imaging:  I have reviewed all pertinent imaging results/findings within the past 24 hours.  Assessment/Plan:     Cardiac/Vascular  * Coronary artery disease  70 y/o female with hx of CAD admitted to SICU s/p 2 vessel CABG with Dr. Flores 6/26. POD 4.       Neuro/Psych:     - Sedation: none    - Pain:     - Tylenol 1000mg Q8H PRN   - PRN Oxycodone 5mg. 5 mg x1 over last 24 hrs              Cardiac:     - S/p CABG x 2 with Dr. Flores 6/26    - BP Goal: MAP . HR 68-88. MAP  last 24 hrs    - amiodarone 400 TID, Metoprolol 25 BID    - CT output meds 70, pleural 120    - Anti-HTNs: off gtt    - Rhythm: NSR    - episode of SVT post op, resolved    - Beta Blocker: metoprolol 25 BID    - Statin: ezetimibe       Pulmonary:     - room air to 2L NC    - Goal SpO2 >90%    - Chest Tubes x 4 (2 Meds & 2 Pleural):    - ABGS PRN         Renal:    - Trend BUN/Cr. Baseline 0.8     - strict Is/Os    - UOP 1340 last 24hrs. Net -1400     - Lasix 40 BID      Recent Labs   Lab 06/29/25  0248 06/29/25  1448 06/30/25  0312   BUN 37* 34* 31*   CREATININE 1.0 0.8 0.8           FEN / GI:     Feeding - cardiac diet    Potassium 3.5 this AM. Additional KCL 40 meq ordered       - Daily CMP, PRN K/Mag/Phos per protocol     - Replace electrolytes as needed    - Bowel Regimen: Miralax, docusate, titrated to 1 BM per day. Last BM 6/29      ID:     - Aebrile    Recent Labs   Lab 06/28/25  0405 06/29/25  0248 06/30/25  0312   WBC 18.94* 15.26* 11.29           Heme/Onc:     - Hgb 9.8 from 9.4    - CBC daily    - ASA 81 daily    - DVT ppx - lovenox    Recent Labs   Lab 06/26/25  1247 06/27/25  0307 06/28/25  0405 06/29/25  0248 06/30/25  0312   HGB 9.4* 9.8* 9.8* 9.4* 9.1*    212 237 234 258   APTT 30.6 29.0  --   --   --    INR 1.3* 1.2  --   --   --            Endocrine:     - CTS Goal -140. Ranged 105-122 last 24hrs     - HgbA1c: 5.4    -  Endocrinology consulted for insulin management    - subq insulin       PPx:   Feeding: cardiac diet  Analgesia/Sedation: tylenol, oxy prn   Thromboembolic Prevention: Lovenox   HOB >30: Yes  Stress Ulcer: Yes - famotidine 20mg PO daily   Glucose Control: subq insulin  Bowel regimen - miralax, senna BID     Lines/Drains/Airway:   Chest Tubes: 4 (2 Mediastinal, 2 Pleural)    Pacing Wires: Temporary V pacing wires      Dispo/Code Status/Palliative:     - step down    - Full Code          Danielle Eaton MD  Critical Care - Surgery  [facility] - Surgical Intensive Care

## 2025-06-30 NOTE — EICU
Intervention Initiated From:  [facility] / EICU    Elvin intervened regarding:  Rounding (Video assessment)    Virtual ICU Quality Rounds    Admit Date: 6/26/2025  Hospital Day: 4    ICU Day: 3d 20h    24H Vital Sign Range:  Temp:  [97.7 °F (36.5 °C)-98.3 °F (36.8 °C)]   Pulse:  [70-88]   Resp:  [18-40]   BP: (111-171)/(56-90)   SpO2:  [89 %-100 %]     VICU Surveillance Screening    Daily review of  line necessity(optional): pacer wires, CT                LDA reconciliation : Yes

## 2025-06-30 NOTE — PT/OT/SLP PROGRESS
Physical Therapy Treatment    Patient Name:  Veronica Duque   MRN:  [id number]  Admit Date: 2025  Admitting Diagnosis:  Coronary artery disease   Length of Stay: 4 days  Recent Surgery: Procedure(s) (LRB):  CORONARY ARTERY BYPASS GRAFT (CABG) (N/A)  SURGICAL PROCUREMENT, VEIN, ENDOSCOPIC (Left) 4 Days Post-Op    Recommendations:     Discharge Recommendations:  Low Intensity Therapy   Discharge Equipment Recommendations: none   Barriers to discharge: none    Plan:     During this hospitalization, patient to be seen 5 x/week to address the listed problems via gait training, therapeutic activities, therapeutic exercises, neuromuscular re-education  Plan of Care Expires:  25  Plan of Care Reviewed with: patient, daughter, spouse    Assessment:     Veronica Duque is a 69 y.o. female admitted with a medical diagnosis of Coronary artery disease. Pt progressing towards goals, but not at PLOF. Pt tolerated session well.  Pt is improving with therapy evidenced by increase gait distance. Will continue to progress mobility per patient's tolerance. Pt would benefit from continued acute PT to maximize functional mobility after surgical intervention.           Problem List: weakness, impaired endurance, impaired functional mobility, gait instability, impaired balance, impaired cardiopulmonary response to activity.  Rehab Prognosis: Good     GOALS:   Multidisciplinary Problems       Physical Therapy Goals          Problem: Physical Therapy    Goal Priority Disciplines Outcome Interventions   Physical Therapy Goal     PT, PT/OT Progressing    Description: Goals to be met by: 7/15/2025    Patient will increase functional independence with mobility by performin. Supine to sit with Supervision  2. Sit to stand transfer with Supervision  3. Gait  x 400 feet with Supervision.   4. Ascend/descend 5 stairs with bilateral Handrails Supervision.                          DME Justifications:  No DME recommended  "requiring DME justifications    Subjective   Communicated with RN prior to session.  Patient found up in chair upon PT entry to room, agreeable to evaluation. Veronica Duque's daughter and  present during session.    Chief Complaint: none reported   Patient/Family Comments/goals: to get better   Pain/Comfort:  Pain Rating 1: 0/10  Pain Rating Post-Intervention 1: 0/10    Objective:   Patient found with: telemetry, pulse ox (continuous), blood pressure cuff, peripheral IV, chest tube, PureWick, external pacer   General Precautions: Standard, Cardiac fall (KMIT)   Orthopedic Precautions:N/A   Braces: N/A   Oxygen Device: Room Air  Vitals: BP (!) 125/59 (BP Location: Right arm, Patient Position: Sitting)   Pulse 83   Temp 97.5 °F (36.4 °C) (Oral)   Resp 18   Ht 5' 5" (1.651 m)   Wt 73.3 kg (161 lb 9.6 oz)   SpO2 97%   Breastfeeding No   BMI 26.89 kg/m²     Outcome Measures:  AM-PAC 6 CLICK MOBILITY  Turning over in bed (including adjusting bedclothes, sheets and blankets)?: 2  Sitting down on and standing up from a chair with arms (e.g., wheelchair, bedside commode, etc.): 3  Moving from lying on back to sitting on the side of the bed?: 2  Moving to and from a bed to a chair (including a wheelchair)?: 3  Need to walk in hospital room?: 3  Climbing 3-5 steps with a railing?: 2  Basic Mobility Total Score: 15     Functional Mobility:  Additional staff present: OT - due to pt requiring 2 skilled therapists to safely perform functional mobility and to accommodate pt's activity tolerance    Bed Mobility:   Not performed 2nd to pt found in the chair     Transfers:   Sit <> Stand Transfer: minimum assistance with no assistive device from the chair       Gait:  Patient ambulated: 10ft + 210ft  (ADLs standing at the sink between trials)  Patient required: standy by assistance  Patient used: no assistive device  Gait Pattern observed: reciprocal gait  Gait Deviation(s): decreased step length, decreased arm " "swing, and decreased jess  Impairments due to: impaired balance, decreased strength, and decreased endurance  Comments:   Portable monitor intact and RN present  Verbal cueing for normal arm swing, upright posture, and pacing       Therapeutic Activities, Exercises, and Education:   Educated pt on PT role/POC  Educated pt on importance of OOB activity and daily ambulation   Pt verbalized understanding     Therapist provided one-on-one education to patient regarding principles " Keep Your Move in the Tube" protocol. Explained the concept of keeping elbows close to the body to protect the sternum during healing.  Discussed load bearing and non-load-bearing movements "in the tube" and "out of the tube". Reviewed recommendation using baseline pain to guide tolerated movements in and out of the tube. Patient understood the instructions and had no questions.     Pt performed ADLs standing at the sink with OT    Patient left up in chair with all lines intact, call button in reach, and RN notified and  and daughter present    Time Tracking:     PT Received On: 06/30/25  PT Start Time: 0844     PT Stop Time: 0912  PT Total Time (min): 28 min       Billable Minutes:   Gait Training 15 and Therapeutic Activity 8    Treatment Type: Treatment  PT/PTA: PT               "

## 2025-06-30 NOTE — PT/OT/SLP PROGRESS
"Occupational Therapy  Co Treatment    Name: Veronica Duque  MRN: [id number]  Admitting Diagnosis:  Coronary artery disease  4 Days Post-Op    Recommendations:     Discharge Recommendations: No Therapy Indicated    Assessment:     Veronica Duque is a 69 y.o. female with a medical diagnosis of Coronary artery disease. Performance deficits affecting function are weakness, impaired endurance, impaired self care skills, impaired functional mobility, gait instability, impaired balance, impaired cardiopulmonary response to activity, edema. Pt tolerated session well with VSS throughout session.     Rehab Prognosis:  Good; patient would benefit from acute skilled OT services to address these deficits and reach maximum level of function.       Plan:     Patient to be seen 5 x/week to address the above listed problems via self-care/home management, neuromuscular re-education, therapeutic activities, therapeutic exercises  Plan of Care Expires: 08/01/25  Plan of Care Reviewed with: patient, daughter, spouse    Subjective     Chief Complaint: "this feels wonderful" re: washing her face  Patient/Family Comments/goals: to get better  Pain/Comfort:  Pain Rating 1: 0/10    Objective:     Communicated with: nsg prior to session.  Patient found up in chair with blood pressure cuff, central line, chest tube, myrick catheter, peripheral IV, pulse ox (continuous), telemetry, purewick, external pacer upon OT entry to room.    [place] completed this date to optimize functional safety and performance due to impaired tolerance to activity and increased medical acuity in the setting of the ICU    General Precautions: Standard, fall  KMIT  Respiratory Status: Room air     Occupational Performance:   Functional Mobility/Transfers:  Patient completed Sit <> Stand Transfer with minimum assistance  with  no assistive device   Functional Mobility: completed to the sink and in the hallway with SBA    Activities of Daily " Living:  Grooming: stand by assistance completed at the sink       [facility] 6 Click ADL: 18    Treatment & Education:  Pt stood from chair with MIN A completed functional mobility to the sink and performed grooming and hygiene with SBA for approx 10 minutes. Pt completed functional mobility in the hallway to challenge cardiopulmonary response to activity. Pt returned seated in the chair and scooted back in the chair with TOTAL A.   Edu provided re role of OT and safety with ADLs  Edu provided re keep your move in the tube. Including using baseline pain to guide tolerated movements in and out of the tube.     Patient left up in chair with all lines intact and call button in reach    GOALS:   Multidisciplinary Problems       Occupational Therapy Goals          Problem: Occupational Therapy    Goal Priority Disciplines Outcome Interventions   Occupational Therapy Goal     OT, PT/OT Progressing    Description: Goals to be met by: 7/4/25     Patient will increase functional independence with ADLs by performing:    LE Dressing with Stand-by Assistance.  Grooming while standing at sink with Stand-by Assistance.- met 6/30   Toileting from toilet with Stand-by Assistance for hygiene and clothing management.   Toilet transfer to toilet with Stand-by Assistance.                       Time Tracking:     OT Date of Treatment: 06/30/25  OT Start Time: 0844  OT Stop Time: 0912  OT Total Time (min): 28 min    Billable Minutes:Self Care/Home Management 12  Therapeutic Activity 16    OT/GARRY: OT          6/30/2025

## 2025-06-30 NOTE — PLAN OF CARE
Problem: Occupational Therapy  Goal: Occupational Therapy Goal  Description: Goals to be met by: 7/4/25     Patient will increase functional independence with ADLs by performing:    LE Dressing with Stand-by Assistance.  Grooming while standing at sink with Stand-by Assistance.- met 6/30  Toileting from toilet with Stand-by Assistance for hygiene and clothing management.   Toilet transfer to toilet with Stand-by Assistance.    Outcome: Progressing

## 2025-06-30 NOTE — HOSPITAL COURSE
68 yo female with hx of HTN, HLD, CVA with no residual deficits, PAD s/p R SFA stenting by Dr. Valle (non-obstructive L CFA/SFA) who was recently admitted for NSTEMI. The patient presents to the SICU s/p CABG with Dr. Flores on 06/26/2025. She was fast tracked and extubated on 6/26.     6/28: intermittently went into afib with RVR. Started on amiodarone.  6/29: had bowel movement. Lines removed. Lasix 40 BID.   6/30: ready for stepdown

## 2025-06-30 NOTE — ASSESSMENT & PLAN NOTE
68 y/o female with hx of CAD admitted to SICU s/p 2 vessel CABG with Dr. Flores 6/26. POD 4.       Neuro/Psych:     - Sedation: none    - Pain:     - Tylenol 1000mg Q8H PRN   - PRN Oxycodone 5mg. 5 mg x1 over last 24 hrs              Cardiac:     - S/p CABG x 2 with Dr. Flores 6/26    - BP Goal: MAP . HR 68-88. MAP  last 24 hrs    - amiodarone 400 TID, Metoprolol 25 BID    - CT output meds 70, pleural 120    - Anti-HTNs: off gtt    - Rhythm: NSR    - episode of SVT post op, resolved    - Beta Blocker: metoprolol 25 BID    - Statin: ezetimibe       Pulmonary:     - room air to 2L NC    - Goal SpO2 >90%    - Chest Tubes x 4 (2 Meds & 2 Pleural):    - ABGS PRN         Renal:    - Trend BUN/Cr. Baseline 0.8     - strict Is/Os    - UOP 1790 last 24hrs. Net -1880     - Lasix 40 BID      Recent Labs   Lab 06/29/25  0248 06/29/25  1448 06/30/25  0312   BUN 37* 34* 31*   CREATININE 1.0 0.8 0.8           FEN / GI:     Feeding - cardiac diet    Potassium 3.5 this AM. Additional KCL 40 meq ordered       - Daily CMP, PRN K/Mag/Phos per protocol     - Replace electrolytes as needed    - Bowel Regimen: Miralax, docusate, titrated to 1 BM per day. Last BM 6/29      ID:     - Aebrile    Recent Labs   Lab 06/28/25  0405 06/29/25  0248 06/30/25  0312   WBC 18.94* 15.26* 11.29           Heme/Onc:     - Hgb 9.8 from 9.4    - CBC daily    - ASA 81 daily    - DVT ppx - lovenox    Recent Labs   Lab 06/26/25  1247 06/27/25  0307 06/28/25  0405 06/29/25  0248 06/30/25  0312   HGB 9.4* 9.8* 9.8* 9.4* 9.1*    212 237 234 258   APTT 30.6 29.0  --   --   --    INR 1.3* 1.2  --   --   --            Endocrine:     - CTS Goal -140. Ranged 105-122 last 24hrs     - HgbA1c: 5.4    - Endocrinology consulted for insulin management    - subq insulin       PPx:   Feeding: cardiac diet  Analgesia/Sedation: tylenol, oxy prn   Thromboembolic Prevention: Lovenox   HOB >30: Yes  Stress Ulcer: Yes - famotidine 20mg PO daily   Glucose  Control: subq insulin  Bowel regimen - miralax, senna BID     Lines/Drains/Airway:   Chest Tubes: 4 (2 Mediastinal, 2 Pleural) - dc today   Pacing Wires: Temporary V pacing wires      Dispo/Code Status/Palliative:     - step down    - Full Code

## 2025-06-30 NOTE — PLAN OF CARE
Patient transferred to [facility] for continued medical management.     CM/SW has been discussing discharge plan    Discharge Plan A: Home with family  Discharge Plan B: Home    with patient and Extended Emergency Contact Information  Primary Emergency Contact: Adriana Holguin   [facility]  Home Phone: [phone]  Relation: Daughter.     Jayme Viera RN, BSN  Case Management  [phone]

## 2025-06-30 NOTE — PROGRESS NOTES
[facility]  Critical Care - Surgery  Progress Note    Patient Name: Veronica Duque  MRN: [id number]  Admission Date: 6/26/2025  Hospital Length of Stay: 4 days  Code Status: Full Code  Attending Provider: Lamont Flores MD  Primary Care Provider: Juju Wallace MD   Principal Problem: Coronary artery disease    Subjective:     Hospital/ICU Course:  70 yo female with hx of HTN, HLD, CVA with no residual deficits, PAD s/p R SFA stenting by Dr. Valle (non-obstructive L CFA/SFA) who was recently admitted for NSTEMI. The patient presents to the SICU s/p CABG with Dr. Flores on 06/26/2025. She was fast tracked and extubated on 6/26.     6/28: intermittently went into afib with RVR. Started on amiodarone.  6/29: had bowel movement. Lines removed. Lasix 40 BID.   6/30: ready for stepdown     Interval History/Significant Events: NAEON. Denies any acute complaints. Denies n/v/d, abd pain, chest pain, dyspnea.     Follow-up For: Procedure(s) (LRB):  CORONARY ARTERY BYPASS GRAFT (CABG) (N/A)  SURGICAL PROCUREMENT, VEIN, ENDOSCOPIC (Left)    Post-Operative Day: 4 Days Post-Op    Objective:     Vital Signs (Most Recent):  Temp: 97.8 °F (36.6 °C) (06/30/25 0301)  Pulse: 79 (06/30/25 0737)  Resp: (!) 29 (06/30/25 0737)  BP: (!) 160/69 (06/30/25 0700)  SpO2: 96 % (06/30/25 0737) Vital Signs (24h Range):  Temp:  [97.7 °F (36.5 °C)-98.2 °F (36.8 °C)] 97.8 °F (36.6 °C)  Pulse:  [70-88] 79  Resp:  [18-40] 29  SpO2:  [89 %-100 %] 96 %  BP: (111-171)/(56-90) 160/69     Weight: 75 kg (165 lb 5.5 oz)  Body mass index is 27.51 kg/m².      Intake/Output Summary (Last 24 hours) at 6/30/2025 0743  Last data filed at 6/30/2025 0301  Gross per 24 hour   Intake 65.59 ml   Output 1950 ml   Net -1884.41 ml          Physical Exam  Constitutional:       Appearance: Normal appearance.   HENT:      Head: Normocephalic and atraumatic.      Mouth/Throat:      Mouth: Mucous membranes are moist.      Pharynx: Oropharynx is  clear.   Eyes:      Extraocular Movements: Extraocular movements intact.      Pupils: Pupils are equal, round, and reactive to light.   Cardiovascular:      Rate and Rhythm: Normal rate and regular rhythm.   Pulmonary:      Effort: No respiratory distress.      Breath sounds: Normal breath sounds.   Abdominal:      Palpations: Abdomen is soft.      Tenderness: There is no abdominal tenderness.   Musculoskeletal:         General: No tenderness. Normal range of motion.      Cervical back: Normal range of motion and neck supple.      Right lower leg: Edema present.      Left lower leg: Edema present.   Skin:     General: Skin is warm and dry.   Neurological:      General: No focal deficit present.      Mental Status: She is alert. Mental status is at baseline.            Lines/Drains/Airways       Drain  Duration                  Y Chest Tube 1 and 2 06/26/25 1049 1 Anterior Mediastinal 28 Fr. 2 Anterior Mediastinal 28 Fr. 3 days         Y Chest Tube 3 and 4 06/26/25 1049 3 Right Pleural 28 Fr. 4 Left Pleural 28 Fr. 3 days    Female External Urinary Catheter w/ Suction 06/29/25 1737 <1 day              Line  Duration                  Pacer Wires 06/26/25 1028 3 days              Peripheral Intravenous Line  Duration             Peripheral IV 06/26/25 0545 20 G Anterior;Left Forearm 4 days    Peripheral IV 06/26/25 0805 18 G Left Hand 3 days                    Significant Labs:    CBC/Anemia Profile:  Recent Labs   Lab 06/29/25  0248 06/30/25  0312   WBC 15.26* 11.29   HGB 9.4* 9.1*   HCT 29.3* 28.6*    258   MCV 69* 70*   RDW 23.6* 23.6*        Chemistries:  Recent Labs   Lab 06/29/25  0248 06/29/25  1448 06/30/25  0312    141 141   K 3.6 3.7 3.5    107 107   CO2 25 24 26   BUN 37* 34* 31*   CREATININE 1.0 0.8 0.8   CALCIUM 8.6* 9.0 8.3*   ALBUMIN  --   --  2.9*   PROT  --   --  5.1*   BILITOT  --   --  0.8   ALKPHOS  --   --  78   ALT  --   --  12   AST  --   --  19   MG 2.1 1.9 1.8   PHOS 2.7 1.9*  2.6*       Significant Imaging:  I have reviewed all pertinent imaging results/findings within the past 24 hours.  Assessment/Plan:     Cardiac/Vascular  * Coronary artery disease  68 y/o female with hx of CAD admitted to SICU s/p 2 vessel CABG with Dr. Flores 6/26. POD 4.       Neuro/Psych:     - Sedation: none    - Pain:     - Tylenol 1000mg Q8H PRN   - PRN Oxycodone 5mg. 5 mg x1 over last 24 hrs              Cardiac:     - S/p CABG x 2 with Dr. Flores 6/26    - BP Goal: MAP . HR 68-88. MAP  last 24 hrs    - amiodarone 400 TID, Metoprolol 25 BID    - CT output meds 70, pleural 120    - Anti-HTNs: off gtt    - Rhythm: NSR    - episode of SVT post op, resolved    - Beta Blocker: metoprolol 25 BID    - Statin: ezetimibe       Pulmonary:     - room air to 2L NC    - Goal SpO2 >90%    - Chest Tubes x 4 (2 Meds & 2 Pleural):    - ABGS PRN         Renal:    - Trend BUN/Cr. Baseline 0.8     - strict Is/Os    - UOP 1790 last 24hrs. Net -1880     - Lasix 40 BID      Recent Labs   Lab 06/29/25  0248 06/29/25  1448 06/30/25  0312   BUN 37* 34* 31*   CREATININE 1.0 0.8 0.8           FEN / GI:     Feeding - cardiac diet    Potassium 3.5 this AM. Additional KCL 40 meq ordered       - Daily CMP, PRN K/Mag/Phos per protocol     - Replace electrolytes as needed    - Bowel Regimen: Miralax, docusate, titrated to 1 BM per day. Last BM 6/29      ID:     - Aebrile    Recent Labs   Lab 06/28/25  0405 06/29/25  0248 06/30/25  0312   WBC 18.94* 15.26* 11.29           Heme/Onc:     - Hgb 9.8 from 9.4    - CBC daily    - ASA 81 daily    - DVT ppx - lovenox    Recent Labs   Lab 06/26/25  1247 06/27/25  0307 06/28/25  0405 06/29/25  0248 06/30/25  0312   HGB 9.4* 9.8* 9.8* 9.4* 9.1*    212 237 234 258   APTT 30.6 29.0  --   --   --    INR 1.3* 1.2  --   --   --            Endocrine:     - CTS Goal -140. Ranged 105-122 last 24hrs     - HgbA1c: 5.4    - Endocrinology consulted for insulin management    - subq insulin        PPx:   Feeding: cardiac diet  Analgesia/Sedation: tylenol, oxy prn   Thromboembolic Prevention: Lovenox   HOB >30: Yes  Stress Ulcer: Yes - famotidine 20mg PO daily   Glucose Control: subq insulin  Bowel regimen - miralax, senna BID     Lines/Drains/Airway:   Chest Tubes: 4 (2 Mediastinal, 2 Pleural) - dc today   Pacing Wires: Temporary V pacing wires      Dispo/Code Status/Palliative:     - step down    - Full Code          Danielle Eaton MD  Critical Care - Surgery  [facility]

## 2025-06-30 NOTE — NURSING TRANSFER
Nursing Transfer Note      6/30/2025   12:36 PM    Nurse giving handoff:WOODY Ferreira  Nurse receiving handoff:WOODY Head    Reason patient is being transferred: stable for stepdown from ICU    Transfer To:     Transfer via wheelchair    Transfer with cardiac monitoring, pacer wires attached to pacer with backup rate of 50BPM.    Transported by WOODY Ferreira    Transfer Vital Signs:  Blood Pressure:126/58  Heart Rate:74  O2: 97%  Temperature:98.2  Respirations:25    Telemetry: Box Number [id number]  Order for Tele Monitor? Yes    Additional Lines: n/a      Any special needs or follow-up needed: n/a    Patient belongings transferred with patient: Yes    Chart send with patient: Yes    Notified: spouse and daughter at bedside, transferred with patient    Patient reassessed at: 1226 6/30/25   Upon arrival to floor: patient oriented to room and call bell in reach when assisted into chair; care assumed by WOODY Head.

## 2025-06-30 NOTE — PLAN OF CARE
Plan of care reviewed with patient. Patient is AOX4 and VS stable. Patient remained free of falls and trauma, fall precautions are in place. Patient is ambulating with stand by assist. Patient has no complaints of pain. Patient has no questions at this time. Wheels are locked and the bed is in lowest position. The call bell is within reach. Telemetry is on. Plan of care ongoing.       Problem: Adult Inpatient Plan of Care  Goal: Plan of Care Review  Outcome: Progressing  Goal: Patient-Specific Goal (Individualized)  Outcome: Progressing  Goal: Absence of Hospital-Acquired Illness or Injury  Outcome: Progressing  Goal: Optimal Comfort and Wellbeing  Outcome: Progressing  Goal: Readiness for Transition of Care  Outcome: Progressing     Problem: Infection  Goal: Absence of Infection Signs and Symptoms  Outcome: Progressing     Problem: Wound  Goal: Optimal Coping  Outcome: Progressing  Goal: Optimal Functional Ability  Outcome: Progressing

## 2025-06-30 NOTE — SUBJECTIVE & OBJECTIVE
"Interval HPI:   No acute events overnight. Patient in room 37548/35954 A. Blood glucose stable. BG at goal on current insulin regimen (SSI ). Steroid use- None .   4 Days Post-Op  Renal function- Normal   Vasopressors-  None     Diet Heart Healthy Fluid - 1500mL; Standard Tray     Eating:   <25%  Nausea: No  Hypoglycemia and intervention: No  Fever: No  TPN and/or TF: No     BP (!) 144/66   Pulse 79   Temp 98.3 °F (36.8 °C) (Oral)   Resp (!) 29   Ht 5' 5" (1.651 m)   Wt 75 kg (165 lb 5.5 oz)   SpO2 96%   Breastfeeding No   BMI 27.51 kg/m²     Labs Reviewed and Include    Recent Labs   Lab 06/30/25  0312   *   CALCIUM 8.3*   ALBUMIN 2.9*   PROT 5.1*      K 3.5   CO2 26      BUN 31*   CREATININE 0.8   ALKPHOS 78   ALT 12   AST 19   BILITOT 0.8     Lab Results   Component Value Date    WBC 11.29 06/30/2025    HGB 9.1 (L) 06/30/2025    HCT 28.6 (L) 06/30/2025    MCV 70 (L) 06/30/2025     06/30/2025     No results for input(s): "TSH", "FREET4" in the last 168 hours.  Lab Results   Component Value Date    HGBA1C 5.4 06/19/2025       Nutritional status:   Body mass index is 27.51 kg/m².  Lab Results   Component Value Date    ALBUMIN 2.9 (L) 06/30/2025    ALBUMIN 4.3 06/21/2025    ALBUMIN 4.0 06/18/2025     No results found for: "PREALBUMIN"    Estimated Creatinine Clearance: 65.4 mL/min (based on SCr of 0.8 mg/dL).    Accu-Checks  Recent Labs     06/28/25  1253 06/28/25  1710 06/28/25  2105 06/29/25  0249 06/29/25  0758 06/29/25  1149 06/29/25  1705 06/29/25  2159 06/30/25  0313 06/30/25  0811   POCTGLUCOSE 142* 168* 131* 110 110 137* 121* 173* 127* 105       Current Medications and/or Treatments Impacting Glycemic Control  Immunotherapy:    Immunosuppressants       None          Steroids:   Hormones (From admission, onward)      None          Pressors:    Autonomic Drugs (From admission, onward)      None          Hyperglycemia/Diabetes Medications:   Antihyperglycemics (From admission, " onward)      Start     Stop Route Frequency Ordered    06/29/25 1059  insulin aspart U-100 pen 0-5 Units         -- SubQ Before meals & nightly PRN 06/29/25 2574

## 2025-06-30 NOTE — PLAN OF CARE
06/30/25 1030   Rounds   Attendance Provider;Nurse    Discharge Plan A Home with family   Why the patient remains in the hospital Requires continued medical care   Transition of Care Barriers None     CM met with Provider during rounds to discuss discharge planning.  Chest tube removed. Patient will stepdown today.     Jayme Viera, RN, BSN  Case Management  [phone]

## 2025-06-30 NOTE — PROGRESS NOTES
"[facility]  Endocrinology  Progress Note    Admit Date: 6/26/2025     Reason for Consult: Management of  Hyperglycemia     Surgical Procedure: CABG 6/26/25    No known hx of DM and not on meds for DM.    HPI:   Patient is a 69 y.o. female with hx of HTN, HLD, CVA with no residual deficits, PAD s/p R SFA stenting by Dr. Valle (non-obstructive L CFA/SFA) who was recently admitted for NSTEMI. S/p CABG with Dr. Flores on 06/26/2025.  Endocrine consulted for BG management    Interval HPI:   No acute events overnight. Patient in room 85924/02379 A. Blood glucose stable. BG at goal on current insulin regimen (SSI ). Steroid use- None .   4 Days Post-Op  Renal function- Normal   Vasopressors-  None     Diet Heart Healthy Fluid - 1500mL; Standard Tray     Eating:   <25%  Nausea: No  Hypoglycemia and intervention: No  Fever: No  TPN and/or TF: No     BP (!) 144/66   Pulse 79   Temp 98.3 °F (36.8 °C) (Oral)   Resp (!) 29   Ht 5' 5" (1.651 m)   Wt 75 kg (165 lb 5.5 oz)   SpO2 96%   Breastfeeding No   BMI 27.51 kg/m²     Labs Reviewed and Include    Recent Labs   Lab 06/30/25  0312   *   CALCIUM 8.3*   ALBUMIN 2.9*   PROT 5.1*      K 3.5   CO2 26      BUN 31*   CREATININE 0.8   ALKPHOS 78   ALT 12   AST 19   BILITOT 0.8     Lab Results   Component Value Date    WBC 11.29 06/30/2025    HGB 9.1 (L) 06/30/2025    HCT 28.6 (L) 06/30/2025    MCV 70 (L) 06/30/2025     06/30/2025     No results for input(s): "TSH", "FREET4" in the last 168 hours.  Lab Results   Component Value Date    HGBA1C 5.4 06/19/2025       Nutritional status:   Body mass index is 27.51 kg/m².  Lab Results   Component Value Date    ALBUMIN 2.9 (L) 06/30/2025    ALBUMIN 4.3 06/21/2025    ALBUMIN 4.0 06/18/2025     No results found for: "PREALBUMIN"    Estimated Creatinine Clearance: 65.4 mL/min (based on SCr of 0.8 mg/dL).    Accu-Checks  Recent Labs     06/28/25  1253 06/28/25  1710 06/28/25  2009 " 06/29/25  0249 06/29/25  0758 06/29/25  1149 06/29/25  1705 06/29/25  2159 06/30/25  0313 06/30/25  0811   POCTGLUCOSE 142* 168* 131* 110 110 137* 121* 173* 127* 105       Current Medications and/or Treatments Impacting Glycemic Control  Immunotherapy:    Immunosuppressants       None          Steroids:   Hormones (From admission, onward)      None          Pressors:    Autonomic Drugs (From admission, onward)      None          Hyperglycemia/Diabetes Medications:   Antihyperglycemics (From admission, onward)      Start     Stop Route Frequency Ordered    06/29/25 1054  insulin aspart U-100 pen 0-5 Units         -- SubQ Before meals & nightly PRN 06/29/25 0954            ASSESSMENT and PLAN    Cardiac/Vascular  * Coronary artery disease  Managed by primary team  Optimize BG control      PAD (peripheral artery disease)  Managed by primary team  Optimize BG control      Endocrine  Transient hyperglycemia post procedure  BG goal: 110-140   No known history of diabetes. S/p CTS sx.         - continue NovoLog low-dose correction 150/50  - POCT Glucose a.c. HS  - Hypoglycemia protocol in place      ** Please notify Endocrine for any change and/or advance in diet**  ** Please call Endocrine for any BG related issues **     Discharge Planning:   TBD. Please notify endocrinology prior to discharge.            Rohan Tavares PA-C  Endocrinology  [facility]

## 2025-06-30 NOTE — EICU
Intervention Initiated From:  COR / PRASHANT Oconnor intervened regarding:  Rounding (Video assessment)    .v

## 2025-07-01 DIAGNOSIS — Z98.890 POST-OPERATIVE STATE: Primary | ICD-10-CM

## 2025-07-01 PROBLEM — Z95.1 S/P CABG (CORONARY ARTERY BYPASS GRAFT): Status: ACTIVE | Noted: 2025-07-01

## 2025-07-01 LAB
ABSOLUTE EOSINOPHIL (OHS): 0.3 K/UL
ABSOLUTE MONOCYTE (OHS): 1.22 K/UL (ref 0.3–1)
ABSOLUTE NEUTROPHIL COUNT (OHS): 7 K/UL (ref 1.8–7.7)
ALBUMIN SERPL BCP-MCNC: 2.7 G/DL (ref 3.5–5.2)
ALP SERPL-CCNC: 119 UNIT/L (ref 40–150)
ALT SERPL W/O P-5'-P-CCNC: 39 UNIT/L (ref 10–44)
ANION GAP (OHS): 9 MMOL/L (ref 8–16)
AST SERPL-CCNC: 43 UNIT/L (ref 11–45)
BASOPHILS # BLD AUTO: 0.04 K/UL
BASOPHILS NFR BLD AUTO: 0.4 %
BILIRUB SERPL-MCNC: 1.1 MG/DL (ref 0.1–1)
BUN SERPL-MCNC: 25 MG/DL (ref 8–23)
CALCIUM SERPL-MCNC: 8 MG/DL (ref 8.7–10.5)
CHLORIDE SERPL-SCNC: 107 MMOL/L (ref 95–110)
CO2 SERPL-SCNC: 26 MMOL/L (ref 23–29)
CREAT SERPL-MCNC: 0.7 MG/DL (ref 0.5–1.4)
ERYTHROCYTE [DISTWIDTH] IN BLOOD BY AUTOMATED COUNT: 23.9 % (ref 11.5–14.5)
GFR SERPLBLD CREATININE-BSD FMLA CKD-EPI: >60 ML/MIN/1.73/M2
GLUCOSE SERPL-MCNC: 113 MG/DL (ref 70–110)
HCT VFR BLD AUTO: 29.9 % (ref 37–48.5)
HGB BLD-MCNC: 9.8 GM/DL (ref 12–16)
IMM GRANULOCYTES # BLD AUTO: 0.07 K/UL (ref 0–0.04)
IMM GRANULOCYTES NFR BLD AUTO: 0.7 % (ref 0–0.5)
LYMPHOCYTES # BLD AUTO: 1.73 K/UL (ref 1–4.8)
MAGNESIUM SERPL-MCNC: 1.9 MG/DL (ref 1.6–2.6)
MCH RBC QN AUTO: 22.7 PG (ref 27–31)
MCHC RBC AUTO-ENTMCNC: 32.8 G/DL (ref 32–36)
MCV RBC AUTO: 69 FL (ref 82–98)
NUCLEATED RBC (/100WBC) (OHS): 0 /100 WBC
PHOSPHATE SERPL-MCNC: 3.3 MG/DL (ref 2.7–4.5)
PLATELET # BLD AUTO: 324 K/UL (ref 150–450)
PMV BLD AUTO: 10.1 FL (ref 9.2–12.9)
POCT GLUCOSE: 117 MG/DL (ref 70–110)
POCT GLUCOSE: 123 MG/DL (ref 70–110)
POCT GLUCOSE: 145 MG/DL (ref 70–110)
POTASSIUM SERPL-SCNC: 3.5 MMOL/L (ref 3.5–5.1)
PROT SERPL-MCNC: 5.1 GM/DL (ref 6–8.4)
RBC # BLD AUTO: 4.32 M/UL (ref 4–5.4)
RELATIVE EOSINOPHIL (OHS): 2.9 %
RELATIVE LYMPHOCYTE (OHS): 16.7 % (ref 18–48)
RELATIVE MONOCYTE (OHS): 11.8 % (ref 4–15)
RELATIVE NEUTROPHIL (OHS): 67.5 % (ref 38–73)
SODIUM SERPL-SCNC: 142 MMOL/L (ref 136–145)
WBC # BLD AUTO: 10.36 K/UL (ref 3.9–12.7)

## 2025-07-01 PROCEDURE — 25000003 PHARM REV CODE 250: Mod: HCNC

## 2025-07-01 PROCEDURE — 11000001 HC ACUTE MED/SURG PRIVATE ROOM: Mod: HCNC

## 2025-07-01 PROCEDURE — 83735 ASSAY OF MAGNESIUM: CPT | Mod: HCNC

## 2025-07-01 PROCEDURE — 85025 COMPLETE CBC W/AUTO DIFF WBC: CPT | Mod: HCNC

## 2025-07-01 PROCEDURE — 80053 COMPREHEN METABOLIC PANEL: CPT | Mod: HCNC

## 2025-07-01 PROCEDURE — 97116 GAIT TRAINING THERAPY: CPT | Mod: HCNC

## 2025-07-01 PROCEDURE — 25000003 PHARM REV CODE 250: Mod: HCNC | Performed by: STUDENT IN AN ORGANIZED HEALTH CARE EDUCATION/TRAINING PROGRAM

## 2025-07-01 PROCEDURE — 94664 DEMO&/EVAL PT USE INHALER: CPT | Mod: HCNC

## 2025-07-01 PROCEDURE — 99232 SBSQ HOSP IP/OBS MODERATE 35: CPT | Mod: HCNC,,, | Performed by: PHYSICIAN ASSISTANT

## 2025-07-01 PROCEDURE — 94761 N-INVAS EAR/PLS OXIMETRY MLT: CPT | Mod: HCNC

## 2025-07-01 PROCEDURE — 97535 SELF CARE MNGMENT TRAINING: CPT | Mod: HCNC

## 2025-07-01 PROCEDURE — 84100 ASSAY OF PHOSPHORUS: CPT | Mod: HCNC

## 2025-07-01 PROCEDURE — 63600175 PHARM REV CODE 636 W HCPCS: Mod: HCNC

## 2025-07-01 PROCEDURE — 36415 COLL VENOUS BLD VENIPUNCTURE: CPT | Mod: HCNC

## 2025-07-01 PROCEDURE — 94799 UNLISTED PULMONARY SVC/PX: CPT | Mod: HCNC

## 2025-07-01 PROCEDURE — 99900035 HC TECH TIME PER 15 MIN (STAT): Mod: HCNC

## 2025-07-01 RX ADMIN — AMIODARONE HYDROCHLORIDE 400 MG: 200 TABLET ORAL at 08:07

## 2025-07-01 RX ADMIN — ASPIRIN 81 MG CHEWABLE TABLET 81 MG: 81 TABLET CHEWABLE at 08:07

## 2025-07-01 RX ADMIN — METOPROLOL TARTRATE 25 MG: 25 TABLET, FILM COATED ORAL at 08:07

## 2025-07-01 RX ADMIN — FUROSEMIDE 40 MG: 10 INJECTION, SOLUTION INTRAVENOUS at 06:07

## 2025-07-01 RX ADMIN — FUROSEMIDE 40 MG: 10 INJECTION, SOLUTION INTRAVENOUS at 08:07

## 2025-07-01 RX ADMIN — AMIODARONE HYDROCHLORIDE 400 MG: 200 TABLET ORAL at 03:07

## 2025-07-01 RX ADMIN — FAMOTIDINE 20 MG: 20 TABLET, FILM COATED ORAL at 08:07

## 2025-07-01 RX ADMIN — EZETIMIBE 10 MG: 10 TABLET ORAL at 08:07

## 2025-07-01 RX ADMIN — SENNOSIDES 8.6 MG: 8.6 TABLET, FILM COATED ORAL at 08:07

## 2025-07-01 NOTE — ASSESSMENT & PLAN NOTE
- Can be expected postoperatively however prior history to arrival  - CBC daily to monitor trends   - No need for replacement today  - Todays  9.8/29.9

## 2025-07-01 NOTE — ASSESSMENT & PLAN NOTE
- Maintain sternal precautions   - ASA  - BB  - Statin intolerant; continue zetia  - Lasix with scheduled potassium ordered   - Encourage ambulation  - PT/OT  - Cardiac diet with 1500 cc fluid restriction   - Bowel regimen in place   - Famotidine for ulcer prevention   - Pacer wires removed today  - Chest tubes previously removed   - Central Line previously removed   - Larose previously removed   - Monitor electrolytes to keep Mag above 2 and Potassium above 4  - OOBTO   - Encourage IS use     REVIEW OF SYSTEMS  Constitutional: No fever, No Chills, +fatigue  HEENT: No eye pain, +Double vision  Pulm: No cough,  No shortness of breath  Cardio: No chest pain, No palpitations  GI:  No abdominal pain, No nausea, No vomiting, No diarrhea, No constipation  : No dysuria, No frequency, No hematuria  Neuro: No headaches, +memory loss, +weakness, +chronic numbness  Skin: +Abrasions from falling  MSK: No joint pain, No joint swelling, No muscle pain, No Neck or back pain  Psych:  No depression, No anxiety

## 2025-07-01 NOTE — PROGRESS NOTES
"[facility] - [facility] Stepdown  Endocrinology  Progress Note    Admit Date: 6/26/2025     Reason for Consult: Management of  Hyperglycemia     Surgical Procedure: CABG 6/26/25    No known hx of DM and not on meds for DM.    HPI:   Patient is a 69 y.o. female with hx of HTN, HLD, CVA with no residual deficits, PAD s/p R SFA stenting by Dr. Valle (non-obstructive L CFA/SFA) who was recently admitted for NSTEMI. S/p CABG with Dr. Flores on 06/26/2025.  Endocrine consulted for BG management    Interval HPI:   No acute events overnight. Patient in room 315/315 A. Blood glucose stable. BG at goal on current insulin regimen (SSI ). Steroid use- None .   5 Days Post-Op  Renal function- Normal   Vasopressors-  None     Diet Heart Healthy Fluid - 1500mL; Standard Tray     Eating:   <25%  Nausea: No  Hypoglycemia and intervention: No  Fever: No  TPN and/or TF: No    BP (!) 148/67 (BP Location: Left arm, Patient Position: Sitting)   Pulse 87   Temp 97 °F (36.1 °C) (Oral)   Resp 16   Ht 5' 5" (1.651 m)   Wt 73.3 kg (161 lb 9.6 oz)   SpO2 95%   Breastfeeding No   BMI 26.89 kg/m²     Labs Reviewed and Include    Recent Labs   Lab 07/01/25  0559   *   CALCIUM 8.0*   ALBUMIN 2.7*   PROT 5.1*      K 3.5   CO2 26      BUN 25*   CREATININE 0.7   ALKPHOS 119   ALT 39   AST 43   BILITOT 1.1*     Lab Results   Component Value Date    WBC 10.36 07/01/2025    HGB 9.8 (L) 07/01/2025    HCT 29.9 (L) 07/01/2025    MCV 69 (L) 07/01/2025     07/01/2025     No results for input(s): "TSH", "FREET4" in the last 168 hours.  Lab Results   Component Value Date    HGBA1C 5.4 06/19/2025       Nutritional status:   Body mass index is 26.89 kg/m².  Lab Results   Component Value Date    ALBUMIN 2.7 (L) 07/01/2025    ALBUMIN 2.9 (L) 06/30/2025    ALBUMIN 4.3 06/21/2025     No results found for: "PREALBUMIN"    Estimated Creatinine Clearance: 73.9 mL/min (based on SCr of 0.7 mg/dL).    Accu-Checks  Recent Labs     " 06/28/25  2105 06/29/25  0249 06/29/25  0758 06/29/25  1149 06/29/25  1705 06/29/25  2159 06/30/25  0313 06/30/25  0811 06/30/25  1123 06/30/25  1543   POCTGLUCOSE 131* 110 110 137* 121* 173* 127* 105 150* 123*       Current Medications and/or Treatments Impacting Glycemic Control  Immunotherapy:    Immunosuppressants       None          Steroids:   Hormones (From admission, onward)      None          Pressors:    Autonomic Drugs (From admission, onward)      None          Hyperglycemia/Diabetes Medications:   Antihyperglycemics (From admission, onward)      Start     Stop Route Frequency Ordered    06/29/25 1054  insulin aspart U-100 pen 0-5 Units         -- SubQ Before meals & nightly PRN 06/29/25 0954            ASSESSMENT and PLAN    Cardiac/Vascular  * Coronary artery disease  Managed by primary team  Optimize BG control      PAD (peripheral artery disease)  Managed by primary team  Optimize BG control      Endocrine  Transient hyperglycemia post procedure  BG goal: 110-140   No known history of diabetes. S/p CTS sx.      Given stability, endocrine likely will sign off             Rohan Tavares PA-C  Endocrinology  [facility] Stepdown

## 2025-07-01 NOTE — PLAN OF CARE
07/01/25 1315   Rounds   Attendance ;Assigned nurse;Charge nurse  (unit-based STEF)   Discharge Plan A Home with family   Why the patient remains in the hospital Requires continued medical care   Transition of Care Barriers None     Pt just stepped down to CSU and still has pacer wires.  SHITAL 7/4.      Amber Kimble LMSW  [facility]  [phone]

## 2025-07-01 NOTE — ASSESSMENT & PLAN NOTE
BG goal: 110-140   No known history of diabetes. S/p CTS sx.  If remains stable, endocrine likely will sign off       - continue NovoLog low-dose correction 150/50  - POCT Glucose a.c. HS  - Hypoglycemia protocol in place      ** Please notify Endocrine for any change and/or advance in diet**  ** Please call Endocrine for any BG related issues **     Discharge Planning:   TBD. Please notify endocrinology prior to discharge.

## 2025-07-01 NOTE — PT/OT/SLP PROGRESS
Occupational Therapy   Treatment    Name: Veronica Duque  MRN: [id number]  Admitting Diagnosis:  Coronary artery disease  5 Days Post-Op    Recommendations:     Discharge Recommendations: No Therapy Indicated  Discharge Equipment Recommendations:  none  Barriers to discharge:  Other (Comment) (Increase fxl mob, fall risk)    Assessment:     Veronica Duque is a 69 y.o. female with a medical diagnosis of Coronary artery disease.  She presents with no c/o and tolerated treatment session well. Pt completed sit to stand transfer at toilet and hand hygiene standing at sink. Performance deficits affecting function are impaired endurance, weakness, impaired functional mobility, impaired self care skills, impaired cardiopulmonary response to activity.     Rehab Prognosis:  Good; patient would benefit from acute skilled OT services to address these deficits and reach maximum level of function.       Plan:     Patient to be seen 5 x/week to address the above listed problems via self-care/home management, therapeutic exercises, neuromuscular re-education, therapeutic activities  Plan of Care Expires: 08/01/25  Plan of Care Reviewed with: patient, family    Subjective     Chief Complaint: none  Patient/Family Comments/goals: Pt stated she feels fine  Pain/Comfort:  Pain Rating 1: 0/10  Pain Rating Post-Intervention 1: 0/10    Objective:     Communicated with: nurse prior to session.  Patient found up in chair with telemetry upon OT entry to room.    General Precautions: Standard, fall, other (see comments) (KMIT)    Orthopedic Precautions:N/A  Braces: N/A  Respiratory Status: Room air     Occupational Performance:     Bed Mobility:    Not assessed, Pt found up in chair    Functional Mobility/Transfers:  Patient completed Sit <> Stand Transfer from bedside chair with stand by assistance  with  no assistive device   Patient completed Toilet Transfer Step Transfer technique with stand by assistance with  no  "AD  Patient completed Sit <> Stand Transfer from toilet with stand by assistance  with  no assistive device   Functional Mobility: Pt completed fxl mob to bathroom with SBA and no AD    Activities of Daily Living:  Grooming: stand by assistance for hand hygiene standing at sink      [facility] 6 Click ADL: 22    Treatment & Education:  Therapist provided one-on-one education to patient regarding principles " Keep Your Move in the Tube" protocol. Explained the concept of keeping elbows close to the body to protect the sternum during healing.  Discussed load bearing and non-load-bearing movements "in the tube" and "out of the tube". Reviewed recommendation using baseline pain to guide tolerated movements in and out of the tube. Patient understood the instructions and had no questions.   Pt educated on role of occupational therapy, POC, and safety during ADLs and functional mobility. Pt and OT discussed importance of safe, continued mobility to optimize daily living skills. Pt verbalized understanding. Pt given instruction to call for medical staff/nurse for assistance.     Patient left up in chair with call button in reach and family present    GOALS:   Multidisciplinary Problems       Occupational Therapy Goals          Problem: Occupational Therapy    Goal Priority Disciplines Outcome Interventions   Occupational Therapy Goal     OT, PT/OT Progressing    Description: Goals to be met by: 7/4/25     Patient will increase functional independence with ADLs by performing:    LE Dressing with Stand-by Assistance.  Grooming while standing at sink with Stand-by Assistance.  Toileting from toilet with Stand-by Assistance for hygiene and clothing management.   Toilet transfer to toilet with Stand-by Assistance.                         DME Justifications:  No DME recommended requiring DME justifications    Time Tracking:     OT Date of Treatment: 07/01/25  OT Start Time: 1354  OT Stop Time: 1402  OT Total Time (min): 8 " min    Billable Minutes:Self Care/Home Management 8    OT/GARRY: OT          7/1/2025

## 2025-07-01 NOTE — PLAN OF CARE
Problem: Occupational Therapy  Goal: Occupational Therapy Goal  Description: Goals to be met by: 7/4/25     Patient will increase functional independence with ADLs by performing:    LE Dressing with Stand-by Assistance.  Grooming while standing at sink with Stand-by Assistance.---> Met 7/1 Supervision  Toileting from toilet with Stand-by Assistance for hygiene and clothing management.   Toilet transfer to toilet with Stand-by Assistance.---> Met 7/1 Supervision    Outcome: Progressing

## 2025-07-01 NOTE — SUBJECTIVE & OBJECTIVE
Interval History: Step down from ICU uneventful.  Pacing wires removed today. Will plan for discharge home tomorrow with no needs.    Review of Systems   Constitutional: Negative for malaise/fatigue.   Cardiovascular:  Negative for chest pain, claudication, dyspnea on exertion, irregular heartbeat, leg swelling and palpitations.   Respiratory:  Negative for cough and shortness of breath.    Hematologic/Lymphatic: Negative for bleeding problem.   Gastrointestinal:  Negative for abdominal pain.   Genitourinary:  Negative for dysuria.   Neurological:  Negative for headaches and weakness.     Medications:  Continuous Infusions:  Scheduled Meds:   amiodarone  400 mg Oral TID    Followed by    [START ON 7/5/2025] amiodarone  200 mg Oral Daily    aspirin  81 mg Oral Daily    bisacodyL  10 mg Rectal Daily    enoxparin  40 mg Subcutaneous Q24H (prophylaxis, 1700)    ezetimibe  10 mg Oral QHS    famotidine  20 mg Oral BID    furosemide (LASIX) injection  40 mg Intravenous BID    metoprolol tartrate  25 mg Oral BID    polyethylene glycol  17 g Oral BID    potassium bicarbonate  60 mEq Oral Once    senna  8.6 mg Oral Q12H     PRN Meds:  Current Facility-Administered Medications:     acetaminophen, 1,000 mg, Oral, Q8H PRN    dextrose 50%, 12.5 g, Intravenous, PRN    dextrose 50%, 25 g, Intravenous, PRN    glucagon (human recombinant), 1 mg, Intramuscular, PRN    glucose, 16 g, Oral, PRN    glucose, 24 g, Oral, PRN    insulin aspart U-100, 0-5 Units, Subcutaneous, QID (AC + HS) PRN    magnesium sulfate 2 g IVPB, 2 g, Intravenous, PRN    magnesium sulfate 2 g IVPB, 2 g, Intravenous, PRN    ondansetron, 4 mg, Intravenous, Q12H PRN    oxyCODONE, 5 mg, Oral, Q6H PRN    potassium bicarbonate, 35 mEq, Oral, PRN    potassium bicarbonate, 50 mEq, Oral, PRN    potassium bicarbonate, 60 mEq, Oral, PRN    potassium, sodium phosphates, 2 packet, Oral, PRN    potassium, sodium phosphates, 2 packet, Oral, PRN    potassium, sodium phosphates, 2  packet, Oral, PRN    simethicone, 1 tablet, Oral, TID PRN     Objective:     Vital Signs (Most Recent):  Temp: 98 °F (36.7 °C) (07/01/25 1025)  Pulse: 69 (07/01/25 1200)  Resp: 17 (07/01/25 1025)  BP: (!) 126/58 (07/01/25 1025)  SpO2: (!) 94 % (07/01/25 1025) Vital Signs (24h Range):  Temp:  [97 °F (36.1 °C)-98.2 °F (36.8 °C)] 98 °F (36.7 °C)  Pulse:  [69-91] 69  Resp:  [14-19] 17  SpO2:  [93 %-97 %] 94 %  BP: (126-162)/(58-71) 126/58     Weight: 73.3 kg (161 lb 9.6 oz)  Body mass index is 26.89 kg/m².    SpO2: (!) 94 %       Intake/Output - Last 3 Shifts         06/29 0700  06/30 0659 06/30 0700  07/01 0659 07/01 0700  07/02 0659    P.O.  480 840    I.V. (mL/kg) 1.5 (0)      IV Piggyback 98.1      Total Intake(mL/kg) 99.6 (1.3) 480 (6.5) 840 (11.5)    Urine (mL/kg/hr) 1790 (1) 2200 (1.3)     Stool 0      Chest Tube 190 190     Total Output 1980 2390     Net -1880.4 -1910 +840           Unmeasured Stool Occurrence 1 x              Lines/Drains/Airways       Drain  Duration             Female External Urinary Catheter w/ Suction 06/29/25 1737 1 day              Line  Duration                  Pacer Wires 06/26/25 1028 5 days              Peripheral Intravenous Line  Duration             Peripheral IV 06/30/25 0937 20 G Posterior;Right Hand 1 day                     Physical Exam  Constitutional:       Appearance: Normal appearance.   HENT:      Head: Normocephalic.   Eyes:      Pupils: Pupils are equal, round, and reactive to light.   Cardiovascular:      Rate and Rhythm: Normal rate and regular rhythm.      Pulses: Normal pulses.   Pulmonary:      Effort: Pulmonary effort is normal.   Abdominal:      General: Abdomen is flat. Bowel sounds are normal.      Palpations: Abdomen is soft.   Musculoskeletal:         General: Normal range of motion.   Skin:     General: Skin is warm and dry.      Comments: MSI CDI; pacing wires secured in place   Neurological:      General: No focal deficit present.      Mental Status: She  "is alert.            Significant Labs:  BMP:   Recent Labs   Lab 07/01/25  0559   *      K 3.5      CO2 26   BUN 25*   CREATININE 0.7   CALCIUM 8.0*   MG 1.9     CBC:   Recent Labs   Lab 07/01/25  0559   WBC 10.36   RBC 4.32   HGB 9.8*   HCT 29.9*      MCV 69*   MCH 22.7*   MCHC 32.8     CMP:   Recent Labs   Lab 07/01/25  0559   *   CALCIUM 8.0*   ALBUMIN 2.7*   PROT 5.1*      K 3.5   CO2 26      BUN 25*   CREATININE 0.7   ALKPHOS 119   ALT 39   AST 43   BILITOT 1.1*     Coagulation: No results for input(s): "PT", "INR", "APTT" in the last 48 hours.    Significant Diagnostics:  I have reviewed and interpreted all pertinent imaging results/findings within the past 24 hours.  "

## 2025-07-01 NOTE — HOSPITAL COURSE
On 6/26/2025 the patient was taken to the Operating Room for the above stated procedure. Please see the previously dictated operative report for complete details. Postoperatively, the patient was taken from the  Operating Room to the ICU where the vital signs were monitored and pain was kept under control. The patient was weaned from the drips and extubated in the ICU per protocol. Once hemodynamically stable, the patient was transferred to the Cardiac Step-Down floor for continued strengthening and ambulation. On postoperative day 6, the patient was ready for discharge to home. At the time of discharge, the patient was ambulating unassisted. Pain was well controlled with oral analgesics and the patient was tolerating the diet.    Of note, while admitted, the patient experienced post operative atrial fibrillation.  They were started on amiodarone with successful conversion to NSR. The patient will be discharged home on amiodarone, anticoagulation not indicated in this patient.     MOBILITY AND ACTIVITY: As tolerated. Patient may shower. No heavy lifting of greater than 5 pounds and no driving.     DIET: A Cardiac diet      WOUND CARE INSTRUCTIONS: Check for redness, swelling and drainage around the  incision or wound. Patient is to call for any obvious bleeding, drainage, pus from the wound, unusual problems or difficulties or temperature of greater than 101   degrees.     FOLLOWUP: Follow up with Dr. Flores in approximately 3 weeks. Prior to this  appointment, the patient will have a chest x-ray and EKG.     ACE/ARB: Patient not placed on Ace-Inhibitor at the time of discharge due to potential for hypotension   Statin: Zetia  Beta Blocker: BB      DISCHARGE CONDITION: At the time of discharge, the patient was in sinus rhythm and afebrile with stable vital signs.

## 2025-07-01 NOTE — ASSESSMENT & PLAN NOTE
- Endocrine consulted during admission and continue to follow however they will sign off soon secondary to glucose stability

## 2025-07-01 NOTE — HPI
Veronica Duque is a 69yoF with a hx of HTN, HLD, CVA with no residual deficits, PAD s/p R SFA stenting by Dr. Valle (non-obstructive L CFA/SFA) who was admitted for NSTEMI. She has no known coronary disease. She said it occered when she woke to go to the bathroom yesterday morning and was a heavy pressure in the substernal region, radiating to the neck/shoulder and associated with nausea. It was coming and going until EMS got there where it resolved with NTG. She has been hypertensive. She has never had this pain before. Her hsTn peaked to 700. EKG shows SR with RBBB, non-specific t wave inversions likely related to the bundle and similar to prior. LHC significant for RCA 90, LAD 90.  The risks and benefits were explained and informed consent was obtained.

## 2025-07-01 NOTE — PROGRESS NOTES
[facility] - Cardiology Stepdown  Cardiothoracic Surgery  Progress Note    Patient Name: Veronica Duque  MRN: [id number]  Admission Date: 6/26/2025  Hospital Length of Stay: 5 days  Code Status: Full Code   Attending Physician: Lamont Flores MD   Referring Provider: Lamont Flores MD  Principal Problem:Coronary artery disease  Subjective:     Post-Op Info:  Procedure(s) (LRB):  CORONARY ARTERY BYPASS GRAFT (CABG) (N/A)  SURGICAL PROCUREMENT, VEIN, ENDOSCOPIC (Left)   5 Days Post-Op     Interval History: Step down from ICU uneventful.  Pacing wires removed today. Will plan for discharge home tomorrow with no needs.    Review of Systems   Constitutional: Negative for malaise/fatigue.   Cardiovascular:  Negative for chest pain, claudication, dyspnea on exertion, irregular heartbeat, leg swelling and palpitations.   Respiratory:  Negative for cough and shortness of breath.    Hematologic/Lymphatic: Negative for bleeding problem.   Gastrointestinal:  Negative for abdominal pain.   Genitourinary:  Negative for dysuria.   Neurological:  Negative for headaches and weakness.     Medications:  Continuous Infusions:  Scheduled Meds:   amiodarone  400 mg Oral TID    Followed by    [START ON 7/5/2025] amiodarone  200 mg Oral Daily    aspirin  81 mg Oral Daily    bisacodyL  10 mg Rectal Daily    enoxparin  40 mg Subcutaneous Q24H (prophylaxis, 1700)    ezetimibe  10 mg Oral QHS    famotidine  20 mg Oral BID    furosemide (LASIX) injection  40 mg Intravenous BID    metoprolol tartrate  25 mg Oral BID    polyethylene glycol  17 g Oral BID    potassium bicarbonate  60 mEq Oral Once    senna  8.6 mg Oral Q12H     PRN Meds:  Current Facility-Administered Medications:     acetaminophen, 1,000 mg, Oral, Q8H PRN    dextrose 50%, 12.5 g, Intravenous, PRN    dextrose 50%, 25 g, Intravenous, PRN    glucagon (human recombinant), 1 mg, Intramuscular, PRN    glucose, 16 g, Oral, PRN    glucose, 24 g, Oral, PRN    insulin aspart U-100, 0-5  Units, Subcutaneous, QID (AC + HS) PRN    magnesium sulfate 2 g IVPB, 2 g, Intravenous, PRN    magnesium sulfate 2 g IVPB, 2 g, Intravenous, PRN    ondansetron, 4 mg, Intravenous, Q12H PRN    oxyCODONE, 5 mg, Oral, Q6H PRN    potassium bicarbonate, 35 mEq, Oral, PRN    potassium bicarbonate, 50 mEq, Oral, PRN    potassium bicarbonate, 60 mEq, Oral, PRN    potassium, sodium phosphates, 2 packet, Oral, PRN    potassium, sodium phosphates, 2 packet, Oral, PRN    potassium, sodium phosphates, 2 packet, Oral, PRN    simethicone, 1 tablet, Oral, TID PRN     Objective:     Vital Signs (Most Recent):  Temp: 98 °F (36.7 °C) (07/01/25 1025)  Pulse: 69 (07/01/25 1200)  Resp: 17 (07/01/25 1025)  BP: (!) 126/58 (07/01/25 1025)  SpO2: (!) 94 % (07/01/25 1025) Vital Signs (24h Range):  Temp:  [97 °F (36.1 °C)-98.2 °F (36.8 °C)] 98 °F (36.7 °C)  Pulse:  [69-91] 69  Resp:  [14-19] 17  SpO2:  [93 %-97 %] 94 %  BP: (126-162)/(58-71) 126/58     Weight: 73.3 kg (161 lb 9.6 oz)  Body mass index is 26.89 kg/m².    SpO2: (!) 94 %       Intake/Output - Last 3 Shifts         06/29 0700 06/30 0659 06/30 0700 07/01 0659 07/01 0700  07/02 0659    P.O.  480 840    I.V. (mL/kg) 1.5 (0)      IV Piggyback 98.1      Total Intake(mL/kg) 99.6 (1.3) 480 (6.5) 840 (11.5)    Urine (mL/kg/hr) 1790 (1) 2200 (1.3)     Stool 0      Chest Tube 190 190     Total Output 1980 2390     Net -1880.4 -1910 +840           Unmeasured Stool Occurrence 1 x              Lines/Drains/Airways       Drain  Duration             Female External Urinary Catheter w/ Suction 06/29/25 1737 1 day              Line  Duration                  Pacer Wires 06/26/25 1028 5 days              Peripheral Intravenous Line  Duration             Peripheral IV 06/30/25 0937 20 G Posterior;Right Hand 1 day                     Physical Exam  Constitutional:       Appearance: Normal appearance.   HENT:      Head: Normocephalic.   Eyes:      Pupils: Pupils are equal, round, and reactive to  "light.   Cardiovascular:      Rate and Rhythm: Normal rate and regular rhythm.      Pulses: Normal pulses.   Pulmonary:      Effort: Pulmonary effort is normal.   Abdominal:      General: Abdomen is flat. Bowel sounds are normal.      Palpations: Abdomen is soft.   Musculoskeletal:         General: Normal range of motion.   Skin:     General: Skin is warm and dry.      Comments: MSI CDI; pacing wires secured in place   Neurological:      General: No focal deficit present.      Mental Status: She is alert.            Significant Labs:  BMP:   Recent Labs   Lab 07/01/25  0559   *      K 3.5      CO2 26   BUN 25*   CREATININE 0.7   CALCIUM 8.0*   MG 1.9     CBC:   Recent Labs   Lab 07/01/25  0559   WBC 10.36   RBC 4.32   HGB 9.8*   HCT 29.9*      MCV 69*   MCH 22.7*   MCHC 32.8     CMP:   Recent Labs   Lab 07/01/25  0559   *   CALCIUM 8.0*   ALBUMIN 2.7*   PROT 5.1*      K 3.5   CO2 26      BUN 25*   CREATININE 0.7   ALKPHOS 119   ALT 39   AST 43   BILITOT 1.1*     Coagulation: No results for input(s): "PT", "INR", "APTT" in the last 48 hours.    Significant Diagnostics:  I have reviewed and interpreted all pertinent imaging results/findings within the past 24 hours.  Assessment/Plan:     * Coronary artery disease  - See S/P CABG for details    S/P CABG (coronary artery bypass graft)  - Maintain sternal precautions   - ASA  - BB  - Statin intolerant; continue zetia  - Lasix with scheduled potassium ordered   - Encourage ambulation  - PT/OT  - Cardiac diet with 1500 cc fluid restriction   - Bowel regimen in place   - Famotidine for ulcer prevention   - Pacer wires removed today  - Chest tubes previously removed   - [place] previously removed   - [place] previously removed   - Monitor electrolytes to keep Mag above 2 and Potassium above 4  - OOBTO   - Encourage IS use      S/P chest tube placement  - Previously removed   - Resolved       Transient hyperglycemia post " procedure  - Endocrine consulted during admission and continue to follow however they will sign off soon secondary to glucose stability    Post-operative pain  - Multi-modal pain control     Mixed hyperlipidemia  - History prior to arrival  - Continue Zetia    Statin intolerance  - History prior to arrival   - Continue Zetia   - Will not be discharged home on statin for above    Anemia  - Can be expected postoperatively however prior history to arrival  - CBC daily to monitor trends   - No need for replacement today  - Todays  9.8/29.9      Primary hypertension  - History prior to arrival   - BB    PAD (peripheral artery disease)  - History prior to arrival   - Continue ASA        Chanell Kurtz NP  Cardiothoracic Surgery  [facility] - Cardiology Stepdown

## 2025-07-01 NOTE — PT/OT/SLP PROGRESS
"Physical Therapy  Treatment    Patient Name: Veronica Duque   MRN: [id number]    Recommendations:     Discharge Recommendations: No Therapy Indicated  Discharge Equipment Recommendations: none  Barriers to Discharge: None  Safest Mobility Level with Nursing: amb with Supervision    Assessment:     Veronica Duque is a 69 y.o. female admitted with a medical diagnosis of Coronary artery disease. She presents with the following impairments/functional limitations: impaired endurance, impaired balance, impaired skin, impaired cardiopulmonary response to activity, orthopedic precautions. Pt continues to progress with PT goals, completing tranfers and bed mobility with (I). Pt limited today with ambulation 2/2 increased phlegm and coughing with ambulation. Pt requiring x1 seated rest break to recover. Pt remains functioning below her baseline of (I) and required IP PT.     Rehab Prognosis: Good; patient continues to require acute skilled PT services to address these deficits and reach maximum level of function.  Recent Surgery: Procedure(s) (LRB):  CORONARY ARTERY BYPASS GRAFT (CABG) (N/A)  SURGICAL PROCUREMENT, VEIN, ENDOSCOPIC (Left) 5 Days Post-Op    Plan:     During this hospitalization, patient to be seen 5 x/week to address the identified rehab impairments via gait training, therapeutic activities, therapeutic exercises, neuromuscular re-education and progress toward the following goals:    Plan of Care Expires:  08/26/25    Subjective     Chief Complaint: "I have been up a lot today"  Patient/Family Comments/Goals: to go home  Pain/Comfort:  Pain Rating 1: 0/10  Pain Rating Post-Intervention 1: 0/10    Objective:     Communicated with RN prior to session. Patient found HOB elevated with telemetry upon PT entry to room.     General Precautions: Standard, fall, other (see comments) ([facility])  Orthopedic Precautions: N/A  Braces: N/A  Respiratory Status: Room air    Functional Mobility:  Bed Mobility:  "   Supine to Sit: independence  Transfers:    Sit to Stand: independence with no AD  Gait: Patient ambulated 120 ft + 80ft with no AD and stand by assistance contact guard assistance.   Patient demonstrates steady gait, decreased step length, and increased cough with ambulation requiring seated rest break to recover from coughing.  Pt able to return safely to room and seated UIC, PT encouraging use of IS to assist with improved respiration.   Balance:   Static Sitting: Good (Able to maintain balance against moderate resistance),  Static Standing: Good-/Fair+ (Able to maintain standing balance against minimal resistance),   Stairs deferred today 2/2 SOB from productive cough with ambulation  AM-PAC 6 CLICK MOBILITY  Turning over in bed (including adjusting bedclothes, sheets and blankets)?: 4  Sitting down on and standing up from a chair with arms (e.g., wheelchair, bedside commode, etc.): 4  Moving from lying on back to sitting on the side of the bed?: 4  Moving to and from a bed to a chair (including a wheelchair)?: 4  Need to walk in hospital room?: 3  Climbing 3-5 steps with a railing?: 3  Basic Mobility Total Score: 22     Therapeutic Exercises and Education:  Whiteboard updated  Patient educated on:  role of acute care PT and PT plan of care, safety while in hospital including calling nurse for mobility, call light usage, energy conservation, and pacing strategies.  The effects of bed rest and the importance of OOB activity. Pt encouraged to sit UIC majority of day as tolerated and continue daily ambulation with nursing assist. Pt verbalized understanding.  The importance of maximal participation in therapy session in order to reduce negative effects of prolonged sedentary positioning.   Answered all questions within PT scope of practice and addressed functional mobility concerns.    Patient left up in chair with all lines intact, call bell in reach, chair locked, and family present.    GOALS:    Multidisciplinary Problems       Physical Therapy Goals          Problem: Physical Therapy    Goal Priority Disciplines Outcome Interventions   Physical Therapy Goal     PT, PT/OT Progressing    Description: Goals to be met by: 7/15/2025    Patient will increase functional independence with mobility by performin. Supine to sit with [place]  2. Sit to stand transfer with [place]  3. Gait x 400 feet with Supervision.   4. Ascend/descend 5 stairs with bilateral Handrails Supervision.                          DME Justifications:  No DME recommended requiring DME justifications    Time Tracking:     PT Received On: 25  PT Start Time: 1319     PT Stop Time: 1334  PT Total Time (min): 15 min     Billable Minutes: Gait Training 15     Treatment Type: Treatment  PT/PTA: PT     Number of PTA visits since last PT visit: 0     2025

## 2025-07-01 NOTE — PLAN OF CARE
Plan of care discussed with patient.  Patient ambulating independently, fall precautions in place.Continuing to encourage sternal precautions, IS, and ambulation. Patient has no complaints of pain. Discussed medications and care with family. Pacer wires has been removed. Patient has no questions at this time. Plan of care ongoing.    Problem: Adult Inpatient Plan of Care  Goal: Plan of Care Review  Outcome: Progressing     Problem: Adult Inpatient Plan of Care  Goal: Plan of Care Review  Outcome: Progressing  Goal: Patient-Specific Goal (Individualized)  Outcome: Progressing  Goal: Absence of Hospital-Acquired Illness or Injury  Outcome: Progressing  Goal: Optimal Comfort and Wellbeing  Outcome: Progressing  Goal: Readiness for Transition of Care  Outcome: Progressing     Problem: Infection  Goal: Absence of Infection Signs and Symptoms  Outcome: Progressing     Problem: Wound  Goal: Optimal Coping  Outcome: Progressing  Goal: Optimal Functional Ability  Outcome: Progressing  Goal: Absence of Infection Signs and Symptoms  Outcome: Progressing  Goal: Improved Oral Intake  Outcome: Progressing  Goal: Optimal Pain Control and Function  Outcome: Progressing  Goal: Skin Health and Integrity  Outcome: Progressing  Goal: Optimal Wound Healing  Outcome: Progressing     Problem: Fall Injury Risk  Goal: Absence of Fall and Fall-Related Injury  Outcome: Progressing     Problem: Delirium  Goal: Optimal Coping  Outcome: Progressing  Goal: Improved Behavioral Control  Outcome: Progressing  Goal: Improved Attention and Thought Clarity  Outcome: Progressing  Goal: Improved Sleep  Outcome: Progressing     Problem: Skin Injury Risk Increased  Goal: Skin Health and Integrity  Outcome: Progressing

## 2025-07-01 NOTE — PLAN OF CARE
Problem: Adult Inpatient Plan of Care  Goal: Plan of Care Review  7/1/2025 1758 by Chel Puckett RN  Outcome: Progressing  7/1/2025 1526 by Chel Puckett RN  Outcome: Progressing  Goal: Patient-Specific Goal (Individualized)  7/1/2025 1758 by Chel Puckett RN  Outcome: Progressing  7/1/2025 1526 by Chel Puckett RN  Outcome: Progressing  Goal: Absence of Hospital-Acquired Illness or Injury  7/1/2025 1758 by Chel Puckett RN  Outcome: Progressing  7/1/2025 1526 by Chel Puckett RN  Outcome: Progressing  Goal: Optimal Comfort and Wellbeing  7/1/2025 1758 by Chel Puckett RN  Outcome: Progressing  7/1/2025 1526 by Chel Puckett RN  Outcome: Progressing  Goal: Readiness for Transition of Care  7/1/2025 1758 by Chel Puckett RN  Outcome: Progressing  7/1/2025 1526 by Chel Puckett RN  Outcome: Progressing     Problem: Infection  Goal: Absence of Infection Signs and Symptoms  7/1/2025 1758 by Chel Puckett RN  Outcome: Progressing  7/1/2025 1526 by Chel Puckett RN  Outcome: Progressing     Problem: Wound  Goal: Optimal Coping  7/1/2025 1758 by Chel Puckett RN  Outcome: Progressing  7/1/2025 1526 by Chel Puckett RN  Outcome: Progressing  Goal: Optimal Functional Ability  7/1/2025 1758 by Chel Puckett RN  Outcome: Progressing  7/1/2025 1526 by Chel Puckett RN  Outcome: Progressing  Goal: Absence of Infection Signs and Symptoms  7/1/2025 1758 by Chel Puckett RN  Outcome: Progressing  7/1/2025 1526 by Chel Puckett RN  Outcome: Progressing  Goal: Improved Oral Intake  7/1/2025 1758 by Chel Puckett RN  Outcome: Progressing  7/1/2025 1526 by Chel Puckett RN  Outcome: Progressing  Goal: Optimal Pain Control and Function  7/1/2025 1758 by Chel Puckett RN  Outcome: Progressing  7/1/2025 1526 by Chel Puckett RN  Outcome: Progressing  Goal: Skin Health and Integrity  7/1/2025 1758 by Chel Puckett RN  Outcome:  Progressing  7/1/2025 1526 by Chel Puckett RN  Outcome: Progressing  Goal: Optimal Wound Healing  7/1/2025 1758 by Chel Puckett RN  Outcome: Progressing  7/1/2025 1526 by Chel Puckett RN  Outcome: Progressing     Problem: Fall Injury Risk  Goal: Absence of Fall and Fall-Related Injury  7/1/2025 1758 by Chel Puckett RN  Outcome: Progressing  7/1/2025 1526 by Chel Puckett RN  Outcome: Progressing     Problem: Delirium  Goal: Optimal Coping  7/1/2025 1758 by Chel Puckett RN  Outcome: Progressing  7/1/2025 1526 by Chel Puckett RN  Outcome: Progressing  Goal: Improved Behavioral Control  7/1/2025 1758 by Chel Puckett RN  Outcome: Progressing  7/1/2025 1526 by Chel Puckett RN  Outcome: Progressing  Goal: Improved Attention and Thought Clarity  7/1/2025 1758 by Chel Puckett RN  Outcome: Progressing  7/1/2025 1526 by Chel Puckett RN  Outcome: Progressing  Goal: Improved Sleep  7/1/2025 1758 by Chel Puckett RN  Outcome: Progressing  7/1/2025 1526 by Chel Puckett RN  Outcome: Progressing     Problem: Skin Injury Risk Increased  Goal: Skin Health and Integrity  7/1/2025 1758 by Chel Puckett RN  Outcome: Progressing  7/1/2025 1526 by Chel Puckett RN  Outcome: Progressing

## 2025-07-01 NOTE — PLAN OF CARE
Pt continues to progress with PT goals. All goals reviewed/updated to reflect current functional status.     Problem: Physical Therapy  Goal: Physical Therapy Goal  Description: Goals to be met by: 7/15/2025    Patient will increase functional independence with mobility by performin. Supine to sit with Mower  2. Sit to stand transfer with Mower  3. Gait x 400 feet with Supervision.   4. Ascend/descend 5 stairs with bilateral Handrails Supervision.     Outcome: Progressing

## 2025-07-02 ENCOUNTER — DOCUMENTATION ONLY (OUTPATIENT)
Dept: CARDIOTHORACIC SURGERY | Facility: CLINIC | Age: 70
End: 2025-07-02
Payer: MEDICARE

## 2025-07-02 VITALS
HEART RATE: 81 BPM | RESPIRATION RATE: 19 BRPM | TEMPERATURE: 98 F | DIASTOLIC BLOOD PRESSURE: 67 MMHG | WEIGHT: 161.63 LBS | SYSTOLIC BLOOD PRESSURE: 144 MMHG | HEIGHT: 65 IN | OXYGEN SATURATION: 95 % | BODY MASS INDEX: 26.93 KG/M2

## 2025-07-02 LAB
ABSOLUTE EOSINOPHIL (OHS): 0.38 K/UL
ABSOLUTE MONOCYTE (OHS): 1.25 K/UL (ref 0.3–1)
ABSOLUTE NEUTROPHIL COUNT (OHS): 6.93 K/UL (ref 1.8–7.7)
ALBUMIN SERPL BCP-MCNC: 2.9 G/DL (ref 3.5–5.2)
ALP SERPL-CCNC: 140 UNIT/L (ref 40–150)
ALT SERPL W/O P-5'-P-CCNC: 40 UNIT/L (ref 10–44)
ANION GAP (OHS): 12 MMOL/L (ref 8–16)
AST SERPL-CCNC: 38 UNIT/L (ref 11–45)
BASOPHILS # BLD AUTO: 0.06 K/UL
BASOPHILS NFR BLD AUTO: 0.6 %
BILIRUB SERPL-MCNC: 1.1 MG/DL (ref 0.1–1)
BUN SERPL-MCNC: 20 MG/DL (ref 8–23)
CALCIUM SERPL-MCNC: 8.4 MG/DL (ref 8.7–10.5)
CHLORIDE SERPL-SCNC: 104 MMOL/L (ref 95–110)
CO2 SERPL-SCNC: 24 MMOL/L (ref 23–29)
CREAT SERPL-MCNC: 0.7 MG/DL (ref 0.5–1.4)
ERYTHROCYTE [DISTWIDTH] IN BLOOD BY AUTOMATED COUNT: 23.8 % (ref 11.5–14.5)
GFR SERPLBLD CREATININE-BSD FMLA CKD-EPI: >60 ML/MIN/1.73/M2
GLUCOSE SERPL-MCNC: 109 MG/DL (ref 70–110)
HCT VFR BLD AUTO: 31.8 % (ref 37–48.5)
HGB BLD-MCNC: 9.9 GM/DL (ref 12–16)
IMM GRANULOCYTES # BLD AUTO: 0.09 K/UL (ref 0–0.04)
IMM GRANULOCYTES NFR BLD AUTO: 0.9 % (ref 0–0.5)
LYMPHOCYTES # BLD AUTO: 1.47 K/UL (ref 1–4.8)
MAGNESIUM SERPL-MCNC: 1.7 MG/DL (ref 1.6–2.6)
MCH RBC QN AUTO: 21.8 PG (ref 27–31)
MCHC RBC AUTO-ENTMCNC: 31.1 G/DL (ref 32–36)
MCV RBC AUTO: 70 FL (ref 82–98)
NUCLEATED RBC (/100WBC) (OHS): 0 /100 WBC
PHOSPHATE SERPL-MCNC: 3.8 MG/DL (ref 2.7–4.5)
PLATELET # BLD AUTO: 369 K/UL (ref 150–450)
PMV BLD AUTO: 10.1 FL (ref 9.2–12.9)
POTASSIUM SERPL-SCNC: 3.6 MMOL/L (ref 3.5–5.1)
PROT SERPL-MCNC: 5.4 GM/DL (ref 6–8.4)
RBC # BLD AUTO: 4.55 M/UL (ref 4–5.4)
RELATIVE EOSINOPHIL (OHS): 3.7 %
RELATIVE LYMPHOCYTE (OHS): 14.4 % (ref 18–48)
RELATIVE MONOCYTE (OHS): 12.3 % (ref 4–15)
RELATIVE NEUTROPHIL (OHS): 68.1 % (ref 38–73)
SODIUM SERPL-SCNC: 140 MMOL/L (ref 136–145)
WBC # BLD AUTO: 10.18 K/UL (ref 3.9–12.7)

## 2025-07-02 PROCEDURE — 25000003 PHARM REV CODE 250: Mod: HCNC

## 2025-07-02 PROCEDURE — 85025 COMPLETE CBC W/AUTO DIFF WBC: CPT | Mod: HCNC

## 2025-07-02 PROCEDURE — 84100 ASSAY OF PHOSPHORUS: CPT | Mod: HCNC

## 2025-07-02 PROCEDURE — 63600175 PHARM REV CODE 636 W HCPCS: Mod: HCNC

## 2025-07-02 PROCEDURE — 25000003 PHARM REV CODE 250: Mod: HCNC | Performed by: STUDENT IN AN ORGANIZED HEALTH CARE EDUCATION/TRAINING PROGRAM

## 2025-07-02 PROCEDURE — 25000003 PHARM REV CODE 250: Mod: HCNC | Performed by: NURSE PRACTITIONER

## 2025-07-02 PROCEDURE — 36415 COLL VENOUS BLD VENIPUNCTURE: CPT | Mod: HCNC

## 2025-07-02 PROCEDURE — 83735 ASSAY OF MAGNESIUM: CPT | Mod: HCNC

## 2025-07-02 PROCEDURE — 80053 COMPREHEN METABOLIC PANEL: CPT | Mod: HCNC

## 2025-07-02 RX ORDER — ACETAMINOPHEN 500 MG
1000 TABLET ORAL EVERY 8 HOURS
Qty: 60 TABLET | Refills: 0 | Status: SHIPPED | OUTPATIENT
Start: 2025-07-02 | End: 2025-07-12

## 2025-07-02 RX ORDER — SENNOSIDES 8.6 MG/1
1 TABLET ORAL EVERY 12 HOURS
Qty: 60 TABLET | Refills: 0 | Status: SHIPPED | OUTPATIENT
Start: 2025-07-02 | End: 2025-07-02

## 2025-07-02 RX ORDER — AMIODARONE HYDROCHLORIDE 200 MG/1
TABLET ORAL
Qty: 68 TABLET | Refills: 0 | Status: SHIPPED | OUTPATIENT
Start: 2025-07-02 | End: 2025-07-02

## 2025-07-02 RX ORDER — POTASSIUM CHLORIDE 20 MEQ/1
40 TABLET, EXTENDED RELEASE ORAL ONCE
Status: COMPLETED | OUTPATIENT
Start: 2025-07-02 | End: 2025-07-02

## 2025-07-02 RX ORDER — POLYETHYLENE GLYCOL 3350 17 G/17G
17 POWDER, FOR SOLUTION ORAL DAILY
Qty: 10 EACH | Refills: 0 | Status: SHIPPED | OUTPATIENT
Start: 2025-07-02 | End: 2025-07-12

## 2025-07-02 RX ORDER — POLYETHYLENE GLYCOL 3350 17 G/17G
17 POWDER, FOR SOLUTION ORAL DAILY
Qty: 10 EACH | Refills: 0 | Status: SHIPPED | OUTPATIENT
Start: 2025-07-02 | End: 2025-07-02

## 2025-07-02 RX ORDER — FUROSEMIDE 20 MG/1
20 TABLET ORAL 2 TIMES DAILY
Qty: 45 TABLET | Refills: 1 | Status: SHIPPED | OUTPATIENT
Start: 2025-07-02

## 2025-07-02 RX ORDER — OXYCODONE HYDROCHLORIDE 5 MG/1
5 TABLET ORAL EVERY 4 HOURS PRN
Qty: 42 TABLET | Refills: 0 | Status: SHIPPED | OUTPATIENT
Start: 2025-07-02

## 2025-07-02 RX ORDER — POTASSIUM CHLORIDE 20 MEQ/1
20 TABLET, EXTENDED RELEASE ORAL 2 TIMES DAILY
Qty: 45 TABLET | Refills: 1 | Status: SHIPPED | OUTPATIENT
Start: 2025-07-02 | End: 2025-07-02

## 2025-07-02 RX ORDER — POTASSIUM CHLORIDE 20 MEQ/1
20 TABLET, EXTENDED RELEASE ORAL 2 TIMES DAILY
Qty: 45 TABLET | Refills: 1 | Status: SHIPPED | OUTPATIENT
Start: 2025-07-02

## 2025-07-02 RX ORDER — SENNOSIDES 8.6 MG/1
1 TABLET ORAL EVERY 12 HOURS
Qty: 60 TABLET | Refills: 0 | Status: SHIPPED | OUTPATIENT
Start: 2025-07-02 | End: 2025-08-01

## 2025-07-02 RX ORDER — ACETAMINOPHEN 500 MG
1000 TABLET ORAL EVERY 8 HOURS
Qty: 60 TABLET | Refills: 0 | Status: SHIPPED | OUTPATIENT
Start: 2025-07-02 | End: 2025-07-02

## 2025-07-02 RX ORDER — AMIODARONE HYDROCHLORIDE 200 MG/1
TABLET ORAL
Qty: 68 TABLET | Refills: 0 | Status: SHIPPED | OUTPATIENT
Start: 2025-07-02 | End: 2025-08-09

## 2025-07-02 RX ORDER — OXYCODONE HYDROCHLORIDE 5 MG/1
5 TABLET ORAL EVERY 4 HOURS PRN
Qty: 42 TABLET | Refills: 0 | Status: SHIPPED | OUTPATIENT
Start: 2025-07-02 | End: 2025-07-02

## 2025-07-02 RX ORDER — FUROSEMIDE 20 MG/1
20 TABLET ORAL 2 TIMES DAILY
Qty: 45 TABLET | Refills: 1 | Status: SHIPPED | OUTPATIENT
Start: 2025-07-02 | End: 2025-07-02

## 2025-07-02 RX ADMIN — AMIODARONE HYDROCHLORIDE 400 MG: 200 TABLET ORAL at 09:07

## 2025-07-02 RX ADMIN — METOPROLOL TARTRATE 25 MG: 25 TABLET, FILM COATED ORAL at 09:07

## 2025-07-02 RX ADMIN — FUROSEMIDE 40 MG: 10 INJECTION, SOLUTION INTRAVENOUS at 09:07

## 2025-07-02 RX ADMIN — ASPIRIN 81 MG CHEWABLE TABLET 81 MG: 81 TABLET CHEWABLE at 09:07

## 2025-07-02 RX ADMIN — POTASSIUM CHLORIDE 40 MEQ: 1500 TABLET, EXTENDED RELEASE ORAL at 11:07

## 2025-07-02 RX ADMIN — SENNOSIDES 8.6 MG: 8.6 TABLET, FILM COATED ORAL at 09:07

## 2025-07-02 RX ADMIN — FAMOTIDINE 20 MG: 20 TABLET, FILM COATED ORAL at 09:07

## 2025-07-02 NOTE — NURSING
Patient is ready for discharge. Patient stable alert and oriented x4. VSS. IVs removed. No complaints of pain,sob, palpitations, dizziness,lightheadedness, headache,or n/v. Discussed discharge plan. Reviewed AVS; medications and side effects, appointments, and answered questions with patient and family. __ RX called into patient's personal pharmacy([facility] in [place], [place]). Patient awaiting patient escort to be transported downstairs along with family.

## 2025-07-02 NOTE — PLAN OF CARE
AAOX4,VSS,O2 sats 95% on room air.Plan of care discussed with patient, patient being discharged today. Patient has no complaints of pain/SOB,palpitations, dizziness, lightheadedness, headache, or n/v. Discussed medications and care as well as enforced sternal precautions. Patient has no questions at this time.Pt visualised and stable, with no acute distress noted.Call light within reach.Pt resting,in recliner chair w/ wheels locked.Pt's family by the bedside.Will continue to monitor through the shift, plan of care ongoing.      Problem: Adult Inpatient Plan of Care  Goal: Plan of Care Review  Outcome: Met  Goal: Patient-Specific Goal (Individualized)  Outcome: Met  Goal: Absence of Hospital-Acquired Illness or Injury  Outcome: Met  Goal: Optimal Comfort and Wellbeing  Outcome: Met  Goal: Readiness for Transition of Care  Outcome: Met     Problem: Infection  Goal: Absence of Infection Signs and Symptoms  Outcome: Met     Problem: Wound  Goal: Optimal Coping  Outcome: Met  Goal: Optimal Functional Ability  Outcome: Progressing  Goal: Absence of Infection Signs and Symptoms  Outcome: Met  Goal: Improved Oral Intake  Outcome: Met  Goal: Optimal Pain Control and Function  Outcome: Progressing  Goal: Skin Health and Integrity  Outcome: Progressing  Goal: Optimal Wound Healing  Outcome: Progressing     Problem: Fall Injury Risk  Goal: Absence of Fall and Fall-Related Injury  Outcome: Met     Problem: Delirium  Goal: Optimal Coping  Outcome: Met  Goal: Improved Behavioral Control  Outcome: Met  Goal: Improved Attention and Thought Clarity  Outcome: Met  Goal: Improved Sleep  Outcome: Progressing     Problem: Skin Injury Risk Increased  Goal: Skin Health and Integrity  Outcome: Progressing

## 2025-07-02 NOTE — PLAN OF CARE
[facility] - Cardiology Stepdown  Discharge Final Note    Primary Care Provider: Juju Wallace MD    Expected Discharge Date: 7/2/2025    Final Discharge Note (most recent)       Final Note - 07/02/25 1014          Final Note    Assessment Type Final Discharge Note     Anticipated Discharge Disposition Home or Self Care                 SW met with pt, , daughter, and granddaughter at bedside to review IMM.  Pt voiced agreement with discharge today.      Amber Kimble LMSW  [facility]  [id number]      Important Message from Medicare  Important Message from Medicare regarding Discharge Appeal Rights: Given to patient/caregiver, Explained to patient/caregiver, Signed/date by patient/caregiver (pt voiced agreement with discharge)     Date IMM was signed: 07/02/25  Time IMM was signed: 0953      Future Appointments   Date Time Provider Department Center   7/7/2025  1:00 PM Juju Wallace MD [facility] 65PLUS 65+ [place]   7/15/2025  1:15 PM Amber De La Paz, YENIFER [facility] [address]   7/30/2025  9:30 AM [facility]   7/30/2025 10:00 AM EKG, APPT [facility] EKG [facility]   7/30/2025 10:15 AM Lamont Flores MD [facility] CARDVAS [facility]   9/30/2025 10:40 AM Godwin Dalal Jr., MD [facility] CARDIO [place]       Contact Info       [facility] Provider   Specialty: Internal Medicine    [address]  [facility] [place] [address]       Next Steps: Follow up

## 2025-07-02 NOTE — PROGRESS NOTES
Met with pt and family at bedside and reviewed wound care instructions for pt's midsternal and chest tube site incisions.    Reviewed home care instructions.    Reviewed daily inspection and cleaning of surgical incisions and instructed pt to call the clinic with any questions or concerns.  Pt provided with a hand-out (see below) which contains detailed instructions on daily wound care inspection and care.  Pt reminded to follow physical activity guidelines provided by PT and to refrain from driving until released by Dr. Flores.  Pt informed of post-op appts on 7/30 and was provided with a copy of his appts for that day.  Pt verbalized understanding to all instructions.      Future Appointments   Date Time Provider Department Center   7/7/2025  1:00 PM Juju Wallace MD [facility] 65PLUS 65+ [place]   7/15/2025  1:15 PM Amber De La Paz DPM [facility] [address]   7/30/2025  9:30 AM [facility] OIC-XRAY [facility]   7/30/2025 10:00 AM EKG, APPT [facility] EKG [facility]   7/30/2025 10:15 AM Lamont Flores MD [facility] CARDVAS [facility]   9/30/2025 10:40 AM Godwin Dalal Jr., MD [facility] CARDIO [place]             Showering:   If your incisions are healing and there is no drainage - it is ok to take a shower.  Shower with your back to the shower spray.  It is ok for your incision to get wet, but the shower spray should not directly hit your chest.  Do not soak in a tub.  The water temperature should be warm -- not too hot or cold. Extreme water temperatures can cause you to feel faint.  It is expected that you wash your incisions when you shower, however only use soap and water to cleanse the sites.  Use normal bar soap, not perfumed soap or body wash. During your recovery, do not try a new brand of soap.  Place soapy water on your hand or a clean washcloth and gently wash your incisions using an up-and-down motion.  Do not apply ointments, oils, or salves to your incisions.  Pat the skin gently to dry.    Wound  Inspection:   Wash your hands before and after caring for or touching your incisions.   Look in the mirror daily. Inspect your incisions for redness, drainage and warmth. Your incisions should be healing; there should NOT be an increase in opening.   Gently wash your incisions everyday with soap and warm water using a clean washcloth, or your hand and light touch.   Gently pat dry with a clean towel.   You do not need to cover your incisions unless they are draining.    If you are experiencing drainage, it is important to call your doctor.  Monday - Friday, from 8:00am - 5pm, call [phone].  Outside of these hours, please call [phone] and ask for cardiothoracic surgeon on call.      When to call your doctor:  Increased drainage or oozing from the incision(s)  Increased opening of the incision line(s) - there should not be gaps or pulling apart areas of the incision(s)  Redness along the incision(s) - if the incisions were red or pink when you left the hospital, they should be improving and not becoming more red  Warmth along the incision line(s)  Increased body temperature / Fever - greater than 101 degrees Fahrenheit  If you have diabetes and your blood sugar levels begin to vary     Julie Haase RN  CTS Nurse Navigator [phone]

## 2025-07-02 NOTE — DISCHARGE SUMMARY
[facility] - Cardiology Stepdown  Cardiothoracic Surgery  Discharge Summary      Patient Name: Veronica Duque  MRN: [id number]  Admission Date: 6/26/2025  Hospital Length of Stay: 6 days  Discharge Date and Time: 07/02/2025 9:25 AM  Attending Physician: Lamont Flores MD   Discharging Provider: Chanell Kurtz NP  Primary Care Provider: Juju Wallace MD    HPI:    Veronica Duque is a 69yoF with a hx of HTN, HLD, CVA with no residual deficits, PAD s/p R SFA stenting by Dr. Valle (non-obstructive L CFA/SFA) who was admitted for NSTEMI. She has no known coronary disease. She said it occered when she woke to go to the bathroom yesterday morning and was a heavy pressure in the substernal region, radiating to the neck/shoulder and associated with nausea. It was coming and going until EMS got there where it resolved with NTG. She has been hypertensive. She has never had this pain before. Her hsTn peaked to 700. EKG shows SR with RBBB, non-specific t wave inversions likely related to the bundle and similar to prior. LHC significant for RCA 90, LAD 90.  The risks and benefits were explained and informed consent was obtained.     Procedure(s) (LRB):  CORONARY ARTERY BYPASS GRAFT (CABG) (N/A)  SURGICAL PROCUREMENT, VEIN, ENDOSCOPIC (Left)      Indwelling Lines/Drains at time of discharge:   Lines/Drains/Airways       Drain  Duration             Female External Urinary Catheter w/ Suction 06/29/25 1737 2 days                  Hospital Course: On 6/26/2025 the patient was taken to the Operating Room for the above stated procedure. Please see the previously dictated operative report for complete details. Postoperatively, the patient was taken from the  Operating Room to the ICU where the vital signs were monitored and pain was kept under control. The patient was weaned from the drips and extubated in the ICU per protocol. Once hemodynamically stable, the patient was transferred to the Cardiac Step-Down floor for  continued strengthening and ambulation. On postoperative day 6, the patient was ready for discharge to home. At the time of discharge, the patient was ambulating unassisted. Pain was well controlled with oral analgesics and the patient was tolerating the diet.    Of note, while admitted, the patient experienced post operative atrial fibrillation.  They were started on amiodarone with successful conversion to NSR. The patient will be discharged home on amiodarone, anticoagulation not indicated in this patient.     MOBILITY AND ACTIVITY: As tolerated. Patient may shower. No heavy lifting of greater than 5 pounds and no driving.     DIET: A Cardiac diet      WOUND CARE INSTRUCTIONS: Check for redness, swelling and drainage around the  incision or wound. Patient is to call for any obvious bleeding, drainage, pus from the wound, unusual problems or difficulties or temperature of greater than 101   degrees.     FOLLOWUP: Follow up with Dr. Flores in approximately 3 weeks. Prior to this  appointment, the patient will have a chest x-ray and EKG.     ACE/ARB: Patient not placed on Ace-Inhibitor at the time of discharge due to potential for hypotension   Statin: Zetia  Beta Blocker: BB      DISCHARGE CONDITION: At the time of discharge, the patient was in sinus rhythm and afebrile with stable vital signs.       Goals of Care Treatment Preferences:  Code Status: Full Code    Living Will: Yes              Consults (From admission, onward)          Status Ordering Provider     Consult to Endocrinology  Once        Provider:  (Not yet assigned)    Completed BRAN SALMON     Consult Case Management/Social Work  Once        Provider:  (Not yet assigned)    Acknowledged BRAN SALMON              Pending Diagnostic Studies:       None            No new Assessment & Plan notes have been filed under this hospital service since the last note was generated.  Service: Cardiothoracic Surgery    Final Active Diagnoses:    Diagnosis Date  Noted POA    PRINCIPAL PROBLEM:  Coronary artery disease [I25.10] 06/26/2025 Yes    S/P CABG (coronary artery bypass graft) [Z95.1] 07/01/2025 Not Applicable    Post-operative pain [G89.18] 06/26/2025 No    Transient hyperglycemia post procedure [R73.9] 06/26/2025 No    S/P chest tube placement [Z93.8] 06/26/2025 Not Applicable    Mixed hyperlipidemia [E78.2] 04/14/2025 Yes    Statin intolerance [Z78.9] 04/14/2025 Yes    Primary hypertension [I10] 08/12/2024 Yes    Anemia [D64.9] 08/12/2024 Yes    PAD (peripheral artery disease) [I73.9] 07/13/2023 Yes      Problems Resolved During this Admission:      Discharged Condition: stable    Disposition: Home or Self Care    Follow Up:   Follow-up Information       ProviderThanh .    Specialty: Internal Medicine  Contact information:  [address]  [facility] [address]                           Patient Instructions:      Ambulatory referral/consult to Home Health   Standing Status: Future   Referral Priority: Routine Referral Type: Home Health   Referral Reason: Specialty Services Required   Requested Specialty: Home Health Services   Number of Visits Requested: 1     Ambulatory referral/consult to [facility] Acute Care at Home   Standing Status: Future   Referral Priority: Routine Referral Type: Consultation   Referred to Provider: THANH PROVIDER Requested Specialty: Internal Medicine   Number of Visits Requested: 1     Medications:  Reconciled Home Medications:      Medication List        START taking these medications      acetaminophen 500 MG tablet  Commonly known as: TYLENOL  Take 2 tablets (1,000 mg total) by mouth every 8 (eight) hours. for 10 days     amiodarone 200 MG Tab  Commonly known as: PACERONE  Take 2 tablets (400 mg total) by mouth 2 (two) times daily for 10 days, THEN 1 tablet (200 mg total) once daily.  Start taking on: July 2, 2025     furosemide 20 MG tablet  Commonly known as: LASIX  Take 1 tablet (20 mg total) by mouth 2 (two)  times daily.     oxyCODONE 5 MG immediate release tablet  Commonly known as: ROXICODONE  Take 1 tablet (5 mg total) by mouth every 4 (four) hours as needed for Pain.     polyethylene glycol 17 gram Pwpk  Commonly known as: GLYCOLAX  Take 17 g by mouth once daily. for 10 days     potassium chloride SA 20 MEQ tablet  Commonly known as: K-DUR,KLOR-CON  Take 1 tablet (20 mEq total) by mouth 2 (two) times daily.     senna 8.6 mg tablet  Commonly known as: SENOKOT  Take 1 tablet by mouth every 12 (twelve) hours.            CONTINUE taking these medications      ALPRAZolam 0.5 MG tablet  Commonly known as: XANAX  Take 0.5 mg by mouth 2 (two) times daily.     aspirin 81 MG EC tablet  Commonly known as: ECOTRIN  Take 81 mg by mouth once daily.     ezetimibe 10 mg tablet  Commonly known as: ZETIA  TAKE ONE TABLET BY MOUTH ONCE DAILY     famotidine 20 MG tablet  Commonly known as: PEPCID  Take 1 tablet (20 mg total) by mouth 2 (two) times daily.     fluticasone propionate 50 mcg/actuation nasal spray  Commonly known as: FLONASE  1 spray (50 mcg total) by Each Nostril route once daily.     levocetirizine 5 MG tablet  Commonly known as: XYZAL  Take 1 tablet (5 mg total) by mouth every evening.     magnesium oxide 400 mg (241.3 mg magnesium) tablet  Commonly known as: MAG-OX  Take 400 mg by mouth once daily. OTC, **pt unsure of dosage**     metoprolol tartrate 25 MG tablet  Commonly known as: LOPRESSOR  Take 1 tablet (25 mg total) by mouth 2 (two) times daily.     REPATHA SURECLICK 140 mg/mL Pnij  Generic drug: evolocumab  Inject 1 mL (140 mg total) into the skin every 14 (fourteen) days.            STOP taking these medications      amLODIPine 5 MG tablet  Commonly known as: NORVASC     losartan-hydrochlorothiazide 100-25 mg 100-25 mg per tablet  Commonly known as: HYZAAR     nitroGLYCERIN 0.3 MG SL tablet  Commonly known as: NITROSTAT     spironolactone 25 MG tablet  Commonly known as: ALDACTONE            ASK your doctor about  these medications      diclofenac sodium 1 % Gel  Commonly known as: VOLTAREN  Apply 2 g topically once daily.     estradioL 0.01 % (0.1 mg/gram) vaginal cream  Commonly known as: ESTRACE  INSERT ONE GRAM VAGINALLY MONDAY, WEDNESDAY AND FRIDAY     valACYclovir 500 MG tablet  Commonly known as: VALTREX  Take 500 mg by mouth daily as needed.            Time spent on the discharge of patient: 36 minutes    Chanell Kurtz NP  Cardiothoracic Surgery  [facility] - [facility]

## 2025-07-02 NOTE — PLAN OF CARE
CHW met with patient/family at bedside. Patient experience rounding completed and reviewed the following.    Do you know your discharge plan? Yes or No  If yes, what is the plan? (Home, Home Health, Rehab, SNF, LTAC, or Other) Yes Home    Have you dicussed your needs and preferences with your SW/CM? Yes or No Yes Home    If you are discharging home do you have help at home? Yes or No Yes (spouse)    Do you think you will need additional help at home at discharge? Yes or No  No    Do you currently have difficulty keeping up with bills, affording medicine or buying food? Yes or No No    Assigned SW/Cm notified of any patient/family needs or concerns. Appropriate resources provided to address patient needs. Amber Prince, HUNG, RSW, BSW  Case Management  [phone]

## 2025-07-03 ENCOUNTER — PATIENT OUTREACH (OUTPATIENT)
Dept: ADMINISTRATIVE | Facility: CLINIC | Age: 70
End: 2025-07-03
Payer: MEDICARE

## 2025-07-03 ENCOUNTER — TELEPHONE (OUTPATIENT)
Dept: PRIMARY CARE CLINIC | Facility: CLINIC | Age: 70
End: 2025-07-03
Payer: MEDICARE

## 2025-07-03 NOTE — PROGRESS NOTES
C3 nurse attempted to contact patient for a TCC post hospital discharge follow-up call. The patient's daughter, Adriana, declined call at this time.

## 2025-07-03 NOTE — TELEPHONE ENCOUNTER
Call to patient for follow-up with hospital/ER stay 2/2 CABG.     Discussed medications, and follow-up plan noted in discharge summary. Reviewed medications and purpose.     Reviewed referrals and assessed appointment status. Patient has [facility] schedule for [facility] and [facility] to see patient in home for PT/OT. Advised to get all contact number  of staff members in home in case questions later.       Assessed needs and concerns post hospital, including transportation. Patient will have a virtual visit for HFU. Family in home to assist with care.     Scheduled HFU visit 07/07/2025    Reviewed all new meds, continued medications, follow up appointments and signs and symptoms to report to PCP or seek emergency medical care.     Reviewed Home Care for CABG:    Have someone stay with you in your home for at least the first 1 to 2 weeks after surgery.  Learn how to check your pulse, and check it every day.  Do the breathing exercises you learned in the hospital for 4 to 6 weeks.  Shower every day, washing the incision gently with soap and water.  Reduce the risk factors for heart disease by stopping smoking, controlling high blood pressure, improving cholesterol levels, exercising regularly, reducing weight if necessary, and reducing stress.  Take medicines as directed.  Take care of your mental health.  Call your doctor or cardiologist if you have any concerns.  Encouraged incentive spirometer every 2 hours, up to 10 deep breath slow.     Patient worried about spouse's healthcare and follow-up with physician regarding DM. Discussed spouse's relationship with PCP and need for discussion of healthcare and treatment. Appt scheduled for spouse.. Advised to try and focus on own healthcare at this point.    Pt verbalized understanding to all instructions and education. Pt denies any complaints or concerns at this time

## 2025-07-03 NOTE — PROGRESS NOTES
C3 nurse attempted to contact Veronica Duque for a TCC post hospital discharge follow up call. No answer. Left voicemail with callback information. The patient has a scheduled HOSFU appointment with Juju Wallace MD on 07/07/2025 @ 1300.

## 2025-07-04 ENCOUNTER — PATIENT MESSAGE (OUTPATIENT)
Dept: CARDIOTHORACIC SURGERY | Facility: CLINIC | Age: 70
End: 2025-07-04
Payer: MEDICARE

## 2025-07-07 ENCOUNTER — TELEPHONE (OUTPATIENT)
Dept: CARDIOTHORACIC SURGERY | Facility: CLINIC | Age: 70
End: 2025-07-07
Payer: MEDICARE

## 2025-07-07 ENCOUNTER — TELEPHONE (OUTPATIENT)
Dept: PRIMARY CARE CLINIC | Facility: CLINIC | Age: 70
End: 2025-07-07

## 2025-07-07 ENCOUNTER — PATIENT MESSAGE (OUTPATIENT)
Dept: PODIATRY | Facility: CLINIC | Age: 70
End: 2025-07-07
Payer: MEDICARE

## 2025-07-07 ENCOUNTER — OFFICE VISIT (OUTPATIENT)
Dept: PRIMARY CARE CLINIC | Facility: CLINIC | Age: 70
End: 2025-07-07
Payer: MEDICARE

## 2025-07-07 DIAGNOSIS — I10 PRIMARY HYPERTENSION: ICD-10-CM

## 2025-07-07 DIAGNOSIS — I48.0 PAROXYSMAL ATRIAL FIBRILLATION: ICD-10-CM

## 2025-07-07 DIAGNOSIS — Z95.1 S/P CABG (CORONARY ARTERY BYPASS GRAFT): Primary | ICD-10-CM

## 2025-07-07 PROCEDURE — 98005 SYNCH AUDIO-VIDEO EST LOW 20: CPT | Mod: HCNC,95,, | Performed by: STUDENT IN AN ORGANIZED HEALTH CARE EDUCATION/TRAINING PROGRAM

## 2025-07-07 PROCEDURE — 3044F HG A1C LEVEL LT 7.0%: CPT | Mod: CPTII,HCNC,95, | Performed by: STUDENT IN AN ORGANIZED HEALTH CARE EDUCATION/TRAINING PROGRAM

## 2025-07-07 PROCEDURE — 1157F ADVNC CARE PLAN IN RCRD: CPT | Mod: CPTII,HCNC,95, | Performed by: STUDENT IN AN ORGANIZED HEALTH CARE EDUCATION/TRAINING PROGRAM

## 2025-07-07 NOTE — ASSESSMENT & PLAN NOTE
BP slightly higher over last few days. She is on digital medicine. Will keep monitoring it and see if it continues to stay elevated. Losartan-hctz 100/25 and amlo 5 mg was stopped after surgery. Will see if we need to restart one of the medications.

## 2025-07-07 NOTE — TELEPHONE ENCOUNTER
Called pt following hospital discharge.       Left message with call back number.    Julie Haase RN  [facility] Nurse Navigator [phone]

## 2025-07-07 NOTE — ASSESSMENT & PLAN NOTE
Patient will need appointment prior to refill.  Can be video visit   had a cardiac bypass x2, (RCA and LAD)surgery on June 26th 2025. Tolerated the procedure well. No pain right now. Was discharged on July 3rd. Doing the IS regularly. Denies urinary issues, fever, chills or sob. Having a mild cough. She did develop afib in the hospital. They started her on amiodarone with conversion to NSR. Did not meet criteria for anticoagulation. Has post op appt with cardiothoracic on July 30th and cardiology on sept 30th.

## 2025-07-07 NOTE — ASSESSMENT & PLAN NOTE
She did develop afib in the hospital. They started her on amiodarone with conversion to NSR. Did not meet criteria for anticoagulation. Has f/u appt with cardiology. On amiodarone and metoprolol, continue.

## 2025-07-07 NOTE — PROGRESS NOTES
Clinic Note  7/7/2025      Subjective:       Patient ID:  Veronica is a 69 y.o. female being seen for an established visit.    Chief Complaint: No chief complaint on file.    HPI  Veronica Duque is a 68 y.o.  female who presents with afib (long ago, not on anticoagulation, resolved on its own), HTN, CVA (around 2010), BL carotid artery dissection, PAD (She underwent right Femoral Endarterectomy with patch angioplasty) , Left knee OA s/p knee replacement, CABGx2 (6/26/2025, dr. molina) is here for a virtual hospital f/u visit. Did acp 2025  -had a cardiac bypass x2, (RCA and LAD)surgery on June 26th 2025. Tolerated the procedure well. No pain right now. Was discharged on July 3rd. Doing the IS regularly. Denies urinary issues, fever, chills or sob. Having a mild cough. She did develop afib in the hospital. They started her on amiodarone with conversion to NSR. Did not meet criteria for anticoagulation. Has post op appt with cardiothoracic on July 30th and cardiology on sept 30th. Pt states PT did not reach out to her yet. Looked under referrals and orders were placed today.   -BP slightly higher over last few days. She is on digital medicine. Will keep monitoring it and see if it continues to stay elevated. Losartan-hctz 100/25 and amlo 5 mg was stopped after surgery. Will see if we need to restart one of the medications.   -refuses vaccinations.     Advance Care Planning     Date: 03/12/2025    Power of   I initiated the process of voluntary advance care planning today and explained the importance of this process to the patient.  I introduced the concept of advance directives to the patient, as well. Then the patient received detailed information about the importance of designating a Health Care Power of  (HCPOA). She was also instructed to communicate with this person about their wishes for future healthcare, should she become sick and lose decision-making capacity. The patient has  previously appointed a HCPOA. After our discussion, the patient has decided to complete a HCPOA and has appointed her significant other, health care agent: Brendon Duque & health care agent number: [phone]. I encouraged her to communicate with this person about their wishes for future healthcare, should she become sick and lose decision-making capacity.      A total of 5 min was spent on advance care planning, goals of care discussion, emotional support, formulating and communicating prognosis and exploring burden/benefit of various approaches of treatment. This discussion occurred on a fully voluntary basis with the verbal consent of the patient and/or family.      Review of Systems   Constitutional:  Negative for chills and fever.   HENT:  Negative for congestion and hearing loss.    Respiratory:  Negative for shortness of breath.    Cardiovascular:  Negative for chest pain and palpitations.   Gastrointestinal:  Negative for abdominal pain, blood in stool, constipation and diarrhea.   Genitourinary:  Negative for dysuria and hematuria.   Neurological:  Negative for dizziness and headaches.       Medication List with Changes/Refills   Current Medications    ACETAMINOPHEN (TYLENOL) 500 MG TABLET    Take 2 tablets (1,000 mg total) by mouth every 8 (eight) hours. for 10 days    ALPRAZOLAM (XANAX) 0.5 MG TABLET    Take 0.5 mg by mouth 2 (two) times daily.    AMIODARONE (PACERONE) 200 MG TAB    Take 2 tablets (400 mg total) by mouth 2 (two) times daily for 10 days, THEN 1 tablet (200 mg total) once daily.    ASPIRIN (ECOTRIN) 81 MG EC TABLET    Take 81 mg by mouth once daily.    DICLOFENAC SODIUM (VOLTAREN) 1 % GEL    Apply 2 g topically once daily.    ESTRADIOL (ESTRACE) 0.01 % (0.1 MG/GRAM) VAGINAL CREAM    INSERT ONE GRAM VAGINALLY MONDAY, WEDNESDAY AND FRIDAY    EVOLOCUMAB (REPATHA SURECLICK) 140 MG/ML PNIJ    Inject 1 mL (140 mg total) into the skin every 14 (fourteen) days.    EZETIMIBE (ZETIA) 10 MG TABLET   "  TAKE ONE TABLET BY MOUTH ONCE DAILY    FAMOTIDINE (PEPCID) 20 MG TABLET    Take 1 tablet (20 mg total) by mouth 2 (two) times daily.    FLUTICASONE PROPIONATE (FLONASE) 50 MCG/ACTUATION NASAL SPRAY    1 spray (50 mcg total) by Each Nostril route once daily.    FUROSEMIDE (LASIX) 20 MG TABLET    Take 1 tablet (20 mg total) by mouth 2 (two) times daily.    LEVOCETIRIZINE (XYZAL) 5 MG TABLET    Take 1 tablet (5 mg total) by mouth every evening.    MAGNESIUM OXIDE (MAG-OX) 400 MG (241.3 MG MAGNESIUM) TABLET    Take 400 mg by mouth once daily. OTC, **pt unsure of dosage**    METOPROLOL TARTRATE (LOPRESSOR) 25 MG TABLET    Take 1 tablet (25 mg total) by mouth 2 (two) times daily.    OXYCODONE (ROXICODONE) 5 MG IMMEDIATE RELEASE TABLET    Take 1 tablet (5 mg total) by mouth every 4 (four) hours as needed for Pain.    POLYETHYLENE GLYCOL (GLYCOLAX) 17 GRAM PWPK    Take 17 g by mouth once daily. for 10 days    POTASSIUM CHLORIDE SA (K-DUR,KLOR-CON) 20 MEQ TABLET    Take 1 tablet (20 mEq total) by mouth 2 (two) times daily.    SENNA (SENOKOT) 8.6 MG TABLET    Take 1 tablet by mouth every 12 (twelve) hours.    VALACYCLOVIR (VALTREX) 500 MG TABLET    Take 500 mg by mouth daily as needed.            Objective:      There were no vitals taken for this visit.  Estimated body mass index is 26.89 kg/m² as calculated from the following:    Height as of 6/30/25: 5' 5" (1.651 m).    Weight as of 6/30/25: 73.3 kg (161 lb 9.6 oz).  Physical Exam  Constitutional:       General: She is not in acute distress.     Appearance: Normal appearance.   Eyes:      Conjunctiva/sclera: Conjunctivae normal.   Pulmonary:      Effort: Pulmonary effort is normal. No respiratory distress.   Neurological:      Mental Status: She is alert and oriented to person, place, and time.   Psychiatric:         Mood and Affect: Mood normal.         Behavior: Behavior normal.           Assessment and Plan:     1. S/P CABG (coronary artery bypass graft)  Assessment & " Plan:  had a cardiac bypass x2, (RCA and LAD)surgery on June 26th 2025. Tolerated the procedure well. No pain right now. Was discharged on July 3rd. Doing the IS regularly. Denies urinary issues, fever, chills or sob. Having a mild cough. She did develop afib in the hospital. They started her on amiodarone with conversion to NSR. Did not meet criteria for anticoagulation. Has post op appt with cardiothoracic on July 30th and cardiology on sept 30th.       2. Paroxysmal atrial fibrillation  Assessment & Plan:  She did develop afib in the hospital. They started her on amiodarone with conversion to NSR. Did not meet criteria for anticoagulation. Has f/u appt with cardiology. On amiodarone and metoprolol, continue.       3. Primary hypertension  Overview:   HTN was managed with losartan-HCTZ 100-25mg qd and amlodipine 5mg qd until bypass surgery. Dced at discharge.    Assessment & Plan:  BP slightly higher over last few days. She is on digital medicine. Will keep monitoring it and see if it continues to stay elevated. Losartan-hctz 100/25 and amlo 5 mg was stopped after surgery. Will see if we need to restart one of the medications.            Follow Up:   Follow up in about 4 weeks (around 8/4/2025), or any day and any time is fine., for Virtual Visit.    Other Orders Placed This Visit:  No orders of the defined types were placed in this encounter.        Juju Soto LOS data to display  This includes face to face time and non-face to face time preparing to see the patient (eg, review of tests), obtaining and/or reviewing separately obtained history, documenting clinical information in the electronic or other health record, independently interpreting results and communicating results to the patient/family/caregiver, or care coordinator.     The patient location is: [place]  The chief complaint leading to consultation is: hospital follow up, CABG    Visit type: audiovisual    Face to Face time with patient:  21 mins  21 minutes of total time spent on the encounter, which includes face to face time and non-face to face time preparing to see the patient (eg, review of tests), Obtaining and/or reviewing separately obtained history, Documenting clinical information in the electronic or other health record, Independently interpreting results (not separately reported) and communicating results to the patient/family/caregiver, or Care coordination (not separately reported).         Each patient to whom he or she provides medical services by telemedicine is:  (1) informed of the relationship between the physician and patient and the respective role of any other health care provider with respect to management of the patient; and (2) notified that he or she may decline to receive medical services by telemedicine and may withdraw from such care at any time.

## 2025-07-09 ENCOUNTER — TELEPHONE (OUTPATIENT)
Dept: PODIATRY | Facility: CLINIC | Age: 70
End: 2025-07-09
Payer: MEDICARE

## 2025-07-09 NOTE — TELEPHONE ENCOUNTER
This is a confirmation of your upcoming appointment with our podiatry department. We look forward to seeing you on 7/15 1:15 pm appointment with Dr. De La Paz [address] [place] [address] [address] . Please park behind the second grey building.

## 2025-07-10 ENCOUNTER — TELEPHONE (OUTPATIENT)
Dept: PHARMACY | Facility: CLINIC | Age: 70
End: 2025-07-10
Payer: MEDICARE

## 2025-07-10 NOTE — TELEPHONE ENCOUNTER
[facility]/Population Health Chart Review & Patient Outreach Details For Medication Adherence Project    Reason for Outreach Encounter: 3rd Party payor non-compliance report ([employer], [employer], [employer], etc)  2.  Patient Outreach Method: Reviewed patient chart   3.   Medication in question:  Losartan-hydrochlorothiazide 100-25mg     Losartan-HCTZ dc'ed 7/2/25    4.  Reviewed and or Updates Made To: Patient Chart  5. Outreach Outcomes and/or actions taken: Medication discontinued  Additional Notes:

## 2025-07-15 ENCOUNTER — TELEPHONE (OUTPATIENT)
Dept: PODIATRY | Facility: CLINIC | Age: 70
End: 2025-07-15
Payer: MEDICARE

## 2025-07-15 ENCOUNTER — PATIENT MESSAGE (OUTPATIENT)
Dept: PODIATRY | Facility: CLINIC | Age: 70
End: 2025-07-15
Payer: MEDICARE

## 2025-07-15 NOTE — TELEPHONE ENCOUNTER
Call attempt made to missed reschedule 7/15 appt date due pt no show.  wcall back number of [phone] for further assistance. Portal message was sent and prior appt was cancelled.

## 2025-07-19 ENCOUNTER — PATIENT MESSAGE (OUTPATIENT)
Dept: CARDIOTHORACIC SURGERY | Facility: CLINIC | Age: 70
End: 2025-07-19
Payer: MEDICARE

## 2025-07-29 ENCOUNTER — PATIENT MESSAGE (OUTPATIENT)
Dept: CARDIOLOGY | Facility: CLINIC | Age: 70
End: 2025-07-29
Payer: MEDICARE

## 2025-07-29 ENCOUNTER — PATIENT MESSAGE (OUTPATIENT)
Dept: CARDIOTHORACIC SURGERY | Facility: CLINIC | Age: 70
End: 2025-07-29
Payer: MEDICARE

## 2025-07-30 ENCOUNTER — OFFICE VISIT (OUTPATIENT)
Dept: CARDIOTHORACIC SURGERY | Facility: CLINIC | Age: 70
End: 2025-07-30
Attending: STUDENT IN AN ORGANIZED HEALTH CARE EDUCATION/TRAINING PROGRAM
Payer: MEDICARE

## 2025-07-30 ENCOUNTER — HOSPITAL ENCOUNTER (OUTPATIENT)
Dept: RADIOLOGY | Facility: HOSPITAL | Age: 70
Discharge: HOME | End: 2025-07-30
Attending: STUDENT IN AN ORGANIZED HEALTH CARE EDUCATION/TRAINING PROGRAM
Payer: MEDICARE

## 2025-07-30 ENCOUNTER — HOSPITAL ENCOUNTER (OUTPATIENT)
Dept: CARDIOLOGY | Facility: CLINIC | Age: 70
Discharge: HOME | End: 2025-07-30
Attending: STUDENT IN AN ORGANIZED HEALTH CARE EDUCATION/TRAINING PROGRAM
Payer: MEDICARE

## 2025-07-30 VITALS
OXYGEN SATURATION: 99 % | HEIGHT: 65 IN | WEIGHT: 159.81 LBS | SYSTOLIC BLOOD PRESSURE: 189 MMHG | DIASTOLIC BLOOD PRESSURE: 95 MMHG | HEART RATE: 64 BPM | BODY MASS INDEX: 26.63 KG/M2

## 2025-07-30 DIAGNOSIS — Z95.1 S/P CABG (CORONARY ARTERY BYPASS GRAFT): Primary | ICD-10-CM

## 2025-07-30 DIAGNOSIS — Z98.890 POST-OPERATIVE STATE: ICD-10-CM

## 2025-07-30 LAB
OHS QRS DURATION: 142 MS
OHS QTC CALCULATION: 527 MS

## 2025-07-30 PROCEDURE — 71046 X-RAY EXAM CHEST 2 VIEWS: CPT | Mod: TC,HCNC

## 2025-07-30 PROCEDURE — 4010F ACE/ARB THERAPY RXD/TAKEN: CPT | Mod: CPTII,HCNC,S$GLB, | Performed by: STUDENT IN AN ORGANIZED HEALTH CARE EDUCATION/TRAINING PROGRAM

## 2025-07-30 PROCEDURE — 3044F HG A1C LEVEL LT 7.0%: CPT | Mod: CPTII,HCNC,S$GLB, | Performed by: STUDENT IN AN ORGANIZED HEALTH CARE EDUCATION/TRAINING PROGRAM

## 2025-07-30 PROCEDURE — 99024 POSTOP FOLLOW-UP VISIT: CPT | Mod: HCNC,S$GLB,, | Performed by: STUDENT IN AN ORGANIZED HEALTH CARE EDUCATION/TRAINING PROGRAM

## 2025-07-30 PROCEDURE — 3077F SYST BP >= 140 MM HG: CPT | Mod: CPTII,HCNC,S$GLB, | Performed by: STUDENT IN AN ORGANIZED HEALTH CARE EDUCATION/TRAINING PROGRAM

## 2025-07-30 PROCEDURE — 99999 PR PBB SHADOW E&M-EST. PATIENT-LVL III: CPT | Mod: PBBFAC,HCNC,, | Performed by: STUDENT IN AN ORGANIZED HEALTH CARE EDUCATION/TRAINING PROGRAM

## 2025-07-30 PROCEDURE — 1157F ADVNC CARE PLAN IN RCRD: CPT | Mod: CPTII,HCNC,S$GLB, | Performed by: STUDENT IN AN ORGANIZED HEALTH CARE EDUCATION/TRAINING PROGRAM

## 2025-07-30 PROCEDURE — 1126F AMNT PAIN NOTED NONE PRSNT: CPT | Mod: CPTII,HCNC,S$GLB, | Performed by: STUDENT IN AN ORGANIZED HEALTH CARE EDUCATION/TRAINING PROGRAM

## 2025-07-30 PROCEDURE — 3079F DIAST BP 80-89 MM HG: CPT | Mod: CPTII,HCNC,S$GLB, | Performed by: STUDENT IN AN ORGANIZED HEALTH CARE EDUCATION/TRAINING PROGRAM

## 2025-07-30 RX ORDER — LOSARTAN POTASSIUM 25 MG/1
25 TABLET ORAL DAILY
Qty: 90 TABLET | Refills: 3 | Status: SHIPPED | OUTPATIENT
Start: 2025-07-30 | End: 2026-07-30

## 2025-08-01 ENCOUNTER — TELEPHONE (OUTPATIENT)
Dept: CARDIAC REHAB | Facility: CLINIC | Age: 70
End: 2025-08-01
Payer: MEDICARE

## 2025-08-01 ENCOUNTER — PATIENT MESSAGE (OUTPATIENT)
Dept: CARDIOTHORACIC SURGERY | Facility: CLINIC | Age: 70
End: 2025-08-01
Payer: MEDICARE

## 2025-08-01 ENCOUNTER — TELEPHONE (OUTPATIENT)
Dept: CARDIOTHORACIC SURGERY | Facility: CLINIC | Age: 70
End: 2025-08-01
Payer: MEDICARE

## 2025-08-01 DIAGNOSIS — Z95.1 S/P CABG (CORONARY ARTERY BYPASS GRAFT): Primary | ICD-10-CM

## 2025-08-01 NOTE — TELEPHONE ENCOUNTER
----- Message from Susan sent at 8/1/2025 10:06 AM CDT -----  Patient asking for a call back @ [phone]. Thanks

## 2025-08-01 NOTE — TELEPHONE ENCOUNTER
Cardiac rehab phase 2 orders placed and sent to [facility]. Called patient and notified her orders have been placed and they will call her to set up sessions with her directly.    Julie Haase RN  [facility] Nurse Navigator [phone]

## 2025-08-04 ENCOUNTER — TELEPHONE (OUTPATIENT)
Dept: CARDIAC REHAB | Facility: CLINIC | Age: 70
End: 2025-08-04
Payer: MEDICARE

## 2025-08-04 NOTE — TELEPHONE ENCOUNTER
Returned call.  Will contact patient for scheduling once insurance authorization is back.  Can have CPX done on or after 8/7/2025.  Heather Fontana RN  Cardiac Rehab Nurse

## 2025-08-04 NOTE — TELEPHONE ENCOUNTER
Order was received for Phase II cardiac rehab.  Insurance information sent for authorization.  Patient cannot have CPX done until 9/6/2025 which is 6 weeks after CABG.  Heather Fontana RN  Cardiac Rehab Nurse

## 2025-08-04 NOTE — TELEPHONE ENCOUNTER
----- Message from Gwendolyn sent at 8/4/2025 10:46 AM CDT -----  Regarding: Referral appts  Pt [phone] calling in ref to schedules that she said she spoke to someone about earlier.    Thanks

## 2025-08-06 ENCOUNTER — OFFICE VISIT (OUTPATIENT)
Dept: PRIMARY CARE CLINIC | Facility: CLINIC | Age: 70
End: 2025-08-06
Payer: MEDICARE

## 2025-08-06 ENCOUNTER — TELEPHONE (OUTPATIENT)
Dept: PRIMARY CARE CLINIC | Facility: CLINIC | Age: 70
End: 2025-08-06
Payer: MEDICARE

## 2025-08-06 DIAGNOSIS — I10 PRIMARY HYPERTENSION: Primary | ICD-10-CM

## 2025-08-06 PROCEDURE — 1160F RVW MEDS BY RX/DR IN RCRD: CPT | Mod: CPTII,HCNC,95, | Performed by: STUDENT IN AN ORGANIZED HEALTH CARE EDUCATION/TRAINING PROGRAM

## 2025-08-06 PROCEDURE — 3044F HG A1C LEVEL LT 7.0%: CPT | Mod: CPTII,HCNC,95, | Performed by: STUDENT IN AN ORGANIZED HEALTH CARE EDUCATION/TRAINING PROGRAM

## 2025-08-06 PROCEDURE — 1157F ADVNC CARE PLAN IN RCRD: CPT | Mod: CPTII,HCNC,95, | Performed by: STUDENT IN AN ORGANIZED HEALTH CARE EDUCATION/TRAINING PROGRAM

## 2025-08-06 PROCEDURE — 98005 SYNCH AUDIO-VIDEO EST LOW 20: CPT | Mod: HCNC,95,, | Performed by: STUDENT IN AN ORGANIZED HEALTH CARE EDUCATION/TRAINING PROGRAM

## 2025-08-06 PROCEDURE — 1159F MED LIST DOCD IN RCRD: CPT | Mod: CPTII,HCNC,95, | Performed by: STUDENT IN AN ORGANIZED HEALTH CARE EDUCATION/TRAINING PROGRAM

## 2025-08-06 PROCEDURE — 4010F ACE/ARB THERAPY RXD/TAKEN: CPT | Mod: CPTII,HCNC,95, | Performed by: STUDENT IN AN ORGANIZED HEALTH CARE EDUCATION/TRAINING PROGRAM

## 2025-08-06 RX ORDER — HYDROCHLOROTHIAZIDE 25 MG/1
25 TABLET ORAL DAILY
Qty: 30 TABLET | Refills: 11 | Status: SHIPPED | OUTPATIENT
Start: 2025-08-06 | End: 2026-08-06

## 2025-08-06 NOTE — PROGRESS NOTES
Clinic Note  8/6/2025      Subjective:       Patient ID:  Veronica is a 69 y.o. female being seen for an established visit.    Chief Complaint: No chief complaint on file.    HPI  Veronica Duque is a 68 y.o.  female who presents with afib (long ago, not on anticoagulation, resolved on its own), HTN, CVA (around 2010), BL carotid artery dissection, PAD (She underwent right Femoral Endarterectomy with patch angioplasty) , Left knee OA s/p knee replacement, CABGx2 (6/26/2025, dr. molina) is here for a virtual hospital f/u visit. Did acp 2025  -pt states she is doing well. Pain is much better. Cardiorehab will schedule appt with her. No issues. Had f/u appt with cardiothoracic on July 30th. He said to f/u with cardiology in the future  -BP has been elevated. Having lower ext edema. She used to be on Losartan-hctz 100/25 and amlo 5 mg  which was stopped after surgery. She was restarted on losartan 25 mg. Since she is having edema, will restart hctz 25mg  -se is wondering if the pneumatic compression devices can be ordered for lower ext edema   -refuses vaccinations.           Review of Systems   Constitutional:  Negative for chills and fever.   HENT:  Negative for congestion and hearing loss.    Respiratory:  Negative for shortness of breath.    Cardiovascular:  Negative for chest pain and palpitations.   Gastrointestinal:  Negative for abdominal pain, blood in stool, constipation and diarrhea.   Genitourinary:  Negative for dysuria and hematuria.   Musculoskeletal:  Negative for neck pain.   Neurological:  Negative for dizziness and headaches.       Medication List with Changes/Refills   New Medications    HYDROCHLOROTHIAZIDE (HYDRODIURIL) 25 MG TABLET    Take 1 tablet (25 mg total) by mouth once daily.   Current Medications    ALPRAZOLAM (XANAX) 0.5 MG TABLET    Take 0.5 mg by mouth 2 (two) times daily.    ASPIRIN (ECOTRIN) 81 MG EC TABLET    Take 81 mg by mouth once daily.    DICLOFENAC SODIUM  "(VOLTAREN) 1 % GEL    Apply 2 g topically once daily.    ESTRADIOL (ESTRACE) 0.01 % (0.1 MG/GRAM) VAGINAL CREAM    INSERT ONE GRAM VAGINALLY MONDAY, WEDNESDAY AND FRIDAY    EVOLOCUMAB (REPATHA SURECLICK) 140 MG/ML PNIJ    Inject 1 mL (140 mg total) into the skin every 14 (fourteen) days.    EZETIMIBE (ZETIA) 10 MG TABLET    TAKE ONE TABLET BY MOUTH ONCE DAILY    FLUTICASONE PROPIONATE (FLONASE) 50 MCG/ACTUATION NASAL SPRAY    1 spray (50 mcg total) by Each Nostril route once daily.    LEVOCETIRIZINE (XYZAL) 5 MG TABLET    Take 1 tablet (5 mg total) by mouth every evening.    LOSARTAN (COZAAR) 25 MG TABLET    Take 1 tablet (25 mg total) by mouth once daily.    MAGNESIUM OXIDE (MAG-OX) 400 MG (241.3 MG MAGNESIUM) TABLET    Take 400 mg by mouth once daily. OTC, **pt unsure of dosage**    METOPROLOL TARTRATE (LOPRESSOR) 25 MG TABLET    Take 1 tablet (25 mg total) by mouth 2 (two) times daily.    VALACYCLOVIR (VALTREX) 500 MG TABLET    Take 500 mg by mouth daily as needed.            Objective:      There were no vitals taken for this visit.  Estimated body mass index is 26.6 kg/m² as calculated from the following:    Height as of 7/30/25: 5' 5" (1.651 m).    Weight as of 7/30/25: 72.5 kg (159 lb 13.3 oz).  Physical Exam  Constitutional:       General: She is not in acute distress.     Appearance: Normal appearance.   Eyes:      Conjunctiva/sclera: Conjunctivae normal.   Pulmonary:      Effort: Pulmonary effort is normal. No respiratory distress.   Neurological:      Mental Status: She is alert and oriented to person, place, and time.   Psychiatric:         Mood and Affect: Mood normal.         Behavior: Behavior normal.         Assessment and Plan:     1. Primary hypertension  Assessment & Plan:  BP has been elevated. Having lower ext edema. She used to be on Losartan-hctz 100/25 and amlo 5 mg  which was stopped after surgery. She was restarted on losartan 25 mg. Since she is having edema, will restart hctz 25mg. On " digital medicine. Will continue to monitor. Will check bmp in 2 weeks.     Orders:  -     Basic Metabolic Panel; Future; Expected date: 08/20/2025    Other orders  -     hydroCHLOROthiazide (HYDRODIURIL) 25 MG tablet; Take 1 tablet (25 mg total) by mouth once daily.  Dispense: 30 tablet; Refill: 11         Follow Up:   Follow up in about 4 weeks (around 9/3/2025) for not on a monday. morning preferred. pls schedule bmp in 2 weeks. call her and schedule lab and appt.    Other Orders Placed This Visit:  Orders Placed This Encounter   Procedures    Basic Metabolic Panel         Juju Wallace        The patient location is: [place]  The chief complaint leading to consultation is: hospital follow up, CABG    Visit type: audiovisual    Face to Face time with patient: 13 mins  22 minutes of total time spent on the encounter, which includes face to face time and non-face to face time preparing to see the patient (eg, review of tests), Obtaining and/or reviewing separately obtained history, Documenting clinical information in the electronic or other health record, Independently interpreting results (not separately reported) and communicating results to the patient/family/caregiver, or Care coordination (not separately reported).         Each patient to whom he or she provides medical services by telemedicine is:  (1) informed of the relationship between the physician and patient and the respective role of any other health care provider with respect to management of the patient; and (2) notified that he or she may decline to receive medical services by telemedicine and may withdraw from such care at any time.

## 2025-08-06 NOTE — ASSESSMENT & PLAN NOTE
BP has been elevated. Having lower ext edema. She used to be on Losartan-hctz 100/25 and amlo 5 mg  which was stopped after surgery. She was restarted on losartan 25 mg. Since she is having edema, will restart hctz 25mg. On digital medicine. Will continue to monitor. Will check bmp in 2 weeks.

## 2025-08-17 ENCOUNTER — PATIENT MESSAGE (OUTPATIENT)
Dept: PRIMARY CARE CLINIC | Facility: CLINIC | Age: 70
End: 2025-08-17
Payer: MEDICARE

## 2025-08-17 ENCOUNTER — PATIENT MESSAGE (OUTPATIENT)
Dept: CARDIOLOGY | Facility: CLINIC | Age: 70
End: 2025-08-17
Payer: MEDICARE

## 2025-08-18 ENCOUNTER — LAB VISIT (OUTPATIENT)
Dept: LAB | Facility: HOSPITAL | Age: 70
End: 2025-08-18
Attending: STUDENT IN AN ORGANIZED HEALTH CARE EDUCATION/TRAINING PROGRAM
Payer: MEDICARE

## 2025-08-18 DIAGNOSIS — I25.10 CORONARY ARTERY DISEASE, UNSPECIFIED VESSEL OR LESION TYPE, UNSPECIFIED WHETHER ANGINA PRESENT, UNSPECIFIED WHETHER NATIVE OR TRANSPLANTED HEART: ICD-10-CM

## 2025-08-18 DIAGNOSIS — Z95.1 STATUS POST CORONARY ARTERY BYPASS GRAFT: ICD-10-CM

## 2025-08-18 DIAGNOSIS — I10 PRIMARY HYPERTENSION: ICD-10-CM

## 2025-08-18 DIAGNOSIS — Z95.1 STATUS POST CORONARY ARTERY BYPASS GRAFT: Primary | ICD-10-CM

## 2025-08-18 LAB
ANION GAP (OHS): 15 MMOL/L (ref 8–16)
BUN SERPL-MCNC: 18 MG/DL (ref 8–23)
CALCIUM SERPL-MCNC: 9.2 MG/DL (ref 8.7–10.5)
CHLORIDE SERPL-SCNC: 106 MMOL/L (ref 95–110)
CO2 SERPL-SCNC: 23 MMOL/L (ref 23–29)
CREAT SERPL-MCNC: 0.7 MG/DL (ref 0.5–1.4)
GFR SERPLBLD CREATININE-BSD FMLA CKD-EPI: >60 ML/MIN/1.73/M2
GLUCOSE SERPL-MCNC: 89 MG/DL (ref 70–110)
POTASSIUM SERPL-SCNC: 3.9 MMOL/L (ref 3.5–5.1)
SODIUM SERPL-SCNC: 144 MMOL/L (ref 136–145)

## 2025-08-18 PROCEDURE — 80048 BASIC METABOLIC PNL TOTAL CA: CPT | Mod: HCNC

## 2025-08-18 PROCEDURE — 36415 COLL VENOUS BLD VENIPUNCTURE: CPT

## (undated) DEVICE — RETRACTOR OCTOBASE INSERT HOLD

## (undated) DEVICE — TOURNIQUET SB QC DP 34X4IN

## (undated) DEVICE — SUT SILK BLK BR. 2 2-60

## (undated) DEVICE — SPONGE COTTON TRAY 4X4IN

## (undated) DEVICE — PAD CAST SPECIALIST STRL 6

## (undated) DEVICE — PENCIL SMK EVAC CONNECTOR 10FT

## (undated) DEVICE — SOL NACL 0.9% INJ 500ML BG

## (undated) DEVICE — COVER INSTR ELASTIC BAND 40X20

## (undated) DEVICE — ELECTRODE REM PLYHSV RETURN 9

## (undated) DEVICE — GUIDEWIRE STF .035X260CM ANG

## (undated) DEVICE — SOL 9P NACL IRR PIC IL

## (undated) DEVICE — CATH JACKY RADIAL 3.5 110CM

## (undated) DEVICE — BLADE SAG DUAL 18MMX1.27MMX90M

## (undated) DEVICE — PULSAVAC ZIMMER

## (undated) DEVICE — SUT SILK 2-0 SH 18IN BLACK

## (undated) DEVICE — DISPOSABLE OR TOWEL

## (undated) DEVICE — TIP YANKAUERS BULB NO VENT

## (undated) DEVICE — Device

## (undated) DEVICE — SUT PROLENE 4-0 SH BLU 36IN

## (undated) DEVICE — CATH INFINITI 4F JL4 .042X100

## (undated) DEVICE — DRESSING TRANS 4X4 TEGADERM

## (undated) DEVICE — KIT TOTAL HIP OPTIVAC

## (undated) DEVICE — CLIP SPRING 6MM

## (undated) DEVICE — DRAIN CHEST DRY SUCTION

## (undated) DEVICE — DRAPE STERI INSTRUMENT 1018

## (undated) DEVICE — SUT PROLENE 5-0 24 C-1 BL

## (undated) DEVICE — SHEATH 6FR 70CM

## (undated) DEVICE — GUIDE LAUNCHER 6FR JL 4.0

## (undated) DEVICE — SEE MEDLINE ITEM 153151

## (undated) DEVICE — TUBING SPECLM SMOKE EVAC 10MM

## (undated) DEVICE — BANDAGE ELAS SOFTWRAP ST 4X5YD

## (undated) DEVICE — CATH INFINITI JUDKINS JR4

## (undated) DEVICE — COVER BACK TABLE 72X21

## (undated) DEVICE — WIRE INTRAMYOCARDIAL TEMP

## (undated) DEVICE — GUIDEWIRE MANDRIL .018IN 40CM

## (undated) DEVICE — DRAPE CVMAX SPLIT ANES SCRN

## (undated) DEVICE — GUIDEWIRE STD .035X260CM ANG

## (undated) DEVICE — SET MICROPUNCTURE

## (undated) DEVICE — BLADE RECIP DOUBLE SIDED

## (undated) DEVICE — TRAY HEART OMC

## (undated) DEVICE — COVER PROBE US 5.5X58L NON LTX

## (undated) DEVICE — DRAPE ANGIO BRACH 38X44IN

## (undated) DEVICE — NDL HYPO REG 25G X 1 1/2

## (undated) DEVICE — SEE MEDLINE ITEM 146298

## (undated) DEVICE — DRESSING XEROFORM FOIL PK 1X8

## (undated) DEVICE — SPONGE DERMACEA 4X4IN 12PLY

## (undated) DEVICE — VISE RADIFOCUS MULTI TORQUE

## (undated) DEVICE — BANDAGE ADHESIVE PLAS STRL 1X3

## (undated) DEVICE — GLOVE BIOGEL SKINSENSE PI 6.5

## (undated) DEVICE — BLADE SAW STERNAL 5/BX

## (undated) DEVICE — SUT 3-0 12-18IN SILK

## (undated) DEVICE — APPLIER LIGACLIP SM 9.38IN

## (undated) DEVICE — CANNULA VESSEL FREE FLOW

## (undated) DEVICE — GLOVE BIOGEL PI MICRO SZ 7.5

## (undated) DEVICE — CATH THOR STND RGHT ANG 28F

## (undated) DEVICE — LOOP VESSEL BLUE MAXI

## (undated) DEVICE — COVER LIGHT HANDLE 80/CA

## (undated) DEVICE — CATH 5FR OMNIFLUSH 65CM .038

## (undated) DEVICE — SUT STRATAFIX PDS 4 FS-2 14IN

## (undated) DEVICE — SOL IRR NACL .9% 3000ML

## (undated) DEVICE — SUT VICRYL 3-0 27 SH

## (undated) DEVICE — SYR 3CC LUER LOC

## (undated) DEVICE — BLOWER MISTER

## (undated) DEVICE — DRAPE FEMORAL 82 X 124 IN

## (undated) DEVICE — NDL 21GA X1 1/2 REG BEVEL

## (undated) DEVICE — SEE MEDLINE ITEM 152530

## (undated) DEVICE — APPLICATOR CHLORAPREP ORN 26ML

## (undated) DEVICE — COVERS PROBE NR-48 STERILE

## (undated) DEVICE — SUT VICRYL PLUS 2-0 CT1 18

## (undated) DEVICE — PROBE CATH TEMP 16 FRFOLEY 400

## (undated) DEVICE — INSERTS STEALTH FIBRA SIZE 5

## (undated) DEVICE — TRAY CATH 1-LYR URIMTR 16FR

## (undated) DEVICE — GAUZE SPONGE 4X4 12PLY

## (undated) DEVICE — SUT PROLENE 6-0 C-1 30IN BL

## (undated) DEVICE — NDL SAFETY 22G X 1.5 ECLIPSE

## (undated) DEVICE — SUT 2-0 12-18IN SILK

## (undated) DEVICE — SUT PROLENE 7-0 BV-1 30

## (undated) DEVICE — SUT VICRYL+ 1 CTX 18IN VIOL

## (undated) DEVICE — KIT CUSTOM MANIFOLD

## (undated) DEVICE — PAD DEFIB CADENCE ADULT R2

## (undated) DEVICE — TRAY FOLEY 16FR INFECTION CONT

## (undated) DEVICE — GLOVE BIOGEL SKINSENSE PI 7.0

## (undated) DEVICE — BANDAGE ELAS SOFTWRAP ST 6X5YD

## (undated) DEVICE — GUIDEWIRE EMERALD .035IN 260CM

## (undated) DEVICE — SYR 30CC LUER LOCK

## (undated) DEVICE — DECANTER FLUID TRNSF WHITE 9IN

## (undated) DEVICE — DRESSING AQUACEL AG ADV 3.5X12

## (undated) DEVICE — ELECTRODE MULTI-FUNC ADULTSTAT

## (undated) DEVICE — SEALER BIPOLAR TISSUE 6.0

## (undated) DEVICE — BLADE SCALP OPHTL BEVEL STR

## (undated) DEVICE — SUT 6 18IN STEEL MONO CCS

## (undated) DEVICE — KIT COPILOT VALVE HEMO TOOL

## (undated) DEVICE — SET DECANTER MEDICHOICE

## (undated) DEVICE — PAD COLD THERAPY KNEE WRAP ON

## (undated) DEVICE — SUT VICRYL BR 1 GEN 27 CT-1

## (undated) DEVICE — SUT PROLENE 4-0 RB-1 BL MO

## (undated) DEVICE — DRAPE SLUSH WARMER WITH DISC

## (undated) DEVICE — OMNIPAQUE CONTRAST 350MG/100ML

## (undated) DEVICE — PRESSURIZER BN CEMENT NOZZLE

## (undated) DEVICE — SYS LABEL CORRECT MED

## (undated) DEVICE — SPONGE GAUZE 16PLY 4X4

## (undated) DEVICE — SUT 4-0 12-18IN SILK BLACK

## (undated) DEVICE — TUBING CNTRST INJ ADPT 72IN

## (undated) DEVICE — KIT URINARY CATH URINE METER

## (undated) DEVICE — KIT MICROINTRO 4F .018X40X7CM

## (undated) DEVICE — SOL NS 1000CC

## (undated) DEVICE — ADHESIVE DERMABOND ADVANCED

## (undated) DEVICE — STAPLER SKIN ROTATING HEAD

## (undated) DEVICE — SUT PROLENE 5-0 BL C-1 4-24

## (undated) DEVICE — KIT SAHARA DRAPE DRAW/LIFT

## (undated) DEVICE — SYR ONLY ET 20CC

## (undated) DEVICE — TOWEL OR DISP STRL BLUE 4/PK

## (undated) DEVICE — DEVICE CLOSURE MYNX GRIP 5FR

## (undated) DEVICE — CATH ULTRAVERSE 035 6X40X75

## (undated) DEVICE — NDL HYPO STD REG BVL 18GX1.5IN

## (undated) DEVICE — SUT 2/0 36IN COATED VICRYL

## (undated) DEVICE — INTRODUCER VASC RADPQ 5FRX10CM

## (undated) DEVICE — CATH ANG PIGTAIL 4FR INFINITY

## (undated) DEVICE — KIT GLIDESHEATH SLEND 6FR 10CM

## (undated) DEVICE — APPLIER CLIP LIAGCLIP 9.375IN

## (undated) DEVICE — TRAY PERIPHERAL VASCULAR OMC

## (undated) DEVICE — UNDERGLOVES BIOGEL PI SZ 6 LF

## (undated) DEVICE — CLIP MED TICALL

## (undated) DEVICE — SPONGE LAP 8X36 SURGICOUNT

## (undated) DEVICE — SYS VIRTUOSAPH PLUS EVM

## (undated) DEVICE — GOWN POLY REINF BRTH SLV XL

## (undated) DEVICE — INFLATOR ENCORE

## (undated) DEVICE — UNDERGLOVES BIOGEL PI SIZE 8.5

## (undated) DEVICE — PAD RADIOLUCENT STAT ADULT

## (undated) DEVICE — ELECTRODE MEGADYNE RETURN DUAL

## (undated) DEVICE — PLEDGET SUT SOFT 3/8X3/16X1/16

## (undated) DEVICE — SUT LIGACLIP SMALL XTRA

## (undated) DEVICE — BLADE 4IN EDGE INSULATED

## (undated) DEVICE — DRESSING AQUACEL AG 3.5X10IN

## (undated) DEVICE — SEE MEDLINE ITEM 152523

## (undated) DEVICE — GLOVE BIOGEL SKINSENSE PI 7.5

## (undated) DEVICE — CATH ALL PUR URTHL RR 20FR

## (undated) DEVICE — SUT MCRYL PLUS 4-0 PS2 27IN

## (undated) DEVICE — PAD CURAD NONADH 3X4IN

## (undated) DEVICE — TRAY CATH LAB OMC

## (undated) DEVICE — SUT 7/0 18IN PROLENE BL MO

## (undated) DEVICE — PAD RADI FEMORAL

## (undated) DEVICE — TUBING HF INSUFFLATION W/ FLTR

## (undated) DEVICE — PACK UNIVERSAL SPLIT II

## (undated) DEVICE — HEMOSTAT VASC BAND REG 24CM

## (undated) DEVICE — CATH THORACIC 28FR ST

## (undated) DEVICE — STAPLER SKIN PROXIMATE WIDE

## (undated) DEVICE — SHEATH 6FR 35CM

## (undated) DEVICE — DRESSING ADH ISLAND 3.6 X 14

## (undated) DEVICE — MASK FLYTE HOOD PEEL AWAY

## (undated) DEVICE — SEE MEDLINE ITEM 146231

## (undated) DEVICE — NDL HYPO 27G X 1 1/2

## (undated) DEVICE — SYR MARK 7 ARTERION 150ML

## (undated) DEVICE — TRANSDUCER ADULT DISP